# Patient Record
Sex: MALE | Race: OTHER | NOT HISPANIC OR LATINO | URBAN - METROPOLITAN AREA
[De-identification: names, ages, dates, MRNs, and addresses within clinical notes are randomized per-mention and may not be internally consistent; named-entity substitution may affect disease eponyms.]

---

## 2019-05-18 ENCOUNTER — EMERGENCY (EMERGENCY)
Facility: HOSPITAL | Age: 59
LOS: 1 days | Discharge: HOME | End: 2019-05-18
Admitting: EMERGENCY MEDICINE
Payer: MEDICARE

## 2019-05-18 VITALS
HEART RATE: 77 BPM | DIASTOLIC BLOOD PRESSURE: 85 MMHG | RESPIRATION RATE: 17 BRPM | OXYGEN SATURATION: 98 % | TEMPERATURE: 98 F | SYSTOLIC BLOOD PRESSURE: 125 MMHG

## 2019-05-18 VITALS
TEMPERATURE: 98 F | DIASTOLIC BLOOD PRESSURE: 78 MMHG | HEART RATE: 82 BPM | OXYGEN SATURATION: 99 % | RESPIRATION RATE: 16 BRPM | SYSTOLIC BLOOD PRESSURE: 118 MMHG
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
## 2019-05-18 DIAGNOSIS — S01.112A LACERATION WITHOUT FOREIGN BODY OF LEFT EYELID AND PERIOCULAR AREA, INITIAL ENCOUNTER: ICD-10-CM
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
## 2019-05-18 DIAGNOSIS — R55 SYNCOPE AND COLLAPSE: ICD-10-CM

## 2019-05-18 DIAGNOSIS — Z79.899 OTHER LONG TERM (CURRENT) DRUG THERAPY: ICD-10-CM
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
## 2019-05-18 LAB
ALBUMIN SERPL ELPH-MCNC: 3.4 G/DL — SIGNIFICANT CHANGE UP (ref 3.4–5)
ALP SERPL-CCNC: 79 U/L — SIGNIFICANT CHANGE UP (ref 40–120)
ALT FLD-CCNC: 34 U/L — SIGNIFICANT CHANGE UP (ref 12–42)
ANION GAP SERPL CALC-SCNC: 5 MMOL/L — LOW (ref 9–16)
AST SERPL-CCNC: 30 U/L — SIGNIFICANT CHANGE UP (ref 15–37)
BASOPHILS NFR BLD AUTO: 0.3 % — SIGNIFICANT CHANGE UP (ref 0–2)
BILIRUB SERPL-MCNC: 0.5 MG/DL — SIGNIFICANT CHANGE UP (ref 0.2–1.2)
BUN SERPL-MCNC: 28 MG/DL — HIGH (ref 7–23)
CALCIUM SERPL-MCNC: 9 MG/DL — SIGNIFICANT CHANGE UP (ref 8.5–10.5)
CHLORIDE SERPL-SCNC: 102 MMOL/L — SIGNIFICANT CHANGE UP (ref 96–108)
CO2 SERPL-SCNC: 31 MMOL/L — SIGNIFICANT CHANGE UP (ref 22–31)
CREAT SERPL-MCNC: 1.82 MG/DL — HIGH (ref 0.5–1.3)
EOSINOPHIL NFR BLD AUTO: 0.8 % — SIGNIFICANT CHANGE UP (ref 0–6)
GLUCOSE SERPL-MCNC: 104 MG/DL — HIGH (ref 70–99)
HCT VFR BLD CALC: 48.5 % — SIGNIFICANT CHANGE UP (ref 39–50)
HGB BLD-MCNC: 16.3 G/DL — SIGNIFICANT CHANGE UP (ref 13–17)
IMM GRANULOCYTES NFR BLD AUTO: 0.3 % — SIGNIFICANT CHANGE UP (ref 0–1.5)
LYMPHOCYTES # BLD AUTO: 19.1 % — SIGNIFICANT CHANGE UP (ref 13–44)
MCHC RBC-ENTMCNC: 29.6 PG — SIGNIFICANT CHANGE UP (ref 27–34)
MCHC RBC-ENTMCNC: 33.6 G/DL — SIGNIFICANT CHANGE UP (ref 32–36)
MCV RBC AUTO: 88 FL — SIGNIFICANT CHANGE UP (ref 80–100)
MONOCYTES NFR BLD AUTO: 10.7 % — SIGNIFICANT CHANGE UP (ref 2–14)
NEUTROPHILS NFR BLD AUTO: 68.8 % — SIGNIFICANT CHANGE UP (ref 43–77)
PLATELET # BLD AUTO: 149 K/UL — LOW (ref 150–400)
POTASSIUM SERPL-MCNC: 3.6 MMOL/L — SIGNIFICANT CHANGE UP (ref 3.5–5.3)
POTASSIUM SERPL-SCNC: 3.6 MMOL/L — SIGNIFICANT CHANGE UP (ref 3.5–5.3)
PROT SERPL-MCNC: 7.1 G/DL — SIGNIFICANT CHANGE UP (ref 6.4–8.2)
RBC # BLD: 5.51 M/UL — SIGNIFICANT CHANGE UP (ref 4.2–5.8)
RBC # FLD: 13.2 % — SIGNIFICANT CHANGE UP (ref 10.3–14.5)
SODIUM SERPL-SCNC: 138 MMOL/L — SIGNIFICANT CHANGE UP (ref 132–145)
TROPONIN I SERPL-MCNC: <0.017 NG/ML — LOW (ref 0.02–0.06)
WBC # BLD: 6.3 K/UL — SIGNIFICANT CHANGE UP (ref 3.8–10.5)
WBC # FLD AUTO: 6.3 K/UL — SIGNIFICANT CHANGE UP (ref 3.8–10.5)

## 2019-05-18 PROCEDURE — 72125 CT NECK SPINE W/O DYE: CPT | Mod: 26

## 2019-05-18 PROCEDURE — 99284 EMERGENCY DEPT VISIT MOD MDM: CPT | Mod: 25

## 2019-05-18 PROCEDURE — 12051 INTMD RPR FACE/MM 2.5 CM/<: CPT

## 2019-05-18 PROCEDURE — 70450 CT HEAD/BRAIN W/O DYE: CPT | Mod: 26

## 2019-05-18 PROCEDURE — 70486 CT MAXILLOFACIAL W/O DYE: CPT | Mod: 26

## 2019-05-18 PROCEDURE — 93010 ELECTROCARDIOGRAM REPORT: CPT | Mod: 59

## 2019-05-18 RX ORDER — SODIUM CHLORIDE 9 MG/ML
1000 INJECTION INTRAMUSCULAR; INTRAVENOUS; SUBCUTANEOUS ONCE
Refills: 0 | Status: COMPLETED | OUTPATIENT
Start: 2019-05-18 | End: 2019-05-18

## 2019-05-18 RX ADMIN — SODIUM CHLORIDE 1000 MILLILITER(S): 9 INJECTION INTRAMUSCULAR; INTRAVENOUS; SUBCUTANEOUS at 03:18

## 2019-05-18 NOTE — ED ADULT NURSE NOTE - OBJECTIVE STATEMENT
59 YO M c.o syncopal episode. pt states "I was sleeping on my couch and woke up to use the bathroom. I was walking then the next thing I know I woke up on the floor". Pt has noted bruising to left eye and laceration to left eyebrow noted. Pt denies CP< SOB,n/v/d/fever/chills, HA, blurred vision at this time. Pt states incident happened at 530 am yesterday and after it happened he went back to sleep but during the day the cut kept opening so he came in for stitches.

## 2019-05-18 NOTE — ED PROVIDER NOTE - ENMT, MLM
Airway patent, bruising below left eye with 1.5cm laceration to left eyebrow medially, no tenderness.

## 2019-05-18 NOTE — ED ADULT NURSE NOTE - CHIEF COMPLAINT QUOTE
pt arrived ambulatory complaining of syncopal episode with fall yesterday morning. Noted with bruising to left eye/laceration to left eyebrow. Pt states he initially did not want to come to the ED but a friend advised him to. Denies chest pain/SOB. Takes aspirin daily.

## 2019-05-18 NOTE — ED PROVIDER NOTE - OBJECTIVE STATEMENT
59yo M with h/o HIV on meds with undetectable VL presents c/o laceration to left eyebrow. pt states he woke up this morning (16 hours ago) and stood up quickly and then became lightheaded and had a syncopal episode. pt describes falling and striking his left eyebrow on the dining room table. pt states he came to and was on the floor. pt cleaned the wound with soap and water and applied a bandage but continues to have bleeding. 59yo M with h/o HIV on meds with undetectable VL presents c/o laceration to left eyebrow. pt states he woke up this morning (16 hours ago) and stood up quickly and then became lightheaded and had a syncopal episode. pt describes falling and striking his left eyebrow on the dining room table. pt states he came to and was on the floor. pt cleaned the wound with soap and water and applied a bandage but continues to have bleeding. denies headache, vision changes, nausea, vomiting, numbness, tingling, weakness, slurred speech, ambulation changes. tetanus utd.

## 2019-05-18 NOTE — ED PROVIDER NOTE - NSFOLLOWUPINSTRUCTIONS_ED_ALL_ED_FT
KEEP WOUND CLEAN AND DRY. CHANGE DRESSING DAILY AND APPLY ANTIBIOTIC OINTMENT DAILY. OKAY TO LEAVE OPEN TO AIR AFTER 2-3 DAYS. RETURN TO SUTURE REMOVAL IN 5-7 DAYS. RETURN SOONER FOR FEVER, CHILLS, REDNESS, PUS DRAINAGE, HEADACHES, VISION CHANGES, NAUSEA, VOMITING, ANY OTHER CONCERNS.     BECAUSE YOUR WOUND WAS OPEN SEVERAL HOURS BEFORE YOU CAME TO THE ER, TAKE ANTIBIOTICS AS PRESCRIBED TO PREVENT INFECTION. KEEP WOUND CLEAN AND DRY. CHANGE DRESSING DAILY AND APPLY ANTIBIOTIC OINTMENT DAILY. OKAY TO LEAVE OPEN TO AIR AFTER 2-3 DAYS. RETURN TO SUTURE REMOVAL IN 5-7 DAYS. RETURN SOONER FOR FEVER, CHILLS, REDNESS, PUS DRAINAGE, HEADACHES, VISION CHANGES, NAUSEA, VOMITING, ANY OTHER CONCERNS.     BECAUSE YOUR WOUND WAS OPEN SEVERAL HOURS BEFORE YOU CAME TO THE ER, TAKE ANTIBIOTICS AS PRESCRIBED TO PREVENT INFECTION.    DISCUSS YOUR EKG AND YOUR LABS WITH YOUR PMD AT YOUR APPOINTMENT NEXT WEEK.

## 2019-05-18 NOTE — ED ADULT NURSE REASSESSMENT NOTE - NS ED NURSE REASSESS COMMENT FT1
patient resting in stretcher, nad able to ambulate to bathroom with steady gait. AOx3, plan of care explained, will continue to monitor.

## 2019-05-18 NOTE — ED PROVIDER NOTE - CLINICAL SUMMARY MEDICAL DECISION MAKING FREE TEXT BOX
pt presents with LOC and fall with eyebrow laceration 16 hours ago. labs shows elevated cr - pt given IVF and instructed to f/u with his pmd. he has an appointment next week. pt also given copy of EKG to discuss AV block and RBBB with his PMD. CTH/max/face/cervical nonacute. laceration repaired as per procedure note. will d/c. pt presents with LOC and fall with eyebrow laceration 16 hours ago. labs shows elevated cr - pt given IVF and instructed to f/u with his pmd. he has an appointment next week. pt also given copy of EKG to discuss AV block and RBBB with his PMD. pt given copy of labs and instructed to f/u regarding elevated cr. CTH/max/face/cervical nonacute. laceration repaired as per procedure note. will d/c.

## 2019-05-18 NOTE — ED ADULT TRIAGE NOTE - CHIEF COMPLAINT QUOTE
pt arrived ambulatory complaining of syncopal episode with fall yesterday morning. Noted with laceration to left eyebrow. Pt states he initially did not want to come to the ED but a friend advised him to. Denies chest pain/SOB. Takes aspirin daily. pt arrived ambulatory complaining of syncopal episode with fall yesterday morning. Noted with bruising to left eye/laceration to left eyebrow. Pt states he initially did not want to come to the ED but a friend advised him to. Denies chest pain/SOB. Takes aspirin daily.

## 2019-07-21 ENCOUNTER — EMERGENCY (EMERGENCY)
Facility: HOSPITAL | Age: 59
LOS: 1 days | Discharge: HOME | End: 2019-07-21
Admitting: EMERGENCY MEDICINE
Payer: MEDICARE

## 2019-07-21 ENCOUNTER — EMERGENCY (EMERGENCY)
Facility: HOSPITAL | Age: 59
LOS: 1 days | Discharge: HOME | End: 2019-07-21
Attending: EMERGENCY MEDICINE | Admitting: EMERGENCY MEDICINE
Payer: MEDICARE

## 2019-07-21 VITALS
WEIGHT: 179.9 LBS | RESPIRATION RATE: 18 BRPM | HEART RATE: 75 BPM | TEMPERATURE: 98 F | HEIGHT: 69 IN | OXYGEN SATURATION: 98 % | SYSTOLIC BLOOD PRESSURE: 128 MMHG | DIASTOLIC BLOOD PRESSURE: 81 MMHG

## 2019-07-21 VITALS
DIASTOLIC BLOOD PRESSURE: 86 MMHG | WEIGHT: 169.98 LBS | TEMPERATURE: 98 F | SYSTOLIC BLOOD PRESSURE: 144 MMHG | OXYGEN SATURATION: 98 % | HEART RATE: 63 BPM | RESPIRATION RATE: 17 BRPM

## 2019-07-21 VITALS
DIASTOLIC BLOOD PRESSURE: 78 MMHG | RESPIRATION RATE: 20 BRPM | HEART RATE: 82 BPM | TEMPERATURE: 99 F | OXYGEN SATURATION: 98 % | SYSTOLIC BLOOD PRESSURE: 119 MMHG

## 2019-07-21 VITALS
OXYGEN SATURATION: 95 % | RESPIRATION RATE: 18 BRPM | DIASTOLIC BLOOD PRESSURE: 85 MMHG | TEMPERATURE: 98 F | HEART RATE: 53 BPM | SYSTOLIC BLOOD PRESSURE: 141 MMHG

## 2019-07-21 LAB
ALBUMIN SERPL ELPH-MCNC: 3.9 G/DL — SIGNIFICANT CHANGE UP (ref 3.4–5)
ALP SERPL-CCNC: 79 U/L — SIGNIFICANT CHANGE UP (ref 40–120)
ALT FLD-CCNC: 32 U/L — SIGNIFICANT CHANGE UP (ref 12–42)
ANION GAP SERPL CALC-SCNC: 6 MMOL/L — LOW (ref 9–16)
AST SERPL-CCNC: 26 U/L — SIGNIFICANT CHANGE UP (ref 15–37)
BASOPHILS NFR BLD AUTO: 0.3 % — SIGNIFICANT CHANGE UP (ref 0–2)
BILIRUB SERPL-MCNC: 0.5 MG/DL — SIGNIFICANT CHANGE UP (ref 0.2–1.2)
BUN SERPL-MCNC: 21 MG/DL — SIGNIFICANT CHANGE UP (ref 7–23)
CALCIUM SERPL-MCNC: 10.4 MG/DL — SIGNIFICANT CHANGE UP (ref 8.5–10.5)
CHLORIDE SERPL-SCNC: 107 MMOL/L — SIGNIFICANT CHANGE UP (ref 96–108)
CO2 SERPL-SCNC: 30 MMOL/L — SIGNIFICANT CHANGE UP (ref 22–31)
CREAT SERPL-MCNC: 1.28 MG/DL — SIGNIFICANT CHANGE UP (ref 0.5–1.3)
EOSINOPHIL NFR BLD AUTO: 2.1 % — SIGNIFICANT CHANGE UP (ref 0–6)
GLUCOSE SERPL-MCNC: 76 MG/DL — SIGNIFICANT CHANGE UP (ref 70–99)
HCT VFR BLD CALC: 46.3 % — SIGNIFICANT CHANGE UP (ref 39–50)
HGB BLD-MCNC: 15.7 G/DL — SIGNIFICANT CHANGE UP (ref 13–17)
IMM GRANULOCYTES NFR BLD AUTO: 0.3 % — SIGNIFICANT CHANGE UP (ref 0–1.5)
LYMPHOCYTES # BLD AUTO: 25.9 % — SIGNIFICANT CHANGE UP (ref 13–44)
MCHC RBC-ENTMCNC: 29.1 PG — SIGNIFICANT CHANGE UP (ref 27–34)
MCHC RBC-ENTMCNC: 33.9 G/DL — SIGNIFICANT CHANGE UP (ref 32–36)
MCV RBC AUTO: 85.7 FL — SIGNIFICANT CHANGE UP (ref 80–100)
MONOCYTES NFR BLD AUTO: 10.5 % — SIGNIFICANT CHANGE UP (ref 2–14)
NEUTROPHILS NFR BLD AUTO: 60.9 % — SIGNIFICANT CHANGE UP (ref 43–77)
PLATELET # BLD AUTO: 202 K/UL — SIGNIFICANT CHANGE UP (ref 150–400)
POTASSIUM SERPL-MCNC: 5 MMOL/L — SIGNIFICANT CHANGE UP (ref 3.5–5.3)
POTASSIUM SERPL-SCNC: 5 MMOL/L — SIGNIFICANT CHANGE UP (ref 3.5–5.3)
PROT SERPL-MCNC: 8.1 G/DL — SIGNIFICANT CHANGE UP (ref 6.4–8.2)
RBC # BLD: 5.4 M/UL — SIGNIFICANT CHANGE UP (ref 4.2–5.8)
RBC # FLD: 14.5 % — SIGNIFICANT CHANGE UP (ref 10.3–14.5)
SODIUM SERPL-SCNC: 143 MMOL/L — SIGNIFICANT CHANGE UP (ref 132–145)
WBC # BLD: 5.8 K/UL — SIGNIFICANT CHANGE UP (ref 3.8–10.5)
WBC # FLD AUTO: 5.8 K/UL — SIGNIFICANT CHANGE UP (ref 3.8–10.5)

## 2019-07-21 PROCEDURE — 99284 EMERGENCY DEPT VISIT MOD MDM: CPT

## 2019-07-21 RX ORDER — CEFTRIAXONE 500 MG/1
1000 INJECTION, POWDER, FOR SOLUTION INTRAMUSCULAR; INTRAVENOUS ONCE
Refills: 0 | Status: COMPLETED | OUTPATIENT
Start: 2019-07-21 | End: 2019-07-21

## 2019-07-21 RX ORDER — VANCOMYCIN HCL 1 G
1000 VIAL (EA) INTRAVENOUS EVERY 12 HOURS
Refills: 0 | Status: DISCONTINUED | OUTPATIENT
Start: 2019-07-21 | End: 2019-07-21

## 2019-07-21 RX ORDER — VANCOMYCIN HCL 1 G
1250 VIAL (EA) INTRAVENOUS EVERY 12 HOURS
Refills: 0 | Status: DISCONTINUED | OUTPATIENT
Start: 2019-07-21 | End: 2019-07-25

## 2019-07-21 RX ORDER — VANCOMYCIN HCL 1 G
1250 VIAL (EA) INTRAVENOUS ONCE
Refills: 0 | Status: COMPLETED | OUTPATIENT
Start: 2019-07-21 | End: 2019-07-21

## 2019-07-21 RX ADMIN — Medication 1250 MILLIGRAM(S): at 05:43

## 2019-07-21 RX ADMIN — Medication 166.67 MILLIGRAM(S): at 03:53

## 2019-07-21 RX ADMIN — CEFTRIAXONE 100 MILLIGRAM(S): 500 INJECTION, POWDER, FOR SOLUTION INTRAMUSCULAR; INTRAVENOUS at 03:08

## 2019-07-21 RX ADMIN — Medication 166.67 MILLIGRAM(S): at 15:03

## 2019-07-21 RX ADMIN — CEFTRIAXONE 1000 MILLIGRAM(S): 500 INJECTION, POWDER, FOR SOLUTION INTRAMUSCULAR; INTRAVENOUS at 03:54

## 2019-07-21 NOTE — ED PROVIDER NOTE - SKIN, MLM
1cm area of fluctuance with minimal purulent drainage with +1 pitting edema to the dorsum of foot up to the mid calf. 12cm of erythema. Lesion on the dorsum of ankle. Bruising over medial aspect of the ankle but nontender to palpation. 1cm area of fluctuance to dorsum of left ankle with minimal purulent drainage with +1 pitting edema to the dorsum of foot up to 1/3 the mid calf. 12cm of erythema, well demarcated. Bruising over medial aspect of the ankle but nontender to palpation.

## 2019-07-21 NOTE — ED PROVIDER NOTE - CLINICAL SUMMARY MEDICAL DECISION MAKING FREE TEXT BOX
Pt here for wound check, VSS. Pt reports improvement in swelling and pain to left foot. Pt has received IV abx, will return at 3am and follow up with ID tomorrow and still refusing admission.

## 2019-07-21 NOTE — ED PROVIDER NOTE - CLINICAL SUMMARY MEDICAL DECISION MAKING FREE TEXT BOX
pt presents with significant cellulitis to left lower extremity stemming from a central fluctuant lesion to posterior aspect of left ankle. Dr. Kennedy attempted to drain fluctuant area but the tissue was significantly macerated and drainage was already occurring. while exploring the wound, the base of the ulceration reveals the tendon. ortho consulted who report this does not require an immediate wash out but rather agree with plan for IV abx and recommend closure by plastics after wound has healed some. pt given vanco and ceftriaxone and advised admission which he declined, understanding the risks of worsening infection and possible permanent damage. pt states he will return for 2nd dose of vanco tomorrow night and then will proceed for admission at [facility] where his ID doctor is. pt lives locally and given strict return precautions. VSS. labs unremarkable.

## 2019-07-21 NOTE — ED PROVIDER NOTE - OBJECTIVE STATEMENT
58 y.o M with PMHx throat cancer and skin cancer (in remission) and HIV on meds with undetectable VL presents to the ED with abscess on left ankle x1 week. Pt states 1 week ago he noticed a bump on his left foot which he initially believed it be a mosquito bite. When he noticed it was an abscess, he went to PCP who rx him Bactrim. Pt on his 5th day of Bactrim with no relief of sx. Pt now with redness, swelling and pain to the left foot and ankle. He notes having a history of abscesses and states Bactrim does not relieve sx. Denies recent trauma or injury to the foot. Denies fever, chills, N/V, weakness, numbness or tingling to lower extremities. 58 y.o M with PMHx throat cancer and skin cancer (in remission) and HIV on meds with undetectable VL presents to the ED with abscess on left ankle x1 week. Pt states 1 week ago he noticed a bump on his left foot which he initially believed it be a mosquito bite. When he noticed it was an abscess, he went to PCP who rx him Bactrim. Pt on his 5th day of Bactrim with no relief of sx. Pt now with redness, swelling and pain to the left foot and ankle. 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 Denies recent trauma or injury to the foot. Denies fever, chills, N/V, weakness, numbness or tingling to lower extremities.

## 2019-07-21 NOTE — ED PROVIDER NOTE - SKIN, MLM
Removed dressing of left ankle. No purulent drainage, no erythema, no new areas of fluctuance, and not warm to touch. Good pulses. neurovascularly intact

## 2019-07-21 NOTE — ED PROVIDER NOTE - OBJECTIVE STATEMENT
57 y/o M with PMHx throat cancer and skin cancer (in remission) and HIV on meds with undetectable VL presents to the ED for IV abx. Pt was initially seen her for an abscess to left ankle. He returns today for IV antibiotics for left heel cellulitis. Pt reports feeling better, and foot has significantly improved. No fever, and chills. He is now able to ambulate. Pt is still denying for admission for IV antibiotics. He will follow up for ID doctor tomorrow for possible admission at [facility]. Pt will return at 3am for 2nd round of antibiotics.

## 2019-07-21 NOTE — ED ADULT NURSE NOTE - OBJECTIVE STATEMENT
pt has been on po abs finishing yesterday for what he tought was mosquito bite to heel, now infection tracking up leg, came to the er, nil fevers

## 2019-07-21 NOTE — ED ADULT NURSE REASSESSMENT NOTE - NS ED NURSE REASSESS COMMENT FT1
pt requesting to go home and return for his next dose of ivabs, both Dr antonio and Memo Mahoney aware

## 2019-07-21 NOTE — ED ADULT TRIAGE NOTE - CHIEF COMPLAINT QUOTE
BIBA ambulatory, complaining of abscess to left ankle. Pt states he thought it was a mosquito bite and went to his PCP; was prescribed with bactrim and on his 5th day. Pt complains of worsening pain, redness and swelling to affected ankle.

## 2019-07-21 NOTE — ED ADULT TRIAGE NOTE - WEIGHT IN LBS
Vascular Surgery Consult Note  Pager [phone]    HPI:  75-year-old with atrial fibrillation on coumadin s/p right tibial embolectomy in October 2018 for acute RLE ischemia in setting of AC withdrawal for spine surgery, c/b compartment syndrome with RTOR on POD#2 for 4-compartment fasciotomies, now presents with bilateral LE swelling. Patient went to see JOEL Motta, who grafted the fasciotomy sites and was sent to the ED given concerns of leg swelling. Patient currently ambulates with a cane. Has neuropathic pain for which he sees a neurologist but has refused to start on pain medications.  No known history of CHF, but has had some shortness of breath and orthopnea recently.      PAST MEDICAL & SURGICAL HISTORY:  Exposure to toxin: 9/11   Spondylolisthesis  Spinal stenosis: L3-L4  CVA (cerebral vascular accident): 1/2018 - attributed to no AC for 11 days preop/postop spine surgery - presented to ED with slurred speech, residual ST memory loss, per pt  Osteoarthritis  BPH (benign prostatic hyperplasia)  MOE (obstructive sleep apnea): positive sleep study many years ago, was recommended to use CPAP, patient non-compliant  GERD (gastroesophageal reflux disease)  HTN (hypertension)  Atrial fibrillation: over 20 years  Hodgkin lymphoma: 1975  History of lumbar spinal fusion: 1/5/2018  History of cardioversion: for AF - &quot;many years ago&quot; - unsuccessful  S/P hip replacement, right: 2014  S/P splenectomy: 1975      ALLERGIES:  NKA      HOME MEDICATIONS:  aspirin 81 mg oral delayed release tablet: 1 tab(s) orally once a day (15 Nov 2018 16:26)  atenolol 25 mg oral tablet: 1 tab(s) orally once a day (15 Nov 2018 16:26)  docusate sodium 100 mg oral capsule: 1 cap(s) orally once a day (15 Nov 2018 16:26)  fluticasone 50 mcg/inh nasal spray: 1 spray(s) nasal once a day (15 Nov 2018 16:26)  NIFEdipine 60 mg oral tablet, extended release: 1 tab(s) orally once a day (15 Nov 2018 16:26)  pantoprazole 40 mg oral delayed release tablet: 1 tab(s) orally once a day (before a meal) (15 Nov 2018 16:26)  polyethylene glycol 3350 oral powder for reconstitution: 17 gram(s) orally once a day (15 Nov 2018 16:26)  senna oral tablet: 2 tab(s) orally once a day (at bedtime) (15 Nov 2018 16:26)  simvastatin 20 mg oral tablet: 1 tab(s) orally once a day (at bedtime) (15 Nov 2018 16:26)  tamsulosin 0.4 mg oral capsule: 1 cap(s) orally once a day (at bedtime) (15 Nov 2018 16:26)  warfarin 2 mg oral tablet: 1 tab(s) orally once a day.  INR Goal 2.5-3.5. (15 Nov 2018 16:26)        SOCIAL HISTORY:  Denies smoking and ETOH use. Lives with family.      FAMILY HISTORY:  No pertinent history in first degree relatives.  ___________________________________________  REVIEW OF SYSTEMS:  Constitutional: No fevers, chills, no recent weight loss  ENMT: No changes in hearing, no changes in vision, no sore throat, no cough  Respiratory: No shortness of breath  Cardiovascular: No chest pain, palpitations  Gastrointestinal: No abdominal pain, no diarrhea/constipation  Genitourinary: No dysuria, frequency, or urgency    Extremities: No joint swelling, no limited range of movement  Neurological: No paresthesia  Skin: No rashes  ___________________________________________  PHYSICAL EXAM:  Vital Signs Last 24 Hrs  T(C): 36.6 (05 Feb 2019 12:09), Max: 36.6 (05 Feb 2019 12:09)  T(F): 97.9 (05 Feb 2019 12:09), Max: 97.9 (05 Feb 2019 12:09)  HR: 68 (05 Feb 2019 12:09) (68 - 68)  BP: 163/74 (05 Feb 2019 12:09) (163/74 - 163/74)  BP(mean): --  RR: 20 (05 Feb 2019 12:09) (20 - 20)  SpO2: 94% (05 Feb 2019 12:09) (94% - 94%)CAPILLARY BLOOD GLUCOSE      General: A&Ox3, NAD.  Neuro: Motor and sensory grossly intact with no focal deficits.  HEENT: Anicteric sclerae.  Respiratory: Unlabored breathing.   CVS: Regular rate and rhythm.  Abdomen: Soft, non-distended, non-tender.   Extremities: Warm bilaterally w/ palpable DPs. Bilateral LE swelling. RLE fasciotomy with skin grafts well-healed.  MSK: Intact ROM.  ____________________________________________  LABS:  CBC Full  -  ( 05 Feb 2019 15:20 )  WBC Count : 11.4 K/uL  Hemoglobin : 10.1 g/dL  Hematocrit : 31.6 %  Platelet Count - Automated : 524 K/uL  Mean Cell Volume : 95.7 fl  Mean Cell Hemoglobin : 30.5 pg  Mean Cell Hemoglobin Concentration : 31.8 gm/dL  Auto Neutrophil # : 6.9 K/uL  Auto Lymphocyte # : 3.0 K/uL  Auto Monocyte # : 1.0 K/uL  Auto Eosinophil # : 0.4 K/uL  Auto Basophil # : 0.1 K/uL  Auto Neutrophil % : 60.8 %  Auto Lymphocyte % : 26.8 %  Auto Monocyte % : 8.5 %  Auto Eosinophil % : 3.2 %  Auto Basophil % : 0.7 %    02-05    139  |  100  |  12  ----------------------------<  92  4.1   |  24  |  0.62    Ca    9.1      05 Feb 2019 15:20    TPro  7.4  /  Alb  3.7  /  TBili  0.5  /  DBili  x   /  AST  22  /  ALT  14  /  AlkPhos  116  02-05    LIVER FUNCTIONS - ( 05 Feb 2019 15:20 )  Alb: 3.7 g/dL / Pro: 7.4 g/dL / ALK PHOS: 116 U/L / ALT: 14 U/L / AST: 22 U/L / GGT: x           PT/INR - ( 05 Feb 2019 15:20 )   PT: 27.0 sec;   INR: 2.31 ratio         PTT - ( 05 Feb 2019 15:20 )  PTT:41.5 sec        ____________________________________________  RADIOLOGY:  VA Duplex Lower Ext Vein Scan, Bilat (02.05.19 @ 17:47)   There is normal compressibility of the bilateral common femoral, femoral   and popliteal veins. No calf vein thrombosis is detected.    Doppler examination shows normal spontaneous and phasic flow.    Bilateral benignappearing groin lymph nodes.    Subcutaneous edema in the calves.    IMPRESSION:     No evidence of bilateral lower extremity deep venous thrombosis. 169.9

## 2019-07-21 NOTE — ED PROVIDER NOTE - NSFOLLOWUPINSTRUCTIONS_ED_ALL_ED_FT
RETURN AT 2AM TONIGHT FOR REPEAT DOSE OF ANTIBIOTICS.     AVOID WALKING ON THE LEG AS THIS CAN INCREASE RISK OF TENDON DAMAGE.     KEEP LEG ELEVATED.     OKAY TO TAKE TYLENOL OR MOTRIN ACCORDING TO PACKAGE INSTRUCTIONS FOR PAIN. TAKE WITH FOOD.     RETURN TO ER FOR ANY NEW OR CONCERNING SYMPTOMS INCLUDING FEVER, CHILLS, NAUSEA, VOMITING, DIZZINESS, INCREASED REDNESS, ANY OTHER CONCERNS.

## 2019-07-21 NOTE — ED PROVIDER NOTE - ATTENDING CONTRIBUTION TO CARE
58 yom pw cellulitis.  pt states took 5 days of bactrim w/o significant improvement.  initially was a bug bite.  hx of HIV, complaint w/ meds, CD4 250+ last check up.  pt noted a bump above the bug bite, w/ oozing, and skin breakdown.  no fc.      agree w/ PA, swelling across dorsum of proximal L foot, erythema from base of the heel to distal 1/3 of calf, appears somewhat demarcated, flat, no lymphatic streaking.  noted skin abrasion above the area of achilles tendon insertion (pt states that's the initial bug bite), above which sits an area of fluctuance and cyst like structure, w/ ulcerated base and skin breakdown in the center, no active drainage or bleeding.    wound was gently debrided at bedside, w/ a small superficial incision made after local anesthetics, no significant purulent drainage, white band-like structure noted which is likely the achilles tendon.    d/w pt regarding admission for iv abx, wound care, pt declines, seeking to go home, states will return in the afternoon for 2nd dose of iv abx.  pt states will go follow up with his doctor at [facility], who's an infectious disease specialist.  pt appears reliable, understands rationale of admission for further care, understands the risk of leaving, including worsening symptoms, worsening infection, including deeper infection since there's skin breakdown and exposure of deeper soft tissue.  pt ao x 4, appears to have capacity to make decision, seeking dc, appears reliable, will return for additional iv abx this afternoon and follow up with PMD.

## 2019-07-21 NOTE — ED ADULT NURSE NOTE - NSIMPLEMENTINTERV_GEN_ALL_ED
Implemented All Universal Safety Interventions:  [place] to call system. Call bell, personal items and telephone within reach. Instruct patient to call for assistance. Room bathroom lighting operational. Non-slip footwear when patient is off stretcher. Physically safe environment: no spills, clutter or unnecessary equipment. Stretcher in lowest position, wheels locked, appropriate side rails in place.

## 2019-07-22 ENCOUNTER — EMERGENCY (EMERGENCY)
Facility: HOSPITAL | Age: 59
LOS: 1 days | Discharge: HOME | End: 2019-07-22
Attending: EMERGENCY MEDICINE | Admitting: EMERGENCY MEDICINE
Payer: MEDICARE

## 2019-07-22 VITALS
DIASTOLIC BLOOD PRESSURE: 80 MMHG | TEMPERATURE: 98 F | RESPIRATION RATE: 20 BRPM | SYSTOLIC BLOOD PRESSURE: 129 MMHG | HEART RATE: 49 BPM | OXYGEN SATURATION: 93 %

## 2019-07-22 VITALS
TEMPERATURE: 98 F | OXYGEN SATURATION: 95 % | HEART RATE: 76 BPM | DIASTOLIC BLOOD PRESSURE: 81 MMHG | RESPIRATION RATE: 19 BRPM | SYSTOLIC BLOOD PRESSURE: 123 MMHG

## 2019-07-22 DIAGNOSIS — Z48.00 ENCOUNTER FOR CHANGE OR REMOVAL OF NONSURGICAL WOUND DRESSING: ICD-10-CM

## 2019-07-22 DIAGNOSIS — L03.116 CELLULITIS OF LEFT LOWER LIMB: ICD-10-CM

## 2019-07-22 PROBLEM — L02.91 CUTANEOUS ABSCESS, UNSPECIFIED: Chronic | Status: ACTIVE | Noted: 2019-07-21

## 2019-07-22 PROBLEM — C14.0 MALIGNANT NEOPLASM OF PHARYNX, UNSPECIFIED: Chronic | Status: ACTIVE | Noted: 2019-07-21

## 2019-07-22 LAB
ANION GAP SERPL CALC-SCNC: 10 MMOL/L — SIGNIFICANT CHANGE UP (ref 9–16)
BASOPHILS NFR BLD AUTO: 0.8 % — SIGNIFICANT CHANGE UP (ref 0–2)
BUN SERPL-MCNC: 23 MG/DL — SIGNIFICANT CHANGE UP (ref 7–23)
CALCIUM SERPL-MCNC: 9.7 MG/DL — SIGNIFICANT CHANGE UP (ref 8.5–10.5)
CHLORIDE SERPL-SCNC: 104 MMOL/L — SIGNIFICANT CHANGE UP (ref 96–108)
CO2 SERPL-SCNC: 28 MMOL/L — SIGNIFICANT CHANGE UP (ref 22–31)
CREAT SERPL-MCNC: 1.31 MG/DL — HIGH (ref 0.5–1.3)
EOSINOPHIL NFR BLD AUTO: 3 % — SIGNIFICANT CHANGE UP (ref 0–6)
GLUCOSE SERPL-MCNC: 75 MG/DL — SIGNIFICANT CHANGE UP (ref 70–99)
HCT VFR BLD CALC: 45.3 % — SIGNIFICANT CHANGE UP (ref 39–50)
HGB BLD-MCNC: 15.2 G/DL — SIGNIFICANT CHANGE UP (ref 13–17)
IMM GRANULOCYTES NFR BLD AUTO: 0.8 % — SIGNIFICANT CHANGE UP (ref 0–1.5)
LYMPHOCYTES # BLD AUTO: 25 % — SIGNIFICANT CHANGE UP (ref 13–44)
MCHC RBC-ENTMCNC: 28.8 PG — SIGNIFICANT CHANGE UP (ref 27–34)
MCHC RBC-ENTMCNC: 33.6 G/DL — SIGNIFICANT CHANGE UP (ref 32–36)
MCV RBC AUTO: 86 FL — SIGNIFICANT CHANGE UP (ref 80–100)
MONOCYTES NFR BLD AUTO: 11.1 % — SIGNIFICANT CHANGE UP (ref 2–14)
NEUTROPHILS NFR BLD AUTO: 59.3 % — SIGNIFICANT CHANGE UP (ref 43–77)
PLATELET # BLD AUTO: 198 K/UL — SIGNIFICANT CHANGE UP (ref 150–400)
POTASSIUM SERPL-MCNC: 4.3 MMOL/L — SIGNIFICANT CHANGE UP (ref 3.5–5.3)
POTASSIUM SERPL-SCNC: 4.3 MMOL/L — SIGNIFICANT CHANGE UP (ref 3.5–5.3)
RBC # BLD: 5.27 M/UL — SIGNIFICANT CHANGE UP (ref 4.2–5.8)
RBC # FLD: 14.1 % — SIGNIFICANT CHANGE UP (ref 10.3–14.5)
SODIUM SERPL-SCNC: 142 MMOL/L — SIGNIFICANT CHANGE UP (ref 132–145)
WBC # BLD: 4 K/UL — SIGNIFICANT CHANGE UP (ref 3.8–10.5)
WBC # FLD AUTO: 4 K/UL — SIGNIFICANT CHANGE UP (ref 3.8–10.5)

## 2019-07-22 PROCEDURE — 99284 EMERGENCY DEPT VISIT MOD MDM: CPT

## 2019-07-22 RX ORDER — VANCOMYCIN HCL 1 G
1250 VIAL (EA) INTRAVENOUS ONCE
Refills: 0 | Status: COMPLETED | OUTPATIENT
Start: 2019-07-22 | End: 2019-07-22

## 2019-07-22 RX ORDER — CEFTRIAXONE 500 MG/1
1000 INJECTION, POWDER, FOR SOLUTION INTRAMUSCULAR; INTRAVENOUS ONCE
Refills: 0 | Status: COMPLETED | OUTPATIENT
Start: 2019-07-22 | End: 2019-07-22

## 2019-07-22 RX ORDER — CEFPODOXIME PROXETIL 100 MG
1 TABLET ORAL
Qty: 28 | Refills: 0
Start: 2019-07-22 | End: 2019-08-04

## 2019-07-22 RX ADMIN — Medication 166.67 MILLIGRAM(S): at 03:57

## 2019-07-22 RX ADMIN — CEFTRIAXONE 100 MILLIGRAM(S): 500 INJECTION, POWDER, FOR SOLUTION INTRAMUSCULAR; INTRAVENOUS at 03:58

## 2019-07-22 RX ADMIN — CEFTRIAXONE 1000 MILLIGRAM(S): 500 INJECTION, POWDER, FOR SOLUTION INTRAMUSCULAR; INTRAVENOUS at 04:01

## 2019-07-22 NOTE — ED PROVIDER NOTE - OBJECTIVE STATEMENT
58 yom pw rpt evaluation for cellulitis and wound ulcer.  rec'd 2nd dose of iv abx this afternoon.  pt will see his PMD who's an ID specialist today morning.  there is available walk ins.  pt reports feeling improved w/ swelling and pain.  no bleeding.  no fc

## 2019-07-22 NOTE — ED ADULT NURSE NOTE - OBJECTIVE STATEMENT
pt return to the er for further ivabs as per yesterday, for cellulitis to heel, marked cellulitis area is looking better than yesterday, no pain for patient, walked into dept denies fevers

## 2019-07-22 NOTE — ED PROVIDER NOTE - CLINICAL SUMMARY MEDICAL DECISION MAKING FREE TEXT BOX
marked improvement of cellulitis, will give iv abx, d/w to f/u PMD tomorrow, PO abx ordered but instructed pt to discuss w/ PMD first before starting them.  strict return precautions given

## 2019-07-22 NOTE — ED PROVIDER NOTE - NSFOLLOWUPINSTRUCTIONS_ED_ALL_ED_FT
Follow up with your primary care doctor TODAY  Please discuss with him about the antibiotics regimen  Return immediately for any new or worsening symptoms or any new concerns

## 2019-07-22 NOTE — ED PROVIDER NOTE - PHYSICAL EXAMINATION
CON: ao x 3, HENMT: clear oropharynx, soft neck, HEAD: atraumatic, SKIN: improved erythema compared to prior (I saw pt during his first ED visit), still noted the wound ulcer which appeared less fluctuant, pedal pulses palpable, cap refill < 2 sec, no lymphatic streaking, MSK: full dorsiplantarflexion, improved swelling to base and midfoot, no crepitus noted to LLE

## 2019-07-25 DIAGNOSIS — L02.416 CUTANEOUS ABSCESS OF LEFT LOWER LIMB: ICD-10-CM

## 2019-07-25 DIAGNOSIS — L03.116 CELLULITIS OF LEFT LOWER LIMB: ICD-10-CM

## 2019-12-10 ENCOUNTER — EMERGENCY (EMERGENCY)
Facility: HOSPITAL | Age: 59
LOS: 1 days | Discharge: HOME | End: 2019-12-10
Attending: EMERGENCY MEDICINE | Admitting: EMERGENCY MEDICINE
Payer: MEDICARE

## 2019-12-10 VITALS
OXYGEN SATURATION: 97 % | HEART RATE: 86 BPM | DIASTOLIC BLOOD PRESSURE: 80 MMHG | SYSTOLIC BLOOD PRESSURE: 130 MMHG | RESPIRATION RATE: 18 BRPM | TEMPERATURE: 98 F

## 2019-12-10 VITALS
HEIGHT: 70 IN | DIASTOLIC BLOOD PRESSURE: 82 MMHG | OXYGEN SATURATION: 96 % | HEART RATE: 70 BPM | SYSTOLIC BLOOD PRESSURE: 121 MMHG | RESPIRATION RATE: 16 BRPM | TEMPERATURE: 98 F | WEIGHT: 175.05 LBS

## 2019-12-10 PROCEDURE — 71046 X-RAY EXAM CHEST 2 VIEWS: CPT | Mod: 26

## 2019-12-10 PROCEDURE — 99283 EMERGENCY DEPT VISIT LOW MDM: CPT | Mod: 25

## 2019-12-10 RX ORDER — AZITHROMYCIN 500 MG/1
1 TABLET, FILM COATED ORAL
Qty: 5 | Refills: 0
Start: 2019-12-10 | End: 2020-03-19

## 2019-12-10 RX ORDER — CEFDINIR 250 MG/5ML
1 POWDER, FOR SUSPENSION ORAL
Qty: 20 | Refills: 0
Start: 2019-12-10 | End: 2019-12-19

## 2019-12-10 RX ORDER — AZITHROMYCIN 500 MG/1
1 TABLET, FILM COATED ORAL
Qty: 5 | Refills: 0
Start: 2019-12-10 | End: 2019-12-14

## 2019-12-10 RX ORDER — CEFDINIR 250 MG/5ML
1 POWDER, FOR SUSPENSION ORAL
Qty: 20 | Refills: 0
Start: 2019-12-10 | End: 2020-03-24

## 2019-12-10 NOTE — ED PROVIDER NOTE - CLINICAL SUMMARY MEDICAL DECISION MAKING FREE TEXT BOX
58 y/o M with hx of HIV, undetectable viral load and T cells chronically 200-300,  and throat cancer s/p resection and radiation (in remission) presents to the ED c/o productive cough with green sputum and chest pain associated with cough x2 days. Exam unremarkable. Will order CXR. 60 y/o M with hx of HIV, undetectable viral load and T cells chronically 200-300,  and throat cancer s/p resection and radiation (in remission) presents to the ED c/o productive cough with green sputum and chest pain associated with cough x2 days. Exam unremarkable. + infiltrate R middle lobe, low CURB65, afebrile, will d/c home on cephalosporin + azithromycin, f/u with PMD later this week. discussed return precautions.

## 2019-12-10 NOTE — ED PROVIDER NOTE - OBJECTIVE STATEMENT
60 y/o M with hx of HIV, undetectable viral load and T cells chronically 200-300,  and throat cancer s/p resection and radiation (in remission) presents to the ED c/o productive cough with green sputum and chest pain associated with cough x2 days. Pt endorses chills, but no fever. Received flu shot this year. PMD out of town. Denies myalgias, sore throat or headache.

## 2019-12-10 NOTE — ED PROVIDER NOTE - PATIENT PORTAL LINK FT
You can access the FollowMyHealth Patient Portal offered by [facility] by registering at the following website: [email or web address]. By joining [email or web address]’s FollowMyHealth portal, you will also be able to view your health information using other applications (apps) compatible with our system.

## 2019-12-10 NOTE — ED ADULT NURSE NOTE - NSIMPLEMENTINTERV_GEN_ALL_ED
Implemented All Universal Safety Interventions:  [place] to call system. Call bell, personal items and telephone within reach. Instruct patient to call for assistance. Room bathroom lighting operational. Non-slip footwear when patient is off stretcher. Physically safe environment: no spills, clutter or unnecessary equipment. Stretcher in lowest position, wheels locked, appropriate side rails in place.

## 2019-12-10 NOTE — ED PROVIDER NOTE - NSFOLLOWUPINSTRUCTIONS_ED_ALL_ED_FT
Log Out.    [employer] CareNotes®     :  [facility]             PNEUMONIA - AfterCare(R) Instructions(ER/ED)     Pneumonia    WHAT YOU NEED TO KNOW:    Pneumonia is an infection in your lungs caused by bacteria, viruses, fungi, or parasites. You can become infected if you come in contact with someone who is sick. You can get pneumonia if you recently had surgery or needed a ventilator to help you breathe. Pneumonia can also be caused by accidentally inhaling saliva or small pieces of food. Pneumonia may cause mild symptoms, or it can be severe and life-threatening. The Lungs         DISCHARGE INSTRUCTIONS:    Return to the emergency department if:     You cough up blood.       Your heart beats more than 100 beats in 1 minute.       You are very tired, confused, and cannot think clearly.      You have chest pain or trouble breathing.       Your lips or fingernails turn gray or blue.     Contact your healthcare provider if:     Your symptoms are the same or get worse 48 hours after you start antibiotics.      Your fever is not below 99°F (37.2°C) 48 hours after you start antibiotics.       You have a fever higher than 101°F (38.3°C).       You cannot eat, or you have loss of appetite, nausea, or are vomiting.      You have questions or concerns about your condition or care.    Medicines:     Antibiotics treat pneumonia caused by bacteria.      Acetaminophen decreases pain and fever. It is available without a doctor's order. Ask how much to take and how often to take it. Follow directions. Read the labels of all other medicines you are using to see if they also contain acetaminophen, or ask your doctor or pharmacist. Acetaminophen can cause liver damage if not taken correctly. Do not use more than 4 grams (4,000 milligrams) total of acetaminophen in one day.       NSAIDs, such as ibuprofen, help decrease swelling, pain, and fever. This medicine is available with or without a doctor's order. NSAIDs can cause stomach bleeding or kidney problems in certain people. If you take blood thinner medicine, always ask your healthcare provider if NSAIDs are safe for you. Always read the medicine label and follow directions.      Take your medicine as directed. Contact your healthcare provider if you think your medicine is not helping or if you have side effects. Tell him or her if you are allergic to any medicine. Keep a list of the medicines, vitamins, and herbs you take. Include the amounts, and when and why you take them. Bring the list or the pill bottles to follow-up visits. Carry your medicine list with you in case of an emergency.    Follow up with your healthcare provider as directed: You will need to return for more tests. Write down your questions so you remember to ask them during your visits.     Manage your symptoms:     Rest as needed. Rest often throughout the day. Alternate times of activity with times of rest.      Drink liquids as directed. Ask how much liquid to drink each day and which liquids are best for you. Liquids help thin your mucus, which may make it easier for you to cough it up.       Do not smoke. Avoid secondhand smoke. Smoking increases your risk for pneumonia. Smoking also makes it harder for you to get better after you have had pneumonia. Ask your healthcare provider for information if you need help to quit smoking.       Use a cool mist humidifier. A humidifier will help increase air moisture in your home. This may make it easier for you to breathe and help decrease your cough.       Keep your head elevated. You may be able to breathe better if you lie down with the head of your bed up.     Prevent pneumonia:     Prevent the spread of germs. Wash your hands often with soap and water. Use gel hand cleanser when there is no soap and water available. Do not touch your eyes, nose, or mouth unless you have washed your hands first. Cover your mouth when you cough. Cough into a tissue or your shirtsleeve so you do not spread germs from your hands. If you are sick, stay away from others as much as possible. Handwashing           Limit alcohol. Women should limit alcohol to 1 drink a day. Men should limit alcohol to 2 drinks a day. A drink of alcohol is 12 ounces of beer, 5 ounces of wine, or 1½ ounces of liquor.      Ask about vaccines. You may need a vaccine to help prevent pneumonia. Get an influenza (flu) vaccine every year as soon as it becomes available.

## 2019-12-16 DIAGNOSIS — B20 HUMAN IMMUNODEFICIENCY VIRUS [HIV] DISEASE: ICD-10-CM

## 2019-12-16 DIAGNOSIS — J18.1 LOBAR PNEUMONIA, UNSPECIFIED ORGANISM: ICD-10-CM

## 2019-12-16 DIAGNOSIS — Z79.899 OTHER LONG TERM (CURRENT) DRUG THERAPY: ICD-10-CM

## 2019-12-16 DIAGNOSIS — Z79.2 LONG TERM (CURRENT) USE OF ANTIBIOTICS: ICD-10-CM

## 2019-12-16 DIAGNOSIS — R05 COUGH: ICD-10-CM

## 2020-03-15 ENCOUNTER — EMERGENCY (EMERGENCY)
Facility: HOSPITAL | Age: 60
LOS: 1 days | Discharge: HOME | End: 2020-03-15
Attending: EMERGENCY MEDICINE | Admitting: EMERGENCY MEDICINE
Payer: MEDICARE

## 2020-03-15 VITALS
RESPIRATION RATE: 18 BRPM | SYSTOLIC BLOOD PRESSURE: 127 MMHG | DIASTOLIC BLOOD PRESSURE: 82 MMHG | OXYGEN SATURATION: 98 % | HEIGHT: 68 IN | HEART RATE: 84 BPM | WEIGHT: 149.91 LBS | TEMPERATURE: 98 F

## 2020-03-15 DIAGNOSIS — J02.9 ACUTE PHARYNGITIS, UNSPECIFIED: ICD-10-CM

## 2020-03-15 DIAGNOSIS — R05 COUGH: ICD-10-CM

## 2020-03-15 DIAGNOSIS — Z79.2 LONG TERM (CURRENT) USE OF ANTIBIOTICS: ICD-10-CM

## 2020-03-15 DIAGNOSIS — B20 HUMAN IMMUNODEFICIENCY VIRUS [HIV] DISEASE: ICD-10-CM

## 2020-03-15 DIAGNOSIS — Z79.899 OTHER LONG TERM (CURRENT) DRUG THERAPY: ICD-10-CM

## 2020-03-15 PROCEDURE — 71046 X-RAY EXAM CHEST 2 VIEWS: CPT | Mod: 26

## 2020-03-15 PROCEDURE — 99283 EMERGENCY DEPT VISIT LOW MDM: CPT | Mod: 25

## 2020-03-15 RX ORDER — AZITHROMYCIN 500 MG/1
1 TABLET, FILM COATED ORAL
Qty: 4 | Refills: 0
Start: 2020-03-15 | End: 2020-03-18

## 2020-03-15 RX ORDER — AZITHROMYCIN 500 MG/1
500 TABLET, FILM COATED ORAL ONCE
Refills: 0 | Status: COMPLETED | OUTPATIENT
Start: 2020-03-15 | End: 2020-03-15

## 2020-03-15 RX ADMIN — AZITHROMYCIN 500 MILLIGRAM(S): 500 TABLET, FILM COATED ORAL at 16:08

## 2020-03-15 NOTE — ED ADULT NURSE NOTE - OBJECTIVE STATEMENT
58 y/o M c/o cough and hoarseness for the past 3 wks that has recently increased in severity. Pt states he was originally coughing up clear phlegm but has not been able to expectorate anything in the past few days. Pt endorses increase work of breathing and MENDEZ. Pt denies fever, aches, CP, N/V/D. Pt has PMH of frequent strep throat, throat cancer, laryngitis, HIV, HTN, BPH, HLD.

## 2020-03-15 NOTE — ED PROVIDER NOTE - CHPI ED SYMPTOMS NEG
no fever/no chills/no chest pain/no palpitations, no dizziness, no neck pain, no jaw pain, no N/V, no syncope

## 2020-03-15 NOTE — ED PROVIDER NOTE - OBJECTIVE STATEMENT
Present in HPI: Jacqueline Navas (Formerly [facility]), patient  Triage note: "pt states he has had hoarseness 2-3 weeks, productive cough/ but then not productive. Denies body aches, chills. hx throat CA, HIV"    59 y o male with PMHX of HIV (VL undetectable, CD4 300), throat CA, states he takes Crestor, Losartan, Valtrex, Loperamide, and Flomax for reactions caused by HIV medication, presents to ED for productive cough. Pt states that sx initially started 2-3 weeks ago with post nasal drip, sore throat, and hoarseness. He states it was laryngitis that then turned into a productive cough which has worsened. Notes associated fatigue and chest congestion. Pt noted some SOB this morning while walking his dogs this morning as well. He denies exertional chest pain, palpitations, dizziness, neck pain, jaw pain, N/V, syncope, or other sx. No fevers or chills endorsed. Pt has an upcoming appointment with his oncologist this month. He denies recent travel or known COVID-19 positive contacts.     Triage VS: /82, HR 84, RR 18, Temp 98.4 F, O2 98%

## 2020-03-15 NOTE — ED ADULT NURSE NOTE - CHPI ED NUR SYMPTOMS NEG
no vomiting/no bleeding gums/no chills/no fever/no loss of consciousness/no weakness/no nausea/no numbness/no syncope

## 2020-03-15 NOTE — ED PROVIDER NOTE - NSFOLLOWUPINSTRUCTIONS_ED_ALL_ED_FT
take medications as directed     stay well hydrated    return to ER if symptoms worsen or for any other concern

## 2020-03-15 NOTE — ED ADULT TRIAGE NOTE - CHIEF COMPLAINT QUOTE
pt states he has had hoarseness 2-3 weeks, productive cough/ but then not productive.  Denies body aches, chills  hx throat CA, HIV

## 2020-03-15 NOTE — ED PROVIDER NOTE - CLINICAL SUMMARY MEDICAL DECISION MAKING FREE TEXT BOX
ED evaluation and management discussed with the patient and family (if available) in detail.  Close PMD follow up encouraged.  Strict ED return instructions discussed in detail and patient given the opportunity to ask any questions about their discharge diagnosis and instructions. Patient verbalized understanding.     dc home will follow up with his oncologist as scheduled in 2 weeks

## 2020-03-15 NOTE — ED PROVIDER NOTE - PATIENT PORTAL LINK FT
You can access the FollowMyHealth Patient Portal offered by [facility] by registering at the following website: [email or web address]. By joining [facility]’s FollowMyHealth portal, you will also be able to view your health information using other applications (apps) compatible with our system.

## 2020-03-30 ENCOUNTER — INPATIENT (INPATIENT)
Facility: HOSPITAL | Age: 60
LOS: 31 days | Discharge: HOME HEALTH | DRG: 208 | End: 2020-05-01
Attending: INTERNAL MEDICINE | Admitting: INTERNAL MEDICINE
Payer: MEDICARE

## 2020-03-30 VITALS
SYSTOLIC BLOOD PRESSURE: 143 MMHG | HEART RATE: 86 BPM | OXYGEN SATURATION: 92 % | HEIGHT: 70 IN | TEMPERATURE: 99 F | RESPIRATION RATE: 18 BRPM | WEIGHT: 164.91 LBS | DIASTOLIC BLOOD PRESSURE: 67 MMHG

## 2020-03-30 LAB
ALBUMIN SERPL ELPH-MCNC: 2.9 G/DL — LOW (ref 3.4–5)
ALP SERPL-CCNC: 49 U/L — SIGNIFICANT CHANGE UP (ref 40–120)
ALT FLD-CCNC: 48 U/L — HIGH (ref 12–42)
ANION GAP SERPL CALC-SCNC: 10 MMOL/L — SIGNIFICANT CHANGE UP (ref 9–16)
ANION GAP SERPL CALC-SCNC: 9 MMOL/L — SIGNIFICANT CHANGE UP (ref 9–16)
AST SERPL-CCNC: 67 U/L — HIGH (ref 15–37)
BASOPHILS # BLD AUTO: 0.02 K/UL — SIGNIFICANT CHANGE UP (ref 0–0.2)
BASOPHILS NFR BLD AUTO: 0.2 % — SIGNIFICANT CHANGE UP (ref 0–2)
BILIRUB SERPL-MCNC: 0.3 MG/DL — SIGNIFICANT CHANGE UP (ref 0.2–1.2)
BUN SERPL-MCNC: 43 MG/DL — HIGH (ref 7–23)
BUN SERPL-MCNC: 46 MG/DL — HIGH (ref 7–23)
CALCIUM SERPL-MCNC: 8.3 MG/DL — LOW (ref 8.5–10.5)
CALCIUM SERPL-MCNC: 9.1 MG/DL — SIGNIFICANT CHANGE UP (ref 8.5–10.5)
CHLORIDE SERPL-SCNC: 93 MMOL/L — LOW (ref 96–108)
CHLORIDE SERPL-SCNC: 96 MMOL/L — SIGNIFICANT CHANGE UP (ref 96–108)
CO2 SERPL-SCNC: 25 MMOL/L — SIGNIFICANT CHANGE UP (ref 22–31)
CO2 SERPL-SCNC: 28 MMOL/L — SIGNIFICANT CHANGE UP (ref 22–31)
CREAT SERPL-MCNC: 2.03 MG/DL — HIGH (ref 0.5–1.3)
CREAT SERPL-MCNC: 2.06 MG/DL — HIGH (ref 0.5–1.3)
EOSINOPHIL # BLD AUTO: 0 K/UL — SIGNIFICANT CHANGE UP (ref 0–0.5)
EOSINOPHIL NFR BLD AUTO: 0 % — SIGNIFICANT CHANGE UP (ref 0–6)
GLUCOSE SERPL-MCNC: 101 MG/DL — HIGH (ref 70–99)
GLUCOSE SERPL-MCNC: 109 MG/DL — HIGH (ref 70–99)
HCT VFR BLD CALC: 54.2 % — HIGH (ref 39–50)
HGB BLD-MCNC: 17.8 G/DL — HIGH (ref 13–17)
IMM GRANULOCYTES NFR BLD AUTO: 0.5 % — SIGNIFICANT CHANGE UP (ref 0–1.5)
LACTATE SERPL-SCNC: 0.9 MMOL/L — SIGNIFICANT CHANGE UP (ref 0.4–2)
LYMPHOCYTES # BLD AUTO: 1.32 K/UL — SIGNIFICANT CHANGE UP (ref 1–3.3)
LYMPHOCYTES # BLD AUTO: 15.6 % — SIGNIFICANT CHANGE UP (ref 13–44)
MAGNESIUM SERPL-MCNC: 2.2 MG/DL — SIGNIFICANT CHANGE UP (ref 1.6–2.6)
MCHC RBC-ENTMCNC: 28.9 PG — SIGNIFICANT CHANGE UP (ref 27–34)
MCHC RBC-ENTMCNC: 32.8 GM/DL — SIGNIFICANT CHANGE UP (ref 32–36)
MCV RBC AUTO: 88 FL — SIGNIFICANT CHANGE UP (ref 80–100)
MONOCYTES # BLD AUTO: 0.52 K/UL — SIGNIFICANT CHANGE UP (ref 0–0.9)
MONOCYTES NFR BLD AUTO: 6.2 % — SIGNIFICANT CHANGE UP (ref 2–14)
NEUTROPHILS # BLD AUTO: 6.54 K/UL — SIGNIFICANT CHANGE UP (ref 1.8–7.4)
NEUTROPHILS NFR BLD AUTO: 77.5 % — HIGH (ref 43–77)
NRBC # BLD: 0 /100 WBCS — SIGNIFICANT CHANGE UP (ref 0–0)
NT-PROBNP SERPL-SCNC: 200 PG/ML — SIGNIFICANT CHANGE UP
PCO2 BLDV: 49 MMHG — SIGNIFICANT CHANGE UP (ref 41–51)
PH BLDV: 7.38 — SIGNIFICANT CHANGE UP (ref 7.32–7.43)
PHOSPHATE SERPL-MCNC: 3.2 MG/DL — SIGNIFICANT CHANGE UP (ref 2.5–4.5)
PLATELET # BLD AUTO: 177 K/UL — SIGNIFICANT CHANGE UP (ref 150–400)
PO2 BLDV: 15 MMHG — LOW (ref 35–40)
POTASSIUM SERPL-MCNC: 3.2 MMOL/L — LOW (ref 3.5–5.3)
POTASSIUM SERPL-MCNC: 3.6 MMOL/L — SIGNIFICANT CHANGE UP (ref 3.5–5.3)
POTASSIUM SERPL-SCNC: 3.2 MMOL/L — LOW (ref 3.5–5.3)
POTASSIUM SERPL-SCNC: 3.6 MMOL/L — SIGNIFICANT CHANGE UP (ref 3.5–5.3)
PROT SERPL-MCNC: 8.2 G/DL — SIGNIFICANT CHANGE UP (ref 6.4–8.2)
RBC # BLD: 6.16 M/UL — HIGH (ref 4.2–5.8)
RBC # FLD: 14.3 % — SIGNIFICANT CHANGE UP (ref 10.3–14.5)
SAO2 % BLDV: 19 % — SIGNIFICANT CHANGE UP
SODIUM SERPL-SCNC: 130 MMOL/L — LOW (ref 132–145)
SODIUM SERPL-SCNC: 131 MMOL/L — LOW (ref 132–145)
TROPONIN I SERPL-MCNC: 0.14 NG/ML — HIGH (ref 0.02–0.06)
TROPONIN I SERPL-MCNC: 0.16 NG/ML — HIGH (ref 0.02–0.06)
WBC # BLD: 8.44 K/UL — SIGNIFICANT CHANGE UP (ref 3.8–10.5)
WBC # FLD AUTO: 8.44 K/UL — SIGNIFICANT CHANGE UP (ref 3.8–10.5)

## 2020-03-30 PROCEDURE — 71250 CT THORAX DX C-: CPT | Mod: 26

## 2020-03-30 PROCEDURE — 93010 ELECTROCARDIOGRAM REPORT: CPT

## 2020-03-30 PROCEDURE — 99285 EMERGENCY DEPT VISIT HI MDM: CPT | Mod: CS

## 2020-03-30 RX ORDER — RALTEGRAVIR 400 MG/1
400 TABLET, FILM COATED ORAL
Refills: 0 | Status: DISCONTINUED | OUTPATIENT
Start: 2020-03-30 | End: 2020-04-03

## 2020-03-30 RX ORDER — ALBUTEROL 90 UG/1
0 AEROSOL, METERED ORAL
Qty: 8.5 | Refills: 0 | DISCHARGE

## 2020-03-30 RX ORDER — TAMSULOSIN HYDROCHLORIDE 0.4 MG/1
0.4 CAPSULE ORAL AT BEDTIME
Refills: 0 | Status: DISCONTINUED | OUTPATIENT
Start: 2020-03-30 | End: 2020-05-01

## 2020-03-30 RX ORDER — FAMOTIDINE 10 MG/ML
20 INJECTION INTRAVENOUS DAILY
Refills: 0 | Status: DISCONTINUED | OUTPATIENT
Start: 2020-03-30 | End: 2020-04-04

## 2020-03-30 RX ORDER — TAMSULOSIN HYDROCHLORIDE 0.4 MG/1
0 CAPSULE ORAL
Qty: 0 | Refills: 0 | DISCHARGE

## 2020-03-30 RX ORDER — EMTRICITABINE AND TENOFOVIR DISOPROXIL FUMARATE 200; 300 MG/1; MG/1
0 TABLET, FILM COATED ORAL
Qty: 30 | Refills: 0 | DISCHARGE

## 2020-03-30 RX ORDER — DARUNAVIR 75 MG/1
600 TABLET, FILM COATED ORAL
Refills: 0 | Status: DISCONTINUED | OUTPATIENT
Start: 2020-03-30 | End: 2020-04-03

## 2020-03-30 RX ORDER — AZITHROMYCIN 500 MG/1
500 TABLET, FILM COATED ORAL ONCE
Refills: 0 | Status: COMPLETED | OUTPATIENT
Start: 2020-03-30 | End: 2020-03-30

## 2020-03-30 RX ORDER — ENOXAPARIN SODIUM 100 MG/ML
40 INJECTION SUBCUTANEOUS EVERY 24 HOURS
Refills: 0 | Status: DISCONTINUED | OUTPATIENT
Start: 2020-03-30 | End: 2020-04-11

## 2020-03-30 RX ORDER — ACETAMINOPHEN 500 MG
975 TABLET ORAL ONCE
Refills: 0 | Status: COMPLETED | OUTPATIENT
Start: 2020-03-30 | End: 2020-03-30

## 2020-03-30 RX ORDER — CEFTRIAXONE 500 MG/1
1000 INJECTION, POWDER, FOR SOLUTION INTRAMUSCULAR; INTRAVENOUS ONCE
Refills: 0 | Status: COMPLETED | OUTPATIENT
Start: 2020-03-30 | End: 2020-03-30

## 2020-03-30 RX ORDER — ACETAMINOPHEN 500 MG
650 TABLET ORAL EVERY 6 HOURS
Refills: 0 | Status: DISCONTINUED | OUTPATIENT
Start: 2020-03-30 | End: 2020-03-31

## 2020-03-30 RX ORDER — VALACYCLOVIR 500 MG/1
500 TABLET, FILM COATED ORAL
Refills: 0 | Status: DISCONTINUED | OUTPATIENT
Start: 2020-03-30 | End: 2020-04-03

## 2020-03-30 RX ORDER — LOSARTAN POTASSIUM 100 MG/1
0 TABLET, FILM COATED ORAL
Qty: 0 | Refills: 0 | DISCHARGE

## 2020-03-30 RX ORDER — ASPIRIN/CALCIUM CARB/MAGNESIUM 324 MG
325 TABLET ORAL ONCE
Refills: 0 | Status: COMPLETED | OUTPATIENT
Start: 2020-03-30 | End: 2020-03-30

## 2020-03-30 RX ORDER — LEVOTHYROXINE SODIUM 125 MCG
125 TABLET ORAL DAILY
Refills: 0 | Status: DISCONTINUED | OUTPATIENT
Start: 2020-03-30 | End: 2020-05-01

## 2020-03-30 RX ORDER — POTASSIUM CHLORIDE 20 MEQ
40 PACKET (EA) ORAL EVERY 4 HOURS
Refills: 0 | Status: COMPLETED | OUTPATIENT
Start: 2020-03-30 | End: 2020-03-31

## 2020-03-30 RX ORDER — ROSUVASTATIN CALCIUM 5 MG/1
0 TABLET ORAL
Qty: 0 | Refills: 0 | DISCHARGE

## 2020-03-30 RX ORDER — RITONAVIR 100 MG/1
100 TABLET, FILM COATED ORAL
Refills: 0 | Status: DISCONTINUED | OUTPATIENT
Start: 2020-03-30 | End: 2020-04-03

## 2020-03-30 RX ORDER — SODIUM CHLORIDE 9 MG/ML
1000 INJECTION INTRAMUSCULAR; INTRAVENOUS; SUBCUTANEOUS ONCE
Refills: 0 | Status: COMPLETED | OUTPATIENT
Start: 2020-03-30 | End: 2020-03-30

## 2020-03-30 RX ADMIN — AZITHROMYCIN 255 MILLIGRAM(S): 500 TABLET, FILM COATED ORAL at 20:17

## 2020-03-30 RX ADMIN — SODIUM CHLORIDE 2000 MILLILITER(S): 9 INJECTION INTRAMUSCULAR; INTRAVENOUS; SUBCUTANEOUS at 19:26

## 2020-03-30 RX ADMIN — CEFTRIAXONE 100 MILLIGRAM(S): 500 INJECTION, POWDER, FOR SOLUTION INTRAMUSCULAR; INTRAVENOUS at 20:17

## 2020-03-30 RX ADMIN — Medication 975 MILLIGRAM(S): at 22:51

## 2020-03-30 RX ADMIN — Medication 325 MILLIGRAM(S): at 20:17

## 2020-03-30 NOTE — ED PROVIDER NOTE - PHYSICAL EXAMINATION
PHYSICAL EXAM:    Constitutional: awake, alert, NAD  Eyes: EOMI, no conj injection  HENT: NC AT  Respiratory: mild increased WOB, tachypnic, mild scattered rhonchi b/l  Cardiovascular: RRR nml S1S2  Gastrointestinal: soft, no masses, nontender, nondistended. No guarding or rebound.   Extremities: no peripheral edema  Neurological: AAOx3, CN II-XII grossly intact, no focal numbness or weakness  Skin: no rash  Musculoskeletal: no gross deformity

## 2020-03-30 NOTE — ED PROVIDER NOTE - CARE PLAN
Principal Discharge DX:	Viral pneumonia  Secondary Diagnosis:	LEONEL (acute kidney injury)  Secondary Diagnosis:	Troponin I above reference range

## 2020-03-30 NOTE — ED PROVIDER NOTE - OBJECTIVE STATEMENT
59 y o male with PMHX of HIV (VL undetectable, CD4 300), throat CA s/p treatment, states he takes Crestor, Losartan, Valtrex, Loperamide, and Flomax for reactions caused by HIV medication here for worsening cough/sob. pt was in ED 3/15 for same sx and states since then he completed his azithromycin but sx had not improved. pt endorsing chest soreness w/ cough and SOB at rest and on exertion. no and pain, n,v,diarrhea, syncope. no trauma. + subjective fever. pt endorsing decreased PO intake.

## 2020-03-30 NOTE — ED ADULT NURSE REASSESSMENT NOTE - NS ED NURSE REASSESS COMMENT FT1
Pt remains sleeping in bed, in NAD. Pt found to be febrile during repeat VS, given Tylenol as per MD orders. Repeat labs drawn and sent to lab. Awaiting transport to [facility]. Pt remains on continuos cardiac monitoring.

## 2020-03-30 NOTE — ED ADULT TRIAGE NOTE - CHIEF COMPLAINT QUOTE
Patient with complaint of cough fever and SOB.  Patient in no acute respiratory distress.  Masked in triage.  No known exposure or recent travel.

## 2020-03-30 NOTE — ED ADULT NURSE NOTE - OBJECTIVE STATEMENT
Pt presents to ED c/o cough, SOB and generalized body aches for 4-5 weeks, was seen in ED x2 weeks ago and dc home. Pt reports developing fever x2days ago. Denies any recent travel or recent sick contacts. Pt has hx of HIV, vial load in the 200s and CD4 count in the 300s, compliant with medications.

## 2020-03-30 NOTE — ED PROVIDER NOTE - CLINICAL SUMMARY MEDICAL DECISION MAKING FREE TEXT BOX
pt presents w/ progressive cough, sob, some chest pain w/ cough, c/f dehydration. will get basic labs, ekg/trop r/o atypical acs/prognosticate covid, ct chest, ed obs period

## 2020-03-30 NOTE — ED PROVIDER NOTE - PROGRESS NOTE DETAILS
discussed results w/ pt including COVID findings, elevated trop. cardiology on call for covid called and message left Dr. Dixon. Transfer center contacted

## 2020-03-30 NOTE — ED PROVIDER NOTE - NS ED ROS FT
Constitutional: pos subjective fever, no rigors  Eyes: no eye redness, acute visual change  ENMT: no ear pain, no throat pain  Card: pos chest pain with cough, no palpitations  Pulm: pos cough, pos shortness of breath  GI: no abdominal pain, nausea or vomiting  : no dysuria or hematuria  MSK: no limitation in range of motion, no neck pain  Skin: no rash, no abrasion  Neuro: no numbness, no weakness  Heme/Onc: no easy bruising, no bleeding tendency   Allergic: no hives, no throat swelling

## 2020-03-31 DIAGNOSIS — J96.01 ACUTE RESPIRATORY FAILURE WITH HYPOXIA: ICD-10-CM

## 2020-03-31 DIAGNOSIS — J18.9 PNEUMONIA, UNSPECIFIED ORGANISM: ICD-10-CM

## 2020-03-31 DIAGNOSIS — B20 HUMAN IMMUNODEFICIENCY VIRUS [HIV] DISEASE: ICD-10-CM

## 2020-03-31 DIAGNOSIS — Z87.438 PERSONAL HISTORY OF OTHER DISEASES OF MALE GENITAL ORGANS: ICD-10-CM

## 2020-03-31 DIAGNOSIS — E78.5 HYPERLIPIDEMIA, UNSPECIFIED: ICD-10-CM

## 2020-03-31 DIAGNOSIS — Z29.9 ENCOUNTER FOR PROPHYLACTIC MEASURES, UNSPECIFIED: ICD-10-CM

## 2020-03-31 DIAGNOSIS — N17.9 ACUTE KIDNEY FAILURE, UNSPECIFIED: ICD-10-CM

## 2020-03-31 LAB
4/8 RATIO: 0.32 RATIO — LOW (ref 0.9–3.6)
ABS CD8: 318 /UL — SIGNIFICANT CHANGE UP (ref 142–740)
ALBUMIN SERPL ELPH-MCNC: 2.8 G/DL — LOW (ref 3.3–5)
ALP SERPL-CCNC: 41 U/L — SIGNIFICANT CHANGE UP (ref 40–120)
ALT FLD-CCNC: 46 U/L — HIGH (ref 10–45)
ANION GAP SERPL CALC-SCNC: 16 MMOL/L — SIGNIFICANT CHANGE UP (ref 5–17)
AST SERPL-CCNC: 69 U/L — HIGH (ref 10–40)
BASOPHILS # BLD AUTO: 0.02 K/UL — SIGNIFICANT CHANGE UP (ref 0–0.2)
BASOPHILS NFR BLD AUTO: 0.2 % — SIGNIFICANT CHANGE UP (ref 0–2)
BILIRUB SERPL-MCNC: 0.3 MG/DL — SIGNIFICANT CHANGE UP (ref 0.2–1.2)
BUN SERPL-MCNC: 49 MG/DL — HIGH (ref 7–23)
C DIFF GDH STL QL: NEGATIVE — SIGNIFICANT CHANGE UP
C DIFF GDH STL QL: SIGNIFICANT CHANGE UP
CALCIUM SERPL-MCNC: 8.8 MG/DL — SIGNIFICANT CHANGE UP (ref 8.4–10.5)
CD3 BLASTS SPEC-ACNC: 449 /UL — LOW (ref 672–1870)
CD3 BLASTS SPEC-ACNC: 69 % — SIGNIFICANT CHANGE UP (ref 59–83)
CD4 %: 16 % — LOW (ref 30–62)
CD8 %: 49 % — HIGH (ref 12–36)
CHLORIDE SERPL-SCNC: 100 MMOL/L — SIGNIFICANT CHANGE UP (ref 96–108)
CK SERPL-CCNC: 284 U/L — HIGH (ref 30–200)
CO2 SERPL-SCNC: 18 MMOL/L — LOW (ref 22–31)
CREAT SERPL-MCNC: 2.1 MG/DL — HIGH (ref 0.5–1.3)
CRP SERPL-MCNC: 14.9 MG/DL — HIGH (ref 0–0.4)
D DIMER BLD IA.RAPID-MCNC: 442 NG/ML DDU — HIGH
EOSINOPHIL # BLD AUTO: 0 K/UL — SIGNIFICANT CHANGE UP (ref 0–0.5)
EOSINOPHIL NFR BLD AUTO: 0 % — SIGNIFICANT CHANGE UP (ref 0–6)
FERRITIN SERPL-MCNC: 1892 NG/ML — HIGH (ref 30–400)
GLUCOSE SERPL-MCNC: 96 MG/DL — SIGNIFICANT CHANGE UP (ref 70–99)
HCT VFR BLD CALC: 51.4 % — HIGH (ref 39–50)
HCV AB S/CO SERPL IA: 0.07 S/CO — SIGNIFICANT CHANGE UP
HCV AB SERPL-IMP: SIGNIFICANT CHANGE UP
HGB BLD-MCNC: 16.8 G/DL — SIGNIFICANT CHANGE UP (ref 13–17)
IMM GRANULOCYTES NFR BLD AUTO: 0.4 % — SIGNIFICANT CHANGE UP (ref 0–1.5)
LYMPHOCYTES # BLD AUTO: 0.78 K/UL — LOW (ref 1–3.3)
LYMPHOCYTES # BLD AUTO: 8.8 % — LOW (ref 13–44)
MAGNESIUM SERPL-MCNC: 2.1 MG/DL — SIGNIFICANT CHANGE UP (ref 1.6–2.6)
MCHC RBC-ENTMCNC: 29.2 PG — SIGNIFICANT CHANGE UP (ref 27–34)
MCHC RBC-ENTMCNC: 32.7 GM/DL — SIGNIFICANT CHANGE UP (ref 32–36)
MCV RBC AUTO: 89.2 FL — SIGNIFICANT CHANGE UP (ref 80–100)
MONOCYTES # BLD AUTO: 0.31 K/UL — SIGNIFICANT CHANGE UP (ref 0–0.9)
MONOCYTES NFR BLD AUTO: 3.5 % — SIGNIFICANT CHANGE UP (ref 2–14)
NEUTROPHILS # BLD AUTO: 7.76 K/UL — HIGH (ref 1.8–7.4)
NEUTROPHILS NFR BLD AUTO: 87.1 % — HIGH (ref 43–77)
NRBC # BLD: 0 /100 WBCS — SIGNIFICANT CHANGE UP (ref 0–0)
NT-PROBNP SERPL-SCNC: 178 PG/ML — SIGNIFICANT CHANGE UP (ref 0–300)
PHOSPHATE SERPL-MCNC: 3.7 MG/DL — SIGNIFICANT CHANGE UP (ref 2.5–4.5)
PLATELET # BLD AUTO: 163 K/UL — SIGNIFICANT CHANGE UP (ref 150–400)
POTASSIUM SERPL-MCNC: 3.9 MMOL/L — SIGNIFICANT CHANGE UP (ref 3.5–5.3)
POTASSIUM SERPL-SCNC: 3.9 MMOL/L — SIGNIFICANT CHANGE UP (ref 3.5–5.3)
PROCALCITONIN SERPL-MCNC: 0.32 NG/ML — HIGH (ref 0.02–0.1)
PROT SERPL-MCNC: 7.4 G/DL — SIGNIFICANT CHANGE UP (ref 6–8.3)
RAPID RVP RESULT: SIGNIFICANT CHANGE UP
RBC # BLD: 5.76 M/UL — SIGNIFICANT CHANGE UP (ref 4.2–5.8)
RBC # FLD: 13.9 % — SIGNIFICANT CHANGE UP (ref 10.3–14.5)
SARS-COV-2 RNA SPEC QL NAA+PROBE: DETECTED
SARS-COV-2 RNA SPEC QL NAA+PROBE: DETECTED
SODIUM SERPL-SCNC: 134 MMOL/L — LOW (ref 135–145)
T-CELL CD4 SUBSET PNL BLD: 103 /UL — LOW (ref 489–1457)
WBC # BLD: 8.91 K/UL — SIGNIFICANT CHANGE UP (ref 3.8–10.5)
WBC # FLD AUTO: 8.91 K/UL — SIGNIFICANT CHANGE UP (ref 3.8–10.5)

## 2020-03-31 PROCEDURE — 99223 1ST HOSP IP/OBS HIGH 75: CPT

## 2020-03-31 PROCEDURE — 99223 1ST HOSP IP/OBS HIGH 75: CPT | Mod: GC

## 2020-03-31 RX ORDER — ACETAMINOPHEN 500 MG
975 TABLET ORAL EVERY 6 HOURS
Refills: 0 | Status: DISCONTINUED | OUTPATIENT
Start: 2020-03-31 | End: 2020-04-04

## 2020-03-31 RX ORDER — GUAIFENESIN/DEXTROMETHORPHAN 600MG-30MG
5 TABLET, EXTENDED RELEASE 12 HR ORAL ONCE
Refills: 0 | Status: COMPLETED | OUTPATIENT
Start: 2020-03-31 | End: 2020-03-31

## 2020-03-31 RX ORDER — SODIUM CHLORIDE 9 MG/ML
1000 INJECTION INTRAMUSCULAR; INTRAVENOUS; SUBCUTANEOUS
Refills: 0 | Status: DISCONTINUED | OUTPATIENT
Start: 2020-03-31 | End: 2020-04-02

## 2020-03-31 RX ORDER — SODIUM CHLORIDE 9 MG/ML
250 INJECTION INTRAMUSCULAR; INTRAVENOUS; SUBCUTANEOUS ONCE
Refills: 0 | Status: COMPLETED | OUTPATIENT
Start: 2020-03-31 | End: 2020-03-31

## 2020-03-31 RX ORDER — SODIUM CHLORIDE 9 MG/ML
500 INJECTION INTRAMUSCULAR; INTRAVENOUS; SUBCUTANEOUS ONCE
Refills: 0 | Status: COMPLETED | OUTPATIENT
Start: 2020-03-31 | End: 2020-03-31

## 2020-03-31 RX ORDER — INFLUENZA VIRUS VACCINE 15; 15; 15; 15 UG/.5ML; UG/.5ML; UG/.5ML; UG/.5ML
0.5 SUSPENSION INTRAMUSCULAR ONCE
Refills: 0 | Status: DISCONTINUED | OUTPATIENT
Start: 2020-03-31 | End: 2020-05-01

## 2020-03-31 RX ORDER — ACETAMINOPHEN 500 MG
650 TABLET ORAL ONCE
Refills: 0 | Status: COMPLETED | OUTPATIENT
Start: 2020-03-31 | End: 2020-03-31

## 2020-03-31 RX ADMIN — Medication 650 MILLIGRAM(S): at 12:21

## 2020-03-31 RX ADMIN — RALTEGRAVIR 400 MILLIGRAM(S): 400 TABLET, FILM COATED ORAL at 06:30

## 2020-03-31 RX ADMIN — RITONAVIR 100 MILLIGRAM(S): 100 TABLET, FILM COATED ORAL at 17:49

## 2020-03-31 RX ADMIN — Medication 125 MICROGRAM(S): at 06:30

## 2020-03-31 RX ADMIN — RITONAVIR 100 MILLIGRAM(S): 100 TABLET, FILM COATED ORAL at 06:30

## 2020-03-31 RX ADMIN — Medication 5 MILLILITER(S): at 11:56

## 2020-03-31 RX ADMIN — FAMOTIDINE 20 MILLIGRAM(S): 10 INJECTION INTRAVENOUS at 11:56

## 2020-03-31 RX ADMIN — TAMSULOSIN HYDROCHLORIDE 0.4 MILLIGRAM(S): 0.4 CAPSULE ORAL at 21:53

## 2020-03-31 RX ADMIN — VALACYCLOVIR 500 MILLIGRAM(S): 500 TABLET, FILM COATED ORAL at 06:30

## 2020-03-31 RX ADMIN — SODIUM CHLORIDE 50 MILLILITER(S): 9 INJECTION INTRAMUSCULAR; INTRAVENOUS; SUBCUTANEOUS at 11:57

## 2020-03-31 RX ADMIN — Medication 40 MILLIEQUIVALENT(S): at 01:01

## 2020-03-31 RX ADMIN — VALACYCLOVIR 500 MILLIGRAM(S): 500 TABLET, FILM COATED ORAL at 17:48

## 2020-03-31 RX ADMIN — SODIUM CHLORIDE 1000 MILLILITER(S): 9 INJECTION INTRAMUSCULAR; INTRAVENOUS; SUBCUTANEOUS at 10:17

## 2020-03-31 RX ADMIN — Medication 40 MILLIEQUIVALENT(S): at 00:11

## 2020-03-31 RX ADMIN — DARUNAVIR 600 MILLIGRAM(S): 75 TABLET, FILM COATED ORAL at 17:49

## 2020-03-31 RX ADMIN — SODIUM CHLORIDE 250 MILLILITER(S): 9 INJECTION INTRAMUSCULAR; INTRAVENOUS; SUBCUTANEOUS at 06:30

## 2020-03-31 RX ADMIN — ENOXAPARIN SODIUM 40 MILLIGRAM(S): 100 INJECTION SUBCUTANEOUS at 01:01

## 2020-03-31 RX ADMIN — Medication 975 MILLIGRAM(S): at 17:48

## 2020-03-31 RX ADMIN — RALTEGRAVIR 400 MILLIGRAM(S): 400 TABLET, FILM COATED ORAL at 17:48

## 2020-03-31 RX ADMIN — DARUNAVIR 600 MILLIGRAM(S): 75 TABLET, FILM COATED ORAL at 06:30

## 2020-03-31 NOTE — H&P ADULT - PROBLEM SELECTOR PLAN 8
F: s/p 1L NS and additional .5L NS  E: replete as needed for K <4, Mg <2  N: DASH/TLC diet   GI ppx: famotidine  VTE/DVT ppx: lovenox  Pain: tylenol  Code status: Full  Dispo: COVID tele Hx of BPH  - c/w tamsulosin

## 2020-03-31 NOTE — H&P ADULT - NSICDXFAMILYHX_GEN_ALL_CORE_FT
FAMILY HISTORY:  Family history of diabetes mellitus (DM)  FH: heart disease, father, father still alive age 90

## 2020-03-31 NOTE — H&P ADULT - ATTENDING COMMENTS
Patient seen and examined with house-staff during bedside rounds.  Resident note read, including vitals, physical findings, laboratory data, and radiological reports.   Revisions included below.  Direct personal management at bed side and extensive interpretation of the data.  Plan was outlined and discussed in details with the housestaff.  Decision making of high complexity  Action taken for acute disease activity to reflect the level of care provided:  - medication reconciliation  - review laboratory data  follow on covid swab  continue to monitor troponin  hemodynamically stable

## 2020-03-31 NOTE — H&P ADULT - PROBLEM SELECTOR PLAN 5
Hx of HLD  - hold crestor for transaminitis  - can restart therapeutic interchange with resolution of LFT elevation Hx of HIV, undetectable for 20 yrs per patient and with recent f/u. Tcells per pt 232.   - consider opportunistic infections   - c/w home HIV rx  - c/w valtrex

## 2020-03-31 NOTE — H&P ADULT - PROBLEM SELECTOR PLAN 4
Hx of HIV, undectectable for 20 yrs per patient and with recent f/u. Tcells per pt 232.   - consider opportunistic infections   - c/w home HIV rx Upon presentation reported pain with coughing and had Trop I .159. Received 325mg ASA. EKG without new ST/T wave changes. Old ST depression V3 noted. Repeat trops downtrended to .146. No complaint of chest pain upon presentation to [facility]. Troponemia can be seen with COVID -19.   - monitor for signs/symptoms of ACS

## 2020-03-31 NOTE — H&P ADULT - NSICDXPASTMEDICALHX_GEN_ALL_CORE_FT
PAST MEDICAL HISTORY:  Abscess     HIV disease     Throat cancer PAST MEDICAL HISTORY:  Abscess     History of BPH     HIV disease     Throat cancer

## 2020-03-31 NOTE — CONSULT NOTE ADULT - ASSESSMENT
IMPRESSION:  AIDS patient with COVID-19 infection.  I do not believe his CD4 count checked here is adequate.  I would not normally recommend checking a CD4 count during an acute infection because it's not accurate.  COVID-19 is known to cause lymphopenia.  I assume it will alter his CD4.  Further he has a CD4 % of 16 - this would not change acutely and correlates with a CD4 > 200, which the patient endorses himself.     As for how to treat him:  there is limited data to suggest plaquenil helps decrease viral shedding.  Given his hypoxia, he is someone I would typically treat with plaquenil however the QTc is at 500.  Plaquenil can interact with ritonavir to further increase QTc.  I'm not sure the benefits would outweight risks at this time    Recommend:  1.  Would not start Bactrim at this time.  Would disregard CD4 count we obtained here and call PCP for his last CD4 count  2.  Continue his HIV regimen:  he report it is as follows:  Descovy 1 tab PO daily + Intelence 200 mg PO q12 + Isentress 400 mg PO q12 + Prezista 600 mg PO q12 + Norvir 100 mg PO q12.  Can call his pharmacy and/or PCP to verify in the AM  3.  Hold plaquenil for now given drug interaction.  Check QTc in the AM.  If patient's breathing or oxygen level worsens may have to consider restarting.  There are no other FDA-approved treatments would therefore just give supportive care    4. From my standpoint, can stop Ceftriaxone and Azithromycin --> doubt superimposed bacterial pneumonia     ID team 2 will follow

## 2020-03-31 NOTE — H&P ADULT - PROBLEM SELECTOR PLAN 3
Presentation suspicious for COVID-19. WBC elevated, auto lymphocyte wnl. No known sick/COVID+ contacts.   - f/u COVID PCR  - isolation precautions  - holding empiric tx given lower suspicion  - f/u COVID labs Presentation suspicious for COVID-19. WBC elevated, auto lymphocyte wnl. No known sick/COVID+ contacts.   - f/u COVID PCR  - isolation precautions  - holding empiric tx given lower suspicion  - f/u COVID labs  - f/u AM QTc before rx'ing any QTc prolonging agents

## 2020-03-31 NOTE — H&P ADULT - ASSESSMENT
58YO M with PMH of HIV (VL undetectable, CD4 300), tonsillar Ca (sp tx), HTN, HLD, hypothyroidism, BPH and sleep apnea who presented with cough, subjective fevers, anorexia, and increasing SOB and MENDEZ; found to be in acute hypoxic respiratory failure suspicious for COVID-19 etiology with GGO on CT Chest but ddx including non-COVID etiologies. 58YO M with PMH of HIV (VL undetectable, CD4 300), tonsillar Ca (sp tx), HTN, HLD, hypothyroidism, BPH and sleep apnea who presented with cough, subjective fevers, anorexia, and increasing SOB and MENDEZ; found to be in acute hypoxic respiratory failure suspicious for COVID-19 etiology in present setting and GGO on CT Chest but ddx including non-COVID etiologies including CAP, opportunistic infection, other URI, etc.

## 2020-03-31 NOTE — H&P ADULT - NSHPLABSRESULTS_GEN_ALL_CORE
LABS:                       17.8   8.44  )-----------( 177      ( 30 Mar 2020 19:07 )             54.2     03-30    131<L>  |  96  |  46<H>  ----------------------------<  101<H>  3.2<L>   |  25  |  2.03<H>    Ca    8.3<L>      30 Mar 2020 22:51  Phos  3.2     03-30  Mg     2.2     03-30    TPro  8.2  /  Alb  2.9<L>  /  TBili  0.3  /  DBili  x   /  AST  67<H>  /  ALT  48<H>  /  AlkPhos  49  03-30      CARDIAC MARKERS ( 30 Mar 2020 22:51 )  0.143 ng/mL / x     / x     / x     / x      CARDIAC MARKERS ( 30 Mar 2020 19:07 )  0.159 ng/mL / x     / x     / x     / x          Serum Pro-Brain Natriuretic Peptide: 200 pg/mL (03-30 @ 19:07)    Lactate, Blood: 0.9 mmoL/L (03-30 @ 22:51)      < from: CT Chest No Cont (03.30.20 @ 19:31) >    IMPRESSION:   1.  Multiple perihilar and peripheral ground glass opacities are seen within the upper and lower lobes bilaterally, as well as within the right middle lobe with a bronchocentric and peripheral pattern. These findings are suspicious for atypical/viral pneumonia such as COVID-19   2.  Aneurysmal dilatation of the aortic root measuring up to 4.6 cm, ascending aorta measuring 4.4 cm, aortic arch 3.5 cm and descending thoracic aorta to the level of the aortic hiatus measuring 3 cm. Periodic interval surveillance may be of value.  3.  Mild dilatation of the main pulmonary artery, measuring up to 3.3 cm, suggestive of pulmonary hypertension.  4.  Nonobstructive left nephrolithiasis.    < end of copied text >          RADIOLOGY, EKG & ADDITIONAL TESTS: Reviewed. LABS:                       17.8   8.44  )-----------( 177      ( 30 Mar 2020 19:07 )             54.2     03-30  131<L>  |  96  |  46<H>  ----------------------------<  101<H>  3.2<L>   |  25  |  2.03<H>    Ca    8.3<L>      30 Mar 2020 22:51  Phos  3.2     03-30  Mg     2.2     03-30    TPro  8.2  /  Alb  2.9<L>  /  TBili  0.3  /  DBili  x   /  AST  67<H>  /  ALT  48<H>  /  AlkPhos  49  03-30      CARDIAC MARKERS ( 30 Mar 2020 22:51 )  0.143 ng/mL / x     / x     / x     / x      CARDIAC MARKERS ( 30 Mar 2020 19:07 )  0.159 ng/mL / x     / x     / x     / x          Serum Pro-Brain Natriuretic Peptide: 200 pg/mL (03-30 @ 19:07)    Lactate, Blood: 0.9 mmoL/L (03-30 @ 22:51)      < from: CT Chest No Cont (03.30.20 @ 19:31) >    IMPRESSION:   1.  Multiple perihilar and peripheral ground glass opacities are seen within the upper and lower lobes bilaterally, as well as within the right middle lobe with a bronchocentric and peripheral pattern. These findings are suspicious for atypical/viral pneumonia such as COVID-19   2.  Aneurysmal dilatation of the aortic root measuring up to 4.6 cm, ascending aorta measuring 4.4 cm, aortic arch 3.5 cm and descending thoracic aorta to the level of the aortic hiatus measuring 3 cm. Periodic interval surveillance may be of value.  3.  Mild dilatation of the main pulmonary artery, measuring up to 3.3 cm, suggestive of pulmonary hypertension.  4.  Nonobstructive left nephrolithiasis.    < end of copied text >          RADIOLOGY, EKG & ADDITIONAL TESTS: Reviewed.

## 2020-03-31 NOTE — PROVIDER CONTACT NOTE (CHANGE IN STATUS NOTIFICATION) - SITUATION
Pt experienced 2 episodes of liquid diarrhea with one episode of incontinence. Upon getting back in bed, pt noted to shiver and complaining of feeling cold. Pt O2 increased from 2L to 6L sating at 98%

## 2020-03-31 NOTE — H&P ADULT - NSHPPHYSICALEXAM_GEN_ALL_CORE
General: age-appearing male in NAD, on NC   Resp: diminished at apices b/l, scattered soft rhonchi but generally good air movement. intermittent wet cough. no crackles or wheezing appreciated, satting 90s on NC, no respiratory distress  CV: distant S1S2 RRR  Extremities: UE WWP, 2+ radial pulses b/l  Neuro: AOx4

## 2020-03-31 NOTE — PATIENT PROFILE ADULT - NSTRANSFERBELONGINGSDISPO_GEN_A_NUR
[Fatigue] : fatigue [Nausea] : nausea [Diarrhea] : diarrhea [Negative] : Heme/Lymph [Headache] : headache [FreeTextEntry4] : sinus pressure  [FreeTextEntry9] : body aches with patient

## 2020-03-31 NOTE — H&P ADULT - PROBLEM SELECTOR PLAN 7
Hx of BPH  - c/w tamsulosin LEONEL on CKD. Cr 2.06 on admission, baseline 1.3 in 2019 (although has had Cr 1.8 previous labs).   - trend BMP  - gentle hydration  - consider urine studies  - renally dose Rx    #hyponatremia  Noted with mild hyponatremia (130 -> 131) and hypochloremia (93->96) on presentation likely 2/2 decreased PO intake.   - improved s/p 1L NS  - receiving 500cc bolus AM 3/31  - f/u AM BMP

## 2020-03-31 NOTE — H&P ADULT - PROBLEM SELECTOR PLAN 6
LEONEL on CKD. Cr 2.06 on admission, baseline 1.3 in 2019 (although has had Cr 1.8 previous labs). Noted with mild hyponatremia and hypochloremia on presentation likely 2/2 decreased PO intake.   - trend BMP  - gentle hydration  - consider urine studies  - renally dose Rx Hx of HLD  - hold crestor for transaminitis  - can restart therapeutic interchange with resolution of LFT elevation    #HTN  pt denied hx of HTN however has losartan as home medication  - hold anti HTNs in setting of likely infection  - monitor BPs    #hypothyroidism  - c/w home levothyroxine 125mcg daily

## 2020-03-31 NOTE — H&P ADULT - HISTORY OF PRESENT ILLNESS
58YO M with PMH of HIV (VL undetectable, CD4 300), tonsillar Ca (sp chemo and radtx). HTN, HLD, hypothyroidism, BPH and sleep apnea who presented with cough, subjective fevers, pain with cough, decreased PO intake and increasing SOB and MENDEZ. Of note presented mid-March to [facility] with similar sx and was prescribed Azithromycin.     In [facility] ED:   VS: T99.2, HR 86, 143/67, RR 18 with SpO2 92% on RA  EKG: NSR HR 75, Old ST depression V3, old RBBB  Labs notable for: K+ 3.6, Na 130, BUN/Cr 43/2.06, Trop I .159, auto lympho 1.32, lactate 0.9  Imaging: CT Chest showed b/l upper lobe GGO and RML     Admitted for r/o COVID w/u and tx with r/o ACS. Placed on NC 3L with SpO2 in mid-high 90s.   Given 1L NS, 325mg ASA, 1g tylenol, 1g CTX, 500mg Azithromycin. Home medications restarted. Trop I downtrended to .143 on repeat.     HPI- COVID specific    Date of onset of fever:  Date of onset of dyspnea:  Recent Travel:  Sick Contacts:  COVID Exposure:  Close Contacts:    ROS:  Fevers: Y/N  Malaise: Y/N  Myalgias: Y/N  SOB: Y/N          At rest: Y/N         With exertion: Y/N         At baseline: Y/ N  Cough: Y/N         Productive: Y/N  Nausea: Y/N  Vomiting: Y/N  Diarrhea: Y/N    PMHx:   HTN: Y/N  DM: Y/N  Lung Disease: Y/N       Specify:  Cardiovascular disease: Y/N  Malignancy: Y/N  Immunosuppression: Y/N  HIV: Y/N  CKD/ESRD: Y/N  Chronic Liver Disease: Y/N    SoHx:  Tobacco use: Y/N  Vape use: Y/N  Health care worker: Y/N 58YO M with PMH of HIV (VL undetectable, CD4 232 per pt), tonsillar Ca (sp chemo and radtx), HLD, hypothyroidism, BPH and sleep apnea who presented with cough, subjective fevers, pain with cough, decreased PO intake for 2 weeks with 1d of increasing SOB and MENDEZ. Of note presented mid-March (3/15) to [facility] with similar sx and was prescribed Azithromycin. On ROS endorsed cough as intermittently productive - dark brown sputum. Endorsed mild diarrhea (possibly chronic per med review), denied n/v. Denied abd pain/chest pain. Denied chills. Denied any known sick contacts or contacts w COVID dx.    In [facility] ED:   VS: T99.2, HR 86, 143/67, RR 18 with SpO2 92% on RA  EKG: NSR HR 75, , , QTc 515, Old ST depression V3, old RBBB  Labs notable for: K+ 3.6, Na 130, BUN/Cr 43/2.06, Trop I .159, auto lympho 1.32, lactate 0.9  Imaging: CT Chest showed b/l upper lobe GGO and RML     Admitted for r/o COVID w/u and tx with r/o ACS. Placed on NC 3L with SpO2 in mid-high 90s.   Given 1L NS, 325mg ASA, 1g tylenol, 1g CTX, 500mg Azithromycin. Home medications restarted. Trop I downtrended to .143 on repeat.     HPI- COVID specific    Date of onset of fever: unclear date   Date of onset of dyspnea: 1d  Recent Travel: N  Sick Contacts: N  COVID Exposure: N  Close Contacts: N    ROS:  Fevers: Y  Malaise: Y  Myalgias: N  SOB: Y         At rest: Y        With exertion: Y         At baseline:  N  Cough: Y         Productive: Y  Nausea: N  Vomiting: N  Diarrhea: Y    PMHx:   HTN: N  DM: N  Lung Disease: N however->       Specify: prior CTs in [facility] concerning for pulmonary nodule  Cardiovascular disease: N  Malignancy: Y hx tonsilar Ca  Immunosuppression: N  HIV: Y  CKD/ESRD: N  Chronic Liver Disease: N    SoHx:  Tobacco use: N  Vape use: N  Health care worker: N

## 2020-03-31 NOTE — H&P ADULT - PROBLEM SELECTOR PLAN 1
Presented to [facility] satting low 90s on RA placed on NC with good saturations. Suspicious for COVID-19 etiology at present time and with GGO on CT Chest. Lungs  with good air movement b/l.   - f/u COVID-19 PCR  - f/u lung pathology/pathologies  - wean O2 as tolerated

## 2020-03-31 NOTE — H&P ADULT - PROBLEM SELECTOR PLAN 9
F: s/p 1L NS and additional .5L NS  E: replete as needed for K <4, Mg <2  N: DASH/TLC diet   GI ppx: famotidine  VTE/DVT ppx: lovenox  Pain: tylenol  Code status: Full  Dispo: COVID tele

## 2020-03-31 NOTE — H&P ADULT - PROBLEM SELECTOR PLAN 2
Upon presentation reported pain with coughing and had Trop I .159. EKG without new ST/T wave changes. Old ST depression V3 noted. Repeat trops downtrended to .146. No complaint of chest pain upon presentation to [facility]. Troponemia can be seen with COVID -19.   - monitor for signs/symptoms of ACS Presenting with cough, SOB, mild hypoxia and hx concerning for respiratory infection specifically COVID. CXR now showing distinct opacifications or b/l patchy opacifications. No lymphopenia and +neutrophilia. CT Chest showing b/l GGO concerning for COVID, viral, or atypical PNA. Did not meet sepsis criteria on admission; no tachypnea, no leukocytosis, not febrile (Tmax 100F), not tachycardic. SBP 90s - 140s. In ED given 1g CTX and 500mg azithromycin  - f/u Blood Cx  - f/u sputum Cx (if can collect)  - consider opportunistic infections  - no empiric abx at this time

## 2020-04-01 DIAGNOSIS — B34.2 CORONAVIRUS INFECTION, UNSPECIFIED: ICD-10-CM

## 2020-04-01 LAB
ALBUMIN SERPL ELPH-MCNC: 2.9 G/DL — LOW (ref 3.3–5)
ALP SERPL-CCNC: 35 U/L — LOW (ref 40–120)
ALT FLD-CCNC: 39 U/L — SIGNIFICANT CHANGE UP (ref 10–45)
ANION GAP SERPL CALC-SCNC: 13 MMOL/L — SIGNIFICANT CHANGE UP (ref 5–17)
AST SERPL-CCNC: 60 U/L — HIGH (ref 10–40)
BILIRUB SERPL-MCNC: 0.3 MG/DL — SIGNIFICANT CHANGE UP (ref 0.2–1.2)
BUN SERPL-MCNC: 42 MG/DL — HIGH (ref 7–23)
CALCIUM SERPL-MCNC: 8.8 MG/DL — SIGNIFICANT CHANGE UP (ref 8.4–10.5)
CHLORIDE SERPL-SCNC: 101 MMOL/L — SIGNIFICANT CHANGE UP (ref 96–108)
CO2 SERPL-SCNC: 22 MMOL/L — SIGNIFICANT CHANGE UP (ref 22–31)
CREAT SERPL-MCNC: 1.71 MG/DL — HIGH (ref 0.5–1.3)
CRP SERPL-MCNC: 18.47 MG/DL — HIGH (ref 0–0.4)
CULTURE RESULTS: SIGNIFICANT CHANGE UP
CULTURE RESULTS: SIGNIFICANT CHANGE UP
D DIMER BLD IA.RAPID-MCNC: 396 NG/ML DDU — HIGH
FERRITIN SERPL-MCNC: 1977 NG/ML — HIGH (ref 30–400)
GAMMA INTERFERON BACKGROUND BLD IA-ACNC: 0.02 IU/ML — SIGNIFICANT CHANGE UP
GLUCOSE SERPL-MCNC: 98 MG/DL — SIGNIFICANT CHANGE UP (ref 70–99)
HIV-1 VIRAL LOAD RESULT: SIGNIFICANT CHANGE UP
HIV1 RNA # SERPL NAA+PROBE: SIGNIFICANT CHANGE UP
HIV1 RNA SER-IMP: SIGNIFICANT CHANGE UP
HIV1 RNA SERPL NAA+PROBE-ACNC: SIGNIFICANT CHANGE UP
HIV1 RNA SERPL NAA+PROBE-LOG#: SIGNIFICANT CHANGE UP LG COP/ML
LDH SERPL L TO P-CCNC: 370 U/L — HIGH (ref 50–242)
M TB IFN-G BLD-IMP: NEGATIVE — SIGNIFICANT CHANGE UP
M TB IFN-G CD4+ BCKGRND COR BLD-ACNC: 0 IU/ML — SIGNIFICANT CHANGE UP
M TB IFN-G CD4+CD8+ BCKGRND COR BLD-ACNC: 0 IU/ML — SIGNIFICANT CHANGE UP
MAGNESIUM SERPL-MCNC: 2.4 MG/DL — SIGNIFICANT CHANGE UP (ref 1.6–2.6)
PHOSPHATE SERPL-MCNC: 2.9 MG/DL — SIGNIFICANT CHANGE UP (ref 2.5–4.5)
POTASSIUM SERPL-MCNC: 3.7 MMOL/L — SIGNIFICANT CHANGE UP (ref 3.5–5.3)
POTASSIUM SERPL-SCNC: 3.7 MMOL/L — SIGNIFICANT CHANGE UP (ref 3.5–5.3)
PROCALCITONIN SERPL-MCNC: 0.56 NG/ML — HIGH (ref 0.02–0.1)
PROT SERPL-MCNC: 6.9 G/DL — SIGNIFICANT CHANGE UP (ref 6–8.3)
QUANT TB PLUS MITOGEN MINUS NIL: 5.07 IU/ML — SIGNIFICANT CHANGE UP
SODIUM SERPL-SCNC: 136 MMOL/L — SIGNIFICANT CHANGE UP (ref 135–145)
SPECIMEN SOURCE: SIGNIFICANT CHANGE UP
SPECIMEN SOURCE: SIGNIFICANT CHANGE UP

## 2020-04-01 PROCEDURE — 99232 SBSQ HOSP IP/OBS MODERATE 35: CPT

## 2020-04-01 RX ORDER — ACETAMINOPHEN 500 MG
325 TABLET ORAL ONCE
Refills: 0 | Status: COMPLETED | OUTPATIENT
Start: 2020-04-01 | End: 2020-04-01

## 2020-04-01 RX ORDER — THIAMINE MONONITRATE (VIT B1) 100 MG
200 TABLET ORAL
Refills: 0 | Status: DISCONTINUED | OUTPATIENT
Start: 2020-04-01 | End: 2020-04-03

## 2020-04-01 RX ADMIN — TAMSULOSIN HYDROCHLORIDE 0.4 MILLIGRAM(S): 0.4 CAPSULE ORAL at 21:30

## 2020-04-01 RX ADMIN — Medication 200 MILLIGRAM(S): at 23:28

## 2020-04-01 RX ADMIN — VALACYCLOVIR 500 MILLIGRAM(S): 500 TABLET, FILM COATED ORAL at 05:14

## 2020-04-01 RX ADMIN — Medication 975 MILLIGRAM(S): at 12:35

## 2020-04-01 RX ADMIN — ENOXAPARIN SODIUM 40 MILLIGRAM(S): 100 INJECTION SUBCUTANEOUS at 01:20

## 2020-04-01 RX ADMIN — RITONAVIR 100 MILLIGRAM(S): 100 TABLET, FILM COATED ORAL at 05:14

## 2020-04-01 RX ADMIN — Medication 975 MILLIGRAM(S): at 18:02

## 2020-04-01 RX ADMIN — DARUNAVIR 600 MILLIGRAM(S): 75 TABLET, FILM COATED ORAL at 17:53

## 2020-04-01 RX ADMIN — VALACYCLOVIR 500 MILLIGRAM(S): 500 TABLET, FILM COATED ORAL at 17:53

## 2020-04-01 RX ADMIN — Medication 100 MILLIGRAM(S): at 17:53

## 2020-04-01 RX ADMIN — RALTEGRAVIR 400 MILLIGRAM(S): 400 TABLET, FILM COATED ORAL at 17:53

## 2020-04-01 RX ADMIN — RITONAVIR 100 MILLIGRAM(S): 100 TABLET, FILM COATED ORAL at 17:53

## 2020-04-01 RX ADMIN — Medication 125 MICROGRAM(S): at 05:14

## 2020-04-01 RX ADMIN — RALTEGRAVIR 400 MILLIGRAM(S): 400 TABLET, FILM COATED ORAL at 05:15

## 2020-04-01 RX ADMIN — DARUNAVIR 600 MILLIGRAM(S): 75 TABLET, FILM COATED ORAL at 05:15

## 2020-04-01 RX ADMIN — Medication 975 MILLIGRAM(S): at 09:13

## 2020-04-01 RX ADMIN — Medication 325 MILLIGRAM(S): at 22:10

## 2020-04-01 RX ADMIN — Medication 975 MILLIGRAM(S): at 01:44

## 2020-04-01 RX ADMIN — Medication 100 MILLIGRAM(S): at 09:14

## 2020-04-01 RX ADMIN — FAMOTIDINE 20 MILLIGRAM(S): 10 INJECTION INTRAVENOUS at 12:36

## 2020-04-01 NOTE — PROGRESS NOTE ADULT - SUBJECTIVE AND OBJECTIVE BOX
Hospital Course:   58YO M with PMH of HIV (VL undetectable, CD4 232 per pt), tonsillar Ca (sp chemo and radtx), HLD, hypothyroidism, BPH and sleep apnea who presented with cough (intermitent sputum), subjective fevers, pain with cough, mild diarrhea, decreased PO intake for 2 weeks with 1d of increasing SOB and MENDEZ. Of note presented mid-March (3/15) to [facility] with similar sx and was prescribed Azithromycin. Imaging: CT Chest showed b/l upper lobe GGO and RML. Placed on NC 3L with SpO2 in mid-high 90s, but then required NRB and admitted to [facility]. Received CTX and azithro. ID consulted and recommend continuing home regimen of HIV meds and hold on plaquenil at this time. Now O2 weaned and sat 95% on 4L NC stable for step down to [facility] for further monitoring and management.    VITAL SIGNS:  Vital Signs Last 24 Hrs  T(C): 38.5 (01 Apr 2020 20:55), Max: 38.5 (01 Apr 2020 20:55)  T(F): 101.3 (01 Apr 2020 20:55), Max: 101.3 (01 Apr 2020 20:55)  HR: 64 (01 Apr 2020 20:59) (57 - 73)  BP: 98/57 (01 Apr 2020 20:59) (82/49 - 106/64)  BP(mean): --  RR: 18 (01 Apr 2020 20:59) (18 - 19)  SpO2: 93% (01 Apr 2020 20:59) (92% - 97%)    PHYSICAL EXAM:    General: age-appearing male in NAD, on NC, answering questions in full sentences, uncomfortable appearing  HEENT: NC/AT, EOMI, PERRL, dry mucous membranes  Neck: No JVD  Resp: R>>L posterior middle and lower lobe crackles, occasional intermittent wet cough. no wheezing appreciated   Cardio: distant S1S2 RRR  Abd: S/NT/ND  Extremities: UE WWP, 2+ radial pulses b/l  Neuro: AAOx3    MEDICATIONS:  MEDICATIONS  (STANDING):  acetaminophen   Tablet .. 975 milliGRAM(s) Oral every 6 hours  darunavir 600 milliGRAM(s) Oral two times a day  enoxaparin Injectable 40 milliGRAM(s) SubCutaneous every 24 hours  famotidine    Tablet 20 milliGRAM(s) Oral daily  influenza   Vaccine 0.5 milliLiter(s) IntraMuscular once  levothyroxine 125 MICROGram(s) Oral daily  raltegravir Tablet 400 milliGRAM(s) Oral two times a day  ritonavir Tablet 100 milliGRAM(s) Oral two times a day  sodium chloride 0.9%. 1000 milliLiter(s) (50 mL/Hr) IV Continuous <Continuous>  tamsulosin 0.4 milliGRAM(s) Oral at bedtime  valACYclovir 500 milliGRAM(s) Oral two times a day    MEDICATIONS  (PRN):  benzonatate 100 milliGRAM(s) Oral three times a day PRN Cough      ALLERGIES:  Allergies    No Known Allergies    Intolerances        LABS:                        15.5   6.49  )-----------( 142      ( 01 Apr 2020 07:36 )             47.5     04-01    136  |  101  |  42<H>  ----------------------------<  98  3.7   |  22  |  1.71<H>    Ca    8.8      01 Apr 2020 07:36  Phos  2.9     04-01  Mg     2.4     04-01    TPro  6.9  /  Alb  2.9<L>  /  TBili  0.3  /  DBili  x   /  AST  60<H>  /  ALT  39  /  AlkPhos  35<L>  04-01        CAPILLARY BLOOD GLUCOSE          RADIOLOGY & ADDITIONAL TESTS: Reviewed. Hospital Course:   58YO M with PMH of HIV (VL undetectable, CD4 232 per pt), tonsillar Ca (sp chemo and radtx), HLD, hypothyroidism, BPH and sleep apnea who presented with cough (intermitent sputum), subjective fevers, pain with cough, mild diarrhea, decreased PO intake for 2 weeks with 1d of increasing SOB and MENDEZ. Of note presented mid-March (3/15) to [facility] with similar sx and was prescribed Azithromycin. Imaging: CT Chest showed b/l upper lobe GGO and RML. Placed on NC 3L with SpO2 in mid-high 90s, but then required NRB and admitted to [facility]. Received CTX and azithro x 1 in the ED. ID consulted and recommend continuing home regimen of HIV meds and hold on plaquenil at this time for prolonged QTc. Now O2 weaned and sat 95% on 4L NC stable for step down to [facility] for further monitoring and management.    VITAL SIGNS:  Vital Signs Last 24 Hrs  T(C): 38.5 (01 Apr 2020 20:55), Max: 38.5 (01 Apr 2020 20:55)  T(F): 101.3 (01 Apr 2020 20:55), Max: 101.3 (01 Apr 2020 20:55)  HR: 64 (01 Apr 2020 20:59) (57 - 73)  BP: 98/57 (01 Apr 2020 20:59) (82/49 - 106/64)  BP(mean): --  RR: 18 (01 Apr 2020 20:59) (18 - 19)  SpO2: 93% (01 Apr 2020 20:59) (92% - 97%)    PHYSICAL EXAM:    General: age-appearing male in NAD, on NC, answering questions in full sentences, uncomfortable appearing  HEENT: NC/AT, EOMI, PERRL, dry mucous membranes  Neck: No JVD  Resp: R>>L posterior middle and lower lobe crackles, occasional intermittent wet cough. no wheezing appreciated   Cardio: distant S1S2 RRR  Abd: S/NT/ND  Extremities: UE WWP, 2+ radial pulses b/l  Neuro: AAOx3    MEDICATIONS:  MEDICATIONS  (STANDING):  acetaminophen   Tablet .. 975 milliGRAM(s) Oral every 6 hours  darunavir 600 milliGRAM(s) Oral two times a day  enoxaparin Injectable 40 milliGRAM(s) SubCutaneous every 24 hours  famotidine    Tablet 20 milliGRAM(s) Oral daily  influenza   Vaccine 0.5 milliLiter(s) IntraMuscular once  levothyroxine 125 MICROGram(s) Oral daily  raltegravir Tablet 400 milliGRAM(s) Oral two times a day  ritonavir Tablet 100 milliGRAM(s) Oral two times a day  sodium chloride 0.9%. 1000 milliLiter(s) (50 mL/Hr) IV Continuous <Continuous>  tamsulosin 0.4 milliGRAM(s) Oral at bedtime  valACYclovir 500 milliGRAM(s) Oral two times a day    MEDICATIONS  (PRN):  benzonatate 100 milliGRAM(s) Oral three times a day PRN Cough      ALLERGIES:  Allergies    No Known Allergies    Intolerances        LABS:                        15.5   6.49  )-----------( 142      ( 01 Apr 2020 07:36 )             47.5     04-01    136  |  101  |  42<H>  ----------------------------<  98  3.7   |  22  |  1.71<H>    Ca    8.8      01 Apr 2020 07:36  Phos  2.9     04-01  Mg     2.4     04-01    TPro  6.9  /  Alb  2.9<L>  /  TBili  0.3  /  DBili  x   /  AST  60<H>  /  ALT  39  /  AlkPhos  35<L>  04-01        CAPILLARY BLOOD GLUCOSE          RADIOLOGY & ADDITIONAL TESTS: Reviewed.

## 2020-04-01 NOTE — PROGRESS NOTE ADULT - PROBLEM SELECTOR PLAN 2
Likely viral pneumonia 2/2 COVID-19. Presenting with cough, SOB, mild hypoxia and hx concerning for respiratory infection specifically COVID. CXR now showing distinct opacifications or b/l patchy opacifications. No lymphopenia and +neutrophilia. CT Chest showing b/l GGO concerning for COVID, viral, or atypical PNA. Did not meet sepsis criteria on admission; no tachypnea, no leukocytosis, not febrile (Tmax 100F), not tachycardic. SBP 90s - 140s. In ED given 1g CTX and 500mg azithromycin  - f/u Blood Cx  - f/u sputum Cx (if can collect)  - consider opportunistic infections  - no empiric abx at this time Likely viral pneumonia 2/2 COVID-19. Presenting with cough, SOB, mild hypoxia and hx concerning for respiratory infection specifically COVID. CXR now showing distinct opacifications or b/l patchy opacifications. No lymphopenia and +neutrophilia. CT Chest showing b/l GGO concerning for COVID, viral, or atypical PNA. Did not meet sepsis criteria on admission; no tachypnea, no leukocytosis, not febrile (Tmax 100F), not tachycardic. SBP 90s - 140s. In ED given 1g CTX and 500mg azithromycin  - f/u Blood Cx  - f/u sputum Cx (if can collect)  - held on further azithromycin and plaquenil for prolonged QTc  - holding on vitamin C due to LEONEL  - will start thiamine 200mg BID  - consider opportunistic infections  - no empiric abx at this time Presented to [facility] satting low 90s on RA placed on NC with good saturations. Suspicious for COVID-19 etiology at present time and with GGO on CT Chest. Lungs  with good air movement b/l.   - f/u lung pathology/pathologies  - wean O2 as tolerated Likely viral pneumonia 2/2 COVID-19. Presenting with cough, SOB, mild hypoxia and hx concerning for respiratory infection specifically COVID. CXR now showing distinct opacifications or b/l patchy opacifications. No lymphopenia and +neutrophilia. CT Chest showing b/l GGO concerning for COVID, viral, or atypical PNA. Did not meet sepsis criteria on admission; no tachypnea, no leukocytosis, not febrile (Tmax 100F), not tachycardic. SBP 90s - 140s. In ED given 1g CTX and 500mg azithromycin  - blood Cx NGTD  - f/u sputum Cx (if can collect)  - consider opportunistic infections  - no empiric abx at this time in setting of COVID-19+

## 2020-04-01 NOTE — PROGRESS NOTE ADULT - ASSESSMENT
60YO M with PMH of HIV (VL undetectable, CD4 300), tonsillar Ca (sp tx), HTN, HLD, hypothyroidism, BPH and sleep apnea who presented with cough, subjective fevers, anorexia, and increasing SOB and MENDEZ; found to be in acute hypoxic respiratory failure suspicious for COVID-19 etiology in present setting and GGO on CT Chest but ddx including non-COVID etiologies including CAP, opportunistic infection, other URI, etc.

## 2020-04-01 NOTE — PROGRESS NOTE ADULT - SUBJECTIVE AND OBJECTIVE BOX
INTERVAL HPI/OVERNIGHT EVENTS:    Patient seen and examined earlier today.  Feels better than prior     CONSTITUTIONAL:  + fever or chills, feels well, good appetite  EYES:  Negative  blurry vision or double vision  CARDIOVASCULAR:  Negative for chest pain or palpitations  RESPIRATORY:  Negative for cough, wheezing, + SOB   GASTROINTESTINAL:  Negative for nausea, vomiting, diarrhea, constipation, or abdominal pain  GENITOURINARY:  Negative frequency, urgency or dysuria  NEUROLOGIC:  No headache, confusion, dizziness, lightheadedness      ANTIBIOTICS/RELEVANT:    MEDICATIONS  (STANDING):  acetaminophen   Tablet .. 975 milliGRAM(s) Oral every 6 hours  darunavir 600 milliGRAM(s) Oral two times a day  enoxaparin Injectable 40 milliGRAM(s) SubCutaneous every 24 hours  famotidine    Tablet 20 milliGRAM(s) Oral daily  influenza   Vaccine 0.5 milliLiter(s) IntraMuscular once  levothyroxine 125 MICROGram(s) Oral daily  raltegravir Tablet 400 milliGRAM(s) Oral two times a day  ritonavir Tablet 100 milliGRAM(s) Oral two times a day  sodium chloride 0.9%. 1000 milliLiter(s) (50 mL/Hr) IV Continuous <Continuous>  tamsulosin 0.4 milliGRAM(s) Oral at bedtime  valACYclovir 500 milliGRAM(s) Oral two times a day    MEDICATIONS  (PRN):  benzonatate 100 milliGRAM(s) Oral three times a day PRN Cough        Vital Signs Last 24 Hrs  T(C): 37.6 (01 Apr 2020 09:18), Max: 39.7 (31 Mar 2020 20:48)  T(F): 99.6 (01 Apr 2020 09:18), Max: 103.5 (31 Mar 2020 20:48)  HR: 57 (01 Apr 2020 12:41) (57 - 78)  BP: 103/58 (01 Apr 2020 12:41) (96/61 - 126/74)  BP(mean): --  RR: 18 (01 Apr 2020 12:41) (18 - 20)  SpO2: 95% (01 Apr 2020 12:41) (90% - 97%)    PHYSICAL EXAM:  Constitutional: no distress  Eyes: no icterus   Ear/Nose/Throat: no sinus tenderness on percussion	  Neck:no JVD, no lymphadenopathy, supple  Respiratory: + crackles  Cardiovascular: S1S2 RRR, no murmurs  Gastrointestinal:soft, (+) BS, no HSM  Extremities:no edema   Vascular: DP Pulse:	right normal; left normal      LABS:                        15.5   6.49  )-----------( 142      ( 01 Apr 2020 07:36 )             47.5     04-01    136  |  101  |  42<H>  ----------------------------<  98  3.7   |  22  |  1.71<H>    Ca    8.8      01 Apr 2020 07:36  Phos  2.9     04-01  Mg     2.4     04-01    TPro  6.9  /  Alb  2.9<L>  /  TBili  0.3  /  DBili  x   /  AST  60<H>  /  ALT  39  /  AlkPhos  35<L>  04-01    GI PCR:  negative       MICROBIOLOGY:    Cryptosporidium Smear (04.01.20 @ 00:57)    Culture Results:   No Cryptosporidium, Cyclospora sp. or Isospora found by modified acid  fast stain    GI PCR Panel, Stool (04.01.20 @ 00:55)    Culture Results:   GI PCR Results: NOT detected  *******Please Note:*******  GI panel PCR evaluates for:  Campylobacter, Plesiomonas shigelloides, Salmonella,  Vibrio, Yersinia enterocolitica, Enteroaggregative  Escherichia coli (EAEC), Enteropathogenic E.coli (EPEC),  Enterotoxigenic E. coli (ETEC) lt/st, Shiga-like  toxin-producing E. coli (STEC) stx1/stx2,  Shigella/ Enteroinvasive E. coli (EIEC), Cryptosporidium,  Cyclospora cayetanensis, Entamoeba histolytica,  Giardia lamblia, Adenovirus F 40/41, Astrovirus,  Norovirus GI/GII, Rotavirus A, Sapovirus        RADIOLOGY & ADDITIONAL STUDIES:

## 2020-04-01 NOTE — PROGRESS NOTE ADULT - PROBLEM SELECTOR PLAN 3
Presentation suspicious for COVID-19. WBC elevated, auto lymphocyte wnl. No known sick/COVID+ contacts. COVID-19 PCR +  - isolation precautions  - holding empiric tx given lower oxygen requirements  - f/u daily COVID labs  - QTc: 515 on 3/30 before rx'ing any QTc prolonging agents Likely viral pneumonia 2/2 COVID-19. Presenting with cough, SOB, mild hypoxia and hx concerning for respiratory infection specifically COVID. CXR now showing distinct opacifications or b/l patchy opacifications. No lymphopenia and +neutrophilia. CT Chest showing b/l GGO concerning for COVID, viral, or atypical PNA. Did not meet sepsis criteria on admission; no tachypnea, no leukocytosis, not febrile (Tmax 100F), not tachycardic. SBP 90s - 140s. In ED given 1g CTX and 500mg azithromycin  - f/u Blood Cx  - f/u sputum Cx (if can collect)  - consider opportunistic infections  - no empiric abx at this time Upon presentation reported pain with coughing and had Trop I .159. Received 325mg ASA. EKG without new ST/T wave changes. Old ST depression V3 noted. Repeat trops downtrended to 0.146. No complaint of chest pain upon presentation to [facility]. Troponemia can be seen with COVID -19.   - monitor for signs/symptoms of ACS

## 2020-04-01 NOTE — PROGRESS NOTE ADULT - ASSESSMENT
58YO M with PMH of HIV (VL undetectable, CD4 300), tonsillar Ca (sp tx), HTN, HLD, hypothyroidism, BPH and sleep apnea who presented with cough, subjective fevers, anorexia, and increasing SOB and MENDEZ; found to be in acute hypoxic respiratory failure suspicious for COVID-19 etiology in present setting and GGO on CT Chest but ddx including non-COVID etiologies including CAP, opportunistic infection, other URI, etc. 60YO M with PMH of HIV (VL undetectable, CD4 300), tonsillar Ca (sp tx), HTN, HLD, hypothyroidism, BPH and sleep apnea who presented with cough, subjective fevers, anorexia, and increasing SOB and MENDEZ; found to be in acute hypoxic respiratory 2/2 COVID-19, (not treated for COVID-19 2/2 QTc prolongation), now stepped down to [facility] saturating 100% on 3L.

## 2020-04-01 NOTE — PROGRESS NOTE ADULT - PROBLEM SELECTOR PLAN 7
LEONEL on CKD. Cr 2.06 on admission, baseline 1.3 in 2019 (although has had Cr 1.8 previous labs).   - trend BMP  - gentle hydration  - consider urine studies  - renally dose Rx    #hyponatremia  Noted with mild hyponatremia (130 -> 131) and hypochloremia (93->96) on presentation likely 2/2 decreased PO intake. Improving after fluids  - f/u AM BMP

## 2020-04-01 NOTE — PROGRESS NOTE ADULT - PROBLEM SELECTOR PLAN 1
Presented to [facility] satting low 90s on RA placed on NC with good saturations. Suspicious for COVID-19 etiology at present time and with GGO on CT Chest. Lungs  with good air movement b/l.   - f/u lung pathology/pathologies  - wean O2 as tolerated positive for COVID-19. WBC elevated, auto lymphocyte wnl. No known sick/COVID+ contacts. COVID-19 PCR +  - isolation precautions  - holding empiric tx given lower oxygen requirements  - held on further azithromycin and plaquenil for prolonged QTc  - holding on vitamin C due to LEONEL  - will start thiamine 200mg BID  - f/u daily COVID labs  - QTc: 515 on 3/30 before rx'ing any QTc prolonging agents

## 2020-04-01 NOTE — PROGRESS NOTE ADULT - SUBJECTIVE AND OBJECTIVE BOX
INTERVAL HPI/OVERNIGHT EVENTS: No acute events overnight.    SUBJECTIVE: Patient seen and examined at bedside. Still complaining of some diarrhea and endorses cough. Breathing feels a little bit better today.    OBJECTIVE:    VITAL SIGNS:  ICU Vital Signs Last 24 Hrs  T(C): 37.6 (01 Apr 2020 09:18), Max: 39.7 (31 Mar 2020 20:48)  T(F): 99.6 (01 Apr 2020 09:18), Max: 103.5 (31 Mar 2020 20:48)  HR: 57 (01 Apr 2020 12:41) (57 - 78)  BP: 103/58 (01 Apr 2020 12:41) (96/61 - 126/74)  BP(mean): --  ABP: --  ABP(mean): --  RR: 18 (01 Apr 2020 12:41) (18 - 20)  SpO2: 95% (01 Apr 2020 12:41) (90% - 97%)        CAPILLARY BLOOD GLUCOSE          PHYSICAL EXAM:  General: age-appearing male in NAD, on NC, answering questions in full sentences, uncomfortable appearing  HEENT: NC/AT, EOMI, PERRL, dry mucous membranes  Neck: No JVD  Resp: R>>L posterior middle and lower lobe crackles, occasional intermittent wet cough. no wheezing appreciated   Cardio: distant S1S2 RRR  Abd: S/NT/ND  Extremities: UE WWP, 2+ radial pulses b/l  Neuro: AAOx3    MEDICATIONS:  MEDICATIONS  (STANDING):  acetaminophen   Tablet .. 975 milliGRAM(s) Oral every 6 hours  darunavir 600 milliGRAM(s) Oral two times a day  enoxaparin Injectable 40 milliGRAM(s) SubCutaneous every 24 hours  famotidine    Tablet 20 milliGRAM(s) Oral daily  influenza   Vaccine 0.5 milliLiter(s) IntraMuscular once  levothyroxine 125 MICROGram(s) Oral daily  raltegravir Tablet 400 milliGRAM(s) Oral two times a day  ritonavir Tablet 100 milliGRAM(s) Oral two times a day  sodium chloride 0.9%. 1000 milliLiter(s) (50 mL/Hr) IV Continuous <Continuous>  tamsulosin 0.4 milliGRAM(s) Oral at bedtime  valACYclovir 500 milliGRAM(s) Oral two times a day    MEDICATIONS  (PRN):  benzonatate 100 milliGRAM(s) Oral three times a day PRN Cough      ALLERGIES:  Allergies    No Known Allergies    Intolerances        LABS:                        15.5   6.49  )-----------( 142      ( 01 Apr 2020 07:36 )             47.5     04-01    136  |  101  |  42<H>  ----------------------------<  98  3.7   |  22  |  1.71<H>    Ca    8.8      01 Apr 2020 07:36  Phos  2.9     04-01  Mg     2.4     04-01    TPro  6.9  /  Alb  2.9<L>  /  TBili  0.3  /  DBili  x   /  AST  60<H>  /  ALT  39  /  AlkPhos  35<L>  04-01          Procalcitonin: Procalcitonin, Serum: 0.56 ng/mL (04-01-20 @ 07:36)  Procalcitonin, Serum: 0.32 ng/mL (03-31-20 @ 08:08)    D-dimer: D-Dimer Assay, Quantitative: 396 ng/mL DDU (04-01-20 @ 07:36)  D-Dimer Assay, Quantitative: 442 ng/mL DDU (03-31-20 @ 08:08)    ESR:   CRP: C-Reactive Protein, Serum: 18.47 mg/dL (04-01-20 @ 07:36)  C-Reactive Protein, Serum: 14.90 mg/dL (03-31-20 @ 08:08)    LDH: Lactate Dehydrogenase, Serum: 370 U/L (04-01-20 @ 07:36)    Ferritin: Ferritin, Serum: 1977 ng/mL (04-01-20 @ 07:36)  Ferritin, Serum: 1892 ng/mL (03-31-20 @ 08:08)    Lactate: lc  Trop I:   Ck: Creatine Kinase, Serum: 284 U/L (03-31-20 @ 08:08)        COVID Labs  Full T cell subset: ABS CD3: 449 /uL (03-31-20 @ 11:16)  ABS CD4: 103 /uL (03-31-20 @ 11:16)  ABS CD8: 318 /uL (03-31-20 @ 11:16)    G6PD:   Immunoglobulins panel:   Quantiferon Gold Tb:   Triglyceride level:         Culture - Blood (collected 03-31-20 @ 01:12)  Source: .Blood Blood-Venous  Preliminary Report (04-01-20 @ 02:01):    No growth to date.    Culture - Blood (collected 03-31-20 @ 01:12)  Source: .Blood Blood-Venous  Preliminary Report (04-01-20 @ 02:01):    No growth to date.            RADIOLOGY & ADDITIONAL TESTS: Reviewed. Hospital Course:   60YO M with PMH of HIV (VL undetectable, CD4 232 per pt), tonsillar Ca (sp chemo and radtx), HLD, hypothyroidism, BPH and sleep apnea who presented with cough (intermitent sputum), subjective fevers, pain with cough, mild diarrhea, decreased PO intake for 2 weeks with 1d of increasing SOB and MENDEZ. Of note presented mid-March (3/15) to [facility] with similar sx and was prescribed Azithromycin. Imaging: CT Chest showed b/l upper lobe GGO and RML. Placed on NC 3L with SpO2 in mid-high 90s, but then required NRB and admitted to [facility]. Received CTX and azithro. ID consulted and recommend continuing home regimen of HIV meds and hold on plaquenil at this time. Now O2 weaned and sat 95% on 4L NC stable for step down to [facility] for futher monitoring and management.    INTERVAL HPI/OVERNIGHT EVENTS: No acute events overnight.    SUBJECTIVE: Patient seen and examined at bedside. Still complaining of some diarrhea and endorses cough. Breathing feels a little bit better today.    OBJECTIVE:    VITAL SIGNS:  ICU Vital Signs Last 24 Hrs  T(C): 37.6 (01 Apr 2020 09:18), Max: 39.7 (31 Mar 2020 20:48)  T(F): 99.6 (01 Apr 2020 09:18), Max: 103.5 (31 Mar 2020 20:48)  HR: 57 (01 Apr 2020 12:41) (57 - 78)  BP: 103/58 (01 Apr 2020 12:41) (96/61 - 126/74)  BP(mean): --  ABP: --  ABP(mean): --  RR: 18 (01 Apr 2020 12:41) (18 - 20)  SpO2: 95% (01 Apr 2020 12:41) (90% - 97%)        CAPILLARY BLOOD GLUCOSE          PHYSICAL EXAM:  General: age-appearing male in NAD, on NC, answering questions in full sentences, uncomfortable appearing  HEENT: NC/AT, EOMI, PERRL, dry mucous membranes  Neck: No JVD  Resp: R>>L posterior middle and lower lobe crackles, occasional intermittent wet cough. no wheezing appreciated   Cardio: distant S1S2 RRR  Abd: S/NT/ND  Extremities: UE WWP, 2+ radial pulses b/l  Neuro: AAOx3    MEDICATIONS:  MEDICATIONS  (STANDING):  acetaminophen   Tablet .. 975 milliGRAM(s) Oral every 6 hours  darunavir 600 milliGRAM(s) Oral two times a day  enoxaparin Injectable 40 milliGRAM(s) SubCutaneous every 24 hours  famotidine    Tablet 20 milliGRAM(s) Oral daily  influenza   Vaccine 0.5 milliLiter(s) IntraMuscular once  levothyroxine 125 MICROGram(s) Oral daily  raltegravir Tablet 400 milliGRAM(s) Oral two times a day  ritonavir Tablet 100 milliGRAM(s) Oral two times a day  sodium chloride 0.9%. 1000 milliLiter(s) (50 mL/Hr) IV Continuous <Continuous>  tamsulosin 0.4 milliGRAM(s) Oral at bedtime  valACYclovir 500 milliGRAM(s) Oral two times a day    MEDICATIONS  (PRN):  benzonatate 100 milliGRAM(s) Oral three times a day PRN Cough      ALLERGIES:  Allergies    No Known Allergies    Intolerances        LABS:                        15.5   6.49  )-----------( 142      ( 01 Apr 2020 07:36 )             47.5     04-01    136  |  101  |  42<H>  ----------------------------<  98  3.7   |  22  |  1.71<H>    Ca    8.8      01 Apr 2020 07:36  Phos  2.9     04-01  Mg     2.4     04-01    TPro  6.9  /  Alb  2.9<L>  /  TBili  0.3  /  DBili  x   /  AST  60<H>  /  ALT  39  /  AlkPhos  35<L>  04-01          Procalcitonin: Procalcitonin, Serum: 0.56 ng/mL (04-01-20 @ 07:36)  Procalcitonin, Serum: 0.32 ng/mL (03-31-20 @ 08:08)    D-dimer: D-Dimer Assay, Quantitative: 396 ng/mL DDU (04-01-20 @ 07:36)  D-Dimer Assay, Quantitative: 442 ng/mL DDU (03-31-20 @ 08:08)    ESR:   CRP: C-Reactive Protein, Serum: 18.47 mg/dL (04-01-20 @ 07:36)  C-Reactive Protein, Serum: 14.90 mg/dL (03-31-20 @ 08:08)    LDH: Lactate Dehydrogenase, Serum: 370 U/L (04-01-20 @ 07:36)    Ferritin: Ferritin, Serum: 1977 ng/mL (04-01-20 @ 07:36)  Ferritin, Serum: 1892 ng/mL (03-31-20 @ 08:08)    Lactate: lc  Trop I:   Ck: Creatine Kinase, Serum: 284 U/L (03-31-20 @ 08:08)        COVID Labs  Full T cell subset: ABS CD3: 449 /uL (03-31-20 @ 11:16)  ABS CD4: 103 /uL (03-31-20 @ 11:16)  ABS CD8: 318 /uL (03-31-20 @ 11:16)    G6PD:   Immunoglobulins panel:   Quantiferon Gold Tb:   Triglyceride level:         Culture - Blood (collected 03-31-20 @ 01:12)  Source: .Blood Blood-Venous  Preliminary Report (04-01-20 @ 02:01):    No growth to date.    Culture - Blood (collected 03-31-20 @ 01:12)  Source: .Blood Blood-Venous  Preliminary Report (04-01-20 @ 02:01):    No growth to date.            RADIOLOGY & ADDITIONAL TESTS: Reviewed.

## 2020-04-01 NOTE — PROGRESS NOTE ADULT - PROBLEM SELECTOR PLAN 4
Upon presentation reported pain with coughing and had Trop I .159. Received 325mg ASA. EKG without new ST/T wave changes. Old ST depression V3 noted. Repeat trops downtrended to .146. No complaint of chest pain upon presentation to [facility]. Troponemia can be seen with COVID -19.   - monitor for signs/symptoms of ACS Upon presentation reported pain with coughing and had Trop I .159. Received 325mg ASA. EKG without new ST/T wave changes. Old ST depression V3 noted. Repeat trops downtrended to 0.146. No complaint of chest pain upon presentation to [facility]. Troponemia can be seen with COVID -19.   - monitor for signs/symptoms of ACS Presented to [facility] satting low 90s on RA placed on NC with good saturations. Suspicious for COVID-19 etiology at present time and with GGO on CT Chest. Lungs  with good air movement b/l.   - f/u lung pathology/pathologies  - wean O2 as tolerated

## 2020-04-01 NOTE — PROGRESS NOTE ADULT - ASSESSMENT
IMPRESSION:  HIV patient with COVID-19.  Seems clinically better today    Recommend:  1.  Continue ART regimen:  Descovy 1 tab PO daily + Isentress 400 mg PO q12 + Intelence 200 mg PO q12 + Prezista 600 mg PO q12 + Norvir 100 mg PO q12  2.  In terms of starting PCP ppx, there is no guidance on ppx in patients with HIV who have COVID.  COVID can worsen lymphopenia.  It's unclear how long this lasts for.  Therefore I want to restart bactrim for PCP ppx whoever will wait until renal function returns to normal --> will likely normalize tomorrow  3.  In terms of treating COVID, there is some data to support plaquenil and azithromycin however he has a QTc > 500. Further his norvir would increase plaquenil and potentiate that effect.  He seems to be improving off treatment therefore there is no need to rush given potential side effects.  Would recommend holding those meds.  Replete Potassium > 4 and keep magnesium > 2    ID team 1 will follow

## 2020-04-01 NOTE — PROGRESS NOTE ADULT - PROBLEM SELECTOR PLAN 6
Hx of HLD  - hold crestor for transaminitis  - can restart therapeutic interchange with resolution of LFT elevation    #HTN  pt denied hx of HTN however has losartan as home medication  - hold anti HTNs in setting of likely infection  - monitor BPs    #hypothyroidism  - c/w home levothyroxine 125mcg daily

## 2020-04-01 NOTE — PROGRESS NOTE ADULT - PROBLEM SELECTOR PLAN 4
Upon presentation reported pain with coughing and had Trop I .159. Received 325mg ASA. EKG without new ST/T wave changes. Old ST depression V3 noted. Repeat trops downtrended to .146. No complaint of chest pain upon presentation to [facility]. Troponemia can be seen with COVID -19.   - monitor for signs/symptoms of ACS

## 2020-04-01 NOTE — PROGRESS NOTE ADULT - PROBLEM SELECTOR PLAN 3
Presentation suspicious for COVID-19. WBC elevated, auto lymphocyte wnl. No known sick/COVID+ contacts. COVID-19 PCR +  - isolation precautions  - holding empiric tx given lower oxygen requirements  - f/u daily COVID labs  - QTc: 515 on 3/30 before rx'ing any QTc prolonging agents

## 2020-04-01 NOTE — PROGRESS NOTE ADULT - PROBLEM SELECTOR PLAN 1
Presented to [facility] satting low 90s on RA placed on NC with good saturations. Suspicious for COVID-19 etiology at present time and with GGO on CT Chest. Lungs  with good air movement b/l.   - f/u lung pathology/pathologies  - wean O2 as tolerated

## 2020-04-01 NOTE — PROGRESS NOTE ADULT - PROBLEM SELECTOR PLAN 2
Presenting with cough, SOB, mild hypoxia and hx concerning for respiratory infection specifically COVID. CXR now showing distinct opacifications or b/l patchy opacifications. No lymphopenia and +neutrophilia. CT Chest showing b/l GGO concerning for COVID, viral, or atypical PNA. Did not meet sepsis criteria on admission; no tachypnea, no leukocytosis, not febrile (Tmax 100F), not tachycardic. SBP 90s - 140s. In ED given 1g CTX and 500mg azithromycin  - f/u Blood Cx  - f/u sputum Cx (if can collect)  - consider opportunistic infections  - no empiric abx at this time Likely viral pneumonia 2/2 COVID-19. Presenting with cough, SOB, mild hypoxia and hx concerning for respiratory infection specifically COVID. CXR now showing distinct opacifications or b/l patchy opacifications. No lymphopenia and +neutrophilia. CT Chest showing b/l GGO concerning for COVID, viral, or atypical PNA. Did not meet sepsis criteria on admission; no tachypnea, no leukocytosis, not febrile (Tmax 100F), not tachycardic. SBP 90s - 140s. In ED given 1g CTX and 500mg azithromycin  - f/u Blood Cx  - f/u sputum Cx (if can collect)  - consider opportunistic infections  - no empiric abx at this time

## 2020-04-01 NOTE — PROGRESS NOTE ADULT - PROBLEM SELECTOR PLAN 5
Hx of HIV, undetectable for 20 yrs per patient and with recent f/u. Tcells per pt 232.   - consider opportunistic infections   - c/w home HIV rx  - c/w valtrex

## 2020-04-02 LAB
ALBUMIN SERPL ELPH-MCNC: 2.4 G/DL — LOW (ref 3.3–5)
ALP SERPL-CCNC: 32 U/L — LOW (ref 40–120)
ALT FLD-CCNC: 31 U/L — SIGNIFICANT CHANGE UP (ref 10–45)
ANION GAP SERPL CALC-SCNC: 12 MMOL/L — SIGNIFICANT CHANGE UP (ref 5–17)
AST SERPL-CCNC: 52 U/L — HIGH (ref 10–40)
BASOPHILS # BLD AUTO: 0 K/UL — SIGNIFICANT CHANGE UP (ref 0–0.2)
BASOPHILS NFR BLD AUTO: 0 % — SIGNIFICANT CHANGE UP (ref 0–2)
BILIRUB SERPL-MCNC: 0.2 MG/DL — SIGNIFICANT CHANGE UP (ref 0.2–1.2)
BUN SERPL-MCNC: 41 MG/DL — HIGH (ref 7–23)
CALCIUM SERPL-MCNC: 8.4 MG/DL — SIGNIFICANT CHANGE UP (ref 8.4–10.5)
CHLORIDE SERPL-SCNC: 105 MMOL/L — SIGNIFICANT CHANGE UP (ref 96–108)
CO2 SERPL-SCNC: 18 MMOL/L — LOW (ref 22–31)
CREAT SERPL-MCNC: 1.45 MG/DL — HIGH (ref 0.5–1.3)
CRP SERPL-MCNC: 18.11 MG/DL — HIGH (ref 0–0.4)
CULTURE RESULTS: SIGNIFICANT CHANGE UP
D DIMER BLD IA.RAPID-MCNC: 334 NG/ML DDU — HIGH
EOSINOPHIL # BLD AUTO: 0 K/UL — SIGNIFICANT CHANGE UP (ref 0–0.5)
EOSINOPHIL NFR BLD AUTO: 0 % — SIGNIFICANT CHANGE UP (ref 0–6)
FERRITIN SERPL-MCNC: 2152 NG/ML — HIGH (ref 30–400)
G6PD RBC-CCNC: 13.7 U/G HGB — SIGNIFICANT CHANGE UP (ref 7–20.5)
GLUCOSE SERPL-MCNC: 130 MG/DL — HIGH (ref 70–99)
HCT VFR BLD CALC: 42.7 % — SIGNIFICANT CHANGE UP (ref 39–50)
HGB BLD-MCNC: 14.2 G/DL — SIGNIFICANT CHANGE UP (ref 13–17)
LYMPHOCYTES # BLD AUTO: 0.38 K/UL — LOW (ref 1–3.3)
LYMPHOCYTES # BLD AUTO: 7.1 % — LOW (ref 13–44)
MAGNESIUM SERPL-MCNC: 2.4 MG/DL — SIGNIFICANT CHANGE UP (ref 1.6–2.6)
MCHC RBC-ENTMCNC: 29.1 PG — SIGNIFICANT CHANGE UP (ref 27–34)
MCHC RBC-ENTMCNC: 33.3 GM/DL — SIGNIFICANT CHANGE UP (ref 32–36)
MCV RBC AUTO: 87.5 FL — SIGNIFICANT CHANGE UP (ref 80–100)
MONOCYTES # BLD AUTO: 0.05 K/UL — SIGNIFICANT CHANGE UP (ref 0–0.9)
MONOCYTES NFR BLD AUTO: 0.9 % — LOW (ref 2–14)
MRSA PCR RESULT.: POSITIVE
NEUTROPHILS # BLD AUTO: 4.9 K/UL — SIGNIFICANT CHANGE UP (ref 1.8–7.4)
NEUTROPHILS NFR BLD AUTO: 75.2 % — SIGNIFICANT CHANGE UP (ref 43–77)
PHOSPHATE SERPL-MCNC: 1.9 MG/DL — LOW (ref 2.5–4.5)
PLATELET # BLD AUTO: 143 K/UL — LOW (ref 150–400)
POTASSIUM SERPL-MCNC: 3.1 MMOL/L — LOW (ref 3.5–5.3)
POTASSIUM SERPL-SCNC: 3.1 MMOL/L — LOW (ref 3.5–5.3)
PROT SERPL-MCNC: 6.4 G/DL — SIGNIFICANT CHANGE UP (ref 6–8.3)
RBC # BLD: 4.88 M/UL — SIGNIFICANT CHANGE UP (ref 4.2–5.8)
RBC # FLD: 13.9 % — SIGNIFICANT CHANGE UP (ref 10.3–14.5)
S AUREUS DNA NOSE QL NAA+PROBE: POSITIVE
SODIUM SERPL-SCNC: 135 MMOL/L — SIGNIFICANT CHANGE UP (ref 135–145)
SPECIMEN SOURCE: SIGNIFICANT CHANGE UP
TSH SERPL-MCNC: 2.59 UIU/ML — SIGNIFICANT CHANGE UP (ref 0.35–4.94)
WBC # BLD: 5.33 K/UL — SIGNIFICANT CHANGE UP (ref 3.8–10.5)
WBC # FLD AUTO: 5.33 K/UL — SIGNIFICANT CHANGE UP (ref 3.8–10.5)

## 2020-04-02 PROCEDURE — 71045 X-RAY EXAM CHEST 1 VIEW: CPT | Mod: 26

## 2020-04-02 PROCEDURE — 99233 SBSQ HOSP IP/OBS HIGH 50: CPT | Mod: GC

## 2020-04-02 PROCEDURE — 99232 SBSQ HOSP IP/OBS MODERATE 35: CPT

## 2020-04-02 RX ORDER — ATORVASTATIN CALCIUM 80 MG/1
80 TABLET, FILM COATED ORAL AT BEDTIME
Refills: 0 | Status: DISCONTINUED | OUTPATIENT
Start: 2020-04-02 | End: 2020-04-11

## 2020-04-02 RX ORDER — ETRAVIRINE 200 MG/1
200 TABLET ORAL
Refills: 0 | Status: DISCONTINUED | OUTPATIENT
Start: 2020-04-02 | End: 2020-04-03

## 2020-04-02 RX ORDER — PIPERACILLIN AND TAZOBACTAM 4; .5 G/20ML; G/20ML
4.5 INJECTION, POWDER, LYOPHILIZED, FOR SOLUTION INTRAVENOUS EVERY 6 HOURS
Refills: 0 | Status: COMPLETED | OUTPATIENT
Start: 2020-04-02 | End: 2020-04-09

## 2020-04-02 RX ORDER — AZITHROMYCIN 500 MG/1
250 TABLET, FILM COATED ORAL DAILY
Refills: 0 | Status: DISCONTINUED | OUTPATIENT
Start: 2020-04-02 | End: 2020-04-02

## 2020-04-02 RX ORDER — POTASSIUM PHOSPHATE, MONOBASIC POTASSIUM PHOSPHATE, DIBASIC 236; 224 MG/ML; MG/ML
15 INJECTION, SOLUTION INTRAVENOUS ONCE
Refills: 0 | Status: COMPLETED | OUTPATIENT
Start: 2020-04-02 | End: 2020-04-02

## 2020-04-02 RX ORDER — HYDROXYCHLOROQUINE SULFATE 200 MG
TABLET ORAL
Refills: 0 | Status: COMPLETED | OUTPATIENT
Start: 2020-04-02 | End: 2020-04-07

## 2020-04-02 RX ORDER — LACTOBACILLUS ACIDOPH-L.BULGARICUS 1 MILLION CELL CHEWABLE TABLET 1MM CELL
1 TABLET,CHEWABLE ORAL DAILY
Refills: 0 | Status: DISCONTINUED | OUTPATIENT
Start: 2020-04-02 | End: 2020-05-01

## 2020-04-02 RX ORDER — VANCOMYCIN HCL 1 G
1000 VIAL (EA) INTRAVENOUS EVERY 12 HOURS
Refills: 0 | Status: COMPLETED | OUTPATIENT
Start: 2020-04-03 | End: 2020-04-03

## 2020-04-02 RX ORDER — VANCOMYCIN HCL 1 G
1000 VIAL (EA) INTRAVENOUS EVERY 12 HOURS
Refills: 0 | Status: DISCONTINUED | OUTPATIENT
Start: 2020-04-02 | End: 2020-04-02

## 2020-04-02 RX ORDER — EMTRICITABINE AND TENOFOVIR DISOPROXIL FUMARATE 200; 300 MG/1; MG/1
1 TABLET, FILM COATED ORAL DAILY
Refills: 0 | Status: DISCONTINUED | OUTPATIENT
Start: 2020-04-02 | End: 2020-04-03

## 2020-04-02 RX ORDER — CEFTRIAXONE 500 MG/1
1000 INJECTION, POWDER, FOR SOLUTION INTRAMUSCULAR; INTRAVENOUS EVERY 24 HOURS
Refills: 0 | Status: DISCONTINUED | OUTPATIENT
Start: 2020-04-02 | End: 2020-04-02

## 2020-04-02 RX ORDER — HYDROXYCHLOROQUINE SULFATE 200 MG
200 TABLET ORAL EVERY 12 HOURS
Refills: 0 | Status: COMPLETED | OUTPATIENT
Start: 2020-04-03 | End: 2020-04-06

## 2020-04-02 RX ORDER — HYDROXYCHLOROQUINE SULFATE 200 MG
400 TABLET ORAL EVERY 12 HOURS
Refills: 0 | Status: COMPLETED | OUTPATIENT
Start: 2020-04-02 | End: 2020-04-02

## 2020-04-02 RX ORDER — POTASSIUM CHLORIDE 20 MEQ
40 PACKET (EA) ORAL EVERY 4 HOURS
Refills: 0 | Status: COMPLETED | OUTPATIENT
Start: 2020-04-02 | End: 2020-04-02

## 2020-04-02 RX ORDER — AZITHROMYCIN 500 MG/1
250 TABLET, FILM COATED ORAL DAILY
Refills: 0 | Status: COMPLETED | OUTPATIENT
Start: 2020-04-02 | End: 2020-04-06

## 2020-04-02 RX ADMIN — Medication 975 MILLIGRAM(S): at 13:10

## 2020-04-02 RX ADMIN — FAMOTIDINE 20 MILLIGRAM(S): 10 INJECTION INTRAVENOUS at 13:10

## 2020-04-02 RX ADMIN — RALTEGRAVIR 400 MILLIGRAM(S): 400 TABLET, FILM COATED ORAL at 16:55

## 2020-04-02 RX ADMIN — EMTRICITABINE AND TENOFOVIR DISOPROXIL FUMARATE 1 TABLET(S): 200; 300 TABLET, FILM COATED ORAL at 23:09

## 2020-04-02 RX ADMIN — Medication 1 TABLET(S): at 23:09

## 2020-04-02 RX ADMIN — ENOXAPARIN SODIUM 40 MILLIGRAM(S): 100 INJECTION SUBCUTANEOUS at 00:20

## 2020-04-02 RX ADMIN — Medication 400 MILLIGRAM(S): at 13:10

## 2020-04-02 RX ADMIN — Medication 975 MILLIGRAM(S): at 06:01

## 2020-04-02 RX ADMIN — EMTRICITABINE AND TENOFOVIR DISOPROXIL FUMARATE 1 TABLET(S): 200; 300 TABLET, FILM COATED ORAL at 17:29

## 2020-04-02 RX ADMIN — Medication 125 MICROGRAM(S): at 05:59

## 2020-04-02 RX ADMIN — TAMSULOSIN HYDROCHLORIDE 0.4 MILLIGRAM(S): 0.4 CAPSULE ORAL at 23:11

## 2020-04-02 RX ADMIN — Medication 40 MILLIEQUIVALENT(S): at 13:11

## 2020-04-02 RX ADMIN — RITONAVIR 100 MILLIGRAM(S): 100 TABLET, FILM COATED ORAL at 16:55

## 2020-04-02 RX ADMIN — PIPERACILLIN AND TAZOBACTAM 200 GRAM(S): 4; .5 INJECTION, POWDER, LYOPHILIZED, FOR SOLUTION INTRAVENOUS at 15:18

## 2020-04-02 RX ADMIN — Medication 200 MILLIGRAM(S): at 13:11

## 2020-04-02 RX ADMIN — AZITHROMYCIN 250 MILLIGRAM(S): 500 TABLET, FILM COATED ORAL at 13:10

## 2020-04-02 RX ADMIN — VALACYCLOVIR 500 MILLIGRAM(S): 500 TABLET, FILM COATED ORAL at 17:00

## 2020-04-02 RX ADMIN — Medication 250 MILLIGRAM(S): at 15:32

## 2020-04-02 RX ADMIN — RALTEGRAVIR 400 MILLIGRAM(S): 400 TABLET, FILM COATED ORAL at 06:00

## 2020-04-02 RX ADMIN — Medication 975 MILLIGRAM(S): at 18:25

## 2020-04-02 RX ADMIN — DARUNAVIR 600 MILLIGRAM(S): 75 TABLET, FILM COATED ORAL at 16:55

## 2020-04-02 RX ADMIN — LACTOBACILLUS ACIDOPH-L.BULGARICUS 1 MILLION CELL CHEWABLE TABLET 1 TABLET(S): at 13:11

## 2020-04-02 RX ADMIN — ATORVASTATIN CALCIUM 80 MILLIGRAM(S): 80 TABLET, FILM COATED ORAL at 23:15

## 2020-04-02 RX ADMIN — Medication 400 MILLIGRAM(S): at 23:11

## 2020-04-02 RX ADMIN — VALACYCLOVIR 500 MILLIGRAM(S): 500 TABLET, FILM COATED ORAL at 05:59

## 2020-04-02 RX ADMIN — Medication 975 MILLIGRAM(S): at 00:20

## 2020-04-02 RX ADMIN — CEFTRIAXONE 100 MILLIGRAM(S): 500 INJECTION, POWDER, FOR SOLUTION INTRAMUSCULAR; INTRAVENOUS at 13:17

## 2020-04-02 RX ADMIN — Medication 200 MILLIGRAM(S): at 23:11

## 2020-04-02 RX ADMIN — Medication 40 MILLIEQUIVALENT(S): at 17:15

## 2020-04-02 RX ADMIN — DARUNAVIR 600 MILLIGRAM(S): 75 TABLET, FILM COATED ORAL at 05:59

## 2020-04-02 RX ADMIN — ETRAVIRINE 200 MILLIGRAM(S): 200 TABLET ORAL at 16:57

## 2020-04-02 RX ADMIN — RITONAVIR 100 MILLIGRAM(S): 100 TABLET, FILM COATED ORAL at 06:00

## 2020-04-02 RX ADMIN — PIPERACILLIN AND TAZOBACTAM 200 GRAM(S): 4; .5 INJECTION, POWDER, LYOPHILIZED, FOR SOLUTION INTRAVENOUS at 23:08

## 2020-04-02 RX ADMIN — POTASSIUM PHOSPHATE, MONOBASIC POTASSIUM PHOSPHATE, DIBASIC 63.75 MILLIMOLE(S): 236; 224 INJECTION, SOLUTION INTRAVENOUS at 13:11

## 2020-04-02 NOTE — PROGRESS NOTE ADULT - ASSESSMENT
60YO M with PMH of HIV (VL undetectable, CD4 300), tonsillar Ca (sp tx), HTN, HLD, hypothyroidism, BPH and sleep apnea who presented with cough, subjective fevers, anorexia, and increasing SOB and MENDEZ; found to be in acute hypoxic respiratory 2/2 COVID-19 with possible superimposed bacterial pneumonia.

## 2020-04-02 NOTE — DIETITIAN INITIAL EVALUATION ADULT. - ENERGY NEEDS
Ht (3/30): 177.8cm, Wt (3/30): 74.8kg, IBW: 75.5kg+/-10%, %IBW: 98%, BMI: 23.7   ABW used to calculate energy needs due to pt's current body weight within % IBW. Needs adjusted for hypermetabolic state, fever. Fluid needs per team.

## 2020-04-02 NOTE — DIETITIAN INITIAL EVALUATION ADULT. - PROBLEM SELECTOR PLAN 1
Presented to [facility] satting low 90s on RA placed on NC with good saturations. Suspicious for COVID-19 etiology at present time and with GGO on CT Chest. Lungs  with good air movement b/l.   - f/u COVID-19 PCR  - f/u lung pathology/pathologies  - wean O2 as tolerated

## 2020-04-02 NOTE — DIETITIAN INITIAL EVALUATION ADULT. - PROBLEM SELECTOR PLAN 3
Presentation suspicious for COVID-19. WBC elevated, auto lymphocyte wnl. No known sick/COVID+ contacts.   - f/u COVID PCR  - isolation precautions  - holding empiric tx given lower suspicion  - f/u COVID labs  - f/u AM QTc before rx'ing any QTc prolonging agents

## 2020-04-02 NOTE — PROGRESS NOTE ADULT - PROBLEM SELECTOR PLAN 5
Upon presentation reported pain with coughing and had Trop I .159. Received 325mg ASA and initally admitted to Telemetry for r/o ACS. EKG without new ST/T wave changes. Old ST depression V3 noted. Repeat trops downtrended to 0.146. No complaint of chest pain upon presentation to [facility]. Troponemia can be seen with COVID -19.   - monitor for signs/symptoms of ACS

## 2020-04-02 NOTE — PROGRESS NOTE ADULT - ASSESSMENT
IMPRESSION:  HIV patient with severe COVID-19 infection.  His CD4 count is lower than his reported baseline of > 200.  This is likely due to COVID-19 which causes lymphopenia.  There is no guidance on PCP ppx in HIV patients with COVID.  It is unclear how long this lymphopenia will last.  A PCP infection on top of COVID-19 would potentially be fatal therefore will opt to prophylax him    Given lower Qtc and worsening symptoms it is reasonable to treat COVID-19    Recommend:  1.  Continue ART:  Descovy 1 tab PO daily + Isentress 400 mg PO q12 + Intelence 200 mg PO q12 + Prezista 600 mg PO q12 + Norvir 100 mg PO q12  2.  Can start Plaquenil 400 mg PO q12 x 2 doses, then 200 mg PO q12 x 8 doses  3.  Start Azithromycin 250 mg PO daily x 5 days  4.  Start thiamine 200 mg IV q12 x 5 days   5.  Hold ascorbic acid given LEONEL  6.  Start Bactrim 1 DS PO daily for PCP ppx  7.  Monitor EKG daily if possible while on Azithromycin and plaquenil    ID team 1 will follow

## 2020-04-02 NOTE — PROGRESS NOTE ADULT - PROBLEM SELECTOR PLAN 3
Positive for COVID-19. WBC elevated, auto lymphocyte wnl. No known sick/COVID+ contacts. COVID-19 PCR +.  - isolation precautions  - Started azithromycin and plaquenil as repeat QTc 452  - holding on vitamin C due to LEONEL  - Started thiamine 200mg BID  - f/u daily COVID labs  - Daily EKG for QTc monitoring

## 2020-04-02 NOTE — PROGRESS NOTE ADULT - ATTENDING COMMENTS
Patient seen and examined at bedside. Agree with the history, physical exam, assessment, and plan as outlined above with the following addendum. Briefly patient is a 58 yo M with HIV, MONICA originally admitted to [facility] tele for resp distress requiring NRB. He is now on NC and stepped down to F    COVID 19  -with hypoxia requiring NC. Today pt is febrile and o2 requirements increasing. Initially covid treatment was held given prolonged qtc.   Discussion had with ID. QTC is now 450, so will start azithromycin and Plaquenil and will follow QTC closely.    Given bandemia, will also cover for super imposed bacterial infection. Will start IV vanc and zosyn to cover for hospital acquired organisms given. F/w blood cultures   CT shows joseph opacities and AA at 4.6 cm, which should be followed up as outpatient   Case also discussed with Dr. Pagan at pulmonary conference. If no improvement, can consider IV steroids vs toci

## 2020-04-02 NOTE — PROGRESS NOTE ADULT - PROBLEM SELECTOR PLAN 1
Presented to [facility] satting low 90s on RA placed on NC with good saturations. Suspicious for COVID-19 etiology at present time and with GGO on CT Chest. Lungs  with good air movement b/l. Likely 2/2 viral +/- bacterial pneumonia.  - f/u lung pathology/pathologies  - wean O2 as tolerated

## 2020-04-02 NOTE — PROGRESS NOTE ADULT - PROBLEM SELECTOR PLAN 4
LEONEL on CKD. Cr 2.06 on admission, baseline 1.3 in 2019 (although has had Cr 1.8 previous labs). Improving.  - trend BMP  - gentle hydration  - consider urine studies  - renally dose Rx    #hyponatremia  Noted with mild hyponatremia (130 -> 131) and hypochloremia (93->96) on presentation likely 2/2 decreased PO intake. Improving after fluids  - f/u AM BMP

## 2020-04-02 NOTE — DIETITIAN INITIAL EVALUATION ADULT. - PROBLEM SELECTOR PLAN 7
LEONEL on CKD. Cr 2.06 on admission, baseline 1.3 in 2019 (although has had Cr 1.8 previous labs).   - trend BMP  - gentle hydration  - consider urine studies  - renally dose Rx    #hyponatremia  Noted with mild hyponatremia (130 -> 131) and hypochloremia (93->96) on presentation likely 2/2 decreased PO intake.   - improved s/p 1L NS  - receiving 500cc bolus AM 3/31  - f/u AM BMP

## 2020-04-02 NOTE — PROGRESS NOTE ADULT - SUBJECTIVE AND OBJECTIVE BOX
INTERVAL HPI/OVERNIGHT EVENTS:    Events noted.  Still febrile with decreased O2 sats         ANTIBIOTICS/RELEVANT:    MEDICATIONS  (STANDING):  acetaminophen   Tablet .. 975 milliGRAM(s) Oral every 6 hours  atorvastatin 80 milliGRAM(s) Oral at bedtime  azithromycin   Tablet 250 milliGRAM(s) Oral daily  darunavir 600 milliGRAM(s) Oral two times a day  emtricitabine 200 mG/tenofovir alafenamide 25 mG (DESCOVY) Tablet 1 Tablet(s) Oral daily  enoxaparin Injectable 40 milliGRAM(s) SubCutaneous every 24 hours  etravirine 200 milliGRAM(s) Oral two times a day after meals  famotidine    Tablet 20 milliGRAM(s) Oral daily  hydroxychloroquine 400 milliGRAM(s) Oral every 12 hours  hydroxychloroquine   Oral   influenza   Vaccine 0.5 milliLiter(s) IntraMuscular once  lactobacillus acidophilus and bulgaricus Chewable 1 Tablet(s) Chew daily  levothyroxine 125 MICROGram(s) Oral daily  piperacillin/tazobactam IVPB.. 4.5 Gram(s) IV Intermittent every 6 hours  potassium chloride   Powder 40 milliEquivalent(s) Oral every 4 hours  raltegravir Tablet 400 milliGRAM(s) Oral two times a day  ritonavir Tablet 100 milliGRAM(s) Oral two times a day  tamsulosin 0.4 milliGRAM(s) Oral at bedtime  thiamine 200 milliGRAM(s) Oral <User Schedule>  trimethoprim  160 mG/sulfamethoxazole 800 mG 1 Tablet(s) Oral daily  valACYclovir 500 milliGRAM(s) Oral two times a day  vancomycin  IVPB 1000 milliGRAM(s) IV Intermittent every 12 hours    MEDICATIONS  (PRN):  benzonatate 100 milliGRAM(s) Oral three times a day PRN Cough        Vital Signs Last 24 Hrs  T(C): 36.7 (2020 09:30), Max: 38.9 (2020 21:51)  T(F): 98 (2020 09:30), Max: 102 (2020 21:51)  HR: 60 (2020 09:30) (60 - 73)  BP: 90/53 (2020 09:30) (82/49 - 100/60)  BP(mean): --  RR: 18 (2020 09:30) (17 - 18)  SpO2: 93% (2020 09:30) (92% - 100%)    PHYSICAL EXAM:  see primary team     LABS:                        14.2   5.33  )-----------( 143      ( 2020 09:26 )             42.7     04-02    135  |  105  |  41<H>  ----------------------------<  130<H>  3.1<L>   |  18<L>  |  1.45<H>    Ca    8.4      2020 09:26  Phos  1.9     04-  Mg     2.4     04-    TPro  6.4  /  Alb  2.4<L>  /  TBili  0.2  /  DBili  x   /  AST  52<H>  /  ALT  31  /  AlkPhos  32<L>      Repeat EK      MICROBIOLOGY:    Culture - Blood (20 @ 01:12)    Specimen Source: .Blood Blood-Venous    Culture Results:   No growth to date.        RADIOLOGY & ADDITIONAL STUDIES:    < from: Xray Chest 1 View- PORTABLE-Urgent (20 @ 11:04) >  IMPRESSION: Bilateral airspace opacities, compatible with a multifocal infectious process.

## 2020-04-02 NOTE — DIETITIAN INITIAL EVALUATION ADULT. - PERTINENT MEDS FT
Lovenox, plaquenil, zithromax, descovy, intelence, zosyn, bactrim, vanco, Prezista, isentress, novir, valtrex, lipitor, lactinex, KCl, thiamine, tylenol, pepcid, synthroid

## 2020-04-02 NOTE — DIETITIAN INITIAL EVALUATION ADULT. - OTHER INFO
60YO M with PMH of HIV (VL undetectable, CD4 300), tonsillar Ca (sp tx), HTN, HLD, hypothyroidism, BPH and sleep apnea who presented with cough, subjective fevers, anorexia, and increasing SOB and MENDEZ; found to be in acute hypoxic respiratory 2/2 COVID-19 with possible superimposed bacterial pneumonia. Unable to conduct a face to face interview due to limited contact restrictions related to their medical condition and isolation precautions, spoke with pt on phone. Pt states he does not feel well, denies N/V, last BM 4/1 (endorses loose BM today), endorses abdominal bloating/distention. Fernando score 20. Pt states appetite fair, consuming ~50% of meals at this time. Pt states his appetite is better at home, follows regular diet, denies food allergies. Pt states UBW is 170lb, denies recent weight changes PTA, of note weight documented on 3/30 is 164lb, question if 6lb weight loss true weight loss or difference in scales. Unable to conduct a nutrition-focused physical exam due to limited contact restrictions related to their medical condition and isolation precautions. Encouraged continued increased PO intake with emphasis on lean protein- pt appeared receptive. Offered pt ONS but pt states he brought ~15 protein shakes with him to the hospital. RD to monitor and f/u per protocol.

## 2020-04-02 NOTE — PROGRESS NOTE ADULT - PROBLEM SELECTOR PLAN 7
Hx of HLD  - restarted therapeutic interchange of home crestor    #HTN  pt denied hx of HTN however has losartan as home medication  - hold anti HTNs in setting of likely infection  - monitor BPs    #hypothyroidism  - c/w home levothyroxine 125mcg daily  - F/u TSH

## 2020-04-02 NOTE — PROGRESS NOTE ADULT - SUBJECTIVE AND OBJECTIVE BOX
OVERNIGHT EVENTS: Febrile to 102F. Required increased O2 from 4L to 6L.    SUBJECTIVE / INTERVAL HPI: Patient seen and examined at bedside. Pt c/o persistent dry cough and diarrhea, unable to offer a specific number but agrees it's about 2-5 times daily. Denies f/c, n/v, HA, chest pain, SOB, abdominal pain, dysuria.    MEDICATIONS  (STANDING):  acetaminophen   Tablet .. 975 milliGRAM(s) Oral every 6 hours  azithromycin   Tablet 250 milliGRAM(s) Oral daily  cefTRIAXone   IVPB 1000 milliGRAM(s) IV Intermittent every 24 hours  darunavir 600 milliGRAM(s) Oral two times a day  enoxaparin Injectable 40 milliGRAM(s) SubCutaneous every 24 hours  famotidine    Tablet 20 milliGRAM(s) Oral daily  hydroxychloroquine 400 milliGRAM(s) Oral every 12 hours  hydroxychloroquine   Oral   influenza   Vaccine 0.5 milliLiter(s) IntraMuscular once  lactobacillus acidophilus and bulgaricus Chewable 1 Tablet(s) Chew daily  levothyroxine 125 MICROGram(s) Oral daily  potassium chloride   Powder 40 milliEquivalent(s) Oral every 4 hours  potassium phosphate IVPB 15 milliMole(s) IV Intermittent once  raltegravir Tablet 400 milliGRAM(s) Oral two times a day  ritonavir Tablet 100 milliGRAM(s) Oral two times a day  tamsulosin 0.4 milliGRAM(s) Oral at bedtime  thiamine 200 milliGRAM(s) Oral <User Schedule>  valACYclovir 500 milliGRAM(s) Oral two times a day    MEDICATIONS  (PRN):  benzonatate 100 milliGRAM(s) Oral three times a day PRN Cough    Allergies    No Known Allergies    Intolerances        VITAL SIGNS:  Vital Signs Last 24 Hrs  T(C): 36.7 (02 Apr 2020 09:30), Max: 38.9 (01 Apr 2020 21:51)  T(F): 98 (02 Apr 2020 09:30), Max: 102 (01 Apr 2020 21:51)  HR: 60 (02 Apr 2020 09:30) (57 - 73)  BP: 90/53 (02 Apr 2020 09:30) (82/49 - 103/58)  BP(mean): --  RR: 18 (02 Apr 2020 09:30) (17 - 18)  SpO2: 93% (02 Apr 2020 09:30) (92% - 100%)      04-01-20 @ 07:01  -  04-02-20 @ 07:00  --------------------------------------------------------  IN: 120 mL / OUT: 200 mL / NET: -80 mL        PHYSICAL EXAM:  General: NAD, Laying in bed, not tachypneic on 6LNC, appears malaised  HEENT: NC/AT, anicteric sclera, MMM  Neck: supple  Cardiovascular: +S1/S2, RRR, No murmurs, rubs, gallops  Respiratory: Decreased breath sounds in all lung fields, no wheezing or crackles  Gastrointestinal: soft, NT/ND, +BSx4  Extremities: WWP, no edema, clubbing or cyanosis  Vascular: 2+ radial, DP/PT pulses B/L  Neurological: AAOx3, no focal deficits      LABS:                        14.2   5.33  )-----------( 143      ( 02 Apr 2020 09:26 )             42.7     04-02    135  |  105  |  41<H>  ----------------------------<  130<H>  3.1<L>   |  18<L>  |  1.45<H>    Ca    8.4      02 Apr 2020 09:26  Phos  1.9     04-02  Mg     2.4     04-02    TPro  6.4  /  Alb  2.4<L>  /  TBili  0.2  /  DBili  x   /  AST  52<H>  /  ALT  31  /  AlkPhos  32<L>  04-02        RADIOLOGY & ADDITIONAL TESTS: Reviewed.

## 2020-04-02 NOTE — DIETITIAN INITIAL EVALUATION ADULT. - PROBLEM SELECTOR PLAN 4
Upon presentation reported pain with coughing and had Trop I .159. Received 325mg ASA. EKG without new ST/T wave changes. Old ST depression V3 noted. Repeat trops downtrended to .146. No complaint of chest pain upon presentation to [facility]. Troponemia can be seen with COVID -19.   - monitor for signs/symptoms of ACS

## 2020-04-02 NOTE — DIETITIAN INITIAL EVALUATION ADULT. - PROBLEM SELECTOR PLAN 2
Presenting with cough, SOB, mild hypoxia and hx concerning for respiratory infection specifically COVID. CXR now showing distinct opacifications or b/l patchy opacifications. No lymphopenia and +neutrophilia. CT Chest showing b/l GGO concerning for COVID, viral, or atypical PNA. Did not meet sepsis criteria on admission; no tachypnea, no leukocytosis, not febrile (Tmax 100F), not tachycardic. SBP 90s - 140s. In ED given 1g CTX and 500mg azithromycin  - f/u Blood Cx  - f/u sputum Cx (if can collect)  - consider opportunistic infections  - no empiric abx at this time

## 2020-04-02 NOTE — PROGRESS NOTE ADULT - PROBLEM SELECTOR PLAN 2
Likely viral pneumonia 2/2 COVID-19, now with bandemia concerning for superimposed bacterial pneumonia. Presenting with cough, SOB, mild hypoxia and hx concerning for respiratory infection. CXR showing b/l patchy opacifications. No lymphopenia and +neutrophilia. CT Chest showing b/l GGO concerning for COVID, viral, or atypical PNA. Did not meet sepsis criteria on admission; no tachypnea, no leukocytosis, not febrile (Tmax 100F), not tachycardic. SBP 90s - 140s. Now spiked fever to 102. In ED given 1g CTX and 500mg azithromycin.  - blood Cx NGTD  - Started CFTX 1 gram x 5 days for bandemia and fever to 102  - considering opportunistic infections in setting of HIV with CD4 in 200s

## 2020-04-03 LAB
ALBUMIN SERPL ELPH-MCNC: 2.7 G/DL — LOW (ref 3.3–5)
ALP SERPL-CCNC: 34 U/L — LOW (ref 40–120)
ALT FLD-CCNC: 34 U/L — SIGNIFICANT CHANGE UP (ref 10–45)
ANION GAP SERPL CALC-SCNC: 15 MMOL/L — SIGNIFICANT CHANGE UP (ref 5–17)
AST SERPL-CCNC: 64 U/L — HIGH (ref 10–40)
BASOPHILS # BLD AUTO: 0.01 K/UL — SIGNIFICANT CHANGE UP (ref 0–0.2)
BASOPHILS NFR BLD AUTO: 0.2 % — SIGNIFICANT CHANGE UP (ref 0–2)
BILIRUB SERPL-MCNC: 0.3 MG/DL — SIGNIFICANT CHANGE UP (ref 0.2–1.2)
BUN SERPL-MCNC: 32 MG/DL — HIGH (ref 7–23)
CALCIUM SERPL-MCNC: 9 MG/DL — SIGNIFICANT CHANGE UP (ref 8.4–10.5)
CHLORIDE SERPL-SCNC: 105 MMOL/L — SIGNIFICANT CHANGE UP (ref 96–108)
CO2 SERPL-SCNC: 20 MMOL/L — LOW (ref 22–31)
CREAT SERPL-MCNC: 1.42 MG/DL — HIGH (ref 0.5–1.3)
CRP SERPL-MCNC: 26.62 MG/DL — HIGH (ref 0–0.4)
D DIMER BLD IA.RAPID-MCNC: 383 NG/ML DDU — HIGH
EOSINOPHIL # BLD AUTO: 0 K/UL — SIGNIFICANT CHANGE UP (ref 0–0.5)
EOSINOPHIL NFR BLD AUTO: 0 % — SIGNIFICANT CHANGE UP (ref 0–6)
FERRITIN SERPL-MCNC: 3021 NG/ML — HIGH (ref 30–400)
GLUCOSE BLDC GLUCOMTR-MCNC: 109 MG/DL — HIGH (ref 70–99)
GLUCOSE BLDC GLUCOMTR-MCNC: 143 MG/DL — HIGH (ref 70–99)
GLUCOSE SERPL-MCNC: 96 MG/DL — SIGNIFICANT CHANGE UP (ref 70–99)
HCT VFR BLD CALC: 44.8 % — SIGNIFICANT CHANGE UP (ref 39–50)
HGB BLD-MCNC: 14.7 G/DL — SIGNIFICANT CHANGE UP (ref 13–17)
IMM GRANULOCYTES NFR BLD AUTO: 1 % — SIGNIFICANT CHANGE UP (ref 0–1.5)
LYMPHOCYTES # BLD AUTO: 0.44 K/UL — LOW (ref 1–3.3)
LYMPHOCYTES # BLD AUTO: 11 % — LOW (ref 13–44)
LYMPHOCYTES # BLD AUTO: 9 % — LOW (ref 13–44)
MAGNESIUM SERPL-MCNC: 2.2 MG/DL — SIGNIFICANT CHANGE UP (ref 1.6–2.6)
MANUAL DIF COMMENT BLD-IMP: SIGNIFICANT CHANGE UP
MANUAL SMEAR VERIFICATION: SIGNIFICANT CHANGE UP
MCHC RBC-ENTMCNC: 29.1 PG — SIGNIFICANT CHANGE UP (ref 27–34)
MCHC RBC-ENTMCNC: 32.8 GM/DL — SIGNIFICANT CHANGE UP (ref 32–36)
MCV RBC AUTO: 88.5 FL — SIGNIFICANT CHANGE UP (ref 80–100)
MONOCYTES # BLD AUTO: 0.15 K/UL — SIGNIFICANT CHANGE UP (ref 0–0.9)
MONOCYTES NFR BLD AUTO: 2 % — SIGNIFICANT CHANGE UP (ref 2–14)
MONOCYTES NFR BLD AUTO: 3.1 % — SIGNIFICANT CHANGE UP (ref 2–14)
NEUTROPHILS # BLD AUTO: 4.23 K/UL — SIGNIFICANT CHANGE UP (ref 1.8–7.4)
NEUTROPHILS NFR BLD AUTO: 82 % — HIGH (ref 43–77)
NEUTROPHILS NFR BLD AUTO: 86.7 % — HIGH (ref 43–77)
NEUTS BAND # BLD: 5 % — SIGNIFICANT CHANGE UP
NRBC # BLD: 0 /100 WBCS — SIGNIFICANT CHANGE UP (ref 0–0)
OVALOCYTES BLD QL SMEAR: SLIGHT — SIGNIFICANT CHANGE UP
PHOSPHATE SERPL-MCNC: 2.3 MG/DL — LOW (ref 2.5–4.5)
PLAT MORPH BLD: ABNORMAL
PLATELET # BLD AUTO: 164 K/UL — SIGNIFICANT CHANGE UP (ref 150–400)
PLATELET COUNT - ESTIMATE: ABNORMAL
POTASSIUM SERPL-MCNC: 4 MMOL/L — SIGNIFICANT CHANGE UP (ref 3.5–5.3)
POTASSIUM SERPL-SCNC: 4 MMOL/L — SIGNIFICANT CHANGE UP (ref 3.5–5.3)
PROT SERPL-MCNC: 6.9 G/DL — SIGNIFICANT CHANGE UP (ref 6–8.3)
RBC # BLD: 5.06 M/UL — SIGNIFICANT CHANGE UP (ref 4.2–5.8)
RBC # FLD: 14.3 % — SIGNIFICANT CHANGE UP (ref 10.3–14.5)
RBC BLD AUTO: ABNORMAL
SODIUM SERPL-SCNC: 140 MMOL/L — SIGNIFICANT CHANGE UP (ref 135–145)
WBC # BLD: 4.88 K/UL — SIGNIFICANT CHANGE UP (ref 3.8–10.5)
WBC # FLD AUTO: 4.88 K/UL — SIGNIFICANT CHANGE UP (ref 3.8–10.5)

## 2020-04-03 PROCEDURE — 93010 ELECTROCARDIOGRAM REPORT: CPT

## 2020-04-03 PROCEDURE — 99232 SBSQ HOSP IP/OBS MODERATE 35: CPT

## 2020-04-03 PROCEDURE — 99233 SBSQ HOSP IP/OBS HIGH 50: CPT | Mod: GC

## 2020-04-03 RX ORDER — SODIUM,POTASSIUM PHOSPHATES 278-250MG
2 POWDER IN PACKET (EA) ORAL ONCE
Refills: 0 | Status: COMPLETED | OUTPATIENT
Start: 2020-04-03 | End: 2020-04-03

## 2020-04-03 RX ORDER — INSULIN LISPRO 100/ML
VIAL (ML) SUBCUTANEOUS
Refills: 0 | Status: DISCONTINUED | OUTPATIENT
Start: 2020-04-03 | End: 2020-04-13

## 2020-04-03 RX ORDER — ETRAVIRINE 200 MG/1
200 TABLET ORAL
Refills: 0 | Status: DISCONTINUED | OUTPATIENT
Start: 2020-04-03 | End: 2020-04-04

## 2020-04-03 RX ORDER — DARUNAVIR 75 MG/1
600 TABLET, FILM COATED ORAL
Refills: 0 | Status: DISCONTINUED | OUTPATIENT
Start: 2020-04-03 | End: 2020-04-04

## 2020-04-03 RX ORDER — LOPERAMIDE HCL 2 MG
2 TABLET ORAL THREE TIMES A DAY
Refills: 0 | Status: DISCONTINUED | OUTPATIENT
Start: 2020-04-03 | End: 2020-04-04

## 2020-04-03 RX ORDER — DEXTROSE 50 % IN WATER 50 %
25 SYRINGE (ML) INTRAVENOUS ONCE
Refills: 0 | Status: DISCONTINUED | OUTPATIENT
Start: 2020-04-03 | End: 2020-05-01

## 2020-04-03 RX ORDER — TOCILIZUMAB 20 MG/ML
400 INJECTION, SOLUTION, CONCENTRATE INTRAVENOUS ONCE
Refills: 0 | Status: COMPLETED | OUTPATIENT
Start: 2020-04-03 | End: 2020-04-03

## 2020-04-03 RX ORDER — DEXTROSE 50 % IN WATER 50 %
12.5 SYRINGE (ML) INTRAVENOUS ONCE
Refills: 0 | Status: DISCONTINUED | OUTPATIENT
Start: 2020-04-03 | End: 2020-05-01

## 2020-04-03 RX ORDER — GLUCAGON INJECTION, SOLUTION 0.5 MG/.1ML
1 INJECTION, SOLUTION SUBCUTANEOUS ONCE
Refills: 0 | Status: DISCONTINUED | OUTPATIENT
Start: 2020-04-03 | End: 2020-05-01

## 2020-04-03 RX ORDER — VALACYCLOVIR 500 MG/1
500 TABLET, FILM COATED ORAL
Refills: 0 | Status: DISCONTINUED | OUTPATIENT
Start: 2020-04-03 | End: 2020-05-01

## 2020-04-03 RX ORDER — DEXTROSE 50 % IN WATER 50 %
15 SYRINGE (ML) INTRAVENOUS ONCE
Refills: 0 | Status: DISCONTINUED | OUTPATIENT
Start: 2020-04-03 | End: 2020-05-01

## 2020-04-03 RX ORDER — SODIUM CHLORIDE 9 MG/ML
1000 INJECTION, SOLUTION INTRAVENOUS
Refills: 0 | Status: DISCONTINUED | OUTPATIENT
Start: 2020-04-03 | End: 2020-05-01

## 2020-04-03 RX ORDER — RALTEGRAVIR 400 MG/1
400 TABLET, FILM COATED ORAL
Refills: 0 | Status: DISCONTINUED | OUTPATIENT
Start: 2020-04-03 | End: 2020-04-04

## 2020-04-03 RX ORDER — EMTRICITABINE AND TENOFOVIR DISOPROXIL FUMARATE 200; 300 MG/1; MG/1
1 TABLET, FILM COATED ORAL DAILY
Refills: 0 | Status: DISCONTINUED | OUTPATIENT
Start: 2020-04-03 | End: 2020-05-01

## 2020-04-03 RX ORDER — RITONAVIR 100 MG/1
100 TABLET, FILM COATED ORAL
Refills: 0 | Status: DISCONTINUED | OUTPATIENT
Start: 2020-04-03 | End: 2020-04-14

## 2020-04-03 RX ADMIN — RALTEGRAVIR 400 MILLIGRAM(S): 400 TABLET, FILM COATED ORAL at 05:47

## 2020-04-03 RX ADMIN — Medication 2 PACKET(S): at 12:23

## 2020-04-03 RX ADMIN — Medication 40 MILLIGRAM(S): at 17:18

## 2020-04-03 RX ADMIN — ENOXAPARIN SODIUM 40 MILLIGRAM(S): 100 INJECTION SUBCUTANEOUS at 23:59

## 2020-04-03 RX ADMIN — Medication 975 MILLIGRAM(S): at 12:17

## 2020-04-03 RX ADMIN — FAMOTIDINE 20 MILLIGRAM(S): 10 INJECTION INTRAVENOUS at 12:16

## 2020-04-03 RX ADMIN — Medication 200 MILLIGRAM(S): at 12:17

## 2020-04-03 RX ADMIN — Medication 250 MILLIGRAM(S): at 16:39

## 2020-04-03 RX ADMIN — Medication 975 MILLIGRAM(S): at 00:25

## 2020-04-03 RX ADMIN — Medication 250 MILLIGRAM(S): at 05:45

## 2020-04-03 RX ADMIN — DARUNAVIR 600 MILLIGRAM(S): 75 TABLET, FILM COATED ORAL at 05:47

## 2020-04-03 RX ADMIN — Medication 975 MILLIGRAM(S): at 18:03

## 2020-04-03 RX ADMIN — Medication 200 MILLIGRAM(S): at 22:59

## 2020-04-03 RX ADMIN — ETRAVIRINE 200 MILLIGRAM(S): 200 TABLET ORAL at 12:16

## 2020-04-03 RX ADMIN — RITONAVIR 100 MILLIGRAM(S): 100 TABLET, FILM COATED ORAL at 18:02

## 2020-04-03 RX ADMIN — RITONAVIR 100 MILLIGRAM(S): 100 TABLET, FILM COATED ORAL at 05:47

## 2020-04-03 RX ADMIN — EMTRICITABINE AND TENOFOVIR DISOPROXIL FUMARATE 1 TABLET(S): 200; 300 TABLET, FILM COATED ORAL at 12:16

## 2020-04-03 RX ADMIN — Medication 1 TABLET(S): at 12:17

## 2020-04-03 RX ADMIN — Medication 975 MILLIGRAM(S): at 06:08

## 2020-04-03 RX ADMIN — ATORVASTATIN CALCIUM 80 MILLIGRAM(S): 80 TABLET, FILM COATED ORAL at 22:59

## 2020-04-03 RX ADMIN — PIPERACILLIN AND TAZOBACTAM 200 GRAM(S): 4; .5 INJECTION, POWDER, LYOPHILIZED, FOR SOLUTION INTRAVENOUS at 23:57

## 2020-04-03 RX ADMIN — VALACYCLOVIR 500 MILLIGRAM(S): 500 TABLET, FILM COATED ORAL at 18:03

## 2020-04-03 RX ADMIN — PIPERACILLIN AND TAZOBACTAM 200 GRAM(S): 4; .5 INJECTION, POWDER, LYOPHILIZED, FOR SOLUTION INTRAVENOUS at 05:46

## 2020-04-03 RX ADMIN — PIPERACILLIN AND TAZOBACTAM 200 GRAM(S): 4; .5 INJECTION, POWDER, LYOPHILIZED, FOR SOLUTION INTRAVENOUS at 17:18

## 2020-04-03 RX ADMIN — Medication 125 MICROGRAM(S): at 05:47

## 2020-04-03 RX ADMIN — TOCILIZUMAB 100 MILLIGRAM(S): 20 INJECTION, SOLUTION, CONCENTRATE INTRAVENOUS at 15:32

## 2020-04-03 RX ADMIN — PIPERACILLIN AND TAZOBACTAM 200 GRAM(S): 4; .5 INJECTION, POWDER, LYOPHILIZED, FOR SOLUTION INTRAVENOUS at 12:16

## 2020-04-03 RX ADMIN — AZITHROMYCIN 250 MILLIGRAM(S): 500 TABLET, FILM COATED ORAL at 12:17

## 2020-04-03 RX ADMIN — ETRAVIRINE 200 MILLIGRAM(S): 200 TABLET ORAL at 18:02

## 2020-04-03 RX ADMIN — Medication 975 MILLIGRAM(S): at 23:58

## 2020-04-03 RX ADMIN — TAMSULOSIN HYDROCHLORIDE 0.4 MILLIGRAM(S): 0.4 CAPSULE ORAL at 22:59

## 2020-04-03 RX ADMIN — VALACYCLOVIR 500 MILLIGRAM(S): 500 TABLET, FILM COATED ORAL at 05:47

## 2020-04-03 RX ADMIN — DARUNAVIR 600 MILLIGRAM(S): 75 TABLET, FILM COATED ORAL at 18:04

## 2020-04-03 RX ADMIN — RALTEGRAVIR 400 MILLIGRAM(S): 400 TABLET, FILM COATED ORAL at 18:02

## 2020-04-03 RX ADMIN — ENOXAPARIN SODIUM 40 MILLIGRAM(S): 100 INJECTION SUBCUTANEOUS at 00:24

## 2020-04-03 RX ADMIN — LACTOBACILLUS ACIDOPH-L.BULGARICUS 1 MILLION CELL CHEWABLE TABLET 1 TABLET(S): at 12:16

## 2020-04-03 NOTE — PHYSICAL THERAPY INITIAL EVALUATION ADULT - PERTINENT HX OF CURRENT PROBLEM, REHAB EVAL
59 M HIV, HTN, HSV, HLD, admitted for subjective fevers and mild hypoxia, admitted for r/o COVID. Admitted to tele due to trop I elevation. EKG without ischemic changes, but does have RBBB.. Found to be Covid-19 positive.

## 2020-04-03 NOTE — PROGRESS NOTE ADULT - ASSESSMENT
60YO M with PMH of HIV (VL undetectable, CD4 300), tonsillar Ca (sp tx), HTN, HLD, hypothyroidism, BPH and MONICA who presented with cough, subjective fevers, anorexia, and increasing SOB and MENDEZ; found to be in acute hypoxic respiratory 2/2 COVID-19 with possible superimposed bacterial pneumonia.

## 2020-04-03 NOTE — PROGRESS NOTE ADULT - PROBLEM SELECTOR PLAN 3
Positive for COVID-19. WBC elevated, auto lymphocyte wnl. No known sick/COVID+ contacts. COVID-19 PCR +.  - isolation precautions  - Started azithromycin and plaquenil as repeat QTc 452  - holding on vitamin C due to LEONEL  - Started thiamine 200mg BID  - f/u daily COVID labs  - Daily EKG for QTc monitoring, 4/3 was 449

## 2020-04-03 NOTE — PROGRESS NOTE ADULT - PROBLEM SELECTOR PLAN 1
Presented to [facility] satting low 90s on RA placed on NC. Suspicious for COVID-19 etiology at present time and with GGO on CT Chest. Lungs with good air movement b/l. Likely 2/2 viral +/- bacterial pneumonia.  - f/u lung pathology/pathologies  - wean O2 as tolerated

## 2020-04-03 NOTE — PHYSICAL THERAPY INITIAL EVALUATION ADULT - ADDITIONAL COMMENTS
Patient lives alone with 2 dogs in apartment building with 5+12+15 steps to enter. Does not use any Assistive Devices at baseline. Denies recent Hx of falls.

## 2020-04-03 NOTE — PHYSICAL THERAPY INITIAL EVALUATION ADULT - ASR WT BEARING STATUS EVAL
Notification of Inpatient Admission/Inpatient Authorization Request   This is a Notification of Inpatient Admission for [address]  Be advised that this patient was admitted to our facility under Inpatient Status  Please contact the Arsalan Rosales at [phone] for additional admission information  Patient Name:   Anola Flatness   YOB: 1975       [address]   [address]:   [address]    Tax ID: [id number]  NPI: [id number] Attending Provider/NPI: Dorinda Mcnair Md [[id number]]   Place of Service Code: 24     Place of Service Name:  [address] [place] [address]   Start Date: 11/4/19 1233     Discharge Date & Time: No discharge date for patient encounter  Type of Admission: Inpatient Status Discharge Disposition   (if discharged): Home/Self Care   Patient Diagnoses: Diarrhea [R19 7]  Colitis [K52 9]  Uncooperative behavior [F91 9]     Orders: Admission Orders (From admission, onward)     Ordered        11/04/19 1232  Inpatient Admission  Once                    Assigned Utilization Review Contact: Arsalan Rosales  Utilization   Network Utilization Review Department  Phone: [phone]; Fax [phone]  Email: [email or web address]   ATTENTION PAYERS: Please call the assigned Utilization  directly with any questions or concerns ALL voicemails in the department are confidential  Send all requests for admission clinical reviews, approved or denied determinations and any other requests to dedicated fax number belonging to the campus where the patient is receiving treatment  no weight-bearing restrictions

## 2020-04-03 NOTE — PROGRESS NOTE ADULT - SUBJECTIVE AND OBJECTIVE BOX
OVERNIGHT EVENTS: Persistently febrile to 101.4F. Remained stable on 6LNC.    SUBJECTIVE / INTERVAL HPI: Patient seen and examined at bedside. Pt feeling better. C/o persistent diarrhea up to 5 episodes yesterday. Otherwise denies f/c, n/v, HA, chest pain, SOB, abdominal pain, dysuria.    MEDICATIONS  (STANDING):  acetaminophen   Tablet .. 975 milliGRAM(s) Oral every 6 hours  atorvastatin 80 milliGRAM(s) Oral at bedtime  azithromycin   Tablet 250 milliGRAM(s) Oral daily  darunavir 600 milliGRAM(s) Oral two times a day  emtricitabine 200 mG/tenofovir alafenamide 25 mG (DESCOVY) Tablet 1 Tablet(s) Oral daily  enoxaparin Injectable 40 milliGRAM(s) SubCutaneous every 24 hours  etravirine 200 milliGRAM(s) Oral two times a day after meals  famotidine    Tablet 20 milliGRAM(s) Oral daily  hydroxychloroquine 200 milliGRAM(s) Oral every 12 hours  hydroxychloroquine   Oral   influenza   Vaccine 0.5 milliLiter(s) IntraMuscular once  lactobacillus acidophilus and bulgaricus Chewable 1 Tablet(s) Chew daily  levothyroxine 125 MICROGram(s) Oral daily  piperacillin/tazobactam IVPB.. 4.5 Gram(s) IV Intermittent every 6 hours  raltegravir Tablet 400 milliGRAM(s) Oral two times a day  ritonavir Tablet 100 milliGRAM(s) Oral two times a day  tamsulosin 0.4 milliGRAM(s) Oral at bedtime  thiamine 200 milliGRAM(s) Oral <User Schedule>  tocilizumab IVPB 400 milliGRAM(s) IV Intermittent once  trimethoprim  160 mG/sulfamethoxazole 800 mG 1 Tablet(s) Oral daily  valACYclovir 500 milliGRAM(s) Oral two times a day  vancomycin  IVPB 1000 milliGRAM(s) IV Intermittent every 12 hours    MEDICATIONS  (PRN):  benzonatate 100 milliGRAM(s) Oral three times a day PRN Cough  loperamide 2 milliGRAM(s) Oral three times a day PRN Diarrhea    Allergies    No Known Allergies    Intolerances        VITAL SIGNS:  Vital Signs Last 24 Hrs  T(C): 37 (03 Apr 2020 06:02), Max: 38.6 (03 Apr 2020 00:46)  T(F): 98.6 (03 Apr 2020 06:02), Max: 101.4 (03 Apr 2020 00:46)  HR: 68 (03 Apr 2020 06:02) (68 - 74)  BP: 92/52 (03 Apr 2020 06:02) (92/52 - 111/54)  BP(mean): --  RR: 20 (03 Apr 2020 06:02) (20 - 20)  SpO2: 79% (03 Apr 2020 06:30) (79% - 96%)      04-02-20 @ 07:01  -  04-03-20 @ 07:00  --------------------------------------------------------  IN: 450 mL / OUT: 350 mL / NET: 100 mL        PHYSICAL EXAM:  General: NAD, Laying comfortably in bed, on 6LNC  HEENT: NC/AT, anicteric sclera, MMM  Neck: supple  Cardiovascular: +S1/S2, RRR, No murmurs, rubs, gallops  Respiratory: Coarse crackles in b/l lung bases  Gastrointestinal: soft, NT/ND, +BSx4  Extremities: WWP, no edema, clubbing or cyanosis  Vascular: 2+ radial, DP/PT pulses B/L  Neurological: AAOx3, no focal deficits      LABS:                        14.7   4.88  )-----------( 164      ( 03 Apr 2020 08:01 )             44.8     04-03    140  |  105  |  32<H>  ----------------------------<  96  4.0   |  20<L>  |  1.42<H>    Ca    9.0      03 Apr 2020 08:01  Phos  2.3     04-03  Mg     2.2     04-03    TPro  6.9  /  Alb  2.7<L>  /  TBili  0.3  /  DBili  x   /  AST  64<H>  /  ALT  34  /  AlkPhos  34<L>  04-03        Culture - Blood (collected 04-02-20 @ 16:09)  Source: .Blood Blood  Preliminary Report (04-03-20 @ 05:00):    No growth at 12 hours    Culture - Blood (collected 04-02-20 @ 16:09)  Source: .Blood Blood  Preliminary Report (04-03-20 @ 05:00):    No growth at 12 hours        RADIOLOGY & ADDITIONAL TESTS: Reviewed.

## 2020-04-03 NOTE — PROGRESS NOTE ADULT - PROBLEM SELECTOR PLAN 2
Likely viral pneumonia 2/2 COVID-19, now with bandemia concerning for superimposed bacterial pneumonia. Presenting with cough, SOB, mild hypoxia and hx concerning for respiratory infection. CXR showing b/l patchy opacifications. No lymphopenia and +neutrophilia. CT Chest showing b/l GGO concerning for COVID, viral, or atypical PNA. Did not meet sepsis criteria on admission; no tachypnea, no leukocytosis, not febrile (Tmax 100F), not tachycardic. SBP 90s - 140s. Now spiked fever to 102. In ED given 1g CTX and 500mg azithromycin.  - blood Cx NGTD  - C/w CFTX 1 gram x 5 days for bandemia and fever to 102 (4/2 - 4/6)  - considering opportunistic infections in setting of HIV with CD4 in 200s

## 2020-04-03 NOTE — PROGRESS NOTE ADULT - ASSESSMENT
IMPRESSION:  Severe COVID-19 infection in patient with HIV.  He feels better and clinically looks better although his inflammatory markers continue to rise    Recommend:  1.  Continue his home HIV regimen  2.  Continue Bactrim 1 DS PO daily - this is low dose --> should  have very little effect on QTc and is 100 % effective at preventing PCP  3.  Continue COVID-19 treatment x 5 days     ID team 1 will follow

## 2020-04-03 NOTE — PROGRESS NOTE ADULT - PROBLEM SELECTOR PLAN 6
Hx of HIV, undetectable for 20 yrs per patient and with recent f/u. Tcells per pt 232.   - consider opportunistic infections   - c/w home HIV rx  - c/w valtrex  - Started PCP ppx with Bactrim DS one tablet daily

## 2020-04-03 NOTE — PROGRESS NOTE ADULT - SUBJECTIVE AND OBJECTIVE BOX
INTERVAL HPI/OVERNIGHT EVENTS:    Patient was seen and examined.  Fever curve has improved.  He denies any respiratory complaints other than occasional cough     CONSTITUTIONAL:  Negative fever or chills, feels well, good appetite  EYES:  Negative  blurry vision or double vision  CARDIOVASCULAR:  Negative for chest pain or palpitations  RESPIRATORY:  Negative for cough, wheezing, or SOB   GASTROINTESTINAL:  Negative for nausea, vomiting, diarrhea, constipation, or abdominal pain  GENITOURINARY:  Negative frequency, urgency or dysuria  NEUROLOGIC:  No headache, confusion, dizziness, lightheadedness      ANTIBIOTICS/RELEVANT:    MEDICATIONS  (STANDING):  acetaminophen   Tablet .. 975 milliGRAM(s) Oral every 6 hours  atorvastatin 80 milliGRAM(s) Oral at bedtime  azithromycin   Tablet 250 milliGRAM(s) Oral daily  darunavir 600 milliGRAM(s) Oral two times a day  emtricitabine 200 mG/tenofovir alafenamide 25 mG (DESCOVY) Tablet 1 Tablet(s) Oral daily  enoxaparin Injectable 40 milliGRAM(s) SubCutaneous every 24 hours  etravirine 200 milliGRAM(s) Oral two times a day after meals  famotidine    Tablet 20 milliGRAM(s) Oral daily  hydroxychloroquine 200 milliGRAM(s) Oral every 12 hours  hydroxychloroquine   Oral   influenza   Vaccine 0.5 milliLiter(s) IntraMuscular once  lactobacillus acidophilus and bulgaricus Chewable 1 Tablet(s) Chew daily  levothyroxine 125 MICROGram(s) Oral daily  piperacillin/tazobactam IVPB.. 4.5 Gram(s) IV Intermittent every 6 hours  raltegravir Tablet 400 milliGRAM(s) Oral two times a day  ritonavir Tablet 100 milliGRAM(s) Oral two times a day  tamsulosin 0.4 milliGRAM(s) Oral at bedtime  thiamine 200 milliGRAM(s) Oral <User Schedule>  tocilizumab IVPB 400 milliGRAM(s) IV Intermittent once  trimethoprim  160 mG/sulfamethoxazole 800 mG 1 Tablet(s) Oral daily  valACYclovir 500 milliGRAM(s) Oral two times a day  vancomycin  IVPB 1000 milliGRAM(s) IV Intermittent every 12 hours    MEDICATIONS  (PRN):  benzonatate 100 milliGRAM(s) Oral three times a day PRN Cough  loperamide 2 milliGRAM(s) Oral three times a day PRN Diarrhea        Vital Signs Last 24 Hrs  T(C): 37 (03 Apr 2020 06:02), Max: 38.6 (03 Apr 2020 00:46)  T(F): 98.6 (03 Apr 2020 06:02), Max: 101.4 (03 Apr 2020 00:46)  HR: 68 (03 Apr 2020 06:02) (68 - 74)  BP: 92/52 (03 Apr 2020 06:02) (92/52 - 111/54)  BP(mean): --  RR: 20 (03 Apr 2020 06:02) (20 - 20)  SpO2: 79% (03 Apr 2020 06:30) (79% - 96%)    PHYSICAL EXAM:  Constitutional: non-toxic, no distress  Eyes:  no icterus   Ear/Nose/Throat: no oral lesion  Neck:  supple  Respiratory: scattered crackles   Cardiovascular: S1S2 RRR, no murmurs  Gastrointestinal:soft, (+) BS, no HSM  Extremities:no edema  Vascular: DP Pulse:	right normal; left normal      LABS:                        14.7   4.88  )-----------( 164      ( 03 Apr 2020 08:01 )             44.8     04-03    140  |  105  |  32<H>  ----------------------------<  96  4.0   |  20<L>  |  1.42<H>    Ca    9.0      03 Apr 2020 08:01  Phos  2.3     04-03  Mg     2.2     04-03    TPro  6.9  /  Alb  2.7<L>  /  TBili  0.3  /  DBili  x   /  AST  64<H>  /  ALT  34  /  AlkPhos  34<L>  04-03          MICROBIOLOGY:    Culture - Blood (04.02.20 @ 16:09)    Specimen Source: .Blood Blood    Culture Results:   No growth at 12 hours        RADIOLOGY & ADDITIONAL STUDIES:    < from: Xray Chest 1 View- PORTABLE-Urgent (04.02.20 @ 11:04) >  IMPRESSION: Bilateral airspace opacities, compatible with a multifocal infectious process.

## 2020-04-03 NOTE — PHYSICAL THERAPY INITIAL EVALUATION ADULT - CRITERIA FOR SKILLED THERAPEUTIC INTERVENTIONS
impairments found/therapy frequency/anticipated discharge recommendation/functional limitations in following categories/predicted duration of therapy intervention

## 2020-04-03 NOTE — PHYSICAL THERAPY INITIAL EVALUATION ADULT - GENERAL OBSERVATIONS, REHAB EVAL
Patient received semi-bermudez in bed  in NAD on RA (off NC), +IV (dislodged). Cleared by ZACARIAS Glasgow. Agreeable to PT.

## 2020-04-03 NOTE — PROGRESS NOTE ADULT - PROBLEM SELECTOR PLAN 5
RESOLVED. Upon presentation reported pain with coughing and had Trop I .159. Received 325mg ASA and initally admitted to Telemetry for r/o ACS. EKG without new ST/T wave changes. Old ST depression V3 noted. Repeat trops downtrended to 0.146. No complaint of chest pain upon presentation to [facility]. Troponemia can be seen with COVID -19.   - monitor for signs/symptoms of ACS

## 2020-04-03 NOTE — PROGRESS NOTE ADULT - ATTENDING COMMENTS
Patient seen and examined at bedside. Agree with the history, physical exam, assessment, and plan as outlined above with the following addendum. Briefly patient is a 58 yo M with HIV, MONICA originally admitted to [facility] tele for resp distress requiring NRB. He is now on NC and stepped down to F    COVID 19  Patient was started on azithromycin and plaquenil on 4/2, monitor QTC. Zosyn also started given bandemia and concern for possible superimposed bacterial infecction. Vanc dc since MRSA PCR negative. Bandemia has resolved but today he is still requiring 6L NC and CRP has increased from 18 to 26. Case discussed with Dr. Pagan of pulm, who has approved IV Toci and steroids for COVID treatment  CT shows joseph opacities and AA at 4.6 cm, which should be followed up as outpatient     HIV  -appreciate ID recs  -on home HIV regimen and pcp ppx

## 2020-04-03 NOTE — PROGRESS NOTE ADULT - PROBLEM SELECTOR PLAN 7
Hx of HLD  - restarted therapeutic interchange of home crestor    #HTN  pt denied hx of HTN however has losartan as home medication  - hold anti HTNs in setting of likely infection  - monitor BPs    #hypothyroidism, TSH 2.5  - c/w home levothyroxine 125mcg daily

## 2020-04-03 NOTE — PHYSICAL THERAPY INITIAL EVALUATION ADULT - LEVEL OF INDEPENDENCE: SIT/STAND, REHAB EVAL
Pt presents to the ER with mom and dad after spiking a temp at home of 105.7. Pt last had tylenol at 0800. He did vomit x1 no diarrhea per parents. Per parents pt woke up and spiked a temp of 102.5 and since has been lethargic. Pt denies ear or belly ache but did say that his throat hurts.    supervision

## 2020-04-04 LAB
ALBUMIN SERPL ELPH-MCNC: 2.7 G/DL — LOW (ref 3.3–5)
ALP SERPL-CCNC: 35 U/L — LOW (ref 40–120)
ALT FLD-CCNC: 51 U/L — HIGH (ref 10–45)
ANION GAP SERPL CALC-SCNC: 15 MMOL/L — SIGNIFICANT CHANGE UP (ref 5–17)
AST SERPL-CCNC: 70 U/L — HIGH (ref 10–40)
BASOPHILS # BLD AUTO: 0 K/UL — SIGNIFICANT CHANGE UP (ref 0–0.2)
BASOPHILS NFR BLD AUTO: 0 % — SIGNIFICANT CHANGE UP (ref 0–2)
BILIRUB SERPL-MCNC: 0.3 MG/DL — SIGNIFICANT CHANGE UP (ref 0.2–1.2)
BUN SERPL-MCNC: 32 MG/DL — HIGH (ref 7–23)
CALCIUM SERPL-MCNC: 8.8 MG/DL — SIGNIFICANT CHANGE UP (ref 8.4–10.5)
CHLORIDE SERPL-SCNC: 104 MMOL/L — SIGNIFICANT CHANGE UP (ref 96–108)
CO2 SERPL-SCNC: 17 MMOL/L — LOW (ref 22–31)
CREAT SERPL-MCNC: 1.44 MG/DL — HIGH (ref 0.5–1.3)
CRP SERPL-MCNC: 25.05 MG/DL — HIGH (ref 0–0.4)
CULTURE RESULTS: SIGNIFICANT CHANGE UP
CULTURE RESULTS: SIGNIFICANT CHANGE UP
D DIMER BLD IA.RAPID-MCNC: 394 NG/ML DDU — HIGH
EOSINOPHIL # BLD AUTO: 0 K/UL — SIGNIFICANT CHANGE UP (ref 0–0.5)
EOSINOPHIL NFR BLD AUTO: 0 % — SIGNIFICANT CHANGE UP (ref 0–6)
FERRITIN SERPL-MCNC: 4378 NG/ML — HIGH (ref 30–400)
GLUCOSE BLDC GLUCOMTR-MCNC: 122 MG/DL — HIGH (ref 70–99)
GLUCOSE BLDC GLUCOMTR-MCNC: 132 MG/DL — HIGH (ref 70–99)
GLUCOSE BLDC GLUCOMTR-MCNC: 145 MG/DL — HIGH (ref 70–99)
GLUCOSE SERPL-MCNC: 149 MG/DL — HIGH (ref 70–99)
HCT VFR BLD CALC: 43.7 % — SIGNIFICANT CHANGE UP (ref 39–50)
HGB BLD-MCNC: 14.5 G/DL — SIGNIFICANT CHANGE UP (ref 13–17)
IMM GRANULOCYTES NFR BLD AUTO: 0.6 % — SIGNIFICANT CHANGE UP (ref 0–1.5)
LYMPHOCYTES # BLD AUTO: 0.28 K/UL — LOW (ref 1–3.3)
LYMPHOCYTES # BLD AUTO: 8.1 % — LOW (ref 13–44)
MAGNESIUM SERPL-MCNC: 2.2 MG/DL — SIGNIFICANT CHANGE UP (ref 1.6–2.6)
MCHC RBC-ENTMCNC: 29.2 PG — SIGNIFICANT CHANGE UP (ref 27–34)
MCHC RBC-ENTMCNC: 33.2 GM/DL — SIGNIFICANT CHANGE UP (ref 32–36)
MCV RBC AUTO: 87.9 FL — SIGNIFICANT CHANGE UP (ref 80–100)
MONOCYTES # BLD AUTO: 0.1 K/UL — SIGNIFICANT CHANGE UP (ref 0–0.9)
MONOCYTES NFR BLD AUTO: 2.9 % — SIGNIFICANT CHANGE UP (ref 2–14)
NEUTROPHILS # BLD AUTO: 3.06 K/UL — SIGNIFICANT CHANGE UP (ref 1.8–7.4)
NEUTROPHILS NFR BLD AUTO: 88.4 % — HIGH (ref 43–77)
NRBC # BLD: 0 /100 WBCS — SIGNIFICANT CHANGE UP (ref 0–0)
PHOSPHATE SERPL-MCNC: 3.4 MG/DL — SIGNIFICANT CHANGE UP (ref 2.5–4.5)
PLATELET # BLD AUTO: 227 K/UL — SIGNIFICANT CHANGE UP (ref 150–400)
POTASSIUM SERPL-MCNC: 3.2 MMOL/L — LOW (ref 3.5–5.3)
POTASSIUM SERPL-SCNC: 3.2 MMOL/L — LOW (ref 3.5–5.3)
PROT SERPL-MCNC: 6.7 G/DL — SIGNIFICANT CHANGE UP (ref 6–8.3)
RBC # BLD: 4.97 M/UL — SIGNIFICANT CHANGE UP (ref 4.2–5.8)
RBC # FLD: 14.1 % — SIGNIFICANT CHANGE UP (ref 10.3–14.5)
SODIUM SERPL-SCNC: 136 MMOL/L — SIGNIFICANT CHANGE UP (ref 135–145)
SPECIMEN SOURCE: SIGNIFICANT CHANGE UP
SPECIMEN SOURCE: SIGNIFICANT CHANGE UP
VANCOMYCIN TROUGH SERPL-MCNC: 8.6 UG/ML — LOW (ref 10–20)
WBC # BLD: 3.46 K/UL — LOW (ref 3.8–10.5)
WBC # FLD AUTO: 3.46 K/UL — LOW (ref 3.8–10.5)

## 2020-04-04 PROCEDURE — 93010 ELECTROCARDIOGRAM REPORT: CPT | Mod: 76

## 2020-04-04 PROCEDURE — 99233 SBSQ HOSP IP/OBS HIGH 50: CPT | Mod: GC

## 2020-04-04 PROCEDURE — 99232 SBSQ HOSP IP/OBS MODERATE 35: CPT

## 2020-04-04 RX ORDER — FAMOTIDINE 10 MG/ML
20 INJECTION INTRAVENOUS DAILY
Refills: 0 | Status: DISCONTINUED | OUTPATIENT
Start: 2020-04-05 | End: 2020-05-01

## 2020-04-04 RX ORDER — FAMOTIDINE 10 MG/ML
20 INJECTION INTRAVENOUS ONCE
Refills: 0 | Status: COMPLETED | OUTPATIENT
Start: 2020-04-04 | End: 2020-04-04

## 2020-04-04 RX ORDER — RALTEGRAVIR 400 MG/1
400 TABLET, FILM COATED ORAL
Refills: 0 | Status: DISCONTINUED | OUTPATIENT
Start: 2020-04-04 | End: 2020-05-01

## 2020-04-04 RX ORDER — DARUNAVIR 75 MG/1
600 TABLET, FILM COATED ORAL
Refills: 0 | Status: DISCONTINUED | OUTPATIENT
Start: 2020-04-04 | End: 2020-05-01

## 2020-04-04 RX ORDER — ACETAMINOPHEN 500 MG
975 TABLET ORAL EVERY 8 HOURS
Refills: 0 | Status: DISCONTINUED | OUTPATIENT
Start: 2020-04-04 | End: 2020-04-04

## 2020-04-04 RX ORDER — VANCOMYCIN HCL 1 G
1500 VIAL (EA) INTRAVENOUS EVERY 12 HOURS
Refills: 0 | Status: COMPLETED | OUTPATIENT
Start: 2020-04-04 | End: 2020-04-05

## 2020-04-04 RX ORDER — POTASSIUM CHLORIDE 20 MEQ
40 PACKET (EA) ORAL EVERY 4 HOURS
Refills: 0 | Status: COMPLETED | OUTPATIENT
Start: 2020-04-04 | End: 2020-04-04

## 2020-04-04 RX ORDER — ETRAVIRINE 200 MG/1
200 TABLET ORAL
Refills: 0 | Status: DISCONTINUED | OUTPATIENT
Start: 2020-04-04 | End: 2020-05-01

## 2020-04-04 RX ORDER — LOPERAMIDE HCL 2 MG
2 TABLET ORAL
Refills: 0 | Status: DISCONTINUED | OUTPATIENT
Start: 2020-04-04 | End: 2020-04-11

## 2020-04-04 RX ORDER — ACETAMINOPHEN 500 MG
975 TABLET ORAL EVERY 8 HOURS
Refills: 0 | Status: DISCONTINUED | OUTPATIENT
Start: 2020-04-04 | End: 2020-05-01

## 2020-04-04 RX ADMIN — RITONAVIR 100 MILLIGRAM(S): 100 TABLET, FILM COATED ORAL at 18:05

## 2020-04-04 RX ADMIN — FAMOTIDINE 20 MILLIGRAM(S): 10 INJECTION INTRAVENOUS at 12:18

## 2020-04-04 RX ADMIN — Medication 125 MICROGRAM(S): at 05:13

## 2020-04-04 RX ADMIN — RITONAVIR 100 MILLIGRAM(S): 100 TABLET, FILM COATED ORAL at 05:12

## 2020-04-04 RX ADMIN — VALACYCLOVIR 500 MILLIGRAM(S): 500 TABLET, FILM COATED ORAL at 12:18

## 2020-04-04 RX ADMIN — Medication 40 MILLIGRAM(S): at 05:11

## 2020-04-04 RX ADMIN — RALTEGRAVIR 400 MILLIGRAM(S): 400 TABLET, FILM COATED ORAL at 05:12

## 2020-04-04 RX ADMIN — DARUNAVIR 600 MILLIGRAM(S): 75 TABLET, FILM COATED ORAL at 18:04

## 2020-04-04 RX ADMIN — Medication 40 MILLIGRAM(S): at 18:05

## 2020-04-04 RX ADMIN — TAMSULOSIN HYDROCHLORIDE 0.4 MILLIGRAM(S): 0.4 CAPSULE ORAL at 21:36

## 2020-04-04 RX ADMIN — Medication 40 MILLIEQUIVALENT(S): at 12:18

## 2020-04-04 RX ADMIN — Medication 40 MILLIEQUIVALENT(S): at 18:17

## 2020-04-04 RX ADMIN — Medication 2 MILLIGRAM(S): at 07:35

## 2020-04-04 RX ADMIN — Medication 975 MILLIGRAM(S): at 18:13

## 2020-04-04 RX ADMIN — Medication 1 TABLET(S): at 12:18

## 2020-04-04 RX ADMIN — Medication 300 MILLIGRAM(S): at 13:30

## 2020-04-04 RX ADMIN — Medication 2 MILLIGRAM(S): at 18:04

## 2020-04-04 RX ADMIN — ETRAVIRINE 200 MILLIGRAM(S): 200 TABLET ORAL at 18:04

## 2020-04-04 RX ADMIN — EMTRICITABINE AND TENOFOVIR DISOPROXIL FUMARATE 1 TABLET(S): 200; 300 TABLET, FILM COATED ORAL at 12:18

## 2020-04-04 RX ADMIN — ATORVASTATIN CALCIUM 80 MILLIGRAM(S): 80 TABLET, FILM COATED ORAL at 21:36

## 2020-04-04 RX ADMIN — Medication 200 MILLIGRAM(S): at 18:17

## 2020-04-04 RX ADMIN — PIPERACILLIN AND TAZOBACTAM 200 GRAM(S): 4; .5 INJECTION, POWDER, LYOPHILIZED, FOR SOLUTION INTRAVENOUS at 18:16

## 2020-04-04 RX ADMIN — PIPERACILLIN AND TAZOBACTAM 200 GRAM(S): 4; .5 INJECTION, POWDER, LYOPHILIZED, FOR SOLUTION INTRAVENOUS at 21:37

## 2020-04-04 RX ADMIN — Medication 200 MILLIGRAM(S): at 12:18

## 2020-04-04 RX ADMIN — VALACYCLOVIR 500 MILLIGRAM(S): 500 TABLET, FILM COATED ORAL at 18:07

## 2020-04-04 RX ADMIN — DARUNAVIR 600 MILLIGRAM(S): 75 TABLET, FILM COATED ORAL at 05:11

## 2020-04-04 RX ADMIN — PIPERACILLIN AND TAZOBACTAM 200 GRAM(S): 4; .5 INJECTION, POWDER, LYOPHILIZED, FOR SOLUTION INTRAVENOUS at 06:10

## 2020-04-04 RX ADMIN — Medication 200 MILLIGRAM(S): at 22:00

## 2020-04-04 RX ADMIN — Medication 2 MILLIGRAM(S): at 12:17

## 2020-04-04 RX ADMIN — Medication 975 MILLIGRAM(S): at 12:23

## 2020-04-04 RX ADMIN — Medication 975 MILLIGRAM(S): at 06:11

## 2020-04-04 RX ADMIN — LACTOBACILLUS ACIDOPH-L.BULGARICUS 1 MILLION CELL CHEWABLE TABLET 1 TABLET(S): at 12:18

## 2020-04-04 RX ADMIN — PIPERACILLIN AND TAZOBACTAM 200 GRAM(S): 4; .5 INJECTION, POWDER, LYOPHILIZED, FOR SOLUTION INTRAVENOUS at 12:17

## 2020-04-04 RX ADMIN — AZITHROMYCIN 250 MILLIGRAM(S): 500 TABLET, FILM COATED ORAL at 12:18

## 2020-04-04 RX ADMIN — RALTEGRAVIR 400 MILLIGRAM(S): 400 TABLET, FILM COATED ORAL at 18:04

## 2020-04-04 RX ADMIN — ETRAVIRINE 200 MILLIGRAM(S): 200 TABLET ORAL at 08:08

## 2020-04-04 NOTE — PROGRESS NOTE ADULT - PROBLEM SELECTOR PLAN 2
Likely viral pneumonia 2/2 COVID-19, now with bandemia concerning for superimposed bacterial pneumonia. Presenting with cough, SOB, mild hypoxia and hx concerning for respiratory infection. CXR showing b/l patchy opacifications. No lymphopenia and +neutrophilia. CT Chest showing b/l GGO concerning for COVID, viral, or atypical PNA. Did not meet sepsis criteria on admission; no tachypnea, no leukocytosis, not febrile (Tmax 100F), not tachycardic. SBP 90s - 140s. Now spiked fever to 102. In ED given 1g CTX and 500mg azithromycin. MRSA swab positive.  - blood Cx NGTD  - Started vanc and zosyn x 7 days for bandemia and fever to 102 (4/2 - 4/6)  - considering opportunistic infections in setting of HIV with CD4 in 200s

## 2020-04-04 NOTE — PROGRESS NOTE ADULT - ATTENDING COMMENTS
Patient seen and examined at bedside. Agree with the history, physical exam, assessment, and plan as outlined above with the following addendum. Briefly patient is a 60 yo M with HIV, MONICA originally admitted to [facility] for resp distress requiring NRB. He is now on NC and stepped down to F    COVID 19  Oxygenation requirements downtrending 6 to 4L, CRP stable.    S/p Toci on 4/3. On plaquenil, azithromycin (4/2), IV steroids ( 4/3) for 5 days total  IV abx for possible superimposed bacterial infect- bandemia now resolved. C/w for 7 days total.   CT shows joseph opacities and AA at 4.6 cm, which should be followed up as outpatient     HIV  -appreciate ID recs  -on home HIV regimen and pcp ppx

## 2020-04-04 NOTE — PROGRESS NOTE ADULT - SUBJECTIVE AND OBJECTIVE BOX
OVERNIGHT EVENTS: NAEO. Remained stable on 6LNC.    SUBJECTIVE / INTERVAL HPI: Patient seen and examined at bedside. Pt feeling better however still endorses loose stools and cough. Denies f/c, n/v, HA, chest pain, SOB, abdominal pain, dysuria.    MEDICATIONS  (STANDING):  acetaminophen   Tablet .. 975 milliGRAM(s) Oral every 8 hours  atorvastatin 80 milliGRAM(s) Oral at bedtime  azithromycin   Tablet 250 milliGRAM(s) Oral daily  darunavir 600 milliGRAM(s) Oral <User Schedule>  dextrose 5%. 1000 milliLiter(s) (50 mL/Hr) IV Continuous <Continuous>  dextrose 50% Injectable 12.5 Gram(s) IV Push once  dextrose 50% Injectable 25 Gram(s) IV Push once  dextrose 50% Injectable 25 Gram(s) IV Push once  emtricitabine 200 mG/tenofovir alafenamide 25 mG (DESCOVY) Tablet 1 Tablet(s) Oral daily  enoxaparin Injectable 40 milliGRAM(s) SubCutaneous every 24 hours  etravirine 200 milliGRAM(s) Oral <User Schedule>  guaiFENesin   Syrup  (Sugar-Free) 200 milliGRAM(s) Oral two times a day  hydroxychloroquine 200 milliGRAM(s) Oral every 12 hours  hydroxychloroquine   Oral   influenza   Vaccine 0.5 milliLiter(s) IntraMuscular once  insulin lispro (HumaLOG) corrective regimen sliding scale   SubCutaneous Before meals and at bedtime  lactobacillus acidophilus and bulgaricus Chewable 1 Tablet(s) Chew daily  levothyroxine 125 MICROGram(s) Oral daily  loperamide 2 milliGRAM(s) Oral <User Schedule>  methylPREDNISolone sodium succinate Injectable 40 milliGRAM(s) IV Push every 12 hours  piperacillin/tazobactam IVPB.. 4.5 Gram(s) IV Intermittent every 6 hours  potassium chloride   Powder 40 milliEquivalent(s) Oral every 4 hours  raltegravir Tablet 400 milliGRAM(s) Oral <User Schedule>  ritonavir Tablet 100 milliGRAM(s) Oral two times a day  tamsulosin 0.4 milliGRAM(s) Oral at bedtime  trimethoprim  160 mG/sulfamethoxazole 800 mG 1 Tablet(s) Oral daily  valACYclovir 500 milliGRAM(s) Oral two times a day  vancomycin  IVPB 1500 milliGRAM(s) IV Intermittent every 12 hours    MEDICATIONS  (PRN):  benzonatate 100 milliGRAM(s) Oral three times a day PRN Cough  dextrose 40% Gel 15 Gram(s) Oral once PRN Blood Glucose LESS THAN 70 milliGRAM(s)/deciliter  glucagon  Injectable 1 milliGRAM(s) IntraMuscular once PRN Glucose LESS THAN 70 milligrams/deciliter    Allergies    No Known Allergies    Intolerances        VITAL SIGNS:  Vital Signs Last 24 Hrs  T(C): 36.3 (04 Apr 2020 08:30), Max: 36.9 (04 Apr 2020 05:00)  T(F): 97.4 (04 Apr 2020 08:30), Max: 98.5 (04 Apr 2020 05:00)  HR: 53 (04 Apr 2020 08:30) (53 - 64)  BP: 100/63 (04 Apr 2020 08:30) (82/59 - 110/68)  BP(mean): --  RR: 20 (04 Apr 2020 08:30) (20 - 20)  SpO2: 93% (04 Apr 2020 08:30) (70% - 93%)      04-03-20 @ 07:01  -  04-04-20 @ 07:00  --------------------------------------------------------  IN: 480 mL / OUT: 700 mL / NET: -220 mL        PHYSICAL EXAM:  General: NAD, Laying comfortably in bed, on 6LNC  HEENT: NC/AT, anicteric sclera, MMM  Neck: supple  Cardiovascular: +S1/S2, RRR, No murmurs, rubs, gallops  Respiratory: Coarse crackles in b/l lung bases  Gastrointestinal: soft, NT/ND, +BSx4  Extremities: WWP, no edema, clubbing or cyanosis  Vascular: 2+ radial, DP/PT pulses B/L  Neurological: AAOx3, no focal deficits      LABS:                        14.5   3.46  )-----------( 227      ( 04 Apr 2020 08:26 )             43.7     04-04    136  |  104  |  32<H>  ----------------------------<  149<H>  3.2<L>   |  17<L>  |  1.44<H>    Ca    8.8      04 Apr 2020 08:26  Phos  3.4     04-04  Mg     2.2     04-04    TPro  6.7  /  Alb  2.7<L>  /  TBili  0.3  /  DBili  x   /  AST  70<H>  /  ALT  51<H>  /  AlkPhos  35<L>  04-04        CAPILLARY BLOOD GLUCOSE  POCT Blood Glucose.: 122 mg/dL (04 Apr 2020 11:23)  POCT Blood Glucose.: 132 mg/dL (04 Apr 2020 07:02)  POCT Blood Glucose.: 143 mg/dL (03 Apr 2020 21:18)  POCT Blood Glucose.: 109 mg/dL (03 Apr 2020 16:40)        RADIOLOGY & ADDITIONAL TESTS: Reviewed.

## 2020-04-04 NOTE — PROGRESS NOTE ADULT - PROBLEM SELECTOR PLAN 5
RESOLVED. Upon presentation reported pain with coughing and had Trop I .159. Received 325mg ASA and initally admitted to Telemetry for r/o ACS. EKG without new ST/T wave changes. Old ST depression V3 noted. Repeat trops downtrended to 0.146. No complaint of chest pain upon presentation to [facility]. Troponemia can be seen with COVID -19.   - monitor for signs/symptoms of ACS

## 2020-04-04 NOTE — PROGRESS NOTE ADULT - PROBLEM SELECTOR PLAN 1
Presented to [facility] satting low 90s on RA placed on NC. Suspicious for COVID-19 etiology at present time and with GGO on CT Chest. Lungs with good air movement b/l. Likely 2/2 viral +/- bacterial pneumonia.  - f/u lung pathology/pathologies  - wean O2 as tolerated

## 2020-04-04 NOTE — PROGRESS NOTE ADULT - PROBLEM SELECTOR PLAN 6
Hx of HIV, undetectable for 20 yrs per patient and with recent f/u. Tcells per pt 232.   - consider opportunistic infections   - c/w home HIV rx  - c/w valtrex  - Started PCP ppx with Bactrim DS one tablet daily, to be continued after discharge

## 2020-04-04 NOTE — PROGRESS NOTE ADULT - SUBJECTIVE AND OBJECTIVE BOX
INTERVAL HPI/OVERNIGHT EVENTS:    Events noted. Received Tocilizumab on 4/3.  Afebrile overnight with improving oxygenation        ANTIBIOTICS/RELEVANT:    MEDICATIONS  (STANDING):  acetaminophen   Tablet .. 975 milliGRAM(s) Oral every 8 hours  atorvastatin 80 milliGRAM(s) Oral at bedtime  azithromycin   Tablet 250 milliGRAM(s) Oral daily  darunavir 600 milliGRAM(s) Oral <User Schedule>  dextrose 5%. 1000 milliLiter(s) (50 mL/Hr) IV Continuous <Continuous>  dextrose 50% Injectable 12.5 Gram(s) IV Push once  dextrose 50% Injectable 25 Gram(s) IV Push once  dextrose 50% Injectable 25 Gram(s) IV Push once  emtricitabine 200 mG/tenofovir alafenamide 25 mG (DESCOVY) Tablet 1 Tablet(s) Oral daily  enoxaparin Injectable 40 milliGRAM(s) SubCutaneous every 24 hours  etravirine 200 milliGRAM(s) Oral <User Schedule>  famotidine    Tablet 20 milliGRAM(s) Oral once  hydroxychloroquine 200 milliGRAM(s) Oral every 12 hours  hydroxychloroquine   Oral   influenza   Vaccine 0.5 milliLiter(s) IntraMuscular once  insulin lispro (HumaLOG) corrective regimen sliding scale   SubCutaneous Before meals and at bedtime  lactobacillus acidophilus and bulgaricus Chewable 1 Tablet(s) Chew daily  levothyroxine 125 MICROGram(s) Oral daily  loperamide 2 milliGRAM(s) Oral <User Schedule>  methylPREDNISolone sodium succinate Injectable 40 milliGRAM(s) IV Push every 12 hours  piperacillin/tazobactam IVPB.. 4.5 Gram(s) IV Intermittent every 6 hours  potassium chloride   Powder 40 milliEquivalent(s) Oral every 4 hours  raltegravir Tablet 400 milliGRAM(s) Oral <User Schedule>  ritonavir Tablet 100 milliGRAM(s) Oral two times a day  tamsulosin 0.4 milliGRAM(s) Oral at bedtime  trimethoprim  160 mG/sulfamethoxazole 800 mG 1 Tablet(s) Oral daily  valACYclovir 500 milliGRAM(s) Oral two times a day  vancomycin  IVPB 1500 milliGRAM(s) IV Intermittent every 12 hours    MEDICATIONS  (PRN):  benzonatate 100 milliGRAM(s) Oral three times a day PRN Cough  dextrose 40% Gel 15 Gram(s) Oral once PRN Blood Glucose LESS THAN 70 milliGRAM(s)/deciliter  glucagon  Injectable 1 milliGRAM(s) IntraMuscular once PRN Glucose LESS THAN 70 milligrams/deciliter        Vital Signs Last 24 Hrs  T(C): 36.3 (04 Apr 2020 08:30), Max: 36.9 (04 Apr 2020 05:00)  T(F): 97.4 (04 Apr 2020 08:30), Max: 98.5 (04 Apr 2020 05:00)  HR: 53 (04 Apr 2020 08:30) (53 - 64)  BP: 100/63 (04 Apr 2020 08:30) (82/59 - 110/68)  BP(mean): --  RR: 20 (04 Apr 2020 08:30) (20 - 20)  SpO2: 93% (04 Apr 2020 08:30) (70% - 93%)    PHYSICAL EXAM:  see medicine exam     LABS:                        14.5   3.46  )-----------( 227      ( 04 Apr 2020 08:26 )             43.7     04-04    136  |  104  |  32<H>  ----------------------------<  149<H>  3.2<L>   |  17<L>  |  1.44<H>    Ca    8.8      04 Apr 2020 08:26  Phos  3.4     04-04  Mg     2.2     04-04    TPro  6.7  /  Alb  2.7<L>  /  TBili  0.3  /  DBili  x   /  AST  70<H>  /  ALT  51<H>  /  AlkPhos  35<L>  04-04          MICROBIOLOGY:    Culture - Blood (04.02.20 @ 16:09)    Specimen Source: .Blood Blood    Culture Results:   No growth at 1 day.    Culture - Blood (04.02.20 @ 16:09)    Specimen Source: .Blood Blood    Culture Results:   No growth at 1 day.        RADIOLOGY & ADDITIONAL STUDIES:    < from: Xray Chest 1 View- PORTABLE-Urgent (04.02.20 @ 11:04) >   Bilateral airspace opacities, compatible with a multifocal infectious process.

## 2020-04-05 LAB
ALBUMIN SERPL ELPH-MCNC: 2.9 G/DL — LOW (ref 3.3–5)
ALP SERPL-CCNC: 38 U/L — LOW (ref 40–120)
ALT FLD-CCNC: 43 U/L — SIGNIFICANT CHANGE UP (ref 10–45)
ANION GAP SERPL CALC-SCNC: 14 MMOL/L — SIGNIFICANT CHANGE UP (ref 5–17)
AST SERPL-CCNC: 59 U/L — HIGH (ref 10–40)
BASOPHILS # BLD AUTO: 0.01 K/UL — SIGNIFICANT CHANGE UP (ref 0–0.2)
BASOPHILS NFR BLD AUTO: 0.1 % — SIGNIFICANT CHANGE UP (ref 0–2)
BILIRUB SERPL-MCNC: 0.3 MG/DL — SIGNIFICANT CHANGE UP (ref 0.2–1.2)
BUN SERPL-MCNC: 32 MG/DL — HIGH (ref 7–23)
CALCIUM SERPL-MCNC: 9.4 MG/DL — SIGNIFICANT CHANGE UP (ref 8.4–10.5)
CHLORIDE SERPL-SCNC: 106 MMOL/L — SIGNIFICANT CHANGE UP (ref 96–108)
CO2 SERPL-SCNC: 19 MMOL/L — LOW (ref 22–31)
CREAT SERPL-MCNC: 1.39 MG/DL — HIGH (ref 0.5–1.3)
CRP SERPL-MCNC: 9.56 MG/DL — HIGH (ref 0–0.4)
D DIMER BLD IA.RAPID-MCNC: 571 NG/ML DDU — HIGH
EOSINOPHIL # BLD AUTO: 0 K/UL — SIGNIFICANT CHANGE UP (ref 0–0.5)
EOSINOPHIL NFR BLD AUTO: 0 % — SIGNIFICANT CHANGE UP (ref 0–6)
FERRITIN SERPL-MCNC: 4256 NG/ML — HIGH (ref 30–400)
GLUCOSE BLDC GLUCOMTR-MCNC: 116 MG/DL — HIGH (ref 70–99)
GLUCOSE BLDC GLUCOMTR-MCNC: 123 MG/DL — HIGH (ref 70–99)
GLUCOSE BLDC GLUCOMTR-MCNC: 127 MG/DL — HIGH (ref 70–99)
GLUCOSE BLDC GLUCOMTR-MCNC: 139 MG/DL — HIGH (ref 70–99)
GLUCOSE BLDC GLUCOMTR-MCNC: 86 MG/DL — SIGNIFICANT CHANGE UP (ref 70–99)
GLUCOSE SERPL-MCNC: 141 MG/DL — HIGH (ref 70–99)
HCT VFR BLD CALC: 46.8 % — SIGNIFICANT CHANGE UP (ref 39–50)
HGB BLD-MCNC: 15.4 G/DL — SIGNIFICANT CHANGE UP (ref 13–17)
IMM GRANULOCYTES NFR BLD AUTO: 0.7 % — SIGNIFICANT CHANGE UP (ref 0–1.5)
LYMPHOCYTES # BLD AUTO: 0.28 K/UL — LOW (ref 1–3.3)
LYMPHOCYTES # BLD AUTO: 3.7 % — LOW (ref 13–44)
MAGNESIUM SERPL-MCNC: 2 MG/DL — SIGNIFICANT CHANGE UP (ref 1.6–2.6)
MCHC RBC-ENTMCNC: 29.3 PG — SIGNIFICANT CHANGE UP (ref 27–34)
MCHC RBC-ENTMCNC: 32.9 GM/DL — SIGNIFICANT CHANGE UP (ref 32–36)
MCV RBC AUTO: 89 FL — SIGNIFICANT CHANGE UP (ref 80–100)
MONOCYTES # BLD AUTO: 0.19 K/UL — SIGNIFICANT CHANGE UP (ref 0–0.9)
MONOCYTES NFR BLD AUTO: 2.5 % — SIGNIFICANT CHANGE UP (ref 2–14)
NEUTROPHILS # BLD AUTO: 6.98 K/UL — SIGNIFICANT CHANGE UP (ref 1.8–7.4)
NEUTROPHILS NFR BLD AUTO: 93 % — HIGH (ref 43–77)
NRBC # BLD: 0 /100 WBCS — SIGNIFICANT CHANGE UP (ref 0–0)
PHOSPHATE SERPL-MCNC: 2 MG/DL — LOW (ref 2.5–4.5)
PLATELET # BLD AUTO: 286 K/UL — SIGNIFICANT CHANGE UP (ref 150–400)
POTASSIUM SERPL-MCNC: 4.2 MMOL/L — SIGNIFICANT CHANGE UP (ref 3.5–5.3)
POTASSIUM SERPL-SCNC: 4.2 MMOL/L — SIGNIFICANT CHANGE UP (ref 3.5–5.3)
PROT SERPL-MCNC: 7.3 G/DL — SIGNIFICANT CHANGE UP (ref 6–8.3)
RBC # BLD: 5.26 M/UL — SIGNIFICANT CHANGE UP (ref 4.2–5.8)
RBC # FLD: 14.5 % — SIGNIFICANT CHANGE UP (ref 10.3–14.5)
SODIUM SERPL-SCNC: 139 MMOL/L — SIGNIFICANT CHANGE UP (ref 135–145)
VANCOMYCIN TROUGH SERPL-MCNC: 33.4 UG/ML — CRITICAL HIGH (ref 10–20)
WBC # BLD: 7.51 K/UL — SIGNIFICANT CHANGE UP (ref 3.8–10.5)
WBC # FLD AUTO: 7.51 K/UL — SIGNIFICANT CHANGE UP (ref 3.8–10.5)

## 2020-04-05 PROCEDURE — 71045 X-RAY EXAM CHEST 1 VIEW: CPT | Mod: 26

## 2020-04-05 PROCEDURE — 99233 SBSQ HOSP IP/OBS HIGH 50: CPT | Mod: GC

## 2020-04-05 RX ORDER — FUROSEMIDE 40 MG
40 TABLET ORAL ONCE
Refills: 0 | Status: COMPLETED | OUTPATIENT
Start: 2020-04-05 | End: 2020-04-05

## 2020-04-05 RX ADMIN — Medication 300 MILLIGRAM(S): at 00:01

## 2020-04-05 RX ADMIN — Medication 200 MILLIGRAM(S): at 17:13

## 2020-04-05 RX ADMIN — PIPERACILLIN AND TAZOBACTAM 200 GRAM(S): 4; .5 INJECTION, POWDER, LYOPHILIZED, FOR SOLUTION INTRAVENOUS at 22:32

## 2020-04-05 RX ADMIN — VALACYCLOVIR 500 MILLIGRAM(S): 500 TABLET, FILM COATED ORAL at 11:05

## 2020-04-05 RX ADMIN — RALTEGRAVIR 400 MILLIGRAM(S): 400 TABLET, FILM COATED ORAL at 17:13

## 2020-04-05 RX ADMIN — RALTEGRAVIR 400 MILLIGRAM(S): 400 TABLET, FILM COATED ORAL at 11:05

## 2020-04-05 RX ADMIN — Medication 200 MILLIGRAM(S): at 11:05

## 2020-04-05 RX ADMIN — PIPERACILLIN AND TAZOBACTAM 200 GRAM(S): 4; .5 INJECTION, POWDER, LYOPHILIZED, FOR SOLUTION INTRAVENOUS at 11:05

## 2020-04-05 RX ADMIN — PIPERACILLIN AND TAZOBACTAM 200 GRAM(S): 4; .5 INJECTION, POWDER, LYOPHILIZED, FOR SOLUTION INTRAVENOUS at 05:02

## 2020-04-05 RX ADMIN — FAMOTIDINE 20 MILLIGRAM(S): 10 INJECTION INTRAVENOUS at 11:06

## 2020-04-05 RX ADMIN — AZITHROMYCIN 250 MILLIGRAM(S): 500 TABLET, FILM COATED ORAL at 11:05

## 2020-04-05 RX ADMIN — Medication 40 MILLIGRAM(S): at 17:13

## 2020-04-05 RX ADMIN — ATORVASTATIN CALCIUM 80 MILLIGRAM(S): 80 TABLET, FILM COATED ORAL at 21:06

## 2020-04-05 RX ADMIN — EMTRICITABINE AND TENOFOVIR DISOPROXIL FUMARATE 1 TABLET(S): 200; 300 TABLET, FILM COATED ORAL at 11:05

## 2020-04-05 RX ADMIN — PIPERACILLIN AND TAZOBACTAM 200 GRAM(S): 4; .5 INJECTION, POWDER, LYOPHILIZED, FOR SOLUTION INTRAVENOUS at 17:13

## 2020-04-05 RX ADMIN — ETRAVIRINE 200 MILLIGRAM(S): 200 TABLET ORAL at 17:13

## 2020-04-05 RX ADMIN — DARUNAVIR 600 MILLIGRAM(S): 75 TABLET, FILM COATED ORAL at 17:13

## 2020-04-05 RX ADMIN — Medication 125 MICROGRAM(S): at 05:03

## 2020-04-05 RX ADMIN — ETRAVIRINE 200 MILLIGRAM(S): 200 TABLET ORAL at 11:05

## 2020-04-05 RX ADMIN — LACTOBACILLUS ACIDOPH-L.BULGARICUS 1 MILLION CELL CHEWABLE TABLET 1 TABLET(S): at 11:05

## 2020-04-05 RX ADMIN — DARUNAVIR 600 MILLIGRAM(S): 75 TABLET, FILM COATED ORAL at 11:05

## 2020-04-05 RX ADMIN — TAMSULOSIN HYDROCHLORIDE 0.4 MILLIGRAM(S): 0.4 CAPSULE ORAL at 21:06

## 2020-04-05 RX ADMIN — Medication 200 MILLIGRAM(S): at 05:03

## 2020-04-05 RX ADMIN — Medication 2 MILLIGRAM(S): at 11:05

## 2020-04-05 RX ADMIN — Medication 1 TABLET(S): at 11:05

## 2020-04-05 RX ADMIN — ENOXAPARIN SODIUM 40 MILLIGRAM(S): 100 INJECTION SUBCUTANEOUS at 00:01

## 2020-04-05 RX ADMIN — Medication 300 MILLIGRAM(S): at 14:12

## 2020-04-05 RX ADMIN — Medication 40 MILLIGRAM(S): at 14:12

## 2020-04-05 RX ADMIN — RITONAVIR 100 MILLIGRAM(S): 100 TABLET, FILM COATED ORAL at 17:13

## 2020-04-05 RX ADMIN — VALACYCLOVIR 500 MILLIGRAM(S): 500 TABLET, FILM COATED ORAL at 17:13

## 2020-04-05 RX ADMIN — Medication 40 MILLIGRAM(S): at 05:03

## 2020-04-05 RX ADMIN — Medication 2 MILLIGRAM(S): at 17:13

## 2020-04-05 RX ADMIN — RITONAVIR 100 MILLIGRAM(S): 100 TABLET, FILM COATED ORAL at 05:03

## 2020-04-05 NOTE — PROGRESS NOTE ADULT - SUBJECTIVE AND OBJECTIVE BOX
Overnight: Required NRB, transitioned to 6L NC    Allergies    No Known Allergies    Intolerances      MEDICATIONS:  MEDICATIONS  (STANDING):  atorvastatin 80 milliGRAM(s) Oral at bedtime  azithromycin   Tablet 250 milliGRAM(s) Oral daily  darunavir 600 milliGRAM(s) Oral <User Schedule>  dextrose 5%. 1000 milliLiter(s) (50 mL/Hr) IV Continuous <Continuous>  dextrose 50% Injectable 12.5 Gram(s) IV Push once  dextrose 50% Injectable 25 Gram(s) IV Push once  dextrose 50% Injectable 25 Gram(s) IV Push once  emtricitabine 200 mG/tenofovir alafenamide 25 mG (DESCOVY) Tablet 1 Tablet(s) Oral daily  enoxaparin Injectable 40 milliGRAM(s) SubCutaneous every 24 hours  etravirine 200 milliGRAM(s) Oral <User Schedule>  famotidine    Tablet 20 milliGRAM(s) Oral daily  guaiFENesin   Syrup  (Sugar-Free) 200 milliGRAM(s) Oral two times a day  hydroxychloroquine 200 milliGRAM(s) Oral every 12 hours  hydroxychloroquine   Oral   influenza   Vaccine 0.5 milliLiter(s) IntraMuscular once  insulin lispro (HumaLOG) corrective regimen sliding scale   SubCutaneous Before meals and at bedtime  lactobacillus acidophilus and bulgaricus Chewable 1 Tablet(s) Chew daily  levothyroxine 125 MICROGram(s) Oral daily  loperamide 2 milliGRAM(s) Oral <User Schedule>  methylPREDNISolone sodium succinate Injectable 40 milliGRAM(s) IV Push every 12 hours  piperacillin/tazobactam IVPB.. 4.5 Gram(s) IV Intermittent every 6 hours  raltegravir Tablet 400 milliGRAM(s) Oral <User Schedule>  ritonavir Tablet 100 milliGRAM(s) Oral two times a day  tamsulosin 0.4 milliGRAM(s) Oral at bedtime  trimethoprim  160 mG/sulfamethoxazole 800 mG 1 Tablet(s) Oral daily  valACYclovir 500 milliGRAM(s) Oral two times a day    MEDICATIONS  (PRN):  acetaminophen   Tablet .. 975 milliGRAM(s) Oral every 8 hours PRN Temp greater or equal to 38C (100.4F)  benzonatate 100 milliGRAM(s) Oral three times a day PRN Cough  dextrose 40% Gel 15 Gram(s) Oral once PRN Blood Glucose LESS THAN 70 milliGRAM(s)/deciliter  glucagon  Injectable 1 milliGRAM(s) IntraMuscular once PRN Glucose LESS THAN 70 milligrams/deciliter    Vital Signs Last 24 Hrs  T(C): 36.3 (05 Apr 2020 12:32), Max: 36.6 (04 Apr 2020 21:30)  T(F): 97.3 (05 Apr 2020 12:32), Max: 97.9 (04 Apr 2020 21:30)  HR: 74 (05 Apr 2020 12:32) (50 - 74)  BP: 100/68 (05 Apr 2020 12:32) (100/68 - 143/71)  BP(mean): --  RR: 19 (05 Apr 2020 12:32) (18 - 20)  SpO2: 94% (05 Apr 2020 12:32) (88% - 95%)    PHYSICAL EXAM:    General:  in no acute distress  HEENT: MMM, conjunctiva and sclera clear  Neck: Supple  CV: RRR. Normal S1/S2  Respiratory:No respiratory distress. No intercostal retractions, resp status comfortable on 6L NC  Gastrointestinal: Soft non-tender non-distended. Normal bowel sounds. No hepatosplenomegaly. No rebound or guarding  Extremities: No clubbing, cyanosis, or edema  Skin: Warm and dry.   Neuro: A&O x 3.  Psych: Normal affect and mood    LABS:                        15.4   7.51  )-----------( 286      ( 05 Apr 2020 09:56 )             46.8     04-05    139  |  106  |  32<H>  ----------------------------<  141<H>  4.2   |  19<L>  |  1.39<H>    Ca    9.4      05 Apr 2020 09:56  Phos  2.0     04-05  Mg     2.0     04-05    TPro  7.3  /  Alb  2.9<L>  /  TBili  0.3  /  DBili  x   /  AST  59<H>  /  ALT  43  /  AlkPhos  38<L>  04-05                      Culture - Blood (collected 02 Apr 2020 16:09)  Source: .Blood Blood  Preliminary Report (04 Apr 2020 17:00):    No growth at 2 days.    Culture - Blood (collected 02 Apr 2020 16:09)  Source: .Blood Blood  Preliminary Report (04 Apr 2020 17:00):    No growth at 2 days.      RADIOLOGY & ADDITIONAL STUDIES: Overnight: Required NRB, transitioned to 6L NC    Allergies    No Known Allergies    Intolerances      MEDICATIONS:  MEDICATIONS  (STANDING):  atorvastatin 80 milliGRAM(s) Oral at bedtime  azithromycin   Tablet 250 milliGRAM(s) Oral daily  darunavir 600 milliGRAM(s) Oral <User Schedule>  dextrose 5%. 1000 milliLiter(s) (50 mL/Hr) IV Continuous <Continuous>  dextrose 50% Injectable 12.5 Gram(s) IV Push once  dextrose 50% Injectable 25 Gram(s) IV Push once  dextrose 50% Injectable 25 Gram(s) IV Push once  emtricitabine 200 mG/tenofovir alafenamide 25 mG (DESCOVY) Tablet 1 Tablet(s) Oral daily  enoxaparin Injectable 40 milliGRAM(s) SubCutaneous every 24 hours  etravirine 200 milliGRAM(s) Oral <User Schedule>  famotidine    Tablet 20 milliGRAM(s) Oral daily  guaiFENesin   Syrup  (Sugar-Free) 200 milliGRAM(s) Oral two times a day  hydroxychloroquine 200 milliGRAM(s) Oral every 12 hours  hydroxychloroquine   Oral   influenza   Vaccine 0.5 milliLiter(s) IntraMuscular once  insulin lispro (HumaLOG) corrective regimen sliding scale   SubCutaneous Before meals and at bedtime  lactobacillus acidophilus and bulgaricus Chewable 1 Tablet(s) Chew daily  levothyroxine 125 MICROGram(s) Oral daily  loperamide 2 milliGRAM(s) Oral <User Schedule>  methylPREDNISolone sodium succinate Injectable 40 milliGRAM(s) IV Push every 12 hours  piperacillin/tazobactam IVPB.. 4.5 Gram(s) IV Intermittent every 6 hours  raltegravir Tablet 400 milliGRAM(s) Oral <User Schedule>  ritonavir Tablet 100 milliGRAM(s) Oral two times a day  tamsulosin 0.4 milliGRAM(s) Oral at bedtime  trimethoprim  160 mG/sulfamethoxazole 800 mG 1 Tablet(s) Oral daily  valACYclovir 500 milliGRAM(s) Oral two times a day    MEDICATIONS  (PRN):  acetaminophen   Tablet .. 975 milliGRAM(s) Oral every 8 hours PRN Temp greater or equal to 38C (100.4F)  benzonatate 100 milliGRAM(s) Oral three times a day PRN Cough  dextrose 40% Gel 15 Gram(s) Oral once PRN Blood Glucose LESS THAN 70 milliGRAM(s)/deciliter  glucagon  Injectable 1 milliGRAM(s) IntraMuscular once PRN Glucose LESS THAN 70 milligrams/deciliter    Vital Signs Last 24 Hrs  T(C): 36.3 (05 Apr 2020 12:32), Max: 36.6 (04 Apr 2020 21:30)  T(F): 97.3 (05 Apr 2020 12:32), Max: 97.9 (04 Apr 2020 21:30)  HR: 74 (05 Apr 2020 12:32) (50 - 74)  BP: 100/68 (05 Apr 2020 12:32) (100/68 - 143/71)  BP(mean): --  RR: 19 (05 Apr 2020 12:32) (18 - 20)  SpO2: 94% (05 Apr 2020 12:32) (88% - 95%)    PHYSICAL EXAM:    General: NAD, Laying comfortably in bed, on 6LNC  HEENT: NC/AT, anicteric sclera, MMM  Neck: supple  Cardiovascular: +S1/S2, RRR, No murmurs, rubs, gallops  Respiratory: Coarse crackles in b/l lung bases  Gastrointestinal: soft, NT/ND, +BSx4  Extremities: WWP, no edema, clubbing or cyanosis  Vascular: 2+ radial, DP/PT pulses B/L  Neurological: AAOx3, no focal deficits    LABS:                        15.4   7.51  )-----------( 286      ( 05 Apr 2020 09:56 )             46.8     04-05    139  |  106  |  32<H>  ----------------------------<  141<H>  4.2   |  19<L>  |  1.39<H>    Ca    9.4      05 Apr 2020 09:56  Phos  2.0     04-05  Mg     2.0     04-05    TPro  7.3  /  Alb  2.9<L>  /  TBili  0.3  /  DBili  x   /  AST  59<H>  /  ALT  43  /  AlkPhos  38<L>  04-05                      Culture - Blood (collected 02 Apr 2020 16:09)  Source: .Blood Blood  Preliminary Report (04 Apr 2020 17:00):    No growth at 2 days.    Culture - Blood (collected 02 Apr 2020 16:09)  Source: .Blood Blood  Preliminary Report (04 Apr 2020 17:00):    No growth at 2 days.      RADIOLOGY & ADDITIONAL STUDIES:

## 2020-04-05 NOTE — PROGRESS NOTE ADULT - ASSESSMENT
Agree with the history, physical exam, assessment, and plan as outlined above with the following addendum. Briefly patient is a 58 yo M with HIV, MONICA originally admitted to [facility] tele for resp distress requiring NRB. He is now on NC and stepped down to [facility]    COVID 19  Required NRB overnight, CXR shows worsening pulm edema. Will give IV Lasix today   S/p Toci on 4/3. On plaquenil, azithromycin (4/2), IV steroids ( 4/3) for 5 days total  IV abx for possible superimposed bacterial infect- bandemia now resolved. C/w for 7 days total.       HIV  -appreciate ID recs  -on home HIV regimen and pcp ppx . patient is a 58 yo M with HIV, MONICA originally admitted to [facility] tele for resp distress requiring NRB. He is now on NC and stepped down to [facility]    COVID 19  Required NRB overnight, transitioned to 6L NC.  CXR shows worsening pulm edema. Will give IV Lasix today. Repeat cxr after vs CT  to further assess   S/p Toci on 4/3. On plaquenil, azithromycin (4/2), IV steroids ( 4/3) for 5 days total  IV abx for possible superimposed bacterial infect- bandemia now resolved. C/w for 7 days total.   CRP significantly downtrending --> 25 to 9      HIV  -appreciate ID recs  -on home HIV regimen and pcp ppx . patient is a 60 yo M with HIV, MONICA originally admitted to [facility] tele for resp distress requiring NRB. He is now on NC and stepped down to [facility]    COVID 19  Required NRB overnight, transitioned to 6L NC.  CXR shows worsening pulm edema. Will give IV Lasix today. Repeat cxr after vs CT  to further assess   S/p Toci on 4/3. On plaquenil, azithromycin (4/2), IV steroids ( 4/3) for 5 days total. Monitor qtc- repeat ekg today.   IV abx for possible superimposed bacterial infect- bandemia now resolved. C/w for 7 days total.   CRP significantly downtrending --> 25 to 9      HIV  -appreciate ID recs  -on home HIV regimen and pcp ppx .

## 2020-04-06 LAB
ALBUMIN SERPL ELPH-MCNC: 3.2 G/DL — LOW (ref 3.3–5)
ALP SERPL-CCNC: 43 U/L — SIGNIFICANT CHANGE UP (ref 40–120)
ALT FLD-CCNC: 45 U/L — SIGNIFICANT CHANGE UP (ref 10–45)
ANION GAP SERPL CALC-SCNC: 15 MMOL/L — SIGNIFICANT CHANGE UP (ref 5–17)
AST SERPL-CCNC: 52 U/L — HIGH (ref 10–40)
BASOPHILS # BLD AUTO: 0.01 K/UL — SIGNIFICANT CHANGE UP (ref 0–0.2)
BASOPHILS NFR BLD AUTO: 0.1 % — SIGNIFICANT CHANGE UP (ref 0–2)
BILIRUB SERPL-MCNC: 0.4 MG/DL — SIGNIFICANT CHANGE UP (ref 0.2–1.2)
BUN SERPL-MCNC: 37 MG/DL — HIGH (ref 7–23)
CALCIUM SERPL-MCNC: 9.9 MG/DL — SIGNIFICANT CHANGE UP (ref 8.4–10.5)
CHLORIDE SERPL-SCNC: 103 MMOL/L — SIGNIFICANT CHANGE UP (ref 96–108)
CO2 SERPL-SCNC: 23 MMOL/L — SIGNIFICANT CHANGE UP (ref 22–31)
CREAT SERPL-MCNC: 1.56 MG/DL — HIGH (ref 0.5–1.3)
CRP SERPL-MCNC: 5.25 MG/DL — HIGH (ref 0–0.4)
CULTURE RESULTS: NO GROWTH — SIGNIFICANT CHANGE UP
CULTURE RESULTS: NO GROWTH — SIGNIFICANT CHANGE UP
D DIMER BLD IA.RAPID-MCNC: 732 NG/ML DDU — HIGH
EOSINOPHIL # BLD AUTO: 0 K/UL — SIGNIFICANT CHANGE UP (ref 0–0.5)
EOSINOPHIL NFR BLD AUTO: 0 % — SIGNIFICANT CHANGE UP (ref 0–6)
FERRITIN SERPL-MCNC: 2698 NG/ML — HIGH (ref 30–400)
GLUCOSE BLDC GLUCOMTR-MCNC: 115 MG/DL — HIGH (ref 70–99)
GLUCOSE BLDC GLUCOMTR-MCNC: 120 MG/DL — HIGH (ref 70–99)
GLUCOSE BLDC GLUCOMTR-MCNC: 132 MG/DL — HIGH (ref 70–99)
GLUCOSE BLDC GLUCOMTR-MCNC: 153 MG/DL — HIGH (ref 70–99)
GLUCOSE SERPL-MCNC: 133 MG/DL — HIGH (ref 70–99)
HCT VFR BLD CALC: 47.3 % — SIGNIFICANT CHANGE UP (ref 39–50)
HGB BLD-MCNC: 15.4 G/DL — SIGNIFICANT CHANGE UP (ref 13–17)
IMM GRANULOCYTES NFR BLD AUTO: 0.8 % — SIGNIFICANT CHANGE UP (ref 0–1.5)
LYMPHOCYTES # BLD AUTO: 0.34 K/UL — LOW (ref 1–3.3)
LYMPHOCYTES # BLD AUTO: 4 % — LOW (ref 13–44)
MAGNESIUM SERPL-MCNC: 2 MG/DL — SIGNIFICANT CHANGE UP (ref 1.6–2.6)
MCHC RBC-ENTMCNC: 28.8 PG — SIGNIFICANT CHANGE UP (ref 27–34)
MCHC RBC-ENTMCNC: 32.6 GM/DL — SIGNIFICANT CHANGE UP (ref 32–36)
MCV RBC AUTO: 88.4 FL — SIGNIFICANT CHANGE UP (ref 80–100)
MONOCYTES # BLD AUTO: 0.22 K/UL — SIGNIFICANT CHANGE UP (ref 0–0.9)
MONOCYTES NFR BLD AUTO: 2.6 % — SIGNIFICANT CHANGE UP (ref 2–14)
NEUTROPHILS # BLD AUTO: 7.85 K/UL — HIGH (ref 1.8–7.4)
NEUTROPHILS NFR BLD AUTO: 92.5 % — HIGH (ref 43–77)
NRBC # BLD: 0 /100 WBCS — SIGNIFICANT CHANGE UP (ref 0–0)
PHOSPHATE SERPL-MCNC: 2.9 MG/DL — SIGNIFICANT CHANGE UP (ref 2.5–4.5)
PLATELET # BLD AUTO: 313 K/UL — SIGNIFICANT CHANGE UP (ref 150–400)
POTASSIUM SERPL-MCNC: 4.2 MMOL/L — SIGNIFICANT CHANGE UP (ref 3.5–5.3)
POTASSIUM SERPL-SCNC: 4.2 MMOL/L — SIGNIFICANT CHANGE UP (ref 3.5–5.3)
PROT SERPL-MCNC: 7.6 G/DL — SIGNIFICANT CHANGE UP (ref 6–8.3)
RBC # BLD: 5.35 M/UL — SIGNIFICANT CHANGE UP (ref 4.2–5.8)
RBC # FLD: 14.3 % — SIGNIFICANT CHANGE UP (ref 10.3–14.5)
SODIUM SERPL-SCNC: 141 MMOL/L — SIGNIFICANT CHANGE UP (ref 135–145)
SPECIMEN SOURCE: SIGNIFICANT CHANGE UP
SPECIMEN SOURCE: SIGNIFICANT CHANGE UP
VANCOMYCIN FLD-MCNC: 17.1 UG/ML — SIGNIFICANT CHANGE UP
WBC # BLD: 8.49 K/UL — SIGNIFICANT CHANGE UP (ref 3.8–10.5)
WBC # FLD AUTO: 8.49 K/UL — SIGNIFICANT CHANGE UP (ref 3.8–10.5)

## 2020-04-06 PROCEDURE — 71045 X-RAY EXAM CHEST 1 VIEW: CPT | Mod: 26

## 2020-04-06 PROCEDURE — 93010 ELECTROCARDIOGRAM REPORT: CPT

## 2020-04-06 RX ORDER — VANCOMYCIN HCL 1 G
1000 VIAL (EA) INTRAVENOUS EVERY 12 HOURS
Refills: 0 | Status: COMPLETED | OUTPATIENT
Start: 2020-04-06 | End: 2020-04-07

## 2020-04-06 RX ORDER — VANCOMYCIN HCL 1 G
1000 VIAL (EA) INTRAVENOUS EVERY 12 HOURS
Refills: 0 | Status: DISCONTINUED | OUTPATIENT
Start: 2020-04-06 | End: 2020-04-06

## 2020-04-06 RX ADMIN — Medication 2 MILLIGRAM(S): at 12:25

## 2020-04-06 RX ADMIN — Medication 200 MILLIGRAM(S): at 22:13

## 2020-04-06 RX ADMIN — ETRAVIRINE 200 MILLIGRAM(S): 200 TABLET ORAL at 18:44

## 2020-04-06 RX ADMIN — Medication 200 MILLIGRAM(S): at 00:06

## 2020-04-06 RX ADMIN — Medication 200 MILLIGRAM(S): at 05:02

## 2020-04-06 RX ADMIN — LACTOBACILLUS ACIDOPH-L.BULGARICUS 1 MILLION CELL CHEWABLE TABLET 1 TABLET(S): at 12:28

## 2020-04-06 RX ADMIN — Medication 250 MILLIGRAM(S): at 15:06

## 2020-04-06 RX ADMIN — PIPERACILLIN AND TAZOBACTAM 200 GRAM(S): 4; .5 INJECTION, POWDER, LYOPHILIZED, FOR SOLUTION INTRAVENOUS at 12:25

## 2020-04-06 RX ADMIN — Medication 200 MILLIGRAM(S): at 18:44

## 2020-04-06 RX ADMIN — DARUNAVIR 600 MILLIGRAM(S): 75 TABLET, FILM COATED ORAL at 18:44

## 2020-04-06 RX ADMIN — VALACYCLOVIR 500 MILLIGRAM(S): 500 TABLET, FILM COATED ORAL at 18:44

## 2020-04-06 RX ADMIN — RITONAVIR 100 MILLIGRAM(S): 100 TABLET, FILM COATED ORAL at 18:44

## 2020-04-06 RX ADMIN — ETRAVIRINE 200 MILLIGRAM(S): 200 TABLET ORAL at 12:25

## 2020-04-06 RX ADMIN — PIPERACILLIN AND TAZOBACTAM 200 GRAM(S): 4; .5 INJECTION, POWDER, LYOPHILIZED, FOR SOLUTION INTRAVENOUS at 05:02

## 2020-04-06 RX ADMIN — Medication 40 MILLIGRAM(S): at 18:44

## 2020-04-06 RX ADMIN — Medication 125 MICROGRAM(S): at 05:01

## 2020-04-06 RX ADMIN — ENOXAPARIN SODIUM 40 MILLIGRAM(S): 100 INJECTION SUBCUTANEOUS at 21:39

## 2020-04-06 RX ADMIN — Medication 200 MILLIGRAM(S): at 12:25

## 2020-04-06 RX ADMIN — ATORVASTATIN CALCIUM 80 MILLIGRAM(S): 80 TABLET, FILM COATED ORAL at 21:38

## 2020-04-06 RX ADMIN — Medication 1: at 21:42

## 2020-04-06 RX ADMIN — RALTEGRAVIR 400 MILLIGRAM(S): 400 TABLET, FILM COATED ORAL at 18:44

## 2020-04-06 RX ADMIN — Medication 40 MILLIGRAM(S): at 05:02

## 2020-04-06 RX ADMIN — EMTRICITABINE AND TENOFOVIR DISOPROXIL FUMARATE 1 TABLET(S): 200; 300 TABLET, FILM COATED ORAL at 12:25

## 2020-04-06 RX ADMIN — TAMSULOSIN HYDROCHLORIDE 0.4 MILLIGRAM(S): 0.4 CAPSULE ORAL at 21:38

## 2020-04-06 RX ADMIN — FAMOTIDINE 20 MILLIGRAM(S): 10 INJECTION INTRAVENOUS at 12:25

## 2020-04-06 RX ADMIN — ENOXAPARIN SODIUM 40 MILLIGRAM(S): 100 INJECTION SUBCUTANEOUS at 00:05

## 2020-04-06 RX ADMIN — PIPERACILLIN AND TAZOBACTAM 200 GRAM(S): 4; .5 INJECTION, POWDER, LYOPHILIZED, FOR SOLUTION INTRAVENOUS at 18:44

## 2020-04-06 RX ADMIN — AZITHROMYCIN 250 MILLIGRAM(S): 500 TABLET, FILM COATED ORAL at 12:25

## 2020-04-06 RX ADMIN — RALTEGRAVIR 400 MILLIGRAM(S): 400 TABLET, FILM COATED ORAL at 12:25

## 2020-04-06 RX ADMIN — DARUNAVIR 600 MILLIGRAM(S): 75 TABLET, FILM COATED ORAL at 12:44

## 2020-04-06 RX ADMIN — VALACYCLOVIR 500 MILLIGRAM(S): 500 TABLET, FILM COATED ORAL at 12:25

## 2020-04-06 RX ADMIN — Medication 1 TABLET(S): at 12:25

## 2020-04-06 RX ADMIN — PIPERACILLIN AND TAZOBACTAM 200 GRAM(S): 4; .5 INJECTION, POWDER, LYOPHILIZED, FOR SOLUTION INTRAVENOUS at 21:39

## 2020-04-06 RX ADMIN — Medication 2 MILLIGRAM(S): at 18:44

## 2020-04-06 NOTE — PROGRESS NOTE ADULT - PROBLEM SELECTOR PLAN 1
Presented to [facility] satting low 90s on RA placed on NC. Suspicious for COVID-19 etiology at present time and with GGO on CT Chest. Lungs with good air movement b/l. Likely 2/2 viral +/- bacterial pneumonia.  - f/u lung pathology/pathologies  - wean O2 as tolerated

## 2020-04-06 NOTE — PROGRESS NOTE ADULT - ATTENDING COMMENTS
C/w treatment for possible MRSA pneumonia/HAP and c/w treatment for COVID pneumonia  C/w HIV meds and bactrim for PJP prophylaxis  CKD stage 3-monitor renal function  Rest as above

## 2020-04-06 NOTE — PROGRESS NOTE ADULT - PROBLEM SELECTOR PLAN 6
Hx of HIV, undetectable for 20 yrs per patient and with recent f/u. Tcells per pt 232.   - consider opportunistic infections   - c/w home HIV rx  - c/w valtrex  - Started PCP ppx with Bactrim DS one tablet daily, to be continued after discharge Hx of HIV, undetectable for 20 yrs per patient and with recent f/u. Tcells per pt 232.   - consider opportunistic infections   - c/w home HIV rx  - c/w valtrex  - Started PCP ppx with Bactrim DS one tablet daily due to acute decreased in CD4 to 103, to be continued after discharge

## 2020-04-06 NOTE — PROGRESS NOTE ADULT - PROBLEM SELECTOR PLAN 3
Positive for COVID-19. WBC elevated, auto lymphocyte wnl. No known sick/COVID+ contacts. COVID-19 PCR +.  - isolation precautions  - Started azithromycin and plaquenil as repeat QTc 452  - holding on vitamin C due to LEONEL  - Started thiamine 200mg BID  - f/u daily COVID labs  - Daily EKG for QTc monitoring, 4/3 was 449 Positive for COVID-19. WBC elevated, auto lymphocyte wnl. No known sick/COVID+ contacts. COVID-19 PCR +.  - isolation precautions  - Started azithromycin and plaquenil as repeat QTc 452  - f/u daily COVID labs  - Daily EKG for QTc monitoring, 4/4 was 474

## 2020-04-06 NOTE — PROGRESS NOTE ADULT - PROBLEM SELECTOR PLAN 5
RESOLVED. Upon presentation reported pain with coughing and had Trop I .159. Received 325mg ASA and initally admitted to Telemetry for r/o ACS. EKG without new ST/T wave changes. Old ST depression V3 noted. Repeat trops downtrended to 0.146. No complaint of chest pain upon presentation to [facility]. Troponemia can be seen with COVID -19.   - monitor for signs/symptoms of ACS

## 2020-04-06 NOTE — PROGRESS NOTE ADULT - PROBLEM SELECTOR PLAN 2
Likely viral pneumonia 2/2 COVID-19, now with bandemia concerning for superimposed bacterial pneumonia. Presenting with cough, SOB, mild hypoxia and hx concerning for respiratory infection. CXR showing b/l patchy opacifications. No lymphopenia and +neutrophilia. CT Chest showing b/l GGO concerning for COVID, viral, or atypical PNA. Did not meet sepsis criteria on admission; no tachypnea, no leukocytosis, not febrile (Tmax 100F), not tachycardic. SBP 90s - 140s. Now spiked fever to 102. In ED given 1g CTX and 500mg azithromycin. MRSA swab positive.  - blood Cx NGTD  - Started vanc and zosyn x 7 days for bandemia and fever to 102 (4/2 - 4/6)  - considering opportunistic infections in setting of HIV with CD4 in 200s Likely viral pneumonia 2/2 COVID-19, now with bandemia concerning for superimposed bacterial pneumonia. Presenting with cough, SOB, mild hypoxia and hx concerning for respiratory infection. CXR showing b/l patchy opacifications. No lymphopenia and +neutrophilia. CT Chest showing b/l GGO concerning for COVID, viral, or atypical PNA. Did not meet sepsis criteria on admission; no tachypnea, no leukocytosis, not febrile (Tmax 100F), not tachycardic. SBP 90s - 140s. Then spiked fever to 102. In ED given 1g CTX and 500mg azithromycin. MRSA swab positive.  - blood Cx NGTD  - Started vanc and zosyn x 7 days for bandemia and fever to 102 (4/2 - 4/8)  - considering opportunistic infections in setting of HIV with CD4 in 200s

## 2020-04-06 NOTE — PROGRESS NOTE ADULT - SUBJECTIVE AND OBJECTIVE BOX
OVERNIGHT EVENTS: Vancomycin level 33, held vancomycin and will repeat level. Remained stable on 6LNC.    SUBJECTIVE / INTERVAL HPI: Patient seen and examined at bedside by resident. ROS per resident.    MEDICATIONS  (STANDING):  atorvastatin 80 milliGRAM(s) Oral at bedtime  darunavir 600 milliGRAM(s) Oral <User Schedule>  dextrose 5%. 1000 milliLiter(s) (50 mL/Hr) IV Continuous <Continuous>  dextrose 50% Injectable 12.5 Gram(s) IV Push once  dextrose 50% Injectable 25 Gram(s) IV Push once  dextrose 50% Injectable 25 Gram(s) IV Push once  emtricitabine 200 mG/tenofovir alafenamide 25 mG (DESCOVY) Tablet 1 Tablet(s) Oral daily  enoxaparin Injectable 40 milliGRAM(s) SubCutaneous every 24 hours  etravirine 200 milliGRAM(s) Oral <User Schedule>  famotidine    Tablet 20 milliGRAM(s) Oral daily  guaiFENesin   Syrup  (Sugar-Free) 200 milliGRAM(s) Oral two times a day  hydroxychloroquine 200 milliGRAM(s) Oral every 12 hours  hydroxychloroquine   Oral   influenza   Vaccine 0.5 milliLiter(s) IntraMuscular once  insulin lispro (HumaLOG) corrective regimen sliding scale   SubCutaneous Before meals and at bedtime  lactobacillus acidophilus and bulgaricus Chewable 1 Tablet(s) Chew daily  levothyroxine 125 MICROGram(s) Oral daily  loperamide 2 milliGRAM(s) Oral <User Schedule>  methylPREDNISolone sodium succinate Injectable 40 milliGRAM(s) IV Push every 12 hours  piperacillin/tazobactam IVPB.. 4.5 Gram(s) IV Intermittent every 6 hours  raltegravir Tablet 400 milliGRAM(s) Oral <User Schedule>  ritonavir Tablet 100 milliGRAM(s) Oral two times a day  tamsulosin 0.4 milliGRAM(s) Oral at bedtime  trimethoprim  160 mG/sulfamethoxazole 800 mG 1 Tablet(s) Oral daily  valACYclovir 500 milliGRAM(s) Oral two times a day    MEDICATIONS  (PRN):  acetaminophen   Tablet .. 975 milliGRAM(s) Oral every 8 hours PRN Temp greater or equal to 38C (100.4F)  benzonatate 100 milliGRAM(s) Oral three times a day PRN Cough  dextrose 40% Gel 15 Gram(s) Oral once PRN Blood Glucose LESS THAN 70 milliGRAM(s)/deciliter  glucagon  Injectable 1 milliGRAM(s) IntraMuscular once PRN Glucose LESS THAN 70 milligrams/deciliter    Allergies    No Known Allergies    Intolerances        VITAL SIGNS:  Vital Signs Last 24 Hrs  T(C): 36.8 (06 Apr 2020 10:40), Max: 36.8 (06 Apr 2020 10:40)  T(F): 98.2 (06 Apr 2020 10:40), Max: 98.2 (06 Apr 2020 10:40)  HR: 63 (06 Apr 2020 10:40) (54 - 63)  BP: 128/88 (06 Apr 2020 10:40) (128/88 - 153/93)  BP(mean): --  RR: 19 (06 Apr 2020 10:40) (19 - 20)  SpO2: 96% (06 Apr 2020 10:40) (93% - 96%)        PHYSICAL EXAM:  General: NAD, Laying comfortably in bed  HEENT: NC/AT, anicteric sclera, MMM  Neck: supple  Cardiovascular: +S1/S2, RRR, No murmurs, rubs, gallops  Respiratory: CTA B/L, no W/R/R  Gastrointestinal: soft, NT/ND, +BSx4  Extremities: WWP, no edema, clubbing or cyanosis  Vascular: 2+ radial, DP/PT pulses B/L  Neurological: AAOx3, no focal deficits      LABS:                        15.4   8.49  )-----------( 313      ( 06 Apr 2020 09:06 )             47.3     04-06    141  |  103  |  37<H>  ----------------------------<  133<H>  4.2   |  23  |  1.56<H>    Ca    9.9      06 Apr 2020 09:06  Phos  2.9     04-06  Mg     2.0     04-06    TPro  7.6  /  Alb  3.2<L>  /  TBili  0.4  /  DBili  x   /  AST  52<H>  /  ALT  45  /  AlkPhos  43  04-06        CAPILLARY BLOOD GLUCOSE      POCT Blood Glucose.: 120 mg/dL (06 Apr 2020 11:46)  POCT Blood Glucose.: 115 mg/dL (06 Apr 2020 06:52)  POCT Blood Glucose.: 123 mg/dL (05 Apr 2020 21:37)  POCT Blood Glucose.: 127 mg/dL (05 Apr 2020 17:24)          RADIOLOGY & ADDITIONAL TESTS: Reviewed. OVERNIGHT EVENTS: Vancomycin level 33, held vancomycin and will repeat level. Remained stable on 6LNC.    SUBJECTIVE / INTERVAL HPI: Patient seen and examined at bedside by resident. ROS per resident.    MEDICATIONS  (STANDING):  atorvastatin 80 milliGRAM(s) Oral at bedtime  darunavir 600 milliGRAM(s) Oral <User Schedule>  dextrose 5%. 1000 milliLiter(s) (50 mL/Hr) IV Continuous <Continuous>  dextrose 50% Injectable 12.5 Gram(s) IV Push once  dextrose 50% Injectable 25 Gram(s) IV Push once  dextrose 50% Injectable 25 Gram(s) IV Push once  emtricitabine 200 mG/tenofovir alafenamide 25 mG (DESCOVY) Tablet 1 Tablet(s) Oral daily  enoxaparin Injectable 40 milliGRAM(s) SubCutaneous every 24 hours  etravirine 200 milliGRAM(s) Oral <User Schedule>  famotidine    Tablet 20 milliGRAM(s) Oral daily  guaiFENesin   Syrup  (Sugar-Free) 200 milliGRAM(s) Oral two times a day  hydroxychloroquine 200 milliGRAM(s) Oral every 12 hours  hydroxychloroquine   Oral   influenza   Vaccine 0.5 milliLiter(s) IntraMuscular once  insulin lispro (HumaLOG) corrective regimen sliding scale   SubCutaneous Before meals and at bedtime  lactobacillus acidophilus and bulgaricus Chewable 1 Tablet(s) Chew daily  levothyroxine 125 MICROGram(s) Oral daily  loperamide 2 milliGRAM(s) Oral <User Schedule>  methylPREDNISolone sodium succinate Injectable 40 milliGRAM(s) IV Push every 12 hours  piperacillin/tazobactam IVPB.. 4.5 Gram(s) IV Intermittent every 6 hours  raltegravir Tablet 400 milliGRAM(s) Oral <User Schedule>  ritonavir Tablet 100 milliGRAM(s) Oral two times a day  tamsulosin 0.4 milliGRAM(s) Oral at bedtime  trimethoprim  160 mG/sulfamethoxazole 800 mG 1 Tablet(s) Oral daily  valACYclovir 500 milliGRAM(s) Oral two times a day    MEDICATIONS  (PRN):  acetaminophen   Tablet .. 975 milliGRAM(s) Oral every 8 hours PRN Temp greater or equal to 38C (100.4F)  benzonatate 100 milliGRAM(s) Oral three times a day PRN Cough  dextrose 40% Gel 15 Gram(s) Oral once PRN Blood Glucose LESS THAN 70 milliGRAM(s)/deciliter  glucagon  Injectable 1 milliGRAM(s) IntraMuscular once PRN Glucose LESS THAN 70 milligrams/deciliter    Allergies    No Known Allergies    Intolerances        VITAL SIGNS:  Vital Signs Last 24 Hrs  T(C): 36.8 (06 Apr 2020 10:40), Max: 36.8 (06 Apr 2020 10:40)  T(F): 98.2 (06 Apr 2020 10:40), Max: 98.2 (06 Apr 2020 10:40)  HR: 63 (06 Apr 2020 10:40) (54 - 63)  BP: 128/88 (06 Apr 2020 10:40) (128/88 - 153/93)  BP(mean): --  RR: 19 (06 Apr 2020 10:40) (19 - 20)  SpO2: 96% (06 Apr 2020 10:40) (93% - 96%)        PHYSICAL EXAM:  General: NAD, Laying comfortably in bed  HEENT: NC/AT, anicteric sclera, MMM  Neck: supple  Cardiovascular: +S1/S2, RRR, No murmurs, rubs, gallops  Respiratory: Coarse crackles in b/l lung bases  Gastrointestinal: soft, NT/ND, +BSx4  Extremities: WWP, no edema, clubbing or cyanosis  Vascular: 2+ radial, DP/PT pulses B/L  Neurological: AAOx3, no focal deficits      LABS:                        15.4   8.49  )-----------( 313      ( 06 Apr 2020 09:06 )             47.3     04-06    141  |  103  |  37<H>  ----------------------------<  133<H>  4.2   |  23  |  1.56<H>    Ca    9.9      06 Apr 2020 09:06  Phos  2.9     04-06  Mg     2.0     04-06    TPro  7.6  /  Alb  3.2<L>  /  TBili  0.4  /  DBili  x   /  AST  52<H>  /  ALT  45  /  AlkPhos  43  04-06      CAPILLARY BLOOD GLUCOSE  POCT Blood Glucose.: 120 mg/dL (06 Apr 2020 11:46)  POCT Blood Glucose.: 115 mg/dL (06 Apr 2020 06:52)  POCT Blood Glucose.: 123 mg/dL (05 Apr 2020 21:37)  POCT Blood Glucose.: 127 mg/dL (05 Apr 2020 17:24)      RADIOLOGY & ADDITIONAL TESTS: Reviewed.

## 2020-04-06 NOTE — PROGRESS NOTE ADULT - PROBLEM SELECTOR PLAN 4
LEONEL on CKD. Cr 2.06 on admission, baseline 1.3 in 2019 (although has had Cr 1.8 previous labs). Improving.  - trend BMP  - gentle hydration  - consider urine studies  - renally dose Rx    #hyponatremia  Noted with mild hyponatremia (130 -> 131) and hypochloremia (93->96) on presentation likely 2/2 decreased PO intake. Improving after fluids  - f/u AM BMP LEONEL on CKD. Cr 2.06 on admission, baseline 1.3 in 2019 (although has had Cr 1.8 previous labs). Improving.  - trend BMP  - caution with IVF in setting of lung pathology  - renally dose Rx    #hyponatremia - RESOLVED  Noted with mild hyponatremia (130 -> 131) and hypochloremia (93->96) on presentation likely 2/2 decreased PO intake. Improving after fluids.

## 2020-04-07 LAB
ALBUMIN SERPL ELPH-MCNC: 2.9 G/DL — LOW (ref 3.3–5)
ALP SERPL-CCNC: 39 U/L — LOW (ref 40–120)
ALT FLD-CCNC: 40 U/L — SIGNIFICANT CHANGE UP (ref 10–45)
ANION GAP SERPL CALC-SCNC: 14 MMOL/L — SIGNIFICANT CHANGE UP (ref 5–17)
AST SERPL-CCNC: 42 U/L — HIGH (ref 10–40)
BASOPHILS # BLD AUTO: 0.01 K/UL — SIGNIFICANT CHANGE UP (ref 0–0.2)
BASOPHILS NFR BLD AUTO: 0.1 % — SIGNIFICANT CHANGE UP (ref 0–2)
BILIRUB SERPL-MCNC: 0.4 MG/DL — SIGNIFICANT CHANGE UP (ref 0.2–1.2)
BUN SERPL-MCNC: 34 MG/DL — HIGH (ref 7–23)
CALCIUM SERPL-MCNC: 9.4 MG/DL — SIGNIFICANT CHANGE UP (ref 8.4–10.5)
CHLORIDE SERPL-SCNC: 104 MMOL/L — SIGNIFICANT CHANGE UP (ref 96–108)
CO2 SERPL-SCNC: 22 MMOL/L — SIGNIFICANT CHANGE UP (ref 22–31)
CREAT SERPL-MCNC: 1.36 MG/DL — HIGH (ref 0.5–1.3)
CRP SERPL-MCNC: 2.76 MG/DL — HIGH (ref 0–0.4)
D DIMER BLD IA.RAPID-MCNC: 865 NG/ML DDU — HIGH
EOSINOPHIL # BLD AUTO: 0 K/UL — SIGNIFICANT CHANGE UP (ref 0–0.5)
EOSINOPHIL NFR BLD AUTO: 0 % — SIGNIFICANT CHANGE UP (ref 0–6)
FERRITIN SERPL-MCNC: 1973 NG/ML — HIGH (ref 30–400)
GLUCOSE BLDC GLUCOMTR-MCNC: 127 MG/DL — HIGH (ref 70–99)
GLUCOSE BLDC GLUCOMTR-MCNC: 142 MG/DL — HIGH (ref 70–99)
GLUCOSE BLDC GLUCOMTR-MCNC: 161 MG/DL — HIGH (ref 70–99)
GLUCOSE SERPL-MCNC: 151 MG/DL — HIGH (ref 70–99)
HCT VFR BLD CALC: 45.1 % — SIGNIFICANT CHANGE UP (ref 39–50)
HGB BLD-MCNC: 14.6 G/DL — SIGNIFICANT CHANGE UP (ref 13–17)
IMM GRANULOCYTES NFR BLD AUTO: 1 % — SIGNIFICANT CHANGE UP (ref 0–1.5)
LYMPHOCYTES # BLD AUTO: 0.23 K/UL — LOW (ref 1–3.3)
LYMPHOCYTES # BLD AUTO: 2.9 % — LOW (ref 13–44)
MAGNESIUM SERPL-MCNC: 2.1 MG/DL — SIGNIFICANT CHANGE UP (ref 1.6–2.6)
MCHC RBC-ENTMCNC: 28.6 PG — SIGNIFICANT CHANGE UP (ref 27–34)
MCHC RBC-ENTMCNC: 32.4 GM/DL — SIGNIFICANT CHANGE UP (ref 32–36)
MCV RBC AUTO: 88.4 FL — SIGNIFICANT CHANGE UP (ref 80–100)
MONOCYTES # BLD AUTO: 0.15 K/UL — SIGNIFICANT CHANGE UP (ref 0–0.9)
MONOCYTES NFR BLD AUTO: 1.9 % — LOW (ref 2–14)
NEUTROPHILS # BLD AUTO: 7.53 K/UL — HIGH (ref 1.8–7.4)
NEUTROPHILS NFR BLD AUTO: 94.1 % — HIGH (ref 43–77)
NRBC # BLD: 0 /100 WBCS — SIGNIFICANT CHANGE UP (ref 0–0)
PHOSPHATE SERPL-MCNC: 2.4 MG/DL — LOW (ref 2.5–4.5)
PLATELET # BLD AUTO: 274 K/UL — SIGNIFICANT CHANGE UP (ref 150–400)
POTASSIUM SERPL-MCNC: 4.2 MMOL/L — SIGNIFICANT CHANGE UP (ref 3.5–5.3)
POTASSIUM SERPL-SCNC: 4.2 MMOL/L — SIGNIFICANT CHANGE UP (ref 3.5–5.3)
PROT SERPL-MCNC: 6.5 G/DL — SIGNIFICANT CHANGE UP (ref 6–8.3)
RBC # BLD: 5.1 M/UL — SIGNIFICANT CHANGE UP (ref 4.2–5.8)
RBC # FLD: 14 % — SIGNIFICANT CHANGE UP (ref 10.3–14.5)
SODIUM SERPL-SCNC: 140 MMOL/L — SIGNIFICANT CHANGE UP (ref 135–145)
WBC # BLD: 8 K/UL — SIGNIFICANT CHANGE UP (ref 3.8–10.5)
WBC # FLD AUTO: 8 K/UL — SIGNIFICANT CHANGE UP (ref 3.8–10.5)

## 2020-04-07 PROCEDURE — 93010 ELECTROCARDIOGRAM REPORT: CPT

## 2020-04-07 RX ORDER — SODIUM,POTASSIUM PHOSPHATES 278-250MG
1 POWDER IN PACKET (EA) ORAL ONCE
Refills: 0 | Status: COMPLETED | OUTPATIENT
Start: 2020-04-07 | End: 2020-04-07

## 2020-04-07 RX ADMIN — Medication 40 MILLIGRAM(S): at 05:23

## 2020-04-07 RX ADMIN — LACTOBACILLUS ACIDOPH-L.BULGARICUS 1 MILLION CELL CHEWABLE TABLET 1 TABLET(S): at 12:00

## 2020-04-07 RX ADMIN — Medication 200 MILLIGRAM(S): at 05:23

## 2020-04-07 RX ADMIN — DARUNAVIR 600 MILLIGRAM(S): 75 TABLET, FILM COATED ORAL at 18:14

## 2020-04-07 RX ADMIN — PIPERACILLIN AND TAZOBACTAM 200 GRAM(S): 4; .5 INJECTION, POWDER, LYOPHILIZED, FOR SOLUTION INTRAVENOUS at 11:56

## 2020-04-07 RX ADMIN — TAMSULOSIN HYDROCHLORIDE 0.4 MILLIGRAM(S): 0.4 CAPSULE ORAL at 22:07

## 2020-04-07 RX ADMIN — Medication 250 MILLIGRAM(S): at 18:15

## 2020-04-07 RX ADMIN — Medication 2 MILLIGRAM(S): at 18:15

## 2020-04-07 RX ADMIN — VALACYCLOVIR 500 MILLIGRAM(S): 500 TABLET, FILM COATED ORAL at 18:15

## 2020-04-07 RX ADMIN — EMTRICITABINE AND TENOFOVIR DISOPROXIL FUMARATE 1 TABLET(S): 200; 300 TABLET, FILM COATED ORAL at 13:12

## 2020-04-07 RX ADMIN — FAMOTIDINE 20 MILLIGRAM(S): 10 INJECTION INTRAVENOUS at 12:02

## 2020-04-07 RX ADMIN — RITONAVIR 100 MILLIGRAM(S): 100 TABLET, FILM COATED ORAL at 18:42

## 2020-04-07 RX ADMIN — RALTEGRAVIR 400 MILLIGRAM(S): 400 TABLET, FILM COATED ORAL at 13:12

## 2020-04-07 RX ADMIN — Medication 1: at 07:18

## 2020-04-07 RX ADMIN — PIPERACILLIN AND TAZOBACTAM 200 GRAM(S): 4; .5 INJECTION, POWDER, LYOPHILIZED, FOR SOLUTION INTRAVENOUS at 16:52

## 2020-04-07 RX ADMIN — PIPERACILLIN AND TAZOBACTAM 200 GRAM(S): 4; .5 INJECTION, POWDER, LYOPHILIZED, FOR SOLUTION INTRAVENOUS at 05:23

## 2020-04-07 RX ADMIN — Medication 125 MICROGRAM(S): at 05:22

## 2020-04-07 RX ADMIN — Medication 1 TABLET(S): at 12:00

## 2020-04-07 RX ADMIN — Medication 200 MILLIGRAM(S): at 18:14

## 2020-04-07 RX ADMIN — RALTEGRAVIR 400 MILLIGRAM(S): 400 TABLET, FILM COATED ORAL at 18:15

## 2020-04-07 RX ADMIN — ATORVASTATIN CALCIUM 80 MILLIGRAM(S): 80 TABLET, FILM COATED ORAL at 22:07

## 2020-04-07 RX ADMIN — Medication 40 MILLIGRAM(S): at 18:15

## 2020-04-07 RX ADMIN — Medication 1 PACKET(S): at 12:00

## 2020-04-07 RX ADMIN — VALACYCLOVIR 500 MILLIGRAM(S): 500 TABLET, FILM COATED ORAL at 12:00

## 2020-04-07 RX ADMIN — Medication 250 MILLIGRAM(S): at 05:22

## 2020-04-07 RX ADMIN — ETRAVIRINE 200 MILLIGRAM(S): 200 TABLET ORAL at 13:12

## 2020-04-07 RX ADMIN — RITONAVIR 100 MILLIGRAM(S): 100 TABLET, FILM COATED ORAL at 05:21

## 2020-04-07 RX ADMIN — PIPERACILLIN AND TAZOBACTAM 200 GRAM(S): 4; .5 INJECTION, POWDER, LYOPHILIZED, FOR SOLUTION INTRAVENOUS at 23:07

## 2020-04-07 RX ADMIN — Medication 2 MILLIGRAM(S): at 13:12

## 2020-04-07 RX ADMIN — ETRAVIRINE 200 MILLIGRAM(S): 200 TABLET ORAL at 18:15

## 2020-04-07 RX ADMIN — ENOXAPARIN SODIUM 40 MILLIGRAM(S): 100 INJECTION SUBCUTANEOUS at 23:07

## 2020-04-07 NOTE — PROGRESS NOTE ADULT - PROBLEM SELECTOR PLAN 3
Positive for COVID-19.  No known sick/COVID+ contacts. COVID-19 PCR +.  - isolation precautions  - s/p azithromycin and plaquenil  - trend daily COVID labs  - serial EKGs for QTc monitoring, 4/4 was 474 Positive for COVID-19.  No known sick/COVID+ contacts. COVID-19 PCR +.  - isolation precautions  - s/p azithromycin and plaquenil  - trend daily COVID labs  - serial EKGs for QTc monitoring, 4/4 was 474  --on 4/7 QTc 496  - repeat EKG in AM 4/8

## 2020-04-07 NOTE — PROGRESS NOTE ADULT - PROBLEM SELECTOR PLAN 8
F: none  E: replete as needed for K <4, Mg <2  N: DASH/TLC diet   GI ppx: famotidine  VTE/DVT ppx: lovenox    Code status: Full  Dispo: F

## 2020-04-07 NOTE — PROGRESS NOTE ADULT - SUBJECTIVE AND OBJECTIVE BOX
o/n:     SUBJECTIVE / INTERVAL HPI: Chart reviewed for events.     VITAL SIGNS:  T(F): 97.6 (04-07-20 @ 00:40), Max: 98.2 (04-06-20 @ 10:40)  HR: 56 (04-07-20 @ 00:40) (56 - 63)  BP: 131/80 (04-07-20 @ 00:40) (128/88 - 156/90)  RR: 17 (04-07-20 @ 06:14) (17 - 19)  SpO2: 95% (04-07-20 @ 06:14) (93% - 97%)  Oxygen Support/Ventilation:     PHYSICAL EXAM (covid)      MEDICATIONS:  MEDICATIONS  (STANDING):  atorvastatin 80 milliGRAM(s) Oral at bedtime  darunavir 600 milliGRAM(s) Oral <User Schedule>  dextrose 5%. 1000 milliLiter(s) (50 mL/Hr) IV Continuous <Continuous>  dextrose 50% Injectable 12.5 Gram(s) IV Push once  dextrose 50% Injectable 25 Gram(s) IV Push once  dextrose 50% Injectable 25 Gram(s) IV Push once  emtricitabine 200 mG/tenofovir alafenamide 25 mG (DESCOVY) Tablet 1 Tablet(s) Oral daily  enoxaparin Injectable 40 milliGRAM(s) SubCutaneous every 24 hours  etravirine 200 milliGRAM(s) Oral <User Schedule>  famotidine    Tablet 20 milliGRAM(s) Oral daily  guaiFENesin   Syrup  (Sugar-Free) 200 milliGRAM(s) Oral two times a day  influenza   Vaccine 0.5 milliLiter(s) IntraMuscular once  insulin lispro (HumaLOG) corrective regimen sliding scale   SubCutaneous Before meals and at bedtime  lactobacillus acidophilus and bulgaricus Chewable 1 Tablet(s) Chew daily  levothyroxine 125 MICROGram(s) Oral daily  loperamide 2 milliGRAM(s) Oral <User Schedule>  methylPREDNISolone sodium succinate Injectable 40 milliGRAM(s) IV Push every 12 hours  piperacillin/tazobactam IVPB.. 4.5 Gram(s) IV Intermittent every 6 hours  raltegravir Tablet 400 milliGRAM(s) Oral <User Schedule>  ritonavir Tablet 100 milliGRAM(s) Oral two times a day  tamsulosin 0.4 milliGRAM(s) Oral at bedtime  trimethoprim  160 mG/sulfamethoxazole 800 mG 1 Tablet(s) Oral daily  valACYclovir 500 milliGRAM(s) Oral two times a day  vancomycin  IVPB 1000 milliGRAM(s) IV Intermittent every 12 hours    MEDICATIONS  (PRN):  acetaminophen   Tablet .. 975 milliGRAM(s) Oral every 8 hours PRN Temp greater or equal to 38C (100.4F)  benzonatate 100 milliGRAM(s) Oral three times a day PRN Cough  dextrose 40% Gel 15 Gram(s) Oral once PRN Blood Glucose LESS THAN 70 milliGRAM(s)/deciliter  glucagon  Injectable 1 milliGRAM(s) IntraMuscular once PRN Glucose LESS THAN 70 milligrams/deciliter    ALLERGIES:  Allergies    No Known Allergies    Intolerances      LABS:                        15.4   8.49  )-----------( 313      ( 06 Apr 2020 09:06 )             47.3     04-06    141  |  103  |  37<H>  ----------------------------<  133<H>  4.2   |  23  |  1.56<H>    Ca    9.9      06 Apr 2020 09:06  Phos  2.9     04-06  Mg     2.0     04-06    TPro  7.6  /  Alb  3.2<L>  /  TBili  0.4  /  DBili  x   /  AST  52<H>  /  ALT  45  /  AlkPhos  43  04-06      CAPILLARY BLOOD GLUCOSE      POCT Blood Glucose.: 161 mg/dL (07 Apr 2020 07:05)      RADIOLOGY & ADDITIONAL TESTS: Reviewed. o/n: ESTEFANY, VSS    SUBJECTIVE / INTERVAL HPI: Chart reviewed for events.     VITAL SIGNS:  T(F): 97.6 (04-07-20 @ 00:40), Max: 98.2 (04-06-20 @ 10:40)  HR: 56 (04-07-20 @ 00:40) (56 - 63)  BP: 131/80 (04-07-20 @ 00:40) (128/88 - 156/90)  RR: 17 (04-07-20 @ 06:14) (17 - 19)  SpO2: 95% (04-07-20 @ 06:14) (93% - 97%)  Oxygen Support/Ventilation: 5L NC    PHYSICAL EXAM (covid)      MEDICATIONS:  MEDICATIONS  (STANDING):  atorvastatin 80 milliGRAM(s) Oral at bedtime  darunavir 600 milliGRAM(s) Oral <User Schedule>  dextrose 5%. 1000 milliLiter(s) (50 mL/Hr) IV Continuous <Continuous>  dextrose 50% Injectable 12.5 Gram(s) IV Push once  dextrose 50% Injectable 25 Gram(s) IV Push once  dextrose 50% Injectable 25 Gram(s) IV Push once  emtricitabine 200 mG/tenofovir alafenamide 25 mG (DESCOVY) Tablet 1 Tablet(s) Oral daily  enoxaparin Injectable 40 milliGRAM(s) SubCutaneous every 24 hours  etravirine 200 milliGRAM(s) Oral <User Schedule>  famotidine    Tablet 20 milliGRAM(s) Oral daily  guaiFENesin   Syrup  (Sugar-Free) 200 milliGRAM(s) Oral two times a day  influenza   Vaccine 0.5 milliLiter(s) IntraMuscular once  insulin lispro (HumaLOG) corrective regimen sliding scale   SubCutaneous Before meals and at bedtime  lactobacillus acidophilus and bulgaricus Chewable 1 Tablet(s) Chew daily  levothyroxine 125 MICROGram(s) Oral daily  loperamide 2 milliGRAM(s) Oral <User Schedule>  methylPREDNISolone sodium succinate Injectable 40 milliGRAM(s) IV Push every 12 hours  piperacillin/tazobactam IVPB.. 4.5 Gram(s) IV Intermittent every 6 hours  raltegravir Tablet 400 milliGRAM(s) Oral <User Schedule>  ritonavir Tablet 100 milliGRAM(s) Oral two times a day  tamsulosin 0.4 milliGRAM(s) Oral at bedtime  trimethoprim  160 mG/sulfamethoxazole 800 mG 1 Tablet(s) Oral daily  valACYclovir 500 milliGRAM(s) Oral two times a day  vancomycin  IVPB 1000 milliGRAM(s) IV Intermittent every 12 hours    MEDICATIONS  (PRN):  acetaminophen   Tablet .. 975 milliGRAM(s) Oral every 8 hours PRN Temp greater or equal to 38C (100.4F)  benzonatate 100 milliGRAM(s) Oral three times a day PRN Cough  dextrose 40% Gel 15 Gram(s) Oral once PRN Blood Glucose LESS THAN 70 milliGRAM(s)/deciliter  glucagon  Injectable 1 milliGRAM(s) IntraMuscular once PRN Glucose LESS THAN 70 milligrams/deciliter    ALLERGIES:  Allergies    No Known Allergies    Intolerances      LABS:                        15.4   8.49  )-----------( 313      ( 06 Apr 2020 09:06 )             47.3     04-06    141  |  103  |  37<H>  ----------------------------<  133<H>  4.2   |  23  |  1.56<H>    Ca    9.9      06 Apr 2020 09:06  Phos  2.9     04-06  Mg     2.0     04-06    TPro  7.6  /  Alb  3.2<L>  /  TBili  0.4  /  DBili  x   /  AST  52<H>  /  ALT  45  /  AlkPhos  43  04-06      CAPILLARY BLOOD GLUCOSE      POCT Blood Glucose.: 161 mg/dL (07 Apr 2020 07:05)      RADIOLOGY & ADDITIONAL TESTS: Reviewed. o/n: ESTEFANY, VSS    SUBJECTIVE / INTERVAL HPI: Chart reviewed for events. No complaints; continues to ask when he can return home.    VITAL SIGNS:  T(F): 97.6 (04-07-20 @ 00:40), Max: 98.2 (04-06-20 @ 10:40)  HR: 56 (04-07-20 @ 00:40) (56 - 63)  BP: 131/80 (04-07-20 @ 00:40) (128/88 - 156/90)  RR: 17 (04-07-20 @ 06:14) (17 - 19)  SpO2: 95% (04-07-20 @ 06:14) (93% - 97%)  Oxygen Support/Ventilation: 5L NC    PHYSICAL EXAM (covid)  General: resting in bed in NAD  Mental Status: awake, A&O x 4  HEENT/Neck: NC/AT, no JVD b/l  Cardiovascular: +S1/S2 with RRR. no M/R/G appreciated  Respiratory: comfortable on 5L, + scattered crackles. no respiratory distress  Abdominal: +BS x4, NT/ND, no rigidity, no guarding  Extremities: UE: WWP, no clubbing, no cyanosis.   LE: no edema, symmetric, NT  Vascular: 2+ radial pulses b/l; 1+ DP pulses b/l  Neurological: CN II-XII grossly intact  Psychiatric: verbalizations and behaviors are nonbizarre and appropriate    MEDICATIONS:  MEDICATIONS  (STANDING):  atorvastatin 80 milliGRAM(s) Oral at bedtime  darunavir 600 milliGRAM(s) Oral <User Schedule>  dextrose 5%. 1000 milliLiter(s) (50 mL/Hr) IV Continuous <Continuous>  dextrose 50% Injectable 12.5 Gram(s) IV Push once  dextrose 50% Injectable 25 Gram(s) IV Push once  dextrose 50% Injectable 25 Gram(s) IV Push once  emtricitabine 200 mG/tenofovir alafenamide 25 mG (DESCOVY) Tablet 1 Tablet(s) Oral daily  enoxaparin Injectable 40 milliGRAM(s) SubCutaneous every 24 hours  etravirine 200 milliGRAM(s) Oral <User Schedule>  famotidine    Tablet 20 milliGRAM(s) Oral daily  guaiFENesin   Syrup  (Sugar-Free) 200 milliGRAM(s) Oral two times a day  influenza   Vaccine 0.5 milliLiter(s) IntraMuscular once  insulin lispro (HumaLOG) corrective regimen sliding scale   SubCutaneous Before meals and at bedtime  lactobacillus acidophilus and bulgaricus Chewable 1 Tablet(s) Chew daily  levothyroxine 125 MICROGram(s) Oral daily  loperamide 2 milliGRAM(s) Oral <User Schedule>  methylPREDNISolone sodium succinate Injectable 40 milliGRAM(s) IV Push every 12 hours  piperacillin/tazobactam IVPB.. 4.5 Gram(s) IV Intermittent every 6 hours  raltegravir Tablet 400 milliGRAM(s) Oral <User Schedule>  ritonavir Tablet 100 milliGRAM(s) Oral two times a day  tamsulosin 0.4 milliGRAM(s) Oral at bedtime  trimethoprim  160 mG/sulfamethoxazole 800 mG 1 Tablet(s) Oral daily  valACYclovir 500 milliGRAM(s) Oral two times a day  vancomycin  IVPB 1000 milliGRAM(s) IV Intermittent every 12 hours    MEDICATIONS  (PRN):  acetaminophen   Tablet .. 975 milliGRAM(s) Oral every 8 hours PRN Temp greater or equal to 38C (100.4F)  benzonatate 100 milliGRAM(s) Oral three times a day PRN Cough  dextrose 40% Gel 15 Gram(s) Oral once PRN Blood Glucose LESS THAN 70 milliGRAM(s)/deciliter  glucagon  Injectable 1 milliGRAM(s) IntraMuscular once PRN Glucose LESS THAN 70 milligrams/deciliter    ALLERGIES:  Allergies    No Known Allergies    Intolerances      LABS:                        15.4   8.49  )-----------( 313      ( 06 Apr 2020 09:06 )             47.3     04-06    141  |  103  |  37<H>  ----------------------------<  133<H>  4.2   |  23  |  1.56<H>    Ca    9.9      06 Apr 2020 09:06  Phos  2.9     04-06  Mg     2.0     04-06    TPro  7.6  /  Alb  3.2<L>  /  TBili  0.4  /  DBili  x   /  AST  52<H>  /  ALT  45  /  AlkPhos  43  04-06      CAPILLARY BLOOD GLUCOSE      POCT Blood Glucose.: 161 mg/dL (07 Apr 2020 07:05)      RADIOLOGY & ADDITIONAL TESTS: Reviewed.

## 2020-04-07 NOTE — PROGRESS NOTE ADULT - PROBLEM SELECTOR PLAN 6
Hx of HLD  - restarted therapeutic interchange of home crestor    #HTN  pt denied hx of HTN however has losartan as home medication  - hold anti HTNs in setting of likely infection  - restart if elevated BPs  - monitor BPs    #hypothyroidism, TSH 2.5  - c/w home levothyroxine 125mcg daily

## 2020-04-07 NOTE — PROGRESS NOTE ADULT - ASSESSMENT
60YO M with PMH of HIV (VL undetectable, CD4 300), tonsillar Ca (sp tx), HTN, HLD, hypothyroidism, BPH and MONICA who presented with cough, subjective fevers, anorexia, and increasing SOB and MENDEZ; found to be in acute hypoxic respiratory 2/2 COVID-19 with possible superimposed bacterial pneumonia. 60YO M with PMH of HIV (VL undetectable, CD4 300), tonsillar Ca (sp tx), HTN, HLD, hypothyroidism, BPH and MONICA who presented with cough, subjective fevers, anorexia, and increasing SOB and MENDEZ; found to be in acute hypoxic respiratory 2/2 COVID-19 with likely superimposed bacterial pneumonia. Improving markedly after plaquenil, azithromycin, toci x1, solumedrol, vancomycin, zosyn, and bactrim started.

## 2020-04-07 NOTE — PROGRESS NOTE ADULT - PROBLEM SELECTOR PLAN 5
Hx of HIV, undetectable for 20 yrs per patient and with recent f/u. Tcells per pt 232.   - consider opportunistic infections   - c/w home HIV rx  - c/w valtrex  - Started PCP ppx with Bactrim DS one tablet daily due to acute decreased in CD4 to 103, to be continued after discharge  - outpatient f/u

## 2020-04-07 NOTE — PROGRESS NOTE ADULT - PROBLEM SELECTOR PLAN 2
Presenting with cough, SOB, mild hypoxia and hx concerning for respiratory infection. CXR showing b/l patchy opacifications.  CT Chest showing b/l GGO concerning for COVID, viral, or atypical PNA. Likely viral pneumonia 2/2 COVID-19 with for superimposed bacterial pneumonia. No lymphopenia and +neutrophilia. After admission SBP 90s - 140s, febrile. Originally started on CAP tx; MRSA swab positive. Now on vanc & zosyn & bactrim.  - blood Cx NGTD  - Started vanc and zosyn x 7 days for bandemia and fever to 102 (4/2 - 4/8)  - given acute drop in CD4 to <200 (>200 prior to admission per pt), discharge with Bactrim for PCP ppx

## 2020-04-07 NOTE — PROGRESS NOTE ADULT - PROBLEM SELECTOR PLAN 1
Presented to [facility] satting low 90s on RA placed on NC. Suspicious for COVID-19 with GGO on CT Chest with superimposed bacterial infection.   - azithromycin and plaquenil held on admission given prolonged QTc  - started on azithromycin, plaquenil, 1x toci, solumedrol, vancomycin, and zosyn on 4/3 with marked improvement after  - monitor respiratory status  - monitor VS and labs  - wean O2 as tolerated

## 2020-04-08 ENCOUNTER — TRANSCRIPTION ENCOUNTER (OUTPATIENT)
Age: 60
End: 2020-04-08

## 2020-04-08 LAB
ALBUMIN SERPL ELPH-MCNC: 3.1 G/DL — LOW (ref 3.3–5)
ALP SERPL-CCNC: 42 U/L — SIGNIFICANT CHANGE UP (ref 40–120)
ALT FLD-CCNC: 43 U/L — SIGNIFICANT CHANGE UP (ref 10–45)
ANION GAP SERPL CALC-SCNC: 15 MMOL/L — SIGNIFICANT CHANGE UP (ref 5–17)
AST SERPL-CCNC: 40 U/L — SIGNIFICANT CHANGE UP (ref 10–40)
BASOPHILS # BLD AUTO: 0.02 K/UL — SIGNIFICANT CHANGE UP (ref 0–0.2)
BASOPHILS NFR BLD AUTO: 0.2 % — SIGNIFICANT CHANGE UP (ref 0–2)
BILIRUB SERPL-MCNC: 0.5 MG/DL — SIGNIFICANT CHANGE UP (ref 0.2–1.2)
BUN SERPL-MCNC: 32 MG/DL — HIGH (ref 7–23)
CALCIUM SERPL-MCNC: 9.6 MG/DL — SIGNIFICANT CHANGE UP (ref 8.4–10.5)
CHLORIDE SERPL-SCNC: 103 MMOL/L — SIGNIFICANT CHANGE UP (ref 96–108)
CO2 SERPL-SCNC: 20 MMOL/L — LOW (ref 22–31)
CREAT SERPL-MCNC: 1.2 MG/DL — SIGNIFICANT CHANGE UP (ref 0.5–1.3)
CRP SERPL-MCNC: 1.73 MG/DL — HIGH (ref 0–0.4)
D DIMER BLD IA.RAPID-MCNC: 960 NG/ML DDU — HIGH
EOSINOPHIL # BLD AUTO: 0 K/UL — SIGNIFICANT CHANGE UP (ref 0–0.5)
EOSINOPHIL NFR BLD AUTO: 0 % — SIGNIFICANT CHANGE UP (ref 0–6)
FERRITIN SERPL-MCNC: 1923 NG/ML — HIGH (ref 30–400)
GLUCOSE BLDC GLUCOMTR-MCNC: 118 MG/DL — HIGH (ref 70–99)
GLUCOSE BLDC GLUCOMTR-MCNC: 120 MG/DL — HIGH (ref 70–99)
GLUCOSE BLDC GLUCOMTR-MCNC: 122 MG/DL — HIGH (ref 70–99)
GLUCOSE BLDC GLUCOMTR-MCNC: 155 MG/DL — HIGH (ref 70–99)
GLUCOSE SERPL-MCNC: 126 MG/DL — HIGH (ref 70–99)
HCT VFR BLD CALC: 48.8 % — SIGNIFICANT CHANGE UP (ref 39–50)
HGB BLD-MCNC: 16 G/DL — SIGNIFICANT CHANGE UP (ref 13–17)
IMM GRANULOCYTES NFR BLD AUTO: 1.2 % — SIGNIFICANT CHANGE UP (ref 0–1.5)
LYMPHOCYTES # BLD AUTO: 0.32 K/UL — LOW (ref 1–3.3)
LYMPHOCYTES # BLD AUTO: 3.1 % — LOW (ref 13–44)
MAGNESIUM SERPL-MCNC: 2.2 MG/DL — SIGNIFICANT CHANGE UP (ref 1.6–2.6)
MCHC RBC-ENTMCNC: 28.9 PG — SIGNIFICANT CHANGE UP (ref 27–34)
MCHC RBC-ENTMCNC: 32.8 GM/DL — SIGNIFICANT CHANGE UP (ref 32–36)
MCV RBC AUTO: 88.1 FL — SIGNIFICANT CHANGE UP (ref 80–100)
MONOCYTES # BLD AUTO: 0.25 K/UL — SIGNIFICANT CHANGE UP (ref 0–0.9)
MONOCYTES NFR BLD AUTO: 2.4 % — SIGNIFICANT CHANGE UP (ref 2–14)
NEUTROPHILS # BLD AUTO: 9.72 K/UL — HIGH (ref 1.8–7.4)
NEUTROPHILS NFR BLD AUTO: 93.1 % — HIGH (ref 43–77)
NRBC # BLD: 0 /100 WBCS — SIGNIFICANT CHANGE UP (ref 0–0)
PHOSPHATE SERPL-MCNC: 3 MG/DL — SIGNIFICANT CHANGE UP (ref 2.5–4.5)
PLATELET # BLD AUTO: 283 K/UL — SIGNIFICANT CHANGE UP (ref 150–400)
POTASSIUM SERPL-MCNC: 4.6 MMOL/L — SIGNIFICANT CHANGE UP (ref 3.5–5.3)
POTASSIUM SERPL-SCNC: 4.6 MMOL/L — SIGNIFICANT CHANGE UP (ref 3.5–5.3)
PROT SERPL-MCNC: 6.7 G/DL — SIGNIFICANT CHANGE UP (ref 6–8.3)
RBC # BLD: 5.54 M/UL — SIGNIFICANT CHANGE UP (ref 4.2–5.8)
RBC # FLD: 14 % — SIGNIFICANT CHANGE UP (ref 10.3–14.5)
SODIUM SERPL-SCNC: 138 MMOL/L — SIGNIFICANT CHANGE UP (ref 135–145)
VANCOMYCIN TROUGH SERPL-MCNC: 14.1 UG/ML — SIGNIFICANT CHANGE UP (ref 10–20)
WBC # BLD: 10.44 K/UL — SIGNIFICANT CHANGE UP (ref 3.8–10.5)
WBC # FLD AUTO: 10.44 K/UL — SIGNIFICANT CHANGE UP (ref 3.8–10.5)

## 2020-04-08 PROCEDURE — 99233 SBSQ HOSP IP/OBS HIGH 50: CPT | Mod: GC

## 2020-04-08 RX ORDER — VANCOMYCIN HCL 1 G
1000 VIAL (EA) INTRAVENOUS EVERY 12 HOURS
Refills: 0 | Status: COMPLETED | OUTPATIENT
Start: 2020-04-08 | End: 2020-04-09

## 2020-04-08 RX ADMIN — RALTEGRAVIR 400 MILLIGRAM(S): 400 TABLET, FILM COATED ORAL at 17:25

## 2020-04-08 RX ADMIN — EMTRICITABINE AND TENOFOVIR DISOPROXIL FUMARATE 1 TABLET(S): 200; 300 TABLET, FILM COATED ORAL at 12:53

## 2020-04-08 RX ADMIN — Medication 200 MILLIGRAM(S): at 17:26

## 2020-04-08 RX ADMIN — Medication 40 MILLIGRAM(S): at 06:01

## 2020-04-08 RX ADMIN — Medication 2 MILLIGRAM(S): at 17:25

## 2020-04-08 RX ADMIN — VALACYCLOVIR 500 MILLIGRAM(S): 500 TABLET, FILM COATED ORAL at 12:54

## 2020-04-08 RX ADMIN — RITONAVIR 100 MILLIGRAM(S): 100 TABLET, FILM COATED ORAL at 17:25

## 2020-04-08 RX ADMIN — ENOXAPARIN SODIUM 40 MILLIGRAM(S): 100 INJECTION SUBCUTANEOUS at 21:25

## 2020-04-08 RX ADMIN — PIPERACILLIN AND TAZOBACTAM 200 GRAM(S): 4; .5 INJECTION, POWDER, LYOPHILIZED, FOR SOLUTION INTRAVENOUS at 21:25

## 2020-04-08 RX ADMIN — ATORVASTATIN CALCIUM 80 MILLIGRAM(S): 80 TABLET, FILM COATED ORAL at 21:25

## 2020-04-08 RX ADMIN — ETRAVIRINE 200 MILLIGRAM(S): 200 TABLET ORAL at 17:25

## 2020-04-08 RX ADMIN — Medication 2 MILLIGRAM(S): at 12:54

## 2020-04-08 RX ADMIN — Medication 125 MICROGRAM(S): at 05:02

## 2020-04-08 RX ADMIN — RALTEGRAVIR 400 MILLIGRAM(S): 400 TABLET, FILM COATED ORAL at 12:53

## 2020-04-08 RX ADMIN — ETRAVIRINE 200 MILLIGRAM(S): 200 TABLET ORAL at 12:53

## 2020-04-08 RX ADMIN — Medication 1: at 21:30

## 2020-04-08 RX ADMIN — VALACYCLOVIR 500 MILLIGRAM(S): 500 TABLET, FILM COATED ORAL at 17:25

## 2020-04-08 RX ADMIN — Medication 200 MILLIGRAM(S): at 05:01

## 2020-04-08 RX ADMIN — Medication 250 MILLIGRAM(S): at 12:53

## 2020-04-08 RX ADMIN — FAMOTIDINE 20 MILLIGRAM(S): 10 INJECTION INTRAVENOUS at 12:54

## 2020-04-08 RX ADMIN — TAMSULOSIN HYDROCHLORIDE 0.4 MILLIGRAM(S): 0.4 CAPSULE ORAL at 21:30

## 2020-04-08 RX ADMIN — PIPERACILLIN AND TAZOBACTAM 200 GRAM(S): 4; .5 INJECTION, POWDER, LYOPHILIZED, FOR SOLUTION INTRAVENOUS at 12:05

## 2020-04-08 RX ADMIN — RITONAVIR 100 MILLIGRAM(S): 100 TABLET, FILM COATED ORAL at 05:02

## 2020-04-08 RX ADMIN — PIPERACILLIN AND TAZOBACTAM 200 GRAM(S): 4; .5 INJECTION, POWDER, LYOPHILIZED, FOR SOLUTION INTRAVENOUS at 17:26

## 2020-04-08 RX ADMIN — LACTOBACILLUS ACIDOPH-L.BULGARICUS 1 MILLION CELL CHEWABLE TABLET 1 TABLET(S): at 12:55

## 2020-04-08 RX ADMIN — Medication 1 TABLET(S): at 12:53

## 2020-04-08 RX ADMIN — DARUNAVIR 600 MILLIGRAM(S): 75 TABLET, FILM COATED ORAL at 12:54

## 2020-04-08 RX ADMIN — PIPERACILLIN AND TAZOBACTAM 200 GRAM(S): 4; .5 INJECTION, POWDER, LYOPHILIZED, FOR SOLUTION INTRAVENOUS at 06:02

## 2020-04-08 RX ADMIN — DARUNAVIR 600 MILLIGRAM(S): 75 TABLET, FILM COATED ORAL at 17:25

## 2020-04-08 NOTE — PROGRESS NOTE ADULT - ATTENDING COMMENTS
Seen and examined by me this morning, doing well, wanting to go home  Still on NC 2 lts/min, PT notes from yesterday reviewed-low oxygen on 6 lts  Plan for transfer patient to a CAP unit, needs to complete one more day of Vancomycin for likely superimposed MRSA pneumonia  LEONEL on CKD stage 3 resolved  Rest as above

## 2020-04-08 NOTE — PROGRESS NOTE ADULT - PROBLEM SELECTOR PLAN 5
Hx of HIV, undetectable for 20 yrs per patient and with recent f/u. Tcells per pt 232.   - c/w home HIV rx  - c/w valtrex  - Started PCP ppx with Bactrim DS one tablet daily due to acute decreased in CD4 to 103, to be continued after discharge  - outpatient f/u

## 2020-04-08 NOTE — PROGRESS NOTE ADULT - SUBJECTIVE AND OBJECTIVE BOX
o/n:     SUBJECTIVE / INTERVAL HPI: Chart reviewed for events.     VITAL SIGNS:  T(F): 97.6 (04-08-20 @ 05:49), Max: 98 (04-07-20 @ 14:53)  HR: 65 (04-08-20 @ 05:49) (62 - 74)  BP: 135/82 (04-08-20 @ 05:49) (135/82 - 155/85)  RR: 18 (04-08-20 @ 05:49) (17 - 19)  SpO2: 94% (04-08-20 @ 05:49) (94% - 95%)  Oxygen Support/Ventilation: 5L NC    PHYSICAL EXAM (covid)      MEDICATIONS:  MEDICATIONS  (STANDING):  atorvastatin 80 milliGRAM(s) Oral at bedtime  darunavir 600 milliGRAM(s) Oral <User Schedule>  dextrose 5%. 1000 milliLiter(s) (50 mL/Hr) IV Continuous <Continuous>  dextrose 50% Injectable 12.5 Gram(s) IV Push once  dextrose 50% Injectable 25 Gram(s) IV Push once  dextrose 50% Injectable 25 Gram(s) IV Push once  emtricitabine 200 mG/tenofovir alafenamide 25 mG (DESCOVY) Tablet 1 Tablet(s) Oral daily  enoxaparin Injectable 40 milliGRAM(s) SubCutaneous every 24 hours  etravirine 200 milliGRAM(s) Oral <User Schedule>  famotidine    Tablet 20 milliGRAM(s) Oral daily  guaiFENesin   Syrup  (Sugar-Free) 200 milliGRAM(s) Oral two times a day  influenza   Vaccine 0.5 milliLiter(s) IntraMuscular once  insulin lispro (HumaLOG) corrective regimen sliding scale   SubCutaneous Before meals and at bedtime  lactobacillus acidophilus and bulgaricus Chewable 1 Tablet(s) Chew daily  levothyroxine 125 MICROGram(s) Oral daily  loperamide 2 milliGRAM(s) Oral <User Schedule>  piperacillin/tazobactam IVPB.. 4.5 Gram(s) IV Intermittent every 6 hours  raltegravir Tablet 400 milliGRAM(s) Oral <User Schedule>  ritonavir Tablet 100 milliGRAM(s) Oral two times a day  tamsulosin 0.4 milliGRAM(s) Oral at bedtime  trimethoprim  160 mG/sulfamethoxazole 800 mG 1 Tablet(s) Oral daily  valACYclovir 500 milliGRAM(s) Oral two times a day    MEDICATIONS  (PRN):  acetaminophen   Tablet .. 975 milliGRAM(s) Oral every 8 hours PRN Temp greater or equal to 38C (100.4F)  benzonatate 100 milliGRAM(s) Oral three times a day PRN Cough  dextrose 40% Gel 15 Gram(s) Oral once PRN Blood Glucose LESS THAN 70 milliGRAM(s)/deciliter  glucagon  Injectable 1 milliGRAM(s) IntraMuscular once PRN Glucose LESS THAN 70 milligrams/deciliter    ALLERGIES:  Allergies    No Known Allergies    Intolerances      LABS:                        14.6   8.00  )-----------( 274      ( 07 Apr 2020 07:26 )             45.1     04-07    140  |  104  |  34<H>  ----------------------------<  151<H>  4.2   |  22  |  1.36<H>    Ca    9.4      07 Apr 2020 07:26  Phos  2.4     04-07  Mg     2.1     04-07    TPro  6.5  /  Alb  2.9<L>  /  TBili  0.4  /  DBili  x   /  AST  42<H>  /  ALT  40  /  AlkPhos  39<L>  04-07      CAPILLARY BLOOD GLUCOSE      POCT Blood Glucose.: 127 mg/dL (07 Apr 2020 21:06)      RADIOLOGY & ADDITIONAL TESTS: Reviewed. TRANSFER NOTE  [facility]  HOSPITAL COURSE  60YO M with PMH of HIV (VL undetectable, CD4 232 per pt), tonsillar Ca (sp chemo and radtx), HLD, hypothyroidism, BPH and sleep apnea who presented with cough intermittent sputum), subjective fevers, pain with cough, mild diarrhea, decreased PO intake for 2 weeks with 1d of increasing SOB and MENDEZ. Of note presented mid-March (3/15) to [facility] with similar sx and was prescribed Azithromycin. Imaging: CT Chest showed b/l upper lobe GGO and RML. Placed on NC 3L with SpO2 in mid-high 90s, but then required NRB and admitted to [facility]. Received CTX and azithro x 1 in the ED. ID consulted and recommend continuing home regimen of HIV meds and hold on plaquenil at this time for prolonged QTc. Stepped down to [facility] for further monitoring and management.    On RMF required increasing O2 and with QTc on repeat EKG <500 was started on azithromycin, plaquenil along with tocilizumab x1, steroids, vancomycin (MRSA swab +), zosyn, and bactrim for PCP ppx as CD4 drop to 103. Has continued to improve since initiation of the aforementioned medications and on 4/8 determined to be stable for transfer to [facility] to finalize inpatient abx and monitoring of weaning off O2.     o/n: ESTEFANY    SUBJECTIVE / INTERVAL HPI: Chart reviewed for events. Patient examined at bedside. Feeling well, requesting to go home. no f/c/n/v/d.    VITAL SIGNS:  T(F): 97.6 (04-08-20 @ 05:49), Max: 98 (04-07-20 @ 14:53)  HR: 65 (04-08-20 @ 05:49) (62 - 74)  BP: 135/82 (04-08-20 @ 05:49) (135/82 - 155/85)  RR: 18 (04-08-20 @ 05:49) (17 - 19)  SpO2: 94% (04-08-20 @ 05:49) (94% - 95%)  Oxygen Support/Ventilation: 5L NC    PHYSICAL EXAM (covid)  General: sitting in chair at bedside, NAD, thin male  Mental Status: awake, A&O x 4  HEENT/Neck: NC/AT, no JVD b/l  Cardiovascular: +S1/S2 with RRR. no M/R/G appreciated  Respiratory: comfortable sitting in chair at bedside, lungs CTA b/l  Extremities: UE: WWP, no clubbing, no cyanosis.   LE: no edema, symmetric  Neurological: pupils equal & round, EOMI, CN II-XII grossly intact  Psychiatric: verbalizations and behaviors are nonbizarre and appropriate    MEDICATIONS:  MEDICATIONS  (STANDING):  atorvastatin 80 milliGRAM(s) Oral at bedtime  darunavir 600 milliGRAM(s) Oral <User Schedule>  dextrose 5%. 1000 milliLiter(s) (50 mL/Hr) IV Continuous <Continuous>  dextrose 50% Injectable 12.5 Gram(s) IV Push once  dextrose 50% Injectable 25 Gram(s) IV Push once  dextrose 50% Injectable 25 Gram(s) IV Push once  emtricitabine 200 mG/tenofovir alafenamide 25 mG (DESCOVY) Tablet 1 Tablet(s) Oral daily  enoxaparin Injectable 40 milliGRAM(s) SubCutaneous every 24 hours  etravirine 200 milliGRAM(s) Oral <User Schedule>  famotidine    Tablet 20 milliGRAM(s) Oral daily  guaiFENesin   Syrup  (Sugar-Free) 200 milliGRAM(s) Oral two times a day  influenza   Vaccine 0.5 milliLiter(s) IntraMuscular once  insulin lispro (HumaLOG) corrective regimen sliding scale   SubCutaneous Before meals and at bedtime  lactobacillus acidophilus and bulgaricus Chewable 1 Tablet(s) Chew daily  levothyroxine 125 MICROGram(s) Oral daily  loperamide 2 milliGRAM(s) Oral <User Schedule>  piperacillin/tazobactam IVPB.. 4.5 Gram(s) IV Intermittent every 6 hours  raltegravir Tablet 400 milliGRAM(s) Oral <User Schedule>  ritonavir Tablet 100 milliGRAM(s) Oral two times a day  tamsulosin 0.4 milliGRAM(s) Oral at bedtime  trimethoprim  160 mG/sulfamethoxazole 800 mG 1 Tablet(s) Oral daily  valACYclovir 500 milliGRAM(s) Oral two times a day    MEDICATIONS  (PRN):  acetaminophen   Tablet .. 975 milliGRAM(s) Oral every 8 hours PRN Temp greater or equal to 38C (100.4F)  benzonatate 100 milliGRAM(s) Oral three times a day PRN Cough  dextrose 40% Gel 15 Gram(s) Oral once PRN Blood Glucose LESS THAN 70 milliGRAM(s)/deciliter  glucagon  Injectable 1 milliGRAM(s) IntraMuscular once PRN Glucose LESS THAN 70 milligrams/deciliter    ALLERGIES:  Allergies    No Known Allergies    Intolerances      LABS:                        14.6   8.00  )-----------( 274      ( 07 Apr 2020 07:26 )             45.1     04-07    140  |  104  |  34<H>  ----------------------------<  151<H>  4.2   |  22  |  1.36<H>    Ca    9.4      07 Apr 2020 07:26  Phos  2.4     04-07  Mg     2.1     04-07    TPro  6.5  /  Alb  2.9<L>  /  TBili  0.4  /  DBili  x   /  AST  42<H>  /  ALT  40  /  AlkPhos  39<L>  04-07      CAPILLARY BLOOD GLUCOSE      POCT Blood Glucose.: 127 mg/dL (07 Apr 2020 21:06)      RADIOLOGY & ADDITIONAL TESTS: Reviewed.

## 2020-04-08 NOTE — PROGRESS NOTE ADULT - PROBLEM SELECTOR PLAN 2
Presenting with cough, SOB, mild hypoxia and hx concerning for respiratory infection. CXR showing b/l patchy opacifications.  CT Chest showing b/l GGO concerning for COVID, viral, or atypical PNA. Likely viral pneumonia 2/2 COVID-19 with for superimposed bacterial pneumonia. No lymphopenia and +neutrophilia. After admission SBP 90s - 140s, febrile. Originally started on CAP tx; MRSA swab positive. Now on vanc & zosyn & bactrim.  - blood Cx NGTD  - Started vanc and zosyn x 7 days for bandemia and fever to 102 (4/2 - 4/8)  - vanc trough AM 4/8:   - given acute drop in CD4 to <200 (>200 prior to admission per pt), discharge with Bactrim for PCP ppx Presenting with cough, SOB, mild hypoxia and hx concerning for respiratory infection. CXR showing b/l patchy opacifications.  CT Chest showing b/l GGO concerning for COVID, viral, or atypical PNA. Likely viral pneumonia 2/2 COVID-19 with for superimposed bacterial pneumonia. No lymphopenia and +neutrophilia. After admission SBP 90s - 140s, febrile. Originally started on CAP tx; MRSA swab positive. Now on vanc & zosyn & bactrim.  - blood Cx NGTD  - Started vanc and zosyn x 7 days for bandemia and fever to 102 (4/2 - 4/8)  - vanc trough AM 4/8: 14  - due for 2 final vancomycin doses 4/8  - given acute drop in CD4 to <200 (>200 prior to admission per pt), discharge with Bactrim for PCP ppx

## 2020-04-08 NOTE — PROGRESS NOTE ADULT - PROBLEM SELECTOR PLAN 1
Presented to [facility] satting low 90s on RA placed on NC. Suspicious for COVID-19 with GGO on CT Chest with superimposed bacterial infection.   - azithromycin and plaquenil held on admission given prolonged QTc  - started on azithromycin, plaquenil, 1x toci, solumedrol, vancomycin, and zosyn on 4/3 with marked improvement after  - monitor respiratory status  - monitor VS and labs  - wean O2 as tolerated 5L NC on 4/7 --> Presented to [facility] satting low 90s on RA placed on NC. Suspicious for COVID-19 with GGO on CT Chest with superimposed bacterial infection.   - azithromycin and plaquenil held on admission given prolonged QTc  - started on azithromycin, plaquenil, 1x toci, solumedrol, vancomycin, and zosyn on 4/3 with marked improvement after  - monitor respiratory status  - monitor VS and labs  - wean O2 as tolerated 5L NC on 4/7  - AM 4/8 pt off O2 and saying feeling well  - f/u oxygen saturation on lower & no O2

## 2020-04-08 NOTE — PROGRESS NOTE ADULT - PROBLEM SELECTOR PLAN 3
Positive for COVID-19.  No known sick/COVID+ contacts. COVID-19 PCR +.  - isolation precautions  - s/p azithromycin and plaquenil  - trend daily COVID labs  - serial EKGs for QTc monitoring, 4/4 was 474  --on 4/7 QTc 496  - repeat EKG in AM 4/8, QTc: Positive for COVID-19.  No known sick/COVID+ contacts. COVID-19 PCR +.  - isolation precautions  - s/p azithromycin and plaquenil  - trend daily COVID labs  - serial EKGs for QTc monitoring, 4/4 was 474  --on 4/7 QTc 496  - f/u repeat EKG 4/8 for QTc

## 2020-04-08 NOTE — PROGRESS NOTE ADULT - ASSESSMENT
60YO M with PMH of HIV (VL undetectable, CD4 300), tonsillar Ca (sp tx), HTN, HLD, hypothyroidism, BPH and MONICA who presented with cough, subjective fevers, anorexia, and increasing SOB and MENDEZ; found to be in acute hypoxic respiratory 2/2 COVID-19 with likely superimposed bacterial pneumonia. Improving markedly after plaquenil, azithromycin, toci x1, solumedrol, vancomycin, zosyn, and bactrim started. 60YO M with PMH of HIV (VL undetectable, CD4 300), tonsillar Ca (sp tx), HTN, HLD, hypothyroidism, BPH and MONICA who presented with cough, subjective fevers, anorexia, and increasing SOB and MENDEZ; found to be in acute hypoxic respiratory 2/2 COVID-19 with likely superimposed bacterial pneumonia. Improving markedly after plaquenil, azithromycin, toci x1, solumedrol, vancomycin, zosyn, and bactrim started. Respiratory status continues to improve and now needs to finish inpatient treatment of MRSA PNA and continue to be weaned off oxygen support.

## 2020-04-09 LAB
ALBUMIN SERPL ELPH-MCNC: 2.5 G/DL — LOW (ref 3.4–5)
ALP SERPL-CCNC: 52 U/L — SIGNIFICANT CHANGE UP (ref 40–120)
ALT FLD-CCNC: 56 U/L — HIGH (ref 12–42)
ANION GAP SERPL CALC-SCNC: 10 MMOL/L — SIGNIFICANT CHANGE UP (ref 9–16)
AST SERPL-CCNC: 40 U/L — HIGH (ref 15–37)
BASOPHILS # BLD AUTO: 0.03 K/UL — SIGNIFICANT CHANGE UP (ref 0–0.2)
BASOPHILS NFR BLD AUTO: 0.2 % — SIGNIFICANT CHANGE UP (ref 0–2)
BILIRUB SERPL-MCNC: 0.5 MG/DL — SIGNIFICANT CHANGE UP (ref 0.2–1.2)
BUN SERPL-MCNC: 33 MG/DL — HIGH (ref 7–23)
CALCIUM SERPL-MCNC: 9.4 MG/DL — SIGNIFICANT CHANGE UP (ref 8.5–10.5)
CHLORIDE SERPL-SCNC: 101 MMOL/L — SIGNIFICANT CHANGE UP (ref 96–108)
CO2 SERPL-SCNC: 24 MMOL/L — SIGNIFICANT CHANGE UP (ref 22–31)
CREAT SERPL-MCNC: 1.33 MG/DL — HIGH (ref 0.5–1.3)
CRP SERPL-MCNC: 1.5 MG/DL — HIGH (ref 0–0.9)
EOSINOPHIL # BLD AUTO: 0 K/UL — SIGNIFICANT CHANGE UP (ref 0–0.5)
EOSINOPHIL NFR BLD AUTO: 0 % — SIGNIFICANT CHANGE UP (ref 0–6)
FERRITIN SERPL-MCNC: 1829 NG/ML — HIGH (ref 30–400)
GLUCOSE BLDC GLUCOMTR-MCNC: 109 MG/DL — HIGH (ref 70–99)
GLUCOSE BLDC GLUCOMTR-MCNC: 79 MG/DL — SIGNIFICANT CHANGE UP (ref 70–99)
GLUCOSE BLDC GLUCOMTR-MCNC: 85 MG/DL — SIGNIFICANT CHANGE UP (ref 70–99)
GLUCOSE BLDC GLUCOMTR-MCNC: 94 MG/DL — SIGNIFICANT CHANGE UP (ref 70–99)
GLUCOSE SERPL-MCNC: 96 MG/DL — SIGNIFICANT CHANGE UP (ref 70–99)
HCT VFR BLD CALC: 48.4 % — SIGNIFICANT CHANGE UP (ref 39–50)
HGB BLD-MCNC: 16 G/DL — SIGNIFICANT CHANGE UP (ref 13–17)
IMM GRANULOCYTES NFR BLD AUTO: 1.6 % — HIGH (ref 0–1.5)
LYMPHOCYTES # BLD AUTO: 0.26 K/UL — LOW (ref 1–3.3)
LYMPHOCYTES # BLD AUTO: 2.1 % — LOW (ref 13–44)
MAGNESIUM SERPL-MCNC: 2.2 MG/DL — SIGNIFICANT CHANGE UP (ref 1.6–2.6)
MCHC RBC-ENTMCNC: 29 PG — SIGNIFICANT CHANGE UP (ref 27–34)
MCHC RBC-ENTMCNC: 33.1 GM/DL — SIGNIFICANT CHANGE UP (ref 32–36)
MCV RBC AUTO: 87.8 FL — SIGNIFICANT CHANGE UP (ref 80–100)
MONOCYTES # BLD AUTO: 0.31 K/UL — SIGNIFICANT CHANGE UP (ref 0–0.9)
MONOCYTES NFR BLD AUTO: 2.5 % — SIGNIFICANT CHANGE UP (ref 2–14)
NEUTROPHILS # BLD AUTO: 11.84 K/UL — HIGH (ref 1.8–7.4)
NEUTROPHILS NFR BLD AUTO: 93.6 % — HIGH (ref 43–77)
NRBC # BLD: 0 /100 WBCS — SIGNIFICANT CHANGE UP (ref 0–0)
PHOSPHATE SERPL-MCNC: 2.8 MG/DL — SIGNIFICANT CHANGE UP (ref 2.5–4.5)
PLATELET # BLD AUTO: 291 K/UL — SIGNIFICANT CHANGE UP (ref 150–400)
POTASSIUM SERPL-MCNC: 4.4 MMOL/L — SIGNIFICANT CHANGE UP (ref 3.5–5.3)
POTASSIUM SERPL-SCNC: 4.4 MMOL/L — SIGNIFICANT CHANGE UP (ref 3.5–5.3)
PROT SERPL-MCNC: 7 G/DL — SIGNIFICANT CHANGE UP (ref 6.4–8.2)
RBC # BLD: 5.51 M/UL — SIGNIFICANT CHANGE UP (ref 4.2–5.8)
RBC # FLD: 13.7 % — SIGNIFICANT CHANGE UP (ref 10.3–14.5)
SODIUM SERPL-SCNC: 135 MMOL/L — SIGNIFICANT CHANGE UP (ref 132–145)
WBC # BLD: 12.64 K/UL — HIGH (ref 3.8–10.5)
WBC # FLD AUTO: 12.64 K/UL — HIGH (ref 3.8–10.5)

## 2020-04-09 PROCEDURE — 99231 SBSQ HOSP IP/OBS SF/LOW 25: CPT | Mod: CS

## 2020-04-09 RX ORDER — LOSARTAN POTASSIUM 100 MG/1
50 TABLET, FILM COATED ORAL DAILY
Refills: 0 | Status: DISCONTINUED | OUTPATIENT
Start: 2020-04-09 | End: 2020-04-12

## 2020-04-09 RX ORDER — LOSARTAN POTASSIUM 100 MG/1
25 TABLET, FILM COATED ORAL DAILY
Refills: 0 | Status: DISCONTINUED | OUTPATIENT
Start: 2020-04-09 | End: 2020-04-09

## 2020-04-09 RX ADMIN — DARUNAVIR 600 MILLIGRAM(S): 75 TABLET, FILM COATED ORAL at 14:37

## 2020-04-09 RX ADMIN — Medication 2 MILLIGRAM(S): at 14:37

## 2020-04-09 RX ADMIN — Medication 125 MICROGRAM(S): at 06:22

## 2020-04-09 RX ADMIN — RITONAVIR 100 MILLIGRAM(S): 100 TABLET, FILM COATED ORAL at 14:38

## 2020-04-09 RX ADMIN — Medication 200 MILLIGRAM(S): at 06:20

## 2020-04-09 RX ADMIN — Medication 1 TABLET(S): at 14:38

## 2020-04-09 RX ADMIN — PIPERACILLIN AND TAZOBACTAM 200 GRAM(S): 4; .5 INJECTION, POWDER, LYOPHILIZED, FOR SOLUTION INTRAVENOUS at 06:19

## 2020-04-09 RX ADMIN — ATORVASTATIN CALCIUM 80 MILLIGRAM(S): 80 TABLET, FILM COATED ORAL at 22:05

## 2020-04-09 RX ADMIN — PIPERACILLIN AND TAZOBACTAM 200 GRAM(S): 4; .5 INJECTION, POWDER, LYOPHILIZED, FOR SOLUTION INTRAVENOUS at 13:15

## 2020-04-09 RX ADMIN — LOSARTAN POTASSIUM 25 MILLIGRAM(S): 100 TABLET, FILM COATED ORAL at 06:27

## 2020-04-09 RX ADMIN — Medication 200 MILLIGRAM(S): at 20:48

## 2020-04-09 RX ADMIN — ETRAVIRINE 200 MILLIGRAM(S): 200 TABLET ORAL at 20:42

## 2020-04-09 RX ADMIN — RITONAVIR 100 MILLIGRAM(S): 100 TABLET, FILM COATED ORAL at 20:40

## 2020-04-09 RX ADMIN — RALTEGRAVIR 400 MILLIGRAM(S): 400 TABLET, FILM COATED ORAL at 14:39

## 2020-04-09 RX ADMIN — ENOXAPARIN SODIUM 40 MILLIGRAM(S): 100 INJECTION SUBCUTANEOUS at 22:06

## 2020-04-09 RX ADMIN — VALACYCLOVIR 500 MILLIGRAM(S): 500 TABLET, FILM COATED ORAL at 14:39

## 2020-04-09 RX ADMIN — EMTRICITABINE AND TENOFOVIR DISOPROXIL FUMARATE 1 TABLET(S): 200; 300 TABLET, FILM COATED ORAL at 14:39

## 2020-04-09 RX ADMIN — ETRAVIRINE 200 MILLIGRAM(S): 200 TABLET ORAL at 14:39

## 2020-04-09 RX ADMIN — FAMOTIDINE 20 MILLIGRAM(S): 10 INJECTION INTRAVENOUS at 14:37

## 2020-04-09 RX ADMIN — RALTEGRAVIR 400 MILLIGRAM(S): 400 TABLET, FILM COATED ORAL at 20:42

## 2020-04-09 RX ADMIN — Medication 2 MILLIGRAM(S): at 20:40

## 2020-04-09 RX ADMIN — VALACYCLOVIR 500 MILLIGRAM(S): 500 TABLET, FILM COATED ORAL at 20:48

## 2020-04-09 RX ADMIN — Medication 250 MILLIGRAM(S): at 00:27

## 2020-04-09 NOTE — DISCHARGE NOTE PROVIDER - CARE PROVIDER_API CALL
Elliott Ashraf)  Internal Medicine; Pulmonary Disease  [address] [place] [address]  Phone: [phone]  Fax: [phone]  Follow Up Time:     Freddy Moss)  Cardiology; Internal Medicine  [address] [place] [address]  Phone: [phone]  Fax: [phone]  Follow Up Time:

## 2020-04-09 NOTE — DISCHARGE NOTE PROVIDER - NSDCCPCAREPLAN_GEN_ALL_CORE_FT
PRINCIPAL DISCHARGE DIAGNOSIS  Diagnosis: Coronavirus infection  Assessment and Plan of Treatment: COVID+ PRINCIPAL DISCHARGE DIAGNOSIS  Diagnosis: Coronavirus infection  Assessment and Plan of Treatment: You were diagnosed with the new COVID-19 coronavirus infection via a nasal swab. The treatment for this infection is supportive care, which includes: rest, maintaining adequate oral intake of food and water, and taking acetaminophen/tylenol for fever. Please maintain a strict home quarantine for 14 days at home, and wear a surgical mask at all times when you need to be in close proximity with another human being. Take tylenol as needed, every 6 hours, with a maximum daily dose of 4,000 mg a day. Please visit your nearest urgent care or emergency department should you start to experience: severe shortness of breath, severe cough/wheezing/difficulty breathing, or fever >103 for 3 days. Please maintain a good healthy diet. If you have any questions, please call your primary care provider.        SECONDARY DISCHARGE DIAGNOSES  Diagnosis: History of throat cancer  Assessment and Plan of Treatment: History of throat cancer PRINCIPAL DISCHARGE DIAGNOSIS  Diagnosis: Coronavirus infection  Assessment and Plan of Treatment: You were diagnosed with the new COVID-19 coronavirus infection via a nasal swab. The treatment for this infection is supportive care, which includes: rest, maintaining adequate oral intake of food and water, and taking acetaminophen/tylenol for fever. Please maintain a strict home quarantine for 14 days at home, and wear a surgical mask at all times when you need to be in close proximity with another human being. Take tylenol as needed, every 6 hours, with a maximum daily dose of 4,000 mg a day. Please visit your nearest urgent care or emergency department should you start to experience: severe shortness of breath, severe cough/wheezing/difficulty breathing, or fever >103 for 3 days. Please maintain a good healthy diet. If you have any questions, please call your primary care provider.        SECONDARY DISCHARGE DIAGNOSES  Diagnosis: History of throat cancer  Assessment and Plan of Treatment: You were evaluated by speech and swallow during your hospitalization at [facility] for evaluation of difficulty eating. A modified barium swallow was performed on 5/1/2020 and showed Mild pharyngeal dysphagia in setting of chemoradiation therapy for tonsilar cancer 5 years ago. Swallow function is most likely consistent with baseline swallow due to late effects of chemoradiation therapy although with potential exacerbation of symptoms due to COVID and related shortness of breath with oral intake. You are safe to continue with an oral diet (Mechanical soft & moist solids with thin liquids) and would benefit from a short course of dysphagia therapy as an outpatient to prevent progression of dysphagia.

## 2020-04-09 NOTE — PROGRESS NOTE ADULT - ATTENDING COMMENTS
I was physically present for the key portions of the evaluation and management (E/M) service provided.  I agree with the above history, physical, and plan which I have reviewed and edited where appropriate.    Patient COVID +.  Improving.  Awaiting ability to maintain ambulatory oxygenation >90% on RA.

## 2020-04-09 NOTE — DISCHARGE NOTE PROVIDER - HOSPITAL COURSE
58YO M with PMH of HIV (VL undetectable, CD4 232 per pt), tonsillar Ca (sp chemo and radtx), HLD, hypothyroidism, BPH and sleep apnea who presented with cough intermittent sputum), subjective fevers, pain with cough, mild diarrhea, decreased PO intake for 2 weeks with 1d of increasing SOB and MENDEZ. Of note presented mid-March (3/15) to [facility] with similar sx and was prescribed Azithromycin. Imaging: CT Chest showed b/l upper lobe GGO and RML. Placed on NC 3L with SpO2 in mid-high 90s, but then required NRB and admitted to [facility]. Received CTX and azithro x 1 in the ED. ID consulted and recommend continuing home regimen of HIV meds and hold on plaquenil at this time for prolonged QTc. Stepped down to [facility] for further monitoring and management.    On RMF required increasing O2 and with QTc on repeat EKG <500 was started on azithromycin, plaquenil along with tocilizumab x1, steroids, vancomycin (MRSA swab +), zosyn, and bactrim for PCP ppx as CD4 drop to 103. Has continued to improve since initiation of the aforementioned medications and on 4/8 determined to be stable for transfer to [facility] to finalize inpatient abx and monitoring of weaning off O2. Transferred to [facility] 4/9/20. 58YO M with PMH of HIV (VL undetectable, CD4 232 per pt), tonsillar Ca (sp chemo and radtx), HLD, hypothyroidism, BPH and sleep apnea who presented with cough intermittent sputum), subjective fevers, pain with cough, mild diarrhea, decreased PO intake for 2 weeks with 1d of increasing SOB and MENDEZ. Of note presented mid-March (3/15) to [facility] with similar sx and was prescribed Azithromycin. Imaging: CT Chest showed b/l upper lobe GGO and RML. Placed on NC 3L with SpO2 in mid-high 90s, but then required NRB and admitted to [facility]. Received CTX and azithro x 1 in the ED. ID consulted and recommend continuing home regimen of HIV meds and hold on plaquenil at this time for prolonged QTc. Stepped down to [facility] for further monitoring and management.    On RMF required increasing O2 and with QTc on repeat EKG <500 was started on azithromycin, plaquenil along with tocilizumab x1, steroids, vancomycin (MRSA swab +), zosyn, and bactrim for PCP ppx as CD4 drop to 103. Has continued to improve since initiation of the aforementioned medications and on 4/8 determined to be stable for transfer to [facility] to finalize inpatient abx and monitoring of weaning off O2. Transferred to [facility] 4/9/20. Pt presented to [facility] on 4/11/2020 for hypoxic respiratory failure requiring nonrebreather and admitted for suspected HAP vs PCP and questionable early stage pulmonary fibrosis. Pt was started on full anticoagulation, B/L LE duplex showed no evidence of DVT. Pt's oxygen status continued to be monitored on nonrebreather, chest PT was performed. COVID-19 PCR was found to be negative on 4/16/2020. Pt was started on treatment for PCP with bactrim and steroids, given one dose of Vanc and started Zosyn for empiric coverage, resent MRSA swab and was found to be positive. CMV labs sent (IgG, IgM, PCR), negative. MRSA swab positive for MRSA, continued vanc. CT chest was obtained which showed worsening B/L findings, unable to r/o bacterial PNA. Pt was transferred to MICU due to worsening SOB and abnormal CT. 4/17: Transferred to  for intubation and bronch. Cuff leak noted, ETT requiring re-intubation. 4/18: Extubated to [facility]. MRSA swab +. Vanc trough 10.5- Vanc increased to 1.25g BID (pt already received 1g Vanc, given addtional 250mg). Covid PCR reswab still negative. Chandana Velarde. Passed bedside dysphagia. Regular Diet ordered. 4/19: transferred to [facility] for need of isolation for MRSA. Vanc trough subtherapeutic to 9.5, increased IV Vanc to 1500mg BID. 4/20: Dc'd bactrim as low suspicion for PCP PNA. O2 sats low to high 90s on NRB. 4/22: LE dopplers NEGATIVE. weaned from NRB to venti mask. Throughout his hospital stay pt continued to be weaned off O2. Pt was eventually stable for transfer to regional medical floor for further management. 4/25: CXR showed progression of infiltrates. CTA obtained to r/o PE was negative. 4/27: Fever overnight, CXR no changes, fever workup obtained. Physical therapy was consulted and worked with the pt.    4/28: CXR showed some patchiness in the RLE suspicious for inflammation vs aspiration PNA. Given history of throat ca/radiation, switched to soft diet and started on 7 days of unasyn. 4/29: Patient doing well, no recent fevers, will continue unasyn and monitor fever, encouraged incentive spirometry, failed speech and swallow. 4/30: S&S reevaluated, rec'd reswab for COVID-19 reswab and if (-) recommends modified barium swallow. COVID-19 PCR sent, awaiting results. afebrile, VSS. Soft diet w/ ensures restarted.            *INCOMPLETE** 60YO M with PMH of HIV (VL undetectable, CD4 232 per pt), tonsillar Ca (sp chemo and radtx), HLD, hypothyroidism, BPH and sleep apnea who presented with cough intermittent sputum), subjective fevers, pain with cough, mild diarrhea, decreased PO intake for 2 weeks with 1d of increasing SOB and MENDEZ. Of note presented mid-March (3/15) to [facility] with similar sx and was prescribed Azithromycin. Imaging: CT Chest showed b/l upper lobe GGO and RML. Placed on NC 3L with SpO2 in mid-high 90s, but then required NRB and admitted to [facility]. Received CTX and azithro x 1 in the ED. ID consulted and recommend continuing home regimen of HIV meds and hold on plaquenil at this time for prolonged QTc. Stepped down to [facility] for further monitoring and management.    On RMF required increasing O2 and with QTc on repeat EKG <500 was started on azithromycin, plaquenil along with tocilizumab x1, steroids, vancomycin (MRSA swab +), zosyn, and bactrim for PCP ppx as CD4 drop to 103. Has continued to improve since initiation of the aforementioned medications and on 4/8 determined to be stable for transfer to [facility] to finalize inpatient abx and monitoring of weaning off O2. Transferred to [facility] 4/9/20. Pt presented to [facility] on 4/11/2020 for hypoxic respiratory failure requiring nonrebreather and admitted for suspected HAP vs PCP and questionable early stage pulmonary fibrosis. Pt was started on full anticoagulation, B/L LE duplex showed no evidence of DVT. Pt's oxygen status continued to be monitored on nonrebreather, chest PT was performed. COVID-19 PCR was found to be negative on 4/16/2020. Pt was started on treatment for PCP with bactrim and steroids, given one dose of Vanc and started Zosyn for empiric coverage, resent MRSA swab and was found to be positive. CMV labs sent (IgG, IgM, PCR), negative. MRSA swab positive for MRSA, continued vanc. CT chest was obtained which showed worsening B/L findings, unable to r/o bacterial PNA. Pt was transferred to MICU due to worsening SOB and abnormal CT. 4/17: Transferred to  for intubation and bronch. Cuff leak noted, ETT requiring re-intubation. 4/18: Extubated to [facility]. MRSA swab +. Vanc trough 10.5- Vanc increased to 1.25g BID (pt already received 1g Vanc, given addtional 250mg). Covid PCR reswab was found to be positive on 4/18. Chandana Velarde. Passed bedside dysphagia. Regular Diet ordered. 4/19: transferred to [facility] for need of isolation for MRSA. Vanc trough subtherapeutic to 9.5, increased IV Vanc to 1500mg BID. 4/20: Dc'd bactrim as low suspicion for PCP PNA. O2 sats low to high 90s on NRB. 4/22: LE dopplers NEGATIVE. weaned from NRB to venti mask. Throughout his hospital stay pt continued to be weaned off O2. Pt was eventually stable for transfer to regional medical floor for further management. 4/25: CXR showed progression of infiltrates. CTA obtained to r/o PE was negative. 4/27: Fever overnight, CXR no changes, fever workup obtained. Physical therapy was consulted and worked with the pt. 4/28: CXR showed some patchiness in the RLE suspicious for inflammation vs aspiration PNA. Given history of throat ca/radiation, switched to soft diet and started on 7 days of unasyn. 4/29: Patient doing well, no recent fevers, will continue unasyn and monitor fever, encouraged incentive spirometry, failed speech and swallow. 4/30: S&S reevaluated, rec'd reswab for COVID-19 reswab and if (-) recommends modified barium swallow. COVID-19 PCR sent, awaiting results. afebrile, VSS. Soft diet w/ ensures restarted.            *INCOMPLETE** 60YO M with PMH of HIV (VL undetectable, CD4 232 per pt), tonsillar Ca (sp chemo and radtx), HLD, hypothyroidism, BPH and sleep apnea who presented with cough intermittent sputum), subjective fevers, pain with cough, mild diarrhea, decreased PO intake for 2 weeks with 1d of increasing SOB and MENDEZ. Of note presented mid-March (3/15) to [facility] with similar sx and was prescribed Azithromycin. Imaging: CT Chest showed b/l upper lobe GGO and RML. Placed on NC 3L with SpO2 in mid-high 90s, but then required NRB and admitted to [facility]. Received CTX and azithro x 1 in the ED. ID consulted and recommend continuing home regimen of HIV meds and hold on plaquenil at this time for prolonged QTc. Stepped down to [facility] for further monitoring and management.    On RMF required increasing O2 and with QTc on repeat EKG <500 was started on azithromycin, plaquenil along with tocilizumab x1, steroids, vancomycin (MRSA swab +), zosyn, and bactrim for PCP ppx as CD4 drop to 103. Has continued to improve since initiation of the aforementioned medications and on 4/8 determined to be stable for transfer to [facility] to finalize inpatient abx and monitoring of weaning off O2. Transferred to [facility] 4/9/20. Pt presented to [facility] on 4/11/2020 for hypoxic respiratory failure requiring nonrebreather and admitted for suspected HAP vs PCP and questionable early stage pulmonary fibrosis. Pt was started on full anticoagulation, B/L LE duplex showed no evidence of DVT. Pt's oxygen status continued to be monitored on nonrebreather, chest PT was performed. COVID-19 PCR was found to be negative on 4/16/2020. Pt was started on treatment for PCP with bactrim and steroids, given one dose of Vanc and started Zosyn for empiric coverage, resent MRSA swab and was found to be positive. CMV labs sent (IgG, IgM, PCR), negative. MRSA swab positive for MRSA, continued vanc. CT chest was obtained which showed worsening B/L findings, unable to r/o bacterial PNA. Pt was transferred to MICU due to worsening SOB and abnormal CT. 4/17: Transferred to  for intubation and bronch. Cuff leak noted, ETT requiring re-intubation. 4/18: Extubated to [facility]. MRSA swab +. Vanc trough 10.5- Vanc increased to 1.25g BID (pt already received 1g Vanc, given addtional 250mg). Covid PCR reswab was found to be positive on 4/18. Chandana Velarde. Passed bedside dysphagia. Regular Diet ordered. 4/19: transferred to [facility] for need of isolation for MRSA. Vanc trough subtherapeutic to 9.5, increased IV Vanc to 1500mg BID. 4/20: Dc'd bactrim as low suspicion for PCP PNA. O2 sats low to high 90s on NRB. 4/22: LE dopplers NEGATIVE. weaned from NRB to venti mask. Throughout his hospital stay pt continued to be weaned off O2. Pt was eventually stable for transfer to regional medical floor for further management. 4/25: CXR showed progression of infiltrates. CTA obtained to r/o PE was negative. 4/27: Fever overnight, CXR no changes, fever workup obtained. Physical therapy was consulted and worked with the pt. 4/28: CXR showed some patchiness in the RLE suspicious for inflammation vs aspiration PNA. Given history of throat ca/radiation, switched to soft diet and started on 7 days of unasyn. 4/29: Patient doing well, no recent fevers, will continue unasyn and monitor fever, encouraged incentive spirometry, failed speech and swallow. 4/30: S&S reevaluated, rec'd reswab for COVID-19 reswab and if (-) recommends modified barium swallow. Soft diet w/ ensures restarted. 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 Swallow function is most likely c/w baseline swallow due to late effects of CRT, although with potential exacerbation of symptoms due to COVID and related shortness of breath with PO intake. Pt appears safe to continue with an oral diet and would benefit from a short course of dysphagia therapy as an outpatient to prevent progression of dysphagia. Speech and swallow recommends to continue Mechanical soft & moist solids with thin liquids. At time of discharge pt is satting well on ___L NC at ___. Pt is hemodynamically stable and medically ready for discharge to home on supplemental oxygen (nasal cannula).                            *INCOMPLETE** 60YO M with PMH of HIV (VL undetectable, CD4 232 per pt), tonsillar Ca (sp chemo and radtx), HLD, hypothyroidism, BPH and sleep apnea who presented with cough intermittent sputum), subjective fevers, pain with cough, mild diarrhea, decreased PO intake for 2 weeks with 1d of increasing SOB and MENDEZ. Of note presented mid-March (3/15) to [facility] with similar sx and was prescribed Azithromycin. Imaging: CT Chest showed b/l upper lobe GGO and RML. Placed on NC 3L with SpO2 in mid-high 90s, but then required NRB and admitted to [facility]. Received CTX and azithro x 1 in the ED. ID consulted and recommend continuing home regimen of HIV meds and hold on plaquenil at this time for prolonged QTc. Stepped down to [facility] for further monitoring and management.    On RMF required increasing O2 and with QTc on repeat EKG <500 was started on azithromycin, plaquenil along with tocilizumab x1, steroids, vancomycin (MRSA swab +), zosyn, and bactrim for PCP ppx as CD4 drop to 103. Has continued to improve since initiation of the aforementioned medications and on 4/8 determined to be stable for transfer to [facility] to finalize inpatient abx and monitoring of weaning off O2. Transferred to [facility] 4/9/20. Pt presented to [facility] on 4/11/2020 for hypoxic respiratory failure requiring nonrebreather and admitted for suspected HAP vs PCP and questionable early stage pulmonary fibrosis. Pt was started on full anticoagulation, B/L LE duplex showed no evidence of DVT. Pt's oxygen status continued to be monitored on nonrebreather, chest PT was performed. COVID-19 PCR was found to be negative on 4/16/2020. Pt was started on treatment for PCP with bactrim and steroids, given one dose of Vanc and started Zosyn for empiric coverage, resent MRSA swab and was found to be positive. CMV labs sent (IgG, IgM, PCR), negative. MRSA swab positive for MRSA, continued vanc. CT chest was obtained which showed worsening B/L findings, unable to r/o bacterial PNA. Pt was transferred to MICU due to worsening SOB and abnormal CT. 4/17: Transferred to  for intubation and bronch. Cuff leak noted, ETT requiring re-intubation. 4/18: Extubated to [facility]. MRSA swab +. Vanc trough 10.5- Vanc increased to 1.25g BID (pt already received 1g Vanc, given addtional 250mg). Covid PCR reswab was found to be positive on 4/18. Chandana Velarde. Passed bedside dysphagia. Regular Diet ordered. 4/19: transferred to [facility] for need of isolation for MRSA. Vanc trough subtherapeutic to 9.5, increased IV Vanc to 1500mg BID. 4/20: Dc'd bactrim as low suspicion for PCP PNA. O2 sats low to high 90s on NRB. 4/22: LE dopplers NEGATIVE. weaned from NRB to venti mask. Throughout his hospital stay pt continued to be weaned off O2. Pt was eventually stable for transfer to regional medical floor for further management. 4/25: CXR showed progression of infiltrates. CTA obtained to r/o PE was negative. 4/27: Fever overnight, CXR no changes, fever workup obtained. Physical therapy was consulted and worked with the pt. 4/28: CXR showed some patchiness in the RLE suspicious for inflammation vs aspiration PNA. Given history of throat ca/radiation, switched to soft diet and started on 7 days of unasyn. 4/29: Patient doing well, no recent fevers, will continue unasyn and monitor fever, encouraged incentive spirometry, failed speech and swallow. 4/30: S&S reevaluated, rec'd reswab for COVID-19 reswab and if (-) recommends modified barium swallow. Soft diet w/ ensures restarted. 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 Swallow function is most likely c/w baseline swallow due to late effects of CRT, although with potential exacerbation of symptoms due to COVID and related shortness of breath with PO intake. Pt appears safe to continue with an oral diet and would benefit from a short course of dysphagia therapy as an outpatient to prevent progression of dysphagia. Speech and swallow recommends to continue Mechanical soft & moist solids with thin liquids. At time of discharge pt is satting well on 4L NC at 94%. Pt is hemodynamically stable and medically ready for discharge to home on supplemental oxygen (nasal cannula). 58YO M with PMH of HIV (VL undetectable, CD4 232 per pt), tonsillar Ca (sp chemo and radtx), HLD, hypothyroidism, BPH and sleep apnea who presented with cough intermittent sputum), subjective fevers, pain with cough, mild diarrhea, decreased PO intake for 2 weeks with 1d of increasing SOB and MENDEZ. Of note presented mid-March (3/15) to [facility] with similar sx and was prescribed Azithromycin. Imaging: CT Chest showed b/l upper lobe GGO and RML. Placed on NC 3L with SpO2 in mid-high 90s, but then required NRB and admitted to [facility]. Received CTX and azithro x 1 in the ED. ID consulted and recommend continuing home regimen of HIV meds and hold on plaquenil at this time for prolonged QTc. Stepped down to [facility] for further monitoring and management.    On RMF required increasing O2 and with QTc on repeat EKG <500 was started on azithromycin, plaquenil along with tocilizumab x1, steroids, vancomycin (MRSA swab +), zosyn, and bactrim for PCP ppx as CD4 drop to 103. Has continued to improve since initiation of the aforementioned medications and on 4/8 determined to be stable for transfer to [facility] to finalize inpatient abx and monitoring of weaning off O2. Transferred to [facility] 4/9/20. Pt presented to [facility] on 4/11/2020 for hypoxic respiratory failure requiring nonrebreather and admitted for suspected HAP vs PCP and questionable early stage pulmonary fibrosis. Pt was started on full anticoagulation, B/L LE duplex showed no evidence of DVT. Pt's oxygen status continued to be monitored on nonrebreather, chest PT was performed. COVID-19 PCR was found to be negative on 4/16/2020. Pt was started on treatment for PCP with bactrim and steroids, given one dose of Vanc and started Zosyn for empiric coverage, resent MRSA swab and was found to be positive. CMV labs sent (IgG, IgM, PCR), negative. MRSA swab positive for MRSA, continued vanc. CT chest was obtained which showed worsening B/L findings, unable to r/o bacterial PNA. Pt was transferred to MICU due to worsening SOB and abnormal CT. 4/17: Transferred to  for intubation and bronch. Cuff leak noted, ETT requiring re-intubation. 4/18: Extubated to [facility]. MRSA swab +. Vanc trough 10.5- Vanc increased to 1.25g BID (pt already received 1g Vanc, given addtional 250mg). Covid PCR reswab was found to be positive on 4/18. Chandana Velarde. Passed bedside dysphagia. Regular Diet ordered. 4/19: transferred to [facility] for need of isolation for MRSA. Vanc trough subtherapeutic to 9.5, increased IV Vanc to 1500mg BID. 4/20: Dc'd bactrim as low suspicion for PCP PNA. O2 sats low to high 90s on NRB. 4/22: LE dopplers NEGATIVE. weaned from NRB to venti mask. Throughout his hospital stay pt continued to be weaned off O2. Pt was eventually stable for transfer to regional medical floor for further management. 4/25: CXR showed progression of infiltrates. CTA obtained to r/o PE was negative. 4/27: Fever overnight, CXR no changes, fever workup obtained. Physical therapy was consulted and worked with the pt. 4/28: CXR showed some patchiness in the RLE suspicious for inflammation vs aspiration PNA. Given history of throat ca/radiation, switched to soft diet and started on 7 days of unasyn. 4/29: Patient doing well, no recent fevers, will continue unasyn and monitor fever, encouraged incentive spirometry, failed speech and swallow. 4/30: S&S reevaluated, rec'd reswab for COVID-19 reswab and if (-) recommends modified barium swallow. Soft diet w/ ensures restarted. 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 Swallow function is most likely c/w baseline swallow due to late effects of CRT, although with potential exacerbation of symptoms due to COVID and related shortness of breath with PO intake. Pt appears safe to continue with an oral diet and would benefit from a short course of dysphagia therapy as an outpatient to prevent progression of dysphagia. Speech and swallow recommends to continue Mechanical soft & moist solids with thin liquids. At time of discharge pt is satting well on 4L NC at 94%. Pt is hemodynamically stable and medically ready for discharge to home on 4L supplemental oxygen (nasal cannula). 60YO M with PMH of HIV (VL undetectable, CD4 232 per pt), tonsillar Ca (sp chemo and radtx), HLD, hypothyroidism, BPH and sleep apnea who presented with cough intermittent sputum), subjective fevers, pain with cough, mild diarrhea, decreased PO intake for 2 weeks with 1d of increasing SOB and MENDEZ. Of note presented mid-March (3/15) to [facility] with similar sx and was prescribed Azithromycin. Imaging: CT Chest showed b/l upper lobe GGO and RML. Placed on NC 3L with SpO2 in mid-high 90s, but then required NRB and admitted to [facility]. Received CTX and azithro x 1 in the ED. ID consulted and recommend continuing home regimen of HIV meds and hold on plaquenil at this time for prolonged QTc. Stepped down to [facility] for further monitoring and management.    On RMF required increasing O2 and with QTc on repeat EKG <500 was started on azithromycin, plaquenil along with tocilizumab x1, steroids, vancomycin (MRSA swab +), zosyn, and bactrim for PCP ppx as CD4 drop to 103. Has continued to improve since initiation of the aforementioned medications and on 4/8 determined to be stable for transfer to [facility] to finalize inpatient abx and monitoring of weaning off O2. Transferred to [facility] 4/9/20. Pt presented to [facility] on 4/11/2020 for hypoxic respiratory failure requiring nonrebreather and admitted for suspected HAP vs PCP and questionable early stage pulmonary fibrosis. Pt was started on full anticoagulation, B/L LE duplex showed no evidence of DVT. Pt's oxygen status continued to be monitored on nonrebreather, chest PT was performed. COVID-19 PCR was found to be negative on 4/16/2020. Pt was started on treatment for PCP with bactrim and steroids, given one dose of Vanc and started Zosyn for empiric coverage, resent MRSA swab and was found to be positive. CMV labs sent (IgG, IgM, PCR), negative. MRSA swab positive for MRSA, continued vanc. CT chest was obtained which showed worsening B/L findings, unable to r/o bacterial PNA. Pt was transferred to MICU due to worsening SOB and abnormal CT. 4/17: Transferred to  for intubation and bronch. Cuff leak noted, ETT requiring re-intubation. 4/18: Extubated to [facility]. MRSA swab +. Vanc trough 10.5- Vanc increased to 1.25g BID (pt already received 1g Vanc, given addtional 250mg). Covid PCR reswab was found to be positive on 4/18. Chandana Velarde. Passed bedside dysphagia. Regular Diet ordered. 4/19: transferred to [facility] for need of isolation for MRSA. Vanc trough subtherapeutic to 9.5, increased IV Vanc to 1500mg BID. 4/20: Dc'd bactrim as low suspicion for PCP PNA. O2 sats low to high 90s on NRB. 4/22: LE dopplers NEGATIVE. weaned from NRB to venti mask. Throughout his hospital stay pt continued to be weaned off O2. Pt was eventually stable for transfer to regional medical floor for further management. 4/25: CXR showed progression of infiltrates. CTA obtained to r/o PE was negative. 4/27: Fever overnight, CXR no changes, fever workup obtained. Physical therapy was consulted and worked with the pt. 4/28: CXR showed some patchiness in the RLE suspicious for inflammation vs aspiration PNA. Given history of throat ca/radiation, switched to soft diet and started on 7 days of unasyn. 4/29: Patient doing well, no recent fevers, will continue unasyn and monitor fever, encouraged incentive spirometry, failed speech and swallow. 4/30: S&S reevaluated, rec'd reswab for COVID-19 reswab and if (-) recommends modified barium swallow. Soft diet w/ ensures restarted. 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 Swallow function is most likely c/w baseline swallow due to late effects of CRT, although with potential exacerbation of symptoms due to COVID and related shortness of breath with PO intake. Pt appears safe to continue with an oral diet and would benefit from a short course of dysphagia therapy as an outpatient to prevent progression of dysphagia. Speech and swallow recommends to continue Mechanical soft & moist solids with thin liquids. At time of discharge pt is satting well on 4L NC at 94%. Pt is hemodynamically stable and medically ready for discharge to home on 4L supplemental oxygen (nasal cannula). Pt will be discharged w/ 30 days of lovenox for dvt ppx, pt well educated on administering lovenox.

## 2020-04-09 NOTE — DISCHARGE NOTE PROVIDER - NSDCACTIVITY_GEN_ALL_CORE
Bathing allowed/Walking - Indoors allowed/Do not drive or operate machinery/No heavy lifting/straining/Stairs allowed/Showering allowed

## 2020-04-09 NOTE — DISCHARGE NOTE PROVIDER - NSDCMRMEDTOKEN_GEN_ALL_CORE_FT
ALBUTEROL SULFATE HFA  108 MCG/ACT AERS:   ISENTRESS  400 MG TABS:   LEVOTHYROXINE SODIUM  125 MCG TABS:   Lexapro:   LOSARTAN POTASSIUM  50 MG TABS:   PREZISTA  600 MG TABS:   RITONAVIR  100 MG TABS:   ROSUVASTATIN CALCIUM  20 MG TABS:   TAMSULOSIN HYDROCHLORIDE  .4 MG CAPS:   TIZANIDINE HCL  2 MG TABS:   Valtrex: ALBUTEROL SULFATE HFA  108 MCG/ACT AERS:   ISENTRESS  400 MG TABS:   LEVOTHYROXINE SODIUM  125 MCG TABS:   loperamide: 10 milligram(s) orally 2 times a day  LOSARTAN POTASSIUM  50 MG TABS:   PREZISTA  600 MG TABS:   RITONAVIR  100 MG TABS:   ROSUVASTATIN CALCIUM  20 MG TABS:   TAMSULOSIN HYDROCHLORIDE  .4 MG CAPS:   TIZANIDINE HCL  2 MG TABS:   Valtrex: ALBUTEROL SULFATE HFA  108 MCG/ACT AERS:   Augmentin 875 mg-125 mg oral tablet: 1 tab(s) orally every 12 hours   enoxaparin 40 mg/0.4 mL injectable solution: 40 milligram(s) subcutaneously once a day   ISENTRESS  400 MG TABS:   LEVOTHYROXINE SODIUM  125 MCG TABS:   loperamide: 10 milligram(s) orally 2 times a day  LOSARTAN POTASSIUM  50 MG TABS:   nystatin 100,000 units/mL oral suspension: 4 milliliter(s) orally every 6 hours   PREZISTA  600 MG TABS:   RITONAVIR  100 MG TABS:   ROSUVASTATIN CALCIUM  20 MG TABS:   TAMSULOSIN HYDROCHLORIDE  .4 MG CAPS:   TIZANIDINE HCL  2 MG TABS:   Valtrex:

## 2020-04-09 NOTE — DISCHARGE NOTE PROVIDER - NSDCFUADDAPPT_GEN_ALL_CORE_FT
Please follow up with your primary care physician Please follow up with your primary care physician 1 week from discharge. Please follow up with your primary care physician 1 week from discharge.  Please follow up with Dr. Ashraf (pulmonary) on.......  Please follow up with Dr. Moss (internal medicine) on....... Please follow up with your primary care physician 1 week from discharge.  Please follow up with Dr. Ashraf/Dr. Blood (pulmonary) after discharge. The nurse manager will contact you regarding scheduling your appt.   Please follow up with Dr. Moss (internal medicine) on....... Please follow up with your PCP Dr. Justin Cervantes on 5/7/2020 at 4:30pm via phone. Your physician will call you for a follow up appt via phone.  Please follow up with Dr. Ashraf/Dr. Blood (pulmonary) after discharge. The nurse manager will contact you regarding scheduling your appt.   Please follow up with Dr. Moss (internal medicine) on....... Please follow up with your PCP Dr. Justin Cervantes on 5/7/2020 at 4:30pm via phone. Your physician will call you for a follow up appt via video phone call.   Please follow up with Dr. Ashraf/Dr. Blood (pulmonary) after discharge. The nurse manager will contact you regarding scheduling your appt.   Please follow up with Dr. Moss (internal medicine) on May 13th, 2020 at 1:30pm. Dr. Moss will contact you for follow up appt via video phone call. -Please follow up with your PCP Dr. Justin Cervantes on 5/7/2020 at 4:30pm via phone. Your physician will call you for a follow up appt via video phone call.   -Please follow up with Dr. Ashraf/Dr. Blood (pulmonary) after discharge. The nurse manager will contact you regarding scheduling your appt.   -Please follow up with Dr. Moss (internal medicine) on 5/13/2020 at 1:30pm. Dr. Moss will contact you for follow up appt via video phone call.  -Please follow up with ENT for speech pathology follow up appt. Call the office at [phone] to make an appt 1 week from discharge.

## 2020-04-09 NOTE — DISCHARGE NOTE PROVIDER - NSDCFUADDINST_GEN_ALL_CORE_FT
There was concern that you may have the novel new coronavirus, also known as COVID-19. This test resulted positive from a nasal and oral (mouth) swab that was done earlier in your admission to [facility]. Your symptoms improved dramatically during your hospital stay. Should your test result be positive, the treatment is supportive care, which means: rest, hydration, and maintaining a good healthy diet. You may take tylenol if you experience any fevers and Robitussin for cough. If you start experiencing extreme shortness of breath, difficulty breathing resulting in the inability to even walk, please visit an urgent care. Please maintain a 2 week (14 day) quarantine in your apartment until a health care agent calls you with the test results. The department of health will followup with you via phone, please do not go to your PCP while in self quarantine. Wear a mask at all times should you have to be outdoors briefly - and avoid crowds, public spaces, and mass transit at all times during this quarantine. Continue to wash your hands frequently. Please see the supplemented written instructions for further use. There was concern that you may have the novel new coronavirus, also known as COVID-19. This test resulted positive from a nasal and oral (mouth) swab that was done earlier in your admission to [facility]. Your symptoms improved dramatically during your hospital stay. Should your test result be positive, the treatment is supportive care, which means: rest, hydration, and maintaining a good healthy diet. You may take tylenol if you experience any fevers and Robitussin for cough. If you start experiencing extreme shortness of breath, difficulty breathing resulting in the inability to even walk, please visit an urgent care. Please maintain a 2 week (14 day) quarantine in your apartment until a health care agent calls you with the test results. The department of health will followup with you via phone, please do not go to your PCP while in self quarantine. Wear a mask at all times should you have to be outdoors briefly - and avoid crowds, public spaces, and mass transit at all times during this quarantine. Continue to wash your hands frequently. Please see the supplemented written instructions for further use.    You will be given a pulse oximeter upon discharge to check your oxygen saturation. Please check your oxygen saturation 3 times a day to monitor. Oxygen should be above 90%. If you are experiencing shortness of breath, respiratory distress or any difficulty breathing please return to ED immediately.     New Medications to take after discharged:    - Lovenox (for DVT ppx) 40mg subcutaneously once daily x 30 days. Please administer as directed.   - Augmentin 875-125mg PO every 12 hours x 3 days There was concern that you may have the novel new coronavirus, also known as COVID-19. This test resulted positive from a nasal and oral (mouth) swab that was done in your admission to [facility]. Your symptoms improved dramatically during your hospital stay. Treatment is supportive care, which means: rest, hydration, and maintaining a good healthy diet. You may take tylenol if you experience any fevers and Robitussin for cough. If you start experiencing extreme shortness of breath, difficulty breathing resulting in the inability to even walk, please visit your nearest emergency room. Please maintain a 2 week (14 day) quarantine in your apartment. Wear a mask at all times should you have to be outdoors briefly - and avoid crowds, public spaces, and mass transit at all times during this quarantine. Continue to wash your hands frequently. Please see the supplemented written instructions for further use.    You will be given a pulse oximeter upon discharge to check your oxygen saturation. Please check your oxygen saturation 3 times a day to monitor. Oxygen should be above 90%. If you are experiencing shortness of breath, respiratory distress or any difficulty breathing please return to ED immediately.     New Medications to take after discharged:  - Lovenox (for DVT ppx) 40mg subcutaneously once daily x 30 days. Please administer as directed.   - Augmentin 875-125mg PO every 12 hours x 3 days  - Nystatin susp 4ml every 6 hours x 7 days for oral thrush.  - Take all medications as directed.

## 2020-04-09 NOTE — PROGRESS NOTE ADULT - SUBJECTIVE AND OBJECTIVE BOX
59M pmhx HIV, tonsilar CA s/p CRT, BPH, LHD, hypothyroidism, sleep apnea, trasnferred to [facility] on 4/8, + COVID on 3/30. Patient states his symptoms are improving. Oxygen requirement decreased from 4L NC to 2L NC this morning.     SUBJECTIVE:  Denies: SOB, cough, chest pain, chills     MEDICATIONS  (STANDING):  atorvastatin 80 milliGRAM(s) Oral at bedtime  darunavir 600 milliGRAM(s) Oral <User Schedule>  dextrose 5%. 1000 milliLiter(s) (50 mL/Hr) IV Continuous <Continuous>  dextrose 50% Injectable 12.5 Gram(s) IV Push once  dextrose 50% Injectable 25 Gram(s) IV Push once  dextrose 50% Injectable 25 Gram(s) IV Push once  emtricitabine 200 mG/tenofovir alafenamide 25 mG (DESCOVY) Tablet 1 Tablet(s) Oral daily  enoxaparin Injectable 40 milliGRAM(s) SubCutaneous every 24 hours  etravirine 200 milliGRAM(s) Oral <User Schedule>  famotidine    Tablet 20 milliGRAM(s) Oral daily  guaiFENesin   Syrup  (Sugar-Free) 200 milliGRAM(s) Oral two times a day  influenza   Vaccine 0.5 milliLiter(s) IntraMuscular once  insulin lispro (HumaLOG) corrective regimen sliding scale   SubCutaneous Before meals and at bedtime  lactobacillus acidophilus and bulgaricus Chewable 1 Tablet(s) Chew daily  levothyroxine 125 MICROGram(s) Oral daily  loperamide 2 milliGRAM(s) Oral <User Schedule>  losartan 25 milliGRAM(s) Oral daily  piperacillin/tazobactam IVPB.. 4.5 Gram(s) IV Intermittent every 6 hours  raltegravir Tablet 400 milliGRAM(s) Oral <User Schedule>  ritonavir Tablet 100 milliGRAM(s) Oral two times a day  tamsulosin 0.4 milliGRAM(s) Oral at bedtime  trimethoprim  160 mG/sulfamethoxazole 800 mG 1 Tablet(s) Oral daily  valACYclovir 500 milliGRAM(s) Oral two times a day    MEDICATIONS  (PRN):  acetaminophen   Tablet .. 975 milliGRAM(s) Oral every 8 hours PRN Temp greater or equal to 38C (100.4F)  benzonatate 100 milliGRAM(s) Oral three times a day PRN Cough  dextrose 40% Gel 15 Gram(s) Oral once PRN Blood Glucose LESS THAN 70 milliGRAM(s)/deciliter  glucagon  Injectable 1 milliGRAM(s) IntraMuscular once PRN Glucose LESS THAN 70 milligrams/deciliter      Vital Signs Last 24 Hrs  T(C): 36.1 (09 Apr 2020 09:03), Max: 36.7 (08 Apr 2020 17:25)  T(F): 97 (09 Apr 2020 09:03), Max: 98.1 (09 Apr 2020 02:50)  HR: 60 (09 Apr 2020 10:24) (55 - 85)  BP: 149/92 (09 Apr 2020 09:03) (141/92 - 159/100)  BP(mean): 113 (09 Apr 2020 09:03) (113 - 113)  RR: 19 (09 Apr 2020 09:03) (18 - 20)  SpO2: 82% (09 Apr 2020 10:24) (82% - 94%)    Physical Exam:  General: NAD, resting comfortably in bed  Pulmonary: Nonlabored breathing, no respiratory distress  Neuro: A/O x 3, CNs II-XII grossly intact, no focal deficits  I&O's Summary      LABS:                        16.0   12.64 )-----------( 291      ( 09 Apr 2020 10:45 )             48.4     04-09    135  |  101  |  33<H>  ----------------------------<  96  4.4   |  24  |  1.33<H>    Ca    9.4      09 Apr 2020 10:48  Phos  2.8     04-09  Mg     2.2     04-09    TPro  7.0  /  Alb  2.5<L>  /  TBili  0.5  /  DBili  x   /  AST  40<H>  /  ALT  56<H>  /  AlkPhos  52  04-09        CAPILLARY BLOOD GLUCOSE      POCT Blood Glucose.: 85 mg/dL (09 Apr 2020 09:05)  POCT Blood Glucose.: 155 mg/dL (08 Apr 2020 21:14)  POCT Blood Glucose.: 118 mg/dL (08 Apr 2020 16:58)  POCT Blood Glucose.: 120 mg/dL (08 Apr 2020 11:47)    LIVER FUNCTIONS - ( 09 Apr 2020 10:48 )  Alb: 2.5 g/dL / Pro: 7.0 g/dL / ALK PHOS: 52 U/L / ALT: 56 U/L / AST: 40 U/L / GGT: x           A/P: 59M COVID + 3/30, afebrile > 72h, O2 Sat 94% on 2L NC, medication course complete for COVID/PNA  OOB with PT   Will review home meds with Patient/Confirm with Pharmacy to ensure home regimen is continued accurately   Dispo: Home pending improvement of O2 Saturation 59M pmhx HIV, tonsilar CA s/p CRT, BPH, LHD, hypothyroidism, sleep apnea, trasnferred to [facility] on 4/8, + COVID on 3/30. Patient states his symptoms are improving. Oxygen requirement decreased from 4L NC to 2L NC this morning.   Patient seen and evaluated with Dr. Ruiz on AM rounds.     SUBJECTIVE:  Denies: SOB, cough, chest pain, chills     MEDICATIONS  (STANDING):  atorvastatin 80 milliGRAM(s) Oral at bedtime  darunavir 600 milliGRAM(s) Oral <User Schedule>  dextrose 5%. 1000 milliLiter(s) (50 mL/Hr) IV Continuous <Continuous>  dextrose 50% Injectable 12.5 Gram(s) IV Push once  dextrose 50% Injectable 25 Gram(s) IV Push once  dextrose 50% Injectable 25 Gram(s) IV Push once  emtricitabine 200 mG/tenofovir alafenamide 25 mG (DESCOVY) Tablet 1 Tablet(s) Oral daily  enoxaparin Injectable 40 milliGRAM(s) SubCutaneous every 24 hours  etravirine 200 milliGRAM(s) Oral <User Schedule>  famotidine    Tablet 20 milliGRAM(s) Oral daily  guaiFENesin   Syrup  (Sugar-Free) 200 milliGRAM(s) Oral two times a day  influenza   Vaccine 0.5 milliLiter(s) IntraMuscular once  insulin lispro (HumaLOG) corrective regimen sliding scale   SubCutaneous Before meals and at bedtime  lactobacillus acidophilus and bulgaricus Chewable 1 Tablet(s) Chew daily  levothyroxine 125 MICROGram(s) Oral daily  loperamide 2 milliGRAM(s) Oral <User Schedule>  losartan 25 milliGRAM(s) Oral daily  piperacillin/tazobactam IVPB.. 4.5 Gram(s) IV Intermittent every 6 hours  raltegravir Tablet 400 milliGRAM(s) Oral <User Schedule>  ritonavir Tablet 100 milliGRAM(s) Oral two times a day  tamsulosin 0.4 milliGRAM(s) Oral at bedtime  trimethoprim  160 mG/sulfamethoxazole 800 mG 1 Tablet(s) Oral daily  valACYclovir 500 milliGRAM(s) Oral two times a day    MEDICATIONS  (PRN):  acetaminophen   Tablet .. 975 milliGRAM(s) Oral every 8 hours PRN Temp greater or equal to 38C (100.4F)  benzonatate 100 milliGRAM(s) Oral three times a day PRN Cough  dextrose 40% Gel 15 Gram(s) Oral once PRN Blood Glucose LESS THAN 70 milliGRAM(s)/deciliter  glucagon  Injectable 1 milliGRAM(s) IntraMuscular once PRN Glucose LESS THAN 70 milligrams/deciliter      Vital Signs Last 24 Hrs  T(C): 36.1 (09 Apr 2020 09:03), Max: 36.7 (08 Apr 2020 17:25)  T(F): 97 (09 Apr 2020 09:03), Max: 98.1 (09 Apr 2020 02:50)  HR: 60 (09 Apr 2020 10:24) (55 - 85)  BP: 149/92 (09 Apr 2020 09:03) (141/92 - 159/100)  BP(mean): 113 (09 Apr 2020 09:03) (113 - 113)  RR: 19 (09 Apr 2020 09:03) (18 - 20)  SpO2: 82% (09 Apr 2020 10:24) (82% - 94%)    Physical Exam:  General: NAD, resting comfortably in bed  Pulmonary: Nonlabored breathing, no respiratory distress  Neuro: A/O x 3, CNs II-XII grossly intact, no focal deficits  I&O's Summary      LABS:                        16.0   12.64 )-----------( 291      ( 09 Apr 2020 10:45 )             48.4     04-09    135  |  101  |  33<H>  ----------------------------<  96  4.4   |  24  |  1.33<H>    Ca    9.4      09 Apr 2020 10:48  Phos  2.8     04-09  Mg     2.2     04-09    TPro  7.0  /  Alb  2.5<L>  /  TBili  0.5  /  DBili  x   /  AST  40<H>  /  ALT  56<H>  /  AlkPhos  52  04-09        CAPILLARY BLOOD GLUCOSE      POCT Blood Glucose.: 85 mg/dL (09 Apr 2020 09:05)  POCT Blood Glucose.: 155 mg/dL (08 Apr 2020 21:14)  POCT Blood Glucose.: 118 mg/dL (08 Apr 2020 16:58)  POCT Blood Glucose.: 120 mg/dL (08 Apr 2020 11:47)    LIVER FUNCTIONS - ( 09 Apr 2020 10:48 )  Alb: 2.5 g/dL / Pro: 7.0 g/dL / ALK PHOS: 52 U/L / ALT: 56 U/L / AST: 40 U/L / GGT: x           A/P: 59M COVID + 3/30, afebrile > 72h, O2 Sat 94% on 2L NC, medication course complete for COVID/PNA  OOB with PT   Will review home meds with Patient/Confirm with Pharmacy to ensure home regimen is continued accurately   Dispo: Home pending improvement of O2 Saturation

## 2020-04-09 NOTE — CHART NOTE - NSCHARTNOTEFT_GEN_A_CORE
Pt transferred from [facility] and accepted to [facility] CAP Unit.   Sign out received from primary [facility] team and reviewed.   All inpatient orders and medications have been reviewed and updated.   Vital signs stable, pt is alert and oriented x 3.  Goals have been discussed. Pt is awaiting home.

## 2020-04-09 NOTE — CHART NOTE - NSCHARTNOTEFT_GEN_A_CORE
Admitting Diagnosis:   Patient is a 59y old  Male who presents with a chief complaint of r/o COVID (09 Apr 2020 11:20).  PMH sig for HIV, tonsilar CA s/p CRT, BPH, LHD, hypothyroidism, sleep apnea.  Initially admitted to [facility]; COVID positive.  Symptoms improving now transferred to [facility] CAP.  Oxygen req decreasing from 4L to 2L NC.  Per RD initial note, Pt note feeling well and fair appetite.  Attempted to reach Pt via contact number in [place] as well as on unit but no one available.  Suspect that with respiratory status improving likely so has appetite/intake.  RD note on initial assessment indicated Pt had own supply of oral nutrition supplements; will add to order so Pt continues to receive.      PAST MEDICAL & SURGICAL HISTORY:  History of BPH  HIV disease  Abscess  Throat cancer  No significant past surgical history      Current Nutrition Order:  Diet, DASH/TLC:   Sodium & Cholesterol Restricted (03-31-20 @ 01:59)      PO Intake: Unable to assess.  GI Issues: No issues noted in chart; last BM documented on 4.6  Pain: Denies on flowsheets  Skin Integrity: Intact; no pressure injuries noted    Labs: Serum sodium now normal.  Lytes normal as well.  Slightly elev cr and BUN but no PMH of chronic renal dysfunction.  Albumin remains depleted; likely due to negative acute phase response.  CRP downward trend.  04-09    135  |  101  |  33<H>  ----------------------------<  96  4.4   |  24  |  1.33<H>    Ca    9.4      09 Apr 2020 10:48  Phos  2.8     04-09  Mg     2.2     04-09    TPro  7.0  /  Alb  2.5<L>  /  TBili  0.5  /  DBili  x   /  AST  40<H>  /  ALT  56<H>  /  AlkPhos  52  04-09    CAPILLARY BLOOD GLUCOSE  POCT Blood Glucose.: 85 mg/dL (09 Apr 2020 09:05)  POCT Blood Glucose.: 155 mg/dL (08 Apr 2020 21:14)  POCT Blood Glucose.: 118 mg/dL (08 Apr 2020 16:58)      Medications:  MEDICATIONS  (STANDING):  atorvastatin 80 milliGRAM(s) Oral at bedtime  darunavir 600 milliGRAM(s) Oral <User Schedule>  dextrose 5%. 1000 milliLiter(s) (50 mL/Hr) IV Continuous <Continuous>  dextrose 50% Injectable 12.5 Gram(s) IV Push once  dextrose 50% Injectable 25 Gram(s) IV Push once  dextrose 50% Injectable 25 Gram(s) IV Push once  emtricitabine 200 mG/tenofovir alafenamide 25 mG (DESCOVY) Tablet 1 Tablet(s) Oral daily  enoxaparin Injectable 40 milliGRAM(s) SubCutaneous every 24 hours  etravirine 200 milliGRAM(s) Oral <User Schedule>  famotidine    Tablet 20 milliGRAM(s) Oral daily  guaiFENesin   Syrup  (Sugar-Free) 200 milliGRAM(s) Oral two times a day  influenza   Vaccine 0.5 milliLiter(s) IntraMuscular once  insulin lispro (HumaLOG) corrective regimen sliding scale   SubCutaneous Before meals and at bedtime  lactobacillus acidophilus and bulgaricus Chewable 1 Tablet(s) Chew daily  levothyroxine 125 MICROGram(s) Oral daily  loperamide 2 milliGRAM(s) Oral <User Schedule>  losartan 25 milliGRAM(s) Oral daily  piperacillin/tazobactam IVPB.. 4.5 Gram(s) IV Intermittent every 6 hours  raltegravir Tablet 400 milliGRAM(s) Oral <User Schedule>  ritonavir Tablet 100 milliGRAM(s) Oral two times a day  tamsulosin 0.4 milliGRAM(s) Oral at bedtime  trimethoprim  160 mG/sulfamethoxazole 800 mG 1 Tablet(s) Oral daily  valACYclovir 500 milliGRAM(s) Oral two times a day    MEDICATIONS  (PRN):  acetaminophen   Tablet .. 975 milliGRAM(s) Oral every 8 hours PRN Temp greater or equal to 38C (100.4F)  benzonatate 100 milliGRAM(s) Oral three times a day PRN Cough  dextrose 40% Gel 15 Gram(s) Oral once PRN Blood Glucose LESS THAN 70 milliGRAM(s)/deciliter  glucagon  Injectable 1 milliGRAM(s) IntraMuscular once PRN Glucose LESS THAN 70 milligrams/deciliter      Anthropometrics:  Height: 177.8cm; Wt: 74.8kg (3.30); IBW: 75.5kg; %IBW: 98%; BMI: 23.7  Weight Change: No new Wt's for assessment.    Nutrition Focused Physical Exam: Completed [   ]  Not Pertinent [  x ]  Unable to conduct a face to face interview or nutrition-focused physical exam due to limited contact restrictions related to the pt's medical condition and isolation precautions.     Estimated energy needs: Current body weight used for assessment as data between % IBW.  Nutrient needs based on [facility] standards of care for adults, hypermetabolic stat.  Calories: 6622-9209 kcals (30-35 kcals/kg)  Protein: 75-90 gm pro (1.0-1.2 gm pro/kg)  Fluid: 3799-0379 ml (30-35 ml/kg)    Previous Nutrition Diagnosis: Increased nutrient needs r/t increased demand of nutrient AEB infection and fever.    Active [  x ]  Resolved [   ]    Goal: Po intake to meet within 10% of EER.    Recommendations:  - Continue DASH/TLC diet order  - Added ensure enlive 2 units/day  - Replete lytes as needed  - Obtain weight q 7 days for weekly weight monitoring    Education: Deferred as unable to contact Pt    Risk Level: High [   ] Moderate [   ] Low [ x  ]

## 2020-04-10 LAB
GLUCOSE BLDC GLUCOMTR-MCNC: 75 MG/DL — SIGNIFICANT CHANGE UP (ref 70–99)
GLUCOSE BLDC GLUCOMTR-MCNC: 76 MG/DL — SIGNIFICANT CHANGE UP (ref 70–99)
GLUCOSE BLDC GLUCOMTR-MCNC: 76 MG/DL — SIGNIFICANT CHANGE UP (ref 70–99)
GLUCOSE BLDC GLUCOMTR-MCNC: 91 MG/DL — SIGNIFICANT CHANGE UP (ref 70–99)

## 2020-04-10 PROCEDURE — 99232 SBSQ HOSP IP/OBS MODERATE 35: CPT | Mod: CS

## 2020-04-10 RX ADMIN — TAMSULOSIN HYDROCHLORIDE 0.4 MILLIGRAM(S): 0.4 CAPSULE ORAL at 23:09

## 2020-04-10 RX ADMIN — ETRAVIRINE 200 MILLIGRAM(S): 200 TABLET ORAL at 19:24

## 2020-04-10 RX ADMIN — LOSARTAN POTASSIUM 50 MILLIGRAM(S): 100 TABLET, FILM COATED ORAL at 05:49

## 2020-04-10 RX ADMIN — LACTOBACILLUS ACIDOPH-L.BULGARICUS 1 MILLION CELL CHEWABLE TABLET 1 TABLET(S): at 12:26

## 2020-04-10 RX ADMIN — RITONAVIR 100 MILLIGRAM(S): 100 TABLET, FILM COATED ORAL at 05:49

## 2020-04-10 RX ADMIN — ATORVASTATIN CALCIUM 80 MILLIGRAM(S): 80 TABLET, FILM COATED ORAL at 23:09

## 2020-04-10 RX ADMIN — RITONAVIR 100 MILLIGRAM(S): 100 TABLET, FILM COATED ORAL at 17:47

## 2020-04-10 RX ADMIN — ETRAVIRINE 200 MILLIGRAM(S): 200 TABLET ORAL at 12:30

## 2020-04-10 RX ADMIN — EMTRICITABINE AND TENOFOVIR DISOPROXIL FUMARATE 1 TABLET(S): 200; 300 TABLET, FILM COATED ORAL at 12:30

## 2020-04-10 RX ADMIN — TAMSULOSIN HYDROCHLORIDE 0.4 MILLIGRAM(S): 0.4 CAPSULE ORAL at 00:16

## 2020-04-10 RX ADMIN — FAMOTIDINE 20 MILLIGRAM(S): 10 INJECTION INTRAVENOUS at 12:30

## 2020-04-10 RX ADMIN — Medication 200 MILLIGRAM(S): at 05:26

## 2020-04-10 RX ADMIN — DARUNAVIR 600 MILLIGRAM(S): 75 TABLET, FILM COATED ORAL at 12:30

## 2020-04-10 RX ADMIN — RALTEGRAVIR 400 MILLIGRAM(S): 400 TABLET, FILM COATED ORAL at 12:28

## 2020-04-10 RX ADMIN — DARUNAVIR 600 MILLIGRAM(S): 75 TABLET, FILM COATED ORAL at 19:24

## 2020-04-10 RX ADMIN — Medication 125 MICROGRAM(S): at 05:49

## 2020-04-10 RX ADMIN — VALACYCLOVIR 500 MILLIGRAM(S): 500 TABLET, FILM COATED ORAL at 19:23

## 2020-04-10 RX ADMIN — Medication 2 MILLIGRAM(S): at 19:24

## 2020-04-10 RX ADMIN — Medication 1 TABLET(S): at 12:28

## 2020-04-10 RX ADMIN — RALTEGRAVIR 400 MILLIGRAM(S): 400 TABLET, FILM COATED ORAL at 19:23

## 2020-04-10 RX ADMIN — VALACYCLOVIR 500 MILLIGRAM(S): 500 TABLET, FILM COATED ORAL at 12:31

## 2020-04-10 RX ADMIN — Medication 200 MILLIGRAM(S): at 17:46

## 2020-04-10 RX ADMIN — Medication 2 MILLIGRAM(S): at 12:27

## 2020-04-10 NOTE — PROGRESS NOTE ADULT - SUBJECTIVE AND OBJECTIVE BOX
INTERVAL HPI/OVERNIGHT EVENTS:  Patient was seen and examined at bedside with Dr. Ruiz.  No events overnight.  Pt is sitting on side of the bed, comfortable without complaints.  VITAL SIGNS:  T(F): 97.5 (04-10-20 @ 07:22)  HR: 71 (04-10-20 @ 12:45)  BP: 120/63 (04-10-20 @ 07:22)  RR: 18 (04-10-20 @ 09:34)  SpO2: 86% (04-10-20 @ 12:45)  Wt(kg): --    PHYSICAL EXAM:    General: NAD, A&Ox3  Pulmonary: Currently 92% O2 sat on 4L NC  Cardiovascular: No SOB, No chest pain, BP Stable, HR- 71  Skin: Normal temperature, warm, dry    MEDICATIONS  (STANDING):  atorvastatin 80 milliGRAM(s) Oral at bedtime  darunavir 600 milliGRAM(s) Oral <User Schedule>  dextrose 5%. 1000 milliLiter(s) (50 mL/Hr) IV Continuous <Continuous>  dextrose 50% Injectable 12.5 Gram(s) IV Push once  dextrose 50% Injectable 25 Gram(s) IV Push once  dextrose 50% Injectable 25 Gram(s) IV Push once  emtricitabine 200 mG/tenofovir alafenamide 25 mG (DESCOVY) Tablet 1 Tablet(s) Oral daily  enoxaparin Injectable 40 milliGRAM(s) SubCutaneous every 24 hours  etravirine 200 milliGRAM(s) Oral <User Schedule>  famotidine    Tablet 20 milliGRAM(s) Oral daily  guaiFENesin   Syrup  (Sugar-Free) 200 milliGRAM(s) Oral two times a day  influenza   Vaccine 0.5 milliLiter(s) IntraMuscular once  insulin lispro (HumaLOG) corrective regimen sliding scale   SubCutaneous Before meals and at bedtime  lactobacillus acidophilus and bulgaricus Chewable 1 Tablet(s) Chew daily  levothyroxine 125 MICROGram(s) Oral daily  loperamide 2 milliGRAM(s) Oral <User Schedule>  losartan 50 milliGRAM(s) Oral daily  raltegravir Tablet 400 milliGRAM(s) Oral <User Schedule>  ritonavir Tablet 100 milliGRAM(s) Oral two times a day  tamsulosin 0.4 milliGRAM(s) Oral at bedtime  trimethoprim  160 mG/sulfamethoxazole 800 mG 1 Tablet(s) Oral daily  valACYclovir 500 milliGRAM(s) Oral two times a day    MEDICATIONS  (PRN):  acetaminophen   Tablet .. 975 milliGRAM(s) Oral every 8 hours PRN Temp greater or equal to 38C (100.4F)  benzonatate 100 milliGRAM(s) Oral three times a day PRN Cough  dextrose 40% Gel 15 Gram(s) Oral once PRN Blood Glucose LESS THAN 70 milliGRAM(s)/deciliter  glucagon  Injectable 1 milliGRAM(s) IntraMuscular once PRN Glucose LESS THAN 70 milligrams/deciliter      Allergies    No Known Allergies    Intolerances        LABS:                        16.0   12.64 )-----------( 291      ( 09 Apr 2020 10:45 )             48.4     04-09    135  |  101  |  33<H>  ----------------------------<  96  4.4   |  24  |  1.33<H>    Ca    9.4      09 Apr 2020 10:48  Phos  2.8     04-09  Mg     2.2     04-09    TPro  7.0  /  Alb  2.5<L>  /  TBili  0.5  /  DBili  x   /  AST  40<H>  /  ALT  56<H>  /  AlkPhos  52  04-09          RADIOLOGY & ADDITIONAL TESTS:  Reviewed INTERVAL HPI/OVERNIGHT EVENTS:  Patient was seen and examined at bedside with Dr. Ruiz.  No events overnight.  Pt is sitting on side of the bed, comfortable without complaints.  VITAL SIGNS:  T(F): 97.5 (04-10-20 @ 07:22)  HR: 71 (04-10-20 @ 12:45)  BP: 120/63 (04-10-20 @ 07:22)  RR: 18 (04-10-20 @ 09:34)  SpO2: 86% (04-10-20 @ 12:45)  Wt(kg): --    PHYSICAL EXAM:    General: NAD, A&Ox3  Pulmonary: Currently 92% O2 sat on 4L NC, Pt dropped to 86% on RA with 300 ft ambulation  Cardiovascular: No SOB, No chest pain, BP Stable, HR- 71  Skin: Normal temperature, warm, dry    MEDICATIONS  (STANDING):  atorvastatin 80 milliGRAM(s) Oral at bedtime  darunavir 600 milliGRAM(s) Oral <User Schedule>  dextrose 5%. 1000 milliLiter(s) (50 mL/Hr) IV Continuous <Continuous>  dextrose 50% Injectable 12.5 Gram(s) IV Push once  dextrose 50% Injectable 25 Gram(s) IV Push once  dextrose 50% Injectable 25 Gram(s) IV Push once  emtricitabine 200 mG/tenofovir alafenamide 25 mG (DESCOVY) Tablet 1 Tablet(s) Oral daily  enoxaparin Injectable 40 milliGRAM(s) SubCutaneous every 24 hours  etravirine 200 milliGRAM(s) Oral <User Schedule>  famotidine    Tablet 20 milliGRAM(s) Oral daily  guaiFENesin   Syrup  (Sugar-Free) 200 milliGRAM(s) Oral two times a day  influenza   Vaccine 0.5 milliLiter(s) IntraMuscular once  insulin lispro (HumaLOG) corrective regimen sliding scale   SubCutaneous Before meals and at bedtime  lactobacillus acidophilus and bulgaricus Chewable 1 Tablet(s) Chew daily  levothyroxine 125 MICROGram(s) Oral daily  loperamide 2 milliGRAM(s) Oral <User Schedule>  losartan 50 milliGRAM(s) Oral daily  raltegravir Tablet 400 milliGRAM(s) Oral <User Schedule>  ritonavir Tablet 100 milliGRAM(s) Oral two times a day  tamsulosin 0.4 milliGRAM(s) Oral at bedtime  trimethoprim  160 mG/sulfamethoxazole 800 mG 1 Tablet(s) Oral daily  valACYclovir 500 milliGRAM(s) Oral two times a day    MEDICATIONS  (PRN):  acetaminophen   Tablet .. 975 milliGRAM(s) Oral every 8 hours PRN Temp greater or equal to 38C (100.4F)  benzonatate 100 milliGRAM(s) Oral three times a day PRN Cough  dextrose 40% Gel 15 Gram(s) Oral once PRN Blood Glucose LESS THAN 70 milliGRAM(s)/deciliter  glucagon  Injectable 1 milliGRAM(s) IntraMuscular once PRN Glucose LESS THAN 70 milligrams/deciliter      Allergies    No Known Allergies    Intolerances        LABS:                        16.0   12.64 )-----------( 291      ( 09 Apr 2020 10:45 )             48.4     04-09    135  |  101  |  33<H>  ----------------------------<  96  4.4   |  24  |  1.33<H>    Ca    9.4      09 Apr 2020 10:48  Phos  2.8     04-09  Mg     2.2     04-09    TPro  7.0  /  Alb  2.5<L>  /  TBili  0.5  /  DBili  x   /  AST  40<H>  /  ALT  56<H>  /  AlkPhos  52  04-09          RADIOLOGY & ADDITIONAL TESTS:  Reviewed INTERVAL HPI/OVERNIGHT EVENTS:  Pt is a 59M PMH significant for HIV, tonsilar CA s/p CRT, BPH, LHD, hypothyroidism, sleep apnea, [facility] [place].  Pt was found to be COVID positive on 3/30. Patient was seen and examined at bedside with Dr. Ruiz.  No events overnight.  Pt is sitting on side of the bed, comfortable without complaints.  VITAL SIGNS:  T(F): 97.5 (04-10-20 @ 07:22)  HR: 71 (04-10-20 @ 12:45)  BP: 120/63 (04-10-20 @ 07:22)  RR: 18 (04-10-20 @ 09:34)  SpO2: 86% (04-10-20 @ 12:45)  Wt(kg): --    PHYSICAL EXAM:    General: NAD, A&Ox3  Pulmonary: Currently 92% O2 sat on 4L NC, Pt dropped to 86% on RA with 300 ft ambulation  Cardiovascular: No SOB, No chest pain, BP Stable, HR- 71  Skin: Normal temperature, warm, dry    MEDICATIONS  (STANDING):  atorvastatin 80 milliGRAM(s) Oral at bedtime  darunavir 600 milliGRAM(s) Oral <User Schedule>  dextrose 5%. 1000 milliLiter(s) (50 mL/Hr) IV Continuous <Continuous>  dextrose 50% Injectable 12.5 Gram(s) IV Push once  dextrose 50% Injectable 25 Gram(s) IV Push once  dextrose 50% Injectable 25 Gram(s) IV Push once  emtricitabine 200 mG/tenofovir alafenamide 25 mG (DESCOVY) Tablet 1 Tablet(s) Oral daily  enoxaparin Injectable 40 milliGRAM(s) SubCutaneous every 24 hours  etravirine 200 milliGRAM(s) Oral <User Schedule>  famotidine    Tablet 20 milliGRAM(s) Oral daily  guaiFENesin   Syrup  (Sugar-Free) 200 milliGRAM(s) Oral two times a day  influenza   Vaccine 0.5 milliLiter(s) IntraMuscular once  insulin lispro (HumaLOG) corrective regimen sliding scale   SubCutaneous Before meals and at bedtime  lactobacillus acidophilus and bulgaricus Chewable 1 Tablet(s) Chew daily  levothyroxine 125 MICROGram(s) Oral daily  loperamide 2 milliGRAM(s) Oral <User Schedule>  losartan 50 milliGRAM(s) Oral daily  raltegravir Tablet 400 milliGRAM(s) Oral <User Schedule>  ritonavir Tablet 100 milliGRAM(s) Oral two times a day  tamsulosin 0.4 milliGRAM(s) Oral at bedtime  trimethoprim  160 mG/sulfamethoxazole 800 mG 1 Tablet(s) Oral daily  valACYclovir 500 milliGRAM(s) Oral two times a day    MEDICATIONS  (PRN):  acetaminophen   Tablet .. 975 milliGRAM(s) Oral every 8 hours PRN Temp greater or equal to 38C (100.4F)  benzonatate 100 milliGRAM(s) Oral three times a day PRN Cough  dextrose 40% Gel 15 Gram(s) Oral once PRN Blood Glucose LESS THAN 70 milliGRAM(s)/deciliter  glucagon  Injectable 1 milliGRAM(s) IntraMuscular once PRN Glucose LESS THAN 70 milligrams/deciliter      Allergies    No Known Allergies    Intolerances        LABS:                        16.0   12.64 )-----------( 291      ( 09 Apr 2020 10:45 )             48.4     04-09    135  |  101  |  33<H>  ----------------------------<  96  4.4   |  24  |  1.33<H>    Ca    9.4      09 Apr 2020 10:48  Phos  2.8     04-09  Mg     2.2     04-09    TPro  7.0  /  Alb  2.5<L>  /  TBili  0.5  /  DBili  x   /  AST  40<H>  /  ALT  56<H>  /  AlkPhos  52  04-09          RADIOLOGY & ADDITIONAL TESTS:  Reviewed

## 2020-04-10 NOTE — PROGRESS NOTE ADULT - ATTENDING COMMENTS
I was physically present for the key portions of the evaluation and management (E/M) service provided.  I agree with the above history, physical, and plan which I have reviewed and edited where appropriate.    Patient with HIV low CD4 count, COVID + respiratory disease. Patient stable. Still desaturating on RA to mid 80s when walking.  Will need continued monitoring.

## 2020-04-10 NOTE — PROGRESS NOTE ADULT - ASSESSMENT
A/P: 59M COVID + 3/30, afebrile > 72h, O2 Sat 92% on 4L NC, medication course complete for COVID/PNA Pt is a 59M COVID + 3/30, afebrile > 72h, O2 Sat 92% on 4L NC, medication course complete for COVID/PNA.       Problem/Plan - 1:  ·  Problem: Acute respiratory failure with hypoxia.  Plan: Covid + on 3/30. Solumedrol, Plaquenil, and Azithro completed.  Pt still has desaturation with ambulation, PT will continue to work with pt.  Wean O2 as tolerated.      Problem/Plan - 2:  ·  Problem: Pneumonia.  Plan: Likely viral pneumonia 2/2 COVID-19, now with bandemia concerning for superimposed bacterial pneumonia.  MRSA swab positive.  Blood Cx -NGTD, Vanc and Zosyn completed on 4/8.  CD4- 103, Will continue Bactrim for PCP prophylaxis       Problem/Plan - 3:  ·  Problem: LEONEL (acute kidney injury).  Plan: LEONEL on CKD. Cr 2.06 on admission, Improving, Baseline Cre 1.3.  On 4/8 Cr 1.2       Problem/Plan - 4:  Problem: HIV disease. Plan: Hx of HIV, undetectable for 20 yrs per patient and with recent f/u. CD4-103.  Continue with home HIV regimen and Valtrex.  Bactrim added and will be continued after discharge.     Problem/Plan - 7:  ·  Problem: HLD (hyperlipidemia).  Plan: Hx of HLD  - restarted therapeutic interchange of home crestor    Problem/Plan- 8:  Problem: Hx of HTN: Plan: Continue Losartan    Problem/Plan-9:  Problem: Hypothyroidism: Continue with Levothyroxine 125mcg daily.      Problem/Plan - 10:  ·  Problem: History of BPH.  Plan: Continue with Tamsulosin  - Monitor BPs for hypotension.      Problem/Plan - 11:  ·  Problem: Prophylactic measure.  VTE/DVT ppx: lovenox, GI ppx: famotidine      Diet:  DASH/TLC      Code status: Full      Dispo:  Pt is stable and improving.  Continue current regimen.  Wean 02 as tolerated.  Continue PT TID.

## 2020-04-11 DIAGNOSIS — B97.21 SARS-ASSOCIATED CORONAVIRUS AS THE CAUSE OF DISEASES CLASSIFIED ELSEWHERE: ICD-10-CM

## 2020-04-11 DIAGNOSIS — R63.8 OTHER SYMPTOMS AND SIGNS CONCERNING FOOD AND FLUID INTAKE: ICD-10-CM

## 2020-04-11 LAB
ALBUMIN SERPL ELPH-MCNC: 3.1 G/DL — LOW (ref 3.3–5)
ALP SERPL-CCNC: 61 U/L — SIGNIFICANT CHANGE UP (ref 40–120)
ALT FLD-CCNC: 37 U/L — SIGNIFICANT CHANGE UP (ref 10–45)
ANION GAP SERPL CALC-SCNC: 11 MMOL/L — SIGNIFICANT CHANGE UP (ref 5–17)
ANION GAP SERPL CALC-SCNC: 8 MMOL/L — LOW (ref 9–16)
AST SERPL-CCNC: 33 U/L — SIGNIFICANT CHANGE UP (ref 10–40)
BASOPHILS # BLD AUTO: 0 K/UL — SIGNIFICANT CHANGE UP (ref 0–0.2)
BASOPHILS NFR BLD AUTO: 0 % — SIGNIFICANT CHANGE UP (ref 0–2)
BILIRUB SERPL-MCNC: 0.5 MG/DL — SIGNIFICANT CHANGE UP (ref 0.2–1.2)
BUN SERPL-MCNC: 20 MG/DL — SIGNIFICANT CHANGE UP (ref 7–23)
BUN SERPL-MCNC: 22 MG/DL — SIGNIFICANT CHANGE UP (ref 7–23)
CALCIUM SERPL-MCNC: 8.9 MG/DL — SIGNIFICANT CHANGE UP (ref 8.5–10.5)
CALCIUM SERPL-MCNC: 9.4 MG/DL — SIGNIFICANT CHANGE UP (ref 8.4–10.5)
CHLORIDE SERPL-SCNC: 101 MMOL/L — SIGNIFICANT CHANGE UP (ref 96–108)
CHLORIDE SERPL-SCNC: 103 MMOL/L — SIGNIFICANT CHANGE UP (ref 96–108)
CK SERPL-CCNC: 104 U/L — SIGNIFICANT CHANGE UP (ref 30–200)
CO2 SERPL-SCNC: 24 MMOL/L — SIGNIFICANT CHANGE UP (ref 22–31)
CO2 SERPL-SCNC: 27 MMOL/L — SIGNIFICANT CHANGE UP (ref 22–31)
CREAT SERPL-MCNC: 1.03 MG/DL — SIGNIFICANT CHANGE UP (ref 0.5–1.3)
CREAT SERPL-MCNC: 1.14 MG/DL — SIGNIFICANT CHANGE UP (ref 0.5–1.3)
CRP SERPL-MCNC: 2.8 MG/DL — HIGH (ref 0–0.9)
CRP SERPL-MCNC: 3.87 MG/DL — HIGH (ref 0–0.4)
D DIMER BLD IA.RAPID-MCNC: 3125 NG/ML DDU — HIGH
D DIMER BLD IA.RAPID-MCNC: 3368 NG/ML DDU — HIGH
EOSINOPHIL # BLD AUTO: 0 K/UL — SIGNIFICANT CHANGE UP (ref 0–0.5)
EOSINOPHIL NFR BLD AUTO: 0 % — SIGNIFICANT CHANGE UP (ref 0–6)
FERRITIN SERPL-MCNC: 1563 NG/ML — HIGH (ref 30–400)
GLUCOSE BLDC GLUCOMTR-MCNC: 119 MG/DL — HIGH (ref 70–99)
GLUCOSE BLDC GLUCOMTR-MCNC: 78 MG/DL — SIGNIFICANT CHANGE UP (ref 70–99)
GLUCOSE BLDC GLUCOMTR-MCNC: 83 MG/DL — SIGNIFICANT CHANGE UP (ref 70–99)
GLUCOSE BLDC GLUCOMTR-MCNC: 93 MG/DL — SIGNIFICANT CHANGE UP (ref 70–99)
GLUCOSE SERPL-MCNC: 130 MG/DL — HIGH (ref 70–99)
GLUCOSE SERPL-MCNC: 150 MG/DL — HIGH (ref 70–99)
HBA1C BLD-MCNC: 5.7 % — HIGH (ref 4–5.6)
HCT VFR BLD CALC: 50.5 % — HIGH (ref 39–50)
HCT VFR BLD CALC: 53.8 % — HIGH (ref 39–50)
HGB BLD-MCNC: 17.4 G/DL — HIGH (ref 13–17)
HGB BLD-MCNC: 17.5 G/DL — HIGH (ref 13–17)
LDH SERPL L TO P-CCNC: 516 U/L — HIGH (ref 50–242)
LYMPHOCYTES # BLD AUTO: 0 % — LOW (ref 13–44)
LYMPHOCYTES # BLD AUTO: 0 K/UL — LOW (ref 1–3.3)
MAGNESIUM SERPL-MCNC: 2 MG/DL — SIGNIFICANT CHANGE UP (ref 1.6–2.6)
MCHC RBC-ENTMCNC: 28.9 PG — SIGNIFICANT CHANGE UP (ref 27–34)
MCHC RBC-ENTMCNC: 29.8 PG — SIGNIFICANT CHANGE UP (ref 27–34)
MCHC RBC-ENTMCNC: 32.5 GM/DL — SIGNIFICANT CHANGE UP (ref 32–36)
MCHC RBC-ENTMCNC: 34.5 GM/DL — SIGNIFICANT CHANGE UP (ref 32–36)
MCV RBC AUTO: 86.6 FL — SIGNIFICANT CHANGE UP (ref 80–100)
MCV RBC AUTO: 88.8 FL — SIGNIFICANT CHANGE UP (ref 80–100)
MONOCYTES # BLD AUTO: 0.09 K/UL — SIGNIFICANT CHANGE UP (ref 0–0.9)
MONOCYTES NFR BLD AUTO: 0.9 % — LOW (ref 2–14)
NEUTROPHILS # BLD AUTO: 9.47 K/UL — HIGH (ref 1.8–7.4)
NEUTROPHILS NFR BLD AUTO: 99.1 % — HIGH (ref 43–77)
NRBC # BLD: 0 /100 WBCS — SIGNIFICANT CHANGE UP (ref 0–0)
PHOSPHATE SERPL-MCNC: 2.3 MG/DL — LOW (ref 2.5–4.5)
PLATELET # BLD AUTO: 208 K/UL — SIGNIFICANT CHANGE UP (ref 150–400)
PLATELET # BLD AUTO: 216 K/UL — SIGNIFICANT CHANGE UP (ref 150–400)
POTASSIUM SERPL-MCNC: 4.2 MMOL/L — SIGNIFICANT CHANGE UP (ref 3.5–5.3)
POTASSIUM SERPL-MCNC: 4.3 MMOL/L — SIGNIFICANT CHANGE UP (ref 3.5–5.3)
POTASSIUM SERPL-SCNC: 4.2 MMOL/L — SIGNIFICANT CHANGE UP (ref 3.5–5.3)
POTASSIUM SERPL-SCNC: 4.3 MMOL/L — SIGNIFICANT CHANGE UP (ref 3.5–5.3)
PROCALCITONIN SERPL-MCNC: 0.06 NG/ML — SIGNIFICANT CHANGE UP (ref 0.02–0.1)
PROT SERPL-MCNC: 6.6 G/DL — SIGNIFICANT CHANGE UP (ref 6–8.3)
RBC # BLD: 5.83 M/UL — HIGH (ref 4.2–5.8)
RBC # BLD: 6.06 M/UL — HIGH (ref 4.2–5.8)
RBC # FLD: 14.1 % — SIGNIFICANT CHANGE UP (ref 10.3–14.5)
RBC # FLD: 14.5 % — SIGNIFICANT CHANGE UP (ref 10.3–14.5)
SODIUM SERPL-SCNC: 135 MMOL/L — SIGNIFICANT CHANGE UP (ref 132–145)
SODIUM SERPL-SCNC: 139 MMOL/L — SIGNIFICANT CHANGE UP (ref 135–145)
WBC # BLD: 9.56 K/UL — SIGNIFICANT CHANGE UP (ref 3.8–10.5)
WBC # BLD: 9.95 K/UL — SIGNIFICANT CHANGE UP (ref 3.8–10.5)
WBC # FLD AUTO: 9.56 K/UL — SIGNIFICANT CHANGE UP (ref 3.8–10.5)
WBC # FLD AUTO: 9.95 K/UL — SIGNIFICANT CHANGE UP (ref 3.8–10.5)

## 2020-04-11 PROCEDURE — 99291 CRITICAL CARE FIRST HOUR: CPT | Mod: CS

## 2020-04-11 PROCEDURE — 99232 SBSQ HOSP IP/OBS MODERATE 35: CPT | Mod: CS

## 2020-04-11 PROCEDURE — 71045 X-RAY EXAM CHEST 1 VIEW: CPT | Mod: 26

## 2020-04-11 RX ORDER — ENOXAPARIN SODIUM 100 MG/ML
80 INJECTION SUBCUTANEOUS EVERY 12 HOURS
Refills: 0 | Status: DISCONTINUED | OUTPATIENT
Start: 2020-04-11 | End: 2020-04-13

## 2020-04-11 RX ORDER — ATORVASTATIN CALCIUM 80 MG/1
40 TABLET, FILM COATED ORAL AT BEDTIME
Refills: 0 | Status: DISCONTINUED | OUTPATIENT
Start: 2020-04-11 | End: 2020-04-13

## 2020-04-11 RX ADMIN — ATORVASTATIN CALCIUM 40 MILLIGRAM(S): 80 TABLET, FILM COATED ORAL at 21:06

## 2020-04-11 RX ADMIN — LACTOBACILLUS ACIDOPH-L.BULGARICUS 1 MILLION CELL CHEWABLE TABLET 1 TABLET(S): at 12:26

## 2020-04-11 RX ADMIN — Medication 125 MICROGRAM(S): at 07:44

## 2020-04-11 RX ADMIN — RITONAVIR 100 MILLIGRAM(S): 100 TABLET, FILM COATED ORAL at 12:27

## 2020-04-11 RX ADMIN — ETRAVIRINE 200 MILLIGRAM(S): 200 TABLET ORAL at 12:26

## 2020-04-11 RX ADMIN — TAMSULOSIN HYDROCHLORIDE 0.4 MILLIGRAM(S): 0.4 CAPSULE ORAL at 21:06

## 2020-04-11 RX ADMIN — VALACYCLOVIR 500 MILLIGRAM(S): 500 TABLET, FILM COATED ORAL at 21:06

## 2020-04-11 RX ADMIN — Medication 1 TABLET(S): at 12:50

## 2020-04-11 RX ADMIN — Medication 2 MILLIGRAM(S): at 12:26

## 2020-04-11 RX ADMIN — Medication 2 MILLIGRAM(S): at 18:01

## 2020-04-11 RX ADMIN — RALTEGRAVIR 400 MILLIGRAM(S): 400 TABLET, FILM COATED ORAL at 12:27

## 2020-04-11 RX ADMIN — VALACYCLOVIR 500 MILLIGRAM(S): 500 TABLET, FILM COATED ORAL at 12:26

## 2020-04-11 RX ADMIN — RALTEGRAVIR 400 MILLIGRAM(S): 400 TABLET, FILM COATED ORAL at 18:01

## 2020-04-11 RX ADMIN — RITONAVIR 100 MILLIGRAM(S): 100 TABLET, FILM COATED ORAL at 21:07

## 2020-04-11 RX ADMIN — DARUNAVIR 600 MILLIGRAM(S): 75 TABLET, FILM COATED ORAL at 12:20

## 2020-04-11 RX ADMIN — FAMOTIDINE 20 MILLIGRAM(S): 10 INJECTION INTRAVENOUS at 12:26

## 2020-04-11 RX ADMIN — Medication 200 MILLIGRAM(S): at 18:01

## 2020-04-11 RX ADMIN — RITONAVIR 100 MILLIGRAM(S): 100 TABLET, FILM COATED ORAL at 07:44

## 2020-04-11 RX ADMIN — ETRAVIRINE 200 MILLIGRAM(S): 200 TABLET ORAL at 18:01

## 2020-04-11 RX ADMIN — DARUNAVIR 600 MILLIGRAM(S): 75 TABLET, FILM COATED ORAL at 18:01

## 2020-04-11 RX ADMIN — LOSARTAN POTASSIUM 50 MILLIGRAM(S): 100 TABLET, FILM COATED ORAL at 07:44

## 2020-04-11 RX ADMIN — EMTRICITABINE AND TENOFOVIR DISOPROXIL FUMARATE 1 TABLET(S): 200; 300 TABLET, FILM COATED ORAL at 12:26

## 2020-04-11 RX ADMIN — Medication 200 MILLIGRAM(S): at 07:44

## 2020-04-11 RX ADMIN — ENOXAPARIN SODIUM 80 MILLIGRAM(S): 100 INJECTION SUBCUTANEOUS at 19:21

## 2020-04-11 NOTE — PROGRESS NOTE ADULT - ATTENDING COMMENTS
See the Resident note written above, for details. I reviewed the resident documentation. I have personally seen and examined this critically ill patient. I have reviewed vitals, labs, medications, and additional imaging. I agree with the resident's findings and plans as written above with the following additions/amendments:  59 HIV patient with MONICA, HLD, BPH, hypothyroid, and h/o tonsilar CA s/p resection p/with acute hypoxemic respiratory failure due to COVID 19 requiring NRB, transferred back to [facility] from [facility] for continued care. Patient completed course of HC/A, 5D medrol, and received toci during previous admission. Patient also received 4D ceftriaxone for ?CAP, vanco and ?zosyn for PNA, course narrowed to vanco after MRSA swab returned positive. Patient noted to be 80% on nasal cannula at [facility] and patient transitioned to NRB and transferred. Upon arrival, patient noted to be satting 98-99% --> 50% venti mask -->now 35% venti mask 8L.   Awake prone on latest O2 support (35% venti 8L)  COVID treatment completed  Obtain sputum culture  Hold on abx at this time  ID HIV team consult, ?consider tx dose bactrim for potential PCP coverage  Continue ppx dose bactrim pending ID input, cont HAART, cont valtrex ppx  Initiate empiric tx dose lovenox, obtain LE duplex  Atorva 40 (therapeutic interchange for home rosuva 20)  Home antiHTN Losartan 50 daily   GI protection with famotidine, IPCs  PETEY Franco.  COVID MICU Attending

## 2020-04-11 NOTE — PROGRESS NOTE ADULT - SUBJECTIVE AND OBJECTIVE BOX
Pt is a 58 y/o F who was seen today at bedside examined with . Pt was sitting comfortable in bed, on RA. Pt with history of HIV, tonsilar CA s/p CRT, CD4 of 100. Pt was INTERVAL HPI/OVERNIGHT EVENTS:  Pt is a 59M PMH significant for HIV CD4 103, tonsilar CA s/p CRT, BPH, LHD, hypothyroidism, sleep apnea, pt was admitted on 3/30 and transferred to [facility] on 4/8.  Pt was found to be COVID positive on 3/30. Patient was seen and examined at bedside with Dr. Ruiz. Overnight, pt was on 4 L NC saturation in the low 80's, increased NC to 5 L. Pt still saturating in the low 80's, was started on a NRB 15 L at 4:30am. Pt saturations in the low 80's, was started on a venti mask rate of 4, saturating in the low 90's. Pt D-dimer>3000, CXR this am shows progression of infiltrates from previous CXR on 4/6. Covid positive last swab on 3/31. Given pt's increasing o2 requirements, he is being transferred back to [facility], [address] [place] [address] 2 for tele service/evaluation for ICU.     Pt hx of HIV, [facility].    Pt going to [address] Bed 2  T(F): 97.5 (04-10-20 @ 07:22)  HR: 71 (04-10-20 @ 12:45)  BP: 120/63 (04-10-20 @ 07:22)  RR: 18 (04-10-20 @ 09:34)  SpO2: 86% (04-10-20 @ 12:45)    PHYSICAL EXAM:    General: NAD, A&Ox3  Pulmonary: Venti Mask flow of 4, saturations in low 90's. Increasing o2 requirements overnight from 4-5 L NC saturation in low 80's.  Cardiovascular: SOB, No chest pain, BP Stable, HR- 71  Skin: Normal temperature, warm, dry    MEDICATIONS  (STANDING):  atorvastatin 80 milliGRAM(s) Oral at bedtime  darunavir 600 milliGRAM(s) Oral <User Schedule>  dextrose 5%. 1000 milliLiter(s) (50 mL/Hr) IV Continuous <Continuous>  dextrose 50% Injectable 12.5 Gram(s) IV Push once  dextrose 50% Injectable 25 Gram(s) IV Push once  dextrose 50% Injectable 25 Gram(s) IV Push once  emtricitabine 200 mG/tenofovir alafenamide 25 mG (DESCOVY) Tablet 1 Tablet(s) Oral daily  enoxaparin Injectable 40 milliGRAM(s) SubCutaneous every 24 hours  etravirine 200 milliGRAM(s) Oral <User Schedule>  famotidine    Tablet 20 milliGRAM(s) Oral daily  guaiFENesin   Syrup  (Sugar-Free) 200 milliGRAM(s) Oral two times a day  influenza   Vaccine 0.5 milliLiter(s) IntraMuscular once  insulin lispro (HumaLOG) corrective regimen sliding scale   SubCutaneous Before meals and at bedtime  lactobacillus acidophilus and bulgaricus Chewable 1 Tablet(s) Chew daily  levothyroxine 125 MICROGram(s) Oral daily  loperamide 2 milliGRAM(s) Oral <User Schedule>  losartan 50 milliGRAM(s) Oral daily  raltegravir Tablet 400 milliGRAM(s) Oral <User Schedule>  ritonavir Tablet 100 milliGRAM(s) Oral two times a day  tamsulosin 0.4 milliGRAM(s) Oral at bedtime  trimethoprim  160 mG/sulfamethoxazole 800 mG 1 Tablet(s) Oral daily  valACYclovir 500 milliGRAM(s) Oral two times a day    MEDICATIONS  (PRN):  acetaminophen   Tablet .. 975 milliGRAM(s) Oral every 8 hours PRN Temp greater or equal to 38C (100.4F)  benzonatate 100 milliGRAM(s) Oral three times a day PRN Cough  dextrose 40% Gel 15 Gram(s) Oral once PRN Blood Glucose LESS THAN 70 milliGRAM(s)/deciliter  glucagon  Injectable 1 milliGRAM(s) IntraMuscular once PRN Glucose LESS THAN 70 milligrams/deciliter      Allergies    No Known Allergies    Intolerances        LABS:                        16.0   12.64 )-----------( 291      ( 09 Apr 2020 10:45 )             48.4     04-09    135  |  101  |  33<H>  ----------------------------<  96  4.4   |  24  |  1.33<H>    Ca    9.4      09 Apr 2020 10:48  Phos  2.8     04-09  Mg     2.2     04-09    TPro  7.0  /  Alb  2.5<L>  /  TBili  0.5  /  DBili  x   /  AST  40<H>  /  ALT  56<H>  /  AlkPhos  52  04-09          RADIOLOGY & ADDITIONAL TESTS:  Reviewed INTERVAL HPI/OVERNIGHT EVENTS:  Pt is a 59M PMH significant for HIV CD4 103, tonsilar CA s/p CRT, BPH, LHD, hypothyroidism, sleep apnea, pt was admitted on 3/30 and transferred to [facility] on 4/8.  Pt was found to be COVID positive on 3/30. Patient was seen and examined at bedside with Dr. Ruiz. Overnight, pt was on 4 L NC saturation in the low 80's, increased NC to 5 L. Pt still saturating in the low 80's, was started on a NRB 15 L at 4:30am. Pt saturations in the low 80's, was started on a venti mask rate of 4, saturating in the low 90's. Pt D-dimer>3000, CXR this am shows progression of infiltrates from previous CXR on 4/6. Covid positive last swab on 3/31. Given pt's increasing o2 requirements, he is being transferred back to [facility], [address] [place] [address] Bed 2 for tele service/evaluation for ICU.     Pt hx of HIV, will need to be d/c'd with Karla for PCP PPX.    T(F): 97.5 (04-10-20 @ 07:22)  HR: 71 (04-10-20 @ 12:45)  BP: 120/63 (04-10-20 @ 07:22)  RR: 19 (04-10-20 @ 09:34)  SpO2: 86% (04-10-20 @ 12:45)    PHYSICAL EXAM:    General: NAD, A&Ox3  Pulmonary: Venti Mask flow of 4, saturations in low 90's. Increasing o2 requirements overnight from 4-5 L NC saturation in low 80's.  Cardiovascular: SOB, No chest pain, BP Stable, HR- 71  Skin: Normal temperature, warm, dry    MEDICATIONS  (STANDING):  atorvastatin 80 milliGRAM(s) Oral at bedtime  darunavir 600 milliGRAM(s) Oral <User Schedule>  dextrose 5%. 1000 milliLiter(s) (50 mL/Hr) IV Continuous <Continuous>  dextrose 50% Injectable 12.5 Gram(s) IV Push once  dextrose 50% Injectable 25 Gram(s) IV Push once  dextrose 50% Injectable 25 Gram(s) IV Push once  emtricitabine 200 mG/tenofovir alafenamide 25 mG (DESCOVY) Tablet 1 Tablet(s) Oral daily  enoxaparin Injectable 40 milliGRAM(s) SubCutaneous every 24 hours  etravirine 200 milliGRAM(s) Oral <User Schedule>  famotidine    Tablet 20 milliGRAM(s) Oral daily  guaiFENesin   Syrup  (Sugar-Free) 200 milliGRAM(s) Oral two times a day  influenza   Vaccine 0.5 milliLiter(s) IntraMuscular once  insulin lispro (HumaLOG) corrective regimen sliding scale   SubCutaneous Before meals and at bedtime  lactobacillus acidophilus and bulgaricus Chewable 1 Tablet(s) Chew daily  levothyroxine 125 MICROGram(s) Oral daily  loperamide 2 milliGRAM(s) Oral <User Schedule>  losartan 50 milliGRAM(s) Oral daily  raltegravir Tablet 400 milliGRAM(s) Oral <User Schedule>  ritonavir Tablet 100 milliGRAM(s) Oral two times a day  tamsulosin 0.4 milliGRAM(s) Oral at bedtime  trimethoprim  160 mG/sulfamethoxazole 800 mG 1 Tablet(s) Oral daily  valACYclovir 500 milliGRAM(s) Oral two times a day    MEDICATIONS  (PRN):  acetaminophen   Tablet .. 975 milliGRAM(s) Oral every 8 hours PRN Temp greater or equal to 38C (100.4F)  benzonatate 100 milliGRAM(s) Oral three times a day PRN Cough  dextrose 40% Gel 15 Gram(s) Oral once PRN Blood Glucose LESS THAN 70 milliGRAM(s)/deciliter  glucagon  Injectable 1 milliGRAM(s) IntraMuscular once PRN Glucose LESS THAN 70 milligrams/deciliter      Allergies    No Known Allergies    Intolerances        LABS:                        17.4   9.95 )-----------( 208      ( 11 Apr 2020 12:49 )             50.5     04-09    135  |  103  |  22<H>  ----------------------------<  150  4.2   |  24  |  1.14<H>    Ca    8.9      11 Apr 2020 12:52  Phos  2.8     04-09  Mg     2.2     04-09    TPro  7.0  /  Alb  2.5<L>  /  TBili  0.5  /  DBili  x   /  AST  40<H>  /  ALT  56<H>  /  AlkPhos  52  04-09    DDimer- [id number]  4-11-20 12:52      RADIOLOGY & ADDITIONAL TESTS:  Reviewed    Plan:    Pt to be transferred back to [facility] intermediate tele service [address] Bed 2 for increasing o2 requirements. Pt overnight was increased from NC of 4-5 L saturating in the low 80's, started on NRB 15 L saturating in the low 80's. Pt now on Venturi mask flow of 4, saturating in the low 90's. Pt needs to be monitored and evaluated for possible ICU admission. INTERVAL HPI/OVERNIGHT EVENTS:  Pt is a 59M PMH significant for HIV CD4 103, tonsilar CA s/p CRT, BPH, LHD, hypothyroidism, sleep apnea, pt was admitted on 3/30 and transferred to [facility] on 4/8.  Pt was found to be COVID positive on 3/30. Patient was seen and examined at bedside with Dr. Ruiz. Overnight, pt was on 4 L NC saturation in the low 80's, increased NC to 5 L. Pt still saturating in the low 80's, was started on a NRB 15 L at 4:30am. Pt saturations in the low 80's, was started on a venti mask rate of 4, saturating in the low 90's. Pt D-dimer>3000, CXR this am shows progression of infiltrates from previous CXR on 4/6. Covid positive last swab on 3/31. Given pt's increasing o2 requirements, he is being transferred back to [facility], [address] [place] [address] Bed 2 for tele service/evaluation for ICU.     Pt hx of HIV, will need to continue and be d/c'd with Bactrim for PCP PPX.    T(F): 97.5 (04-10-20 @ 07:22)  HR: 71 (04-10-20 @ 12:45)  BP: 120/63 (04-10-20 @ 07:22)  RR: 19 (04-10-20 @ 09:34)  SpO2: 86% (04-10-20 @ 12:45)    PHYSICAL EXAM:    General: NAD, A&Ox3  Pulmonary: Venti Mask flow of 4, saturations in low 90's. Increasing o2 requirements overnight from 4-5 L NC saturation in low 80's.  Cardiovascular: SOB, No chest pain, BP Stable, HR- 71  Skin: Normal temperature, warm, dry    MEDICATIONS  (STANDING):  atorvastatin 80 milliGRAM(s) Oral at bedtime  darunavir 600 milliGRAM(s) Oral <User Schedule>  dextrose 5%. 1000 milliLiter(s) (50 mL/Hr) IV Continuous <Continuous>  dextrose 50% Injectable 12.5 Gram(s) IV Push once  dextrose 50% Injectable 25 Gram(s) IV Push once  dextrose 50% Injectable 25 Gram(s) IV Push once  emtricitabine 200 mG/tenofovir alafenamide 25 mG (DESCOVY) Tablet 1 Tablet(s) Oral daily  enoxaparin Injectable 40 milliGRAM(s) SubCutaneous every 24 hours  etravirine 200 milliGRAM(s) Oral <User Schedule>  famotidine    Tablet 20 milliGRAM(s) Oral daily  guaiFENesin   Syrup  (Sugar-Free) 200 milliGRAM(s) Oral two times a day  influenza   Vaccine 0.5 milliLiter(s) IntraMuscular once  insulin lispro (HumaLOG) corrective regimen sliding scale   SubCutaneous Before meals and at bedtime  lactobacillus acidophilus and bulgaricus Chewable 1 Tablet(s) Chew daily  levothyroxine 125 MICROGram(s) Oral daily  loperamide 2 milliGRAM(s) Oral <User Schedule>  losartan 50 milliGRAM(s) Oral daily  raltegravir Tablet 400 milliGRAM(s) Oral <User Schedule>  ritonavir Tablet 100 milliGRAM(s) Oral two times a day  tamsulosin 0.4 milliGRAM(s) Oral at bedtime  trimethoprim  160 mG/sulfamethoxazole 800 mG 1 Tablet(s) Oral daily  valACYclovir 500 milliGRAM(s) Oral two times a day    MEDICATIONS  (PRN):  acetaminophen   Tablet .. 975 milliGRAM(s) Oral every 8 hours PRN Temp greater or equal to 38C (100.4F)  benzonatate 100 milliGRAM(s) Oral three times a day PRN Cough  dextrose 40% Gel 15 Gram(s) Oral once PRN Blood Glucose LESS THAN 70 milliGRAM(s)/deciliter  glucagon  Injectable 1 milliGRAM(s) IntraMuscular once PRN Glucose LESS THAN 70 milligrams/deciliter      Allergies    No Known Allergies    Intolerances        LABS:                        17.4   9.95 )-----------( 208      ( 11 Apr 2020 12:49 )             50.5     04-09    135  |  103  |  22<H>  ----------------------------<  150  4.2   |  24  |  1.14<H>    Ca    8.9      11 Apr 2020 12:52  Phos  2.8     04-09  Mg     2.2     04-09    TPro  7.0  /  Alb  2.5<L>  /  TBili  0.5  /  DBili  x   /  AST  40<H>  /  ALT  56<H>  /  AlkPhos  52  04-09    DDimer- [id number]  4-11-20 12:52      RADIOLOGY & ADDITIONAL TESTS:  Reviewed    Plan:    Pt to be transferred back to [facility] intermediate tele service [address] Bed 2 for increasing o2 requirements. Pt overnight was increased from NC of 4-5 L saturating in the low 80's, started on NRB 15 L saturating in the low 80's. Pt now on Venturi mask flow of 4, saturating in the low 90's. Pt needs to be monitored and evaluated for possible ICU admission.

## 2020-04-11 NOTE — PROGRESS NOTE ADULT - PROBLEM SELECTOR PLAN 3
Positive for COVID-19.  No known sick/COVID+ contacts. COVID-19 PCR +.  - isolation precautions  - s/p azithromycin and plaquenil  - trend daily COVID labs  - serial EKGs for QTc monitoring, 4/4 was 474  --on 4/7 QTc 496  - f/u repeat EKG 4/8 for QTc s/p treatment with 5 days of Azithro/plaquenil, solumedrol. s/p Toci on 4/3  - monitor on supplemental O2  - will consider restarting steroids if worsening respiratory status

## 2020-04-11 NOTE — PROGRESS NOTE ADULT - PROBLEM SELECTOR PLAN 8
F: none  E: replete as needed for K <4, Mg <2  N: DASH/TLC diet   GI ppx: famotidine  VTE/DVT ppx: lovenox    Code status: Full  Dispo: F F: none  E: replete PRN  N: DASH/TLC

## 2020-04-11 NOTE — PROGRESS NOTE ADULT - PROBLEM SELECTOR PLAN 9
F: s/p 1L NS and additional .5L NS  E: replete as needed for K <4, Mg <2  N: DASH/TLC diet   GI ppx: famotidine  VTE/DVT ppx: lovenox  Pain: tylenol  Code status: Full  Dispo: COVID tele
F: s/p 1L NS and additional .5L NS  E: replete as needed for K <4, Mg <2  N: DASH/TLC diet   GI ppx: famotidine  VTE/DVT ppx: lovenox  Pain: tylenol  Code status: Full  Dispo: COVID tele
F: none  E: replete as needed for K <4, Mg <2  N: DASH/TLC diet   GI ppx: famotidine  VTE/DVT ppx: lovenox    Code status: Full  Dispo: F
F: none  E: replete as needed for K <4, Mg <2  N: DASH/TLC diet   GI ppx: famotidine  VTE/DVT ppx: lovenox    Code status: Full  Dispo: F
F: s/p IVF for sBP 70s  E: replete as needed for K <4, Mg <2  N: DASH/TLC diet   GI ppx: famotidine  VTE/DVT ppx: lovenox    Code status: Full  Dispo: F
F: none  E: replete as needed for K <4, Mg <2  N: DASH/TLC diet   GI ppx: famotidine  VTE/DVT ppx: lovenox    Code status: Full  Dispo: F
DVT ppx: Lovenox 80mg BID  GI ppx: Famotidine 20 qd    Dispo: COVID tele  Code: FULL

## 2020-04-11 NOTE — PROGRESS NOTE ADULT - PROBLEM SELECTOR PLAN 1
likely 2/2 COVID-19 PNA vs MRSA PNA, s/p COVID treatment with Tocilizumab (04/03), Azithro/Plaquenil (04/02-04/06) and Solumedrol (04/03-04/08). Patient now presents with hypoxic respiratory failure. s/p Vancomycin x 8 doses (last dose 4/9)  - on NRBM 15L, spO2 low 90s  - wean off O2 as tolerated  - awake proning  - f/u inflammatory markers

## 2020-04-11 NOTE — PROGRESS NOTE ADULT - ATTENDING COMMENTS
I was physically present for the key portions of the evaluation and management (E/M) service provided.  I agree with the above history, physical, and plan which I have reviewed and edited where appropriate.    Patient with HIV low CD4 count, COVID + respiratory disease. Patient stable. Still desaturating on RA to mid 80s when walking.  Will need continued monitoring. I was physically present for the key portions of the evaluation and management (E/M) service provided.  I agree with the above history, physical, and plan which I have reviewed and edited where appropriate. I was physically present for the key portions of the evaluation and management (E/M) service provided.  I agree with the above history, physical, and plan which I have reviewed and edited where appropriate.    Patient to be transferred back to [facility].

## 2020-04-11 NOTE — CHART NOTE - NSCHARTNOTEFT_GEN_A_CORE
59 M with HIV CD4 count ~100, h/o tonsilar CA, BPH, hyperlipidemia, who was admitted on 3/30 at [place] with cough, fevers, dyspnea, diarrhea decreased PO intake.  Was treated with supplemental oxygen, plaquenil, azithromycin, and was improving, transferred to [facility] on 4/8 as was still hypoxic on RA when ambulating.  Overnight patient oxygen requirements increased.    This morning patient appeared sob.  Patient oxygen saturation <80% on RA, 80% on 6LNC, 85% on NRB.  CXR shows possible worsening of infiltrates.  D-dimer increase from 900 4/8 to over 3000 today.      Patient will need to be transferred to [facility] for evaluation of need for ICU level care.    Transfer called.

## 2020-04-11 NOTE — PROGRESS NOTE ADULT - PROBLEM SELECTOR PLAN 5
Hx of HIV, undetectable for 20 yrs per patient and with recent f/u. Tcells per pt 232.   - c/w home HIV rx  - c/w valtrex  - Started PCP ppx with Bactrim DS one tablet daily due to acute decreased in CD4 to 103, to be continued after discharge  - outpatient f/u Hx of HIV, undetectable for 20 yrs per patient and with recent f/u. Tcells per pt 232.   - c/w Home HIV rx  - c/w Valtrex  - c/w Bactrim DS one tablet qd for PCP ppx  - outpatient f/u Patient with elevated d-dimer, COVID +ve which is known to cause a pro-thrombotic state  - monitor d-dimer  - f/u doppler U/S

## 2020-04-11 NOTE — PROGRESS NOTE ADULT - PROBLEM SELECTOR PLAN 4
LEONEL on CKD (Baseline 2019 1.3); Today 4/7 1.36.  Resolved.    - trend BMP Hx of HIV, undetectable for 20 yrs per patient. Recent CD4 103, VL undetectable in March 2020   - c/w Home HIV regimen  - c/w Valtrex  - c/w Bactrim DS one tablet qd for PCP ppx  - consider HIV consult

## 2020-04-11 NOTE — PROGRESS NOTE ADULT - ASSESSMENT
Pt is a 59M COVID + 3/30, afebrile > 72h, O2 Sat 92% on 4L NC, medication course complete for COVID/PNA.       Problem/Plan - 1:  ·  Problem: Acute respiratory failure with hypoxia.  Plan: Covid + on 3/30. Solumedrol, Plaquenil, and Azithro completed.  Pt still has desaturation with ambulation, PT will continue to work with pt.  Wean O2 as tolerated.      Problem/Plan - 2:  ·  Problem: Pneumonia.  Plan: Likely viral pneumonia 2/2 COVID-19, now with bandemia concerning for superimposed bacterial pneumonia.  MRSA swab positive.  Blood Cx -NGTD, Vanc and Zosyn completed on 4/8.  CD4- 103, Will continue Bactrim for PCP prophylaxis       Problem/Plan - 3:  ·  Problem: LEONEL (acute kidney injury).  Plan: LEONEL on CKD. Cr 2.06 on admission, Improving, Baseline Cre 1.3.  On 4/8 Cr 1.2       Problem/Plan - 4:  Problem: HIV disease. Plan: Hx of HIV, undetectable for 20 yrs per patient and with recent f/u. CD4-103.  Continue with home HIV regimen and Valtrex.  Bactrim added and will be continued after discharge.     Problem/Plan - 7:  ·  Problem: HLD (hyperlipidemia).  Plan: Hx of HLD  - restarted therapeutic interchange of home crestor    Problem/Plan- 8:  Problem: Hx of HTN: Plan: Continue Losartan    Problem/Plan-9:  Problem: Hypothyroidism: Continue with Levothyroxine 125mcg daily.      Problem/Plan - 10:  ·  Problem: History of BPH.  Plan: Continue with Tamsulosin  - Monitor BPs for hypotension.      Problem/Plan - 11:  ·  Problem: Prophylactic measure.  VTE/DVT ppx: lovenox, GI ppx: famotidine      Diet:  DASH/TLC      Code status: Full      Dispo:  Pt is stable and improving.  Continue current regimen.  Wean 02 as tolerated.  Continue PT TID. Pt is a 59M COVID + 3/30, afebrile > 72h, O2 Sat 92% on 4L NC, medication course complete for COVID/PNA.       Problem/Plan - 1:  ·  Problem: Acute respiratory failure with hypoxia.  Plan: Covid + on 3/30. Solumedrol, Plaquenil, and Azithro completed.  Pt desaturation on NRB 15 L, now on Venturi Mask 4 flow. Pt being transferred to [facility] intermediate tele service [address] 28 bed 2.     Problem/Plan - 2:  ·  Problem: Pneumonia.  Plan: Likely viral pneumonia 2/2 COVID-19, now with bandemia concerning for superimposed bacterial pneumonia.  MRSA swab positive.  Blood Cx -NGTD, Vanc and Zosyn completed on 4/8.  CD4- 103, Will continue Bactrim for PCP prophylaxis       Problem/Plan - 3:  ·  Problem: LEONEL (acute kidney injury).  Plan: LEONEL on CKD. Cr 2.06 on admission, Improving, Baseline Cre 1.3.  On 4/11 Cr 1.14       Problem/Plan - 4:  Problem: HIV disease. Plan: Hx of HIV, undetectable for 20 yrs per patient and with recent f/u. CD4-103.  Continue with home HIV regimen and Valtrex.  Bactrim added and will be continued after discharge.     Problem/Plan - 7:  ·  Problem: HLD (hyperlipidemia).  Plan: Hx of HLD  - restarted therapeutic interchange of home crestor    Problem/Plan- 8:  Problem: Hx of HTN: Plan: Continue Losartan    Problem/Plan-9:  Problem: Hypothyroidism: Continue with Levothyroxine 125mcg daily.      Problem/Plan - 10:  ·  Problem: History of BPH.  Plan: Continue with Tamsulosin  - Monitor BPs for hypotension.      Problem/Plan - 11:  ·  Problem: Prophylactic measure.  VTE/DVT ppx: lovenox, GI ppx: famotidine      Diet:  DASH/TLC      Code status: Full      Dispo:  Pt to be transferred back to [facility] intermediate tele service [address] Bed 2 for increasing o2 requirements. Pt overnight was increased from NC of 4-5 L saturating in the low 80's, started on NRB 15 L saturating in the low 80's. Pt now on Venturi mask flow of 4, saturating in the low 90's. Pt needs to be monitored and evaluated for possible ICU admission Pt is a 59M COVID + 3/30, afebrile > 72h, O2 Sat 92% on 4L NC, medication course complete for COVID/PNA.       Problem/Plan - 1:  ·  Problem: Acute respiratory failure with hypoxia.  Plan: Covid + on 3/30. Solumedrol, Plaquenil, and Azithro completed.  Pt desaturation on NRB 15 L, now on Venturi Mask 4 flow. Pt being transferred to [facility] intermediate tele service 7 [address] 28 bed 2.     Problem/Plan - 2:  ·  Problem: Pneumonia.  Plan: Likely viral pneumonia 2/2 COVID-19, now with bandemia concerning for superimposed bacterial pneumonia.  MRSA swab positive.  Blood Cx -NGTD, Vanc and Zosyn completed on 4/8.  CD4- 103, Will continue Bactrim for PCP prophylaxis and continue on d/c.      Problem/Plan - 3:  ·  Problem: LEONEL (acute kidney injury).  Plan: LEONEL on CKD. Cr 2.06 on admission, Improving, Baseline Cre 1.3.  On 4/11 Cr 1.14       Problem/Plan - 4:  Problem: HIV disease. Plan: Hx of HIV, undetectable for 20 yrs per patient and with recent f/u. CD4-103.  Continue with home HIV regimen and Valtrex.  Bactrim added and will be continued after discharge.     Problem/Plan - 7:  ·  Problem: HLD (hyperlipidemia).  Plan: Hx of HLD  - restarted therapeutic interchange of home crestor    Problem/Plan- 8:  Problem: Hx of HTN: Plan: Continue Losartan    Problem/Plan-9:  Problem: Hypothyroidism: Continue with Levothyroxine 125mcg daily.      Problem/Plan - 10:  ·  Problem: History of BPH.  Plan: Continue with Tamsulosin  - Monitor BPs for hypotension.      Problem/Plan - 11:  ·  Problem: Prophylactic measure.  VTE/DVT ppx: lovenox, GI ppx: famotidine      Diet:  DASH/TLC      Code status: Full      Dispo:  Pt to be transferred back to [facility] intermediate tele service [address] 28 Bed 2 for increasing o2 requirements. Pt overnight was increased from NC of 4-5 L saturating in the low 80's, started on NRB 15 L saturating in the low 80's. Pt now on Venturi mask flow of 4, saturating in the low 90's. Pt needs to be monitored and evaluated for possible ICU admission

## 2020-04-11 NOTE — PROGRESS NOTE ADULT - SUBJECTIVE AND OBJECTIVE BOX
Transfer Acceptance Note: [facility] to Telemetry unit    Hospital course  Patient is a 60 yo M with a PMH significant for HIV CD4 103, tonsilar CA s/p CRT, BPH, LHD, hypothyroidism, sleep apnea, pt was admitted on 3/30 and transferred to [facility] on 4/8.  Pt was found to be COVID positive on 3/30. Patient was seen and examined at bedside with Dr. Ruiz. Overnight, pt was on 4 L NC saturation in the low 80's, increased NC to 5 L. Pt still saturating in the low 80's, was started on a NRB 15 L at 4:30am. Pt saturations in the low 80's, was started on a venti mask rate of 4, saturating in the low 90's. Pt D-dimer>3000, CXR this am shows progression of infiltrates from previous CXR on 4/6. Covid positive last swab on 3/31. Given pt's increasing o2 requirements, he is being transferred back to [facility], [address] Bed 2 for tele service/evaluation for ICU.       VITAL SIGNS:  Vital Signs Last 24 Hrs  T(C): 36.7 (11 Apr 2020 06:43), Max: 37.1 (10 Apr 2020 20:45)  T(F): 98 (11 Apr 2020 06:43), Max: 98.7 (10 Apr 2020 20:45)  HR: 84 (11 Apr 2020 14:51) (71 - 93)  BP: 110/75 (11 Apr 2020 14:51) (110/75 - 136/93)  BP(mean): 89 (11 Apr 2020 14:51) (89 - 89)  RR: 18 (11 Apr 2020 14:51) (16 - 18)  SpO2: 87% (11 Apr 2020 14:51) (80% - 93%)    PHYSICAL EXAM:    General: in NAD, lying comfortably in bed  HEENT: normocephalic, atraumatic; PERRL, anicteric sclera; MMM  Neck: supple, no JVD, no thyromegaly, no lymphadenopathy  Cardiovascular: +S1/S2, RRR, no M/G/R  Respiratory: clear to auscultation B/L; no wheezing, no rales, no rhonchi  Gastrointestinal: soft, NT/ND; +BSx4, no organomegaly  Extremities: WWP; no edema, clubbing or cyanosis  Vascular: 2+ radial, DP/PT pulses B/L  Neurological: AAOx3; no focal deficits    MEDICATIONS:  MEDICATIONS  (STANDING):  atorvastatin 80 milliGRAM(s) Oral at bedtime  darunavir 600 milliGRAM(s) Oral <User Schedule>  dextrose 5%. 1000 milliLiter(s) (50 mL/Hr) IV Continuous <Continuous>  dextrose 50% Injectable 12.5 Gram(s) IV Push once  dextrose 50% Injectable 25 Gram(s) IV Push once  dextrose 50% Injectable 25 Gram(s) IV Push once  emtricitabine 200 mG/tenofovir alafenamide 25 mG (DESCOVY) Tablet 1 Tablet(s) Oral daily  enoxaparin Injectable 40 milliGRAM(s) SubCutaneous every 24 hours  etravirine 200 milliGRAM(s) Oral <User Schedule>  famotidine    Tablet 20 milliGRAM(s) Oral daily  guaiFENesin   Syrup  (Sugar-Free) 200 milliGRAM(s) Oral two times a day  influenza   Vaccine 0.5 milliLiter(s) IntraMuscular once  insulin lispro (HumaLOG) corrective regimen sliding scale   SubCutaneous Before meals and at bedtime  lactobacillus acidophilus and bulgaricus Chewable 1 Tablet(s) Chew daily  levothyroxine 125 MICROGram(s) Oral daily  loperamide 2 milliGRAM(s) Oral <User Schedule>  losartan 50 milliGRAM(s) Oral daily  raltegravir Tablet 400 milliGRAM(s) Oral <User Schedule>  ritonavir Tablet 100 milliGRAM(s) Oral two times a day  tamsulosin 0.4 milliGRAM(s) Oral at bedtime  trimethoprim  160 mG/sulfamethoxazole 800 mG 1 Tablet(s) Oral daily  valACYclovir 500 milliGRAM(s) Oral two times a day    MEDICATIONS  (PRN):  acetaminophen   Tablet .. 975 milliGRAM(s) Oral every 8 hours PRN Temp greater or equal to 38C (100.4F)  benzonatate 100 milliGRAM(s) Oral three times a day PRN Cough  dextrose 40% Gel 15 Gram(s) Oral once PRN Blood Glucose LESS THAN 70 milliGRAM(s)/deciliter  glucagon  Injectable 1 milliGRAM(s) IntraMuscular once PRN Glucose LESS THAN 70 milligrams/deciliter      ALLERGIES:  Allergies    No Known Allergies    Intolerances        LABS:                        17.4   9.95  )-----------( 208      ( 11 Apr 2020 10:27 )             50.5     04-11    135  |  103  |  22  ----------------------------<  150<H>  4.2   |  24  |  1.14    Ca    8.9      11 Apr 2020 10:27    CAPILLARY BLOOD GLUCOSE      POCT Blood Glucose.: 83 mg/dL (11 Apr 2020 12:47)    MRSA/MSSA PCR (04.02.20 @ 16:35)    MRSA PCR Result.: Positive: The [employer] Xpert SA Nasal Complete Assay performed on the [employer]  System is a qualitative in vitro diagnostic test using PCR for the rapid  detection of Staphylococcus Aureus(SA)and methicillin - resistant  Staphylococcus Aureus(MRSA)DNA from nasal swabs in patients at risk for  nasal colonization.  It is not intended to diagnose, guide or monitor  treatment for MRSA/SA infections.  A negative result does not preclude  MRSA/SA nasal colonization.  The assay is FDA-approved and its  performance has been established by [employer], [employer] and the [facility], [place], [place]    Staph Aureus PCR Result: Positive    Culture - Blood (04.02.20 @ 16:09)    Specimen Source: .Blood Blood    Culture Results:   No growth    Culture - Blood (04.02.20 @ 16:09)    Specimen Source: .Blood Blood    Culture Results:   No growth    Cryptosporidium Smear (04.01.20 @ 00:57)    Specimen Source: .Stool    Culture Results:   No Cryptosporidium, Cyclospora sp. or Isospora found by modified acid  fast stain    RADIOLOGY & ADDITIONAL TESTS:   Xray Chest 1 View-PORTABLE IMMEDIATE (04.11.20 @ 10:10)  Portable examination chest demonstrates mild progression lung infiltrates in comparison to prior examination of the chest 4/6/2020.    Impression: Mild progression lung infiltrates Transfer Acceptance Note: [facility] to Telemetry unit    Hospital course  Patient is a 58 yo M with a PMH of HIV (CD4 103, VL undetectable in March 2020), tonsilar CA (s/p CRT and RT), BPH, HLD, hypothyroidism, sleep apnea, COVID +ve, recently admitted on 3/30 at [facility], transferred to [facility] on 4/8.   At [facility], on 4/11 overnight, SpO2 80s on 4-5L NC, he was placed on a NRB 15 L at 4:30am. Pt saturations in the low 80's, was started on a venti mask rate of 4, saturating in the low 90's. Pt D-dimer>3000, CXR this am shows progression of infiltrates from previous CXR on 4/6. Covid positive last swab on 3/31. Given pt's increasing o2 requirements, he is being transferred back to [facility], [address] Bed 2 for tele service/evaluation for ICU.       58YO M with PMH of HIV (VL undetectable, CD4 232 per pt), tonsillar Ca (sp chemo and radtx), HLD, hypothyroidism, BPH and sleep apnea who presented with cough intermittent sputum), subjective fevers, pain with cough, mild diarrhea, decreased PO intake for 2 weeks with 1d of increasing SOB and MENDEZ. Of note presented mid-March (3/15) to [facility] with similar sx and was prescribed Azithromycin. Imaging: CT Chest showed b/l upper lobe GGO and RML. Placed on NC 3L with SpO2 in mid-high 90s, but then required NRB and admitted to [facility]. Received CTX and azithro x 1 in the ED. ID consulted and recommend continuing home regimen of HIV meds and hold on plaquenil at this time for prolonged QTc. Stepped down to [facility] for further monitoring and management.  On RMF required increasing O2 and with QTc on repeat EKG <500 was started on azithromycin, plaquenil along with tocilizumab x1, steroids, vancomycin (MRSA swab +), zosyn, and bactrim for PCP ppx as CD4 drop to 103. Has continued to improve since initiation of the aforementioned medications and on 4/8 determined to be stable for transfer to [facility] to finalize inpatient abx and monitoring of weaning off O2. Transferred to [facility] 4/9/20.    VITAL SIGNS:  Vital Signs Last 24 Hrs  T(C): 36.7 (11 Apr 2020 06:43), Max: 37.1 (10 Apr 2020 20:45)  T(F): 98 (11 Apr 2020 06:43), Max: 98.7 (10 Apr 2020 20:45)  HR: 84 (11 Apr 2020 14:51) (71 - 93)  BP: 110/75 (11 Apr 2020 14:51) (110/75 - 136/93)  BP(mean): 89 (11 Apr 2020 14:51) (89 - 89)  RR: 18 (11 Apr 2020 14:51) (16 - 18)  SpO2: 87% (11 Apr 2020 14:51) (80% - 93%)    PHYSICAL EXAM:    General: in NAD, lying comfortably in bed  HEENT: normocephalic, atraumatic; PERRL, anicteric sclera; MMM  Neck: supple, no JVD, no thyromegaly, no lymphadenopathy  Cardiovascular: +S1/S2, RRR, no M/G/R  Respiratory: clear to auscultation B/L; no wheezing, no rales, no rhonchi  Gastrointestinal: soft, NT/ND; +BSx4, no organomegaly  Extremities: WWP; no edema, clubbing or cyanosis  Vascular: 2+ radial, DP/PT pulses B/L  Neurological: AAOx3; no focal deficits    MEDICATIONS:  MEDICATIONS  (STANDING):  atorvastatin 80 milliGRAM(s) Oral at bedtime  darunavir 600 milliGRAM(s) Oral <User Schedule>  dextrose 5%. 1000 milliLiter(s) (50 mL/Hr) IV Continuous <Continuous>  dextrose 50% Injectable 12.5 Gram(s) IV Push once  dextrose 50% Injectable 25 Gram(s) IV Push once  dextrose 50% Injectable 25 Gram(s) IV Push once  emtricitabine 200 mG/tenofovir alafenamide 25 mG (DESCOVY) Tablet 1 Tablet(s) Oral daily  enoxaparin Injectable 40 milliGRAM(s) SubCutaneous every 24 hours  etravirine 200 milliGRAM(s) Oral <User Schedule>  famotidine    Tablet 20 milliGRAM(s) Oral daily  guaiFENesin   Syrup  (Sugar-Free) 200 milliGRAM(s) Oral two times a day  influenza   Vaccine 0.5 milliLiter(s) IntraMuscular once  insulin lispro (HumaLOG) corrective regimen sliding scale   SubCutaneous Before meals and at bedtime  lactobacillus acidophilus and bulgaricus Chewable 1 Tablet(s) Chew daily  levothyroxine 125 MICROGram(s) Oral daily  loperamide 2 milliGRAM(s) Oral <User Schedule>  losartan 50 milliGRAM(s) Oral daily  raltegravir Tablet 400 milliGRAM(s) Oral <User Schedule>  ritonavir Tablet 100 milliGRAM(s) Oral two times a day  tamsulosin 0.4 milliGRAM(s) Oral at bedtime  trimethoprim  160 mG/sulfamethoxazole 800 mG 1 Tablet(s) Oral daily  valACYclovir 500 milliGRAM(s) Oral two times a day    MEDICATIONS  (PRN):  acetaminophen   Tablet .. 975 milliGRAM(s) Oral every 8 hours PRN Temp greater or equal to 38C (100.4F)  benzonatate 100 milliGRAM(s) Oral three times a day PRN Cough  dextrose 40% Gel 15 Gram(s) Oral once PRN Blood Glucose LESS THAN 70 milliGRAM(s)/deciliter  glucagon  Injectable 1 milliGRAM(s) IntraMuscular once PRN Glucose LESS THAN 70 milligrams/deciliter      ALLERGIES:  Allergies    No Known Allergies    Intolerances        LABS:                        17.4   9.95  )-----------( 208      ( 11 Apr 2020 10:27 )             50.5     04-11    135  |  103  |  22  ----------------------------<  150<H>  4.2   |  24  |  1.14    Ca    8.9      11 Apr 2020 10:27    CAPILLARY BLOOD GLUCOSE      POCT Blood Glucose.: 83 mg/dL (11 Apr 2020 12:47)    MRSA/MSSA PCR (04.02.20 @ 16:35)    MRSA PCR Result.: Positive: The [employer] Xpert SA Nasal Complete Assay performed on the [email or web address]  System is a qualitative in vitro diagnostic test using PCR for the rapid  detection of Staphylococcus Aureus(SA)and methicillin - resistant  Staphylococcus Aureus(MRSA)DNA from nasal swabs in patients at risk for  nasal colonization.  It is not intended to diagnose, guide or monitor  treatment for MRSA/SA infections.  A negative result does not preclude  MRSA/SA nasal colonization.  The assay is FDA-approved and its  performance has been established by [employer], [employer] and the [facility], [place], [place]    Staph Aureus PCR Result: Positive    Culture - Blood (04.02.20 @ 16:09)    Specimen Source: .Blood Blood    Culture Results:   No growth    Culture - Blood (04.02.20 @ 16:09)    Specimen Source: .Blood Blood    Culture Results:   No growth    Cryptosporidium Smear (04.01.20 @ 00:57)    Specimen Source: .Stool    Culture Results:   No Cryptosporidium, Cyclospora sp. or Isospora found by modified acid  fast stain    RADIOLOGY & ADDITIONAL TESTS:   Xray Chest 1 View-PORTABLE IMMEDIATE (04.11.20 @ 10:10)  Portable examination chest demonstrates mild progression lung infiltrates in comparison to prior examination of the chest 4/6/2020.    Impression: Mild progression lung infiltrates Transfer Acceptance Note: [facility] to Telemetry unit    Hospital course  Patient is a 60 yo M with a PMH of HIV (CD4 103, VL undetectable in March 2020), tonsilar CA (s/p CRT and RT), BPH, HLD, hypothyroidism, sleep apnea, COVID +ve, recently admitted on 3/30 at [facility], transferred to [facility] on 4/8. At [facility], on 4/11 overnight, SpO2 80s on 4-5L NC, he was placed on a NRB 15 L at 4:30am. Pt saturations in the low 80's, was placed on a venti mask with improvement to SpO2 low 90's. CXR showed progression of infiltrates compared to previous CXR on 4/6. Given pt's increasing O2 requirements, he was transferred to [facility] telemetry unit for further monitoring.    Subjective: patient seen and examined at bedside, in NAD. On NRBM, 15L      VITAL SIGNS:  Vital Signs Last 24 Hrs  T(C): 36.7 (11 Apr 2020 06:43), Max: 37.1 (10 Apr 2020 20:45)  T(F): 98 (11 Apr 2020 06:43), Max: 98.7 (10 Apr 2020 20:45)  HR: 84 (11 Apr 2020 14:51) (71 - 93)  BP: 110/75 (11 Apr 2020 14:51) (110/75 - 136/93)  BP(mean): 89 (11 Apr 2020 14:51) (89 - 89)  RR: 18 (11 Apr 2020 14:51) (16 - 18)  SpO2: 87% (11 Apr 2020 14:51) (80% - 93%)    PHYSICAL EXAM:  General: in NAD, lying comfortably in bed, NRBM in place  HEENT: normocephalic, atraumatic; PERRL, anicteric sclera; MMM  Neck: supple, no JVD, no thyromegaly, no lymphadenopathy  Cardiovascular: +S1/S2, RRR, no M/G/R  Respiratory: clear to auscultation B/L; no wheezing, no rales, no rhonchi  Gastrointestinal: soft, NT/ND; +BSx4, no organomegaly  Extremities: WWP; no edema, clubbing or cyanosis  Vascular: 2+ radial, DP/PT pulses B/L  Neurological: AAOx3; no focal deficits    MEDICATIONS:  MEDICATIONS  (STANDING):  atorvastatin 80 milliGRAM(s) Oral at bedtime  darunavir 600 milliGRAM(s) Oral <User Schedule>  dextrose 5%. 1000 milliLiter(s) (50 mL/Hr) IV Continuous <Continuous>  dextrose 50% Injectable 12.5 Gram(s) IV Push once  dextrose 50% Injectable 25 Gram(s) IV Push once  dextrose 50% Injectable 25 Gram(s) IV Push once  emtricitabine 200 mG/tenofovir alafenamide 25 mG (DESCOVY) Tablet 1 Tablet(s) Oral daily  enoxaparin Injectable 40 milliGRAM(s) SubCutaneous every 24 hours  etravirine 200 milliGRAM(s) Oral <User Schedule>  famotidine    Tablet 20 milliGRAM(s) Oral daily  guaiFENesin   Syrup  (Sugar-Free) 200 milliGRAM(s) Oral two times a day  influenza   Vaccine 0.5 milliLiter(s) IntraMuscular once  insulin lispro (HumaLOG) corrective regimen sliding scale   SubCutaneous Before meals and at bedtime  lactobacillus acidophilus and bulgaricus Chewable 1 Tablet(s) Chew daily  levothyroxine 125 MICROGram(s) Oral daily  loperamide 2 milliGRAM(s) Oral <User Schedule>  losartan 50 milliGRAM(s) Oral daily  raltegravir Tablet 400 milliGRAM(s) Oral <User Schedule>  ritonavir Tablet 100 milliGRAM(s) Oral two times a day  tamsulosin 0.4 milliGRAM(s) Oral at bedtime  trimethoprim  160 mG/sulfamethoxazole 800 mG 1 Tablet(s) Oral daily  valACYclovir 500 milliGRAM(s) Oral two times a day    MEDICATIONS  (PRN):  acetaminophen   Tablet .. 975 milliGRAM(s) Oral every 8 hours PRN Temp greater or equal to 38C (100.4F)  benzonatate 100 milliGRAM(s) Oral three times a day PRN Cough  dextrose 40% Gel 15 Gram(s) Oral once PRN Blood Glucose LESS THAN 70 milliGRAM(s)/deciliter  glucagon  Injectable 1 milliGRAM(s) IntraMuscular once PRN Glucose LESS THAN 70 milligrams/deciliter      ALLERGIES:  Allergies    No Known Allergies    Intolerances        LABS:                        17.4   9.95  )-----------( 208      ( 11 Apr 2020 10:27 )             50.5     04-11    135  |  103  |  22  ----------------------------<  150<H>  4.2   |  24  |  1.14    Ca    8.9      11 Apr 2020 10:27    CAPILLARY BLOOD GLUCOSE      POCT Blood Glucose.: 83 mg/dL (11 Apr 2020 12:47)    MRSA/MSSA PCR (04.02.20 @ 16:35)    MRSA PCR Result.: Positive: The [employer] Xpert SA Nasal Complete Assay performed on the [employer]  System is a qualitative in vitro diagnostic test using PCR for the rapid  detection of Staphylococcus Aureus(SA)and methicillin - resistant  Staphylococcus Aureus(MRSA)DNA from nasal swabs in patients at risk for  nasal colonization.  It is not intended to diagnose, guide or monitor  treatment for MRSA/SA infections.  A negative result does not preclude  MRSA/SA nasal colonization.  The assay is FDA-approved and its  performance has been established by [employer], [employer] and the [facility] [place]    Staph Aureus PCR Result: Positive    Culture - Blood (04.02.20 @ 16:09)    Specimen Source: .Blood Blood    Culture Results:   No growth    Culture - Blood (04.02.20 @ 16:09)    Specimen Source: .Blood Blood    Culture Results:   No growth    Cryptosporidium Smear (04.01.20 @ 00:57)    Specimen Source: .Stool    Culture Results:   No Cryptosporidium, Cyclospora sp. or Isospora found by modified acid  fast stain    RADIOLOGY & ADDITIONAL TESTS:   Xray Chest 1 View-PORTABLE IMMEDIATE (04.11.20 @ 10:10)  Portable examination chest demonstrates mild progression lung infiltrates in comparison to prior examination of the chest 4/6/2020.    Impression: Mild progression lung infiltrates Transfer Acceptance Note: [facility] to Telemetry unit    Hospital course  Patient is a 60 yo M with a PMH of HIV (CD4 103, VL undetectable in March 2020), tonsilar CA (s/p CRT and RT), BPH, HLD, hypothyroidism, sleep apnea, COVID +ve, recently admitted on 3/30 at [facility], transferred to [facility] on 4/8. At [facility], on 4/11 overnight, SpO2 80s on 4-5L NC, he was placed on a NRB 15 L at 4:30am. Pt saturations in the low 80's, was placed on a venti mask with improvement to SpO2 low 90's. CXR showed progression of infiltrates compared to previous CXR on 4/6. Given pt's increasing O2 requirements, he was transferred to [facility] telemetry unit for further monitoring.    Subjective: patient seen and examined at bedside, in NAD. On Venti mask, 15L       VITAL SIGNS:  Vital Signs Last 24 Hrs  T(C): 36.7 (11 Apr 2020 06:43), Max: 37.1 (10 Apr 2020 20:45)  T(F): 98 (11 Apr 2020 06:43), Max: 98.7 (10 Apr 2020 20:45)  HR: 84 (11 Apr 2020 14:51) (71 - 93)  BP: 110/75 (11 Apr 2020 14:51) (110/75 - 136/93)  BP(mean): 89 (11 Apr 2020 14:51) (89 - 89)  RR: 18 (11 Apr 2020 14:51) (16 - 18)  SpO2: 87% (11 Apr 2020 14:51) (80% - 93%)    PHYSICAL EXAM:  General: in NAD, lying comfortably in bed, NRBM in place  HEENT: normocephalic, atraumatic; PERRL, anicteric sclera; MMM  Neck: supple, no JVD, no thyromegaly, no lymphadenopathy  Cardiovascular: +S1/S2, RRR, no M/G/R  Respiratory: clear to auscultation B/L; no wheezing, no rales, no rhonchi  Gastrointestinal: soft, NT/ND; +BSx4, no organomegaly  Extremities: WWP; no edema, clubbing or cyanosis  Vascular: 2+ radial, DP/PT pulses B/L  Neurological: AAOx3; no focal deficits    MEDICATIONS:  MEDICATIONS  (STANDING):  atorvastatin 80 milliGRAM(s) Oral at bedtime  darunavir 600 milliGRAM(s) Oral <User Schedule>  dextrose 5%. 1000 milliLiter(s) (50 mL/Hr) IV Continuous <Continuous>  dextrose 50% Injectable 12.5 Gram(s) IV Push once  dextrose 50% Injectable 25 Gram(s) IV Push once  dextrose 50% Injectable 25 Gram(s) IV Push once  emtricitabine 200 mG/tenofovir alafenamide 25 mG (DESCOVY) Tablet 1 Tablet(s) Oral daily  enoxaparin Injectable 40 milliGRAM(s) SubCutaneous every 24 hours  etravirine 200 milliGRAM(s) Oral <User Schedule>  famotidine    Tablet 20 milliGRAM(s) Oral daily  guaiFENesin   Syrup  (Sugar-Free) 200 milliGRAM(s) Oral two times a day  influenza   Vaccine 0.5 milliLiter(s) IntraMuscular once  insulin lispro (HumaLOG) corrective regimen sliding scale   SubCutaneous Before meals and at bedtime  lactobacillus acidophilus and bulgaricus Chewable 1 Tablet(s) Chew daily  levothyroxine 125 MICROGram(s) Oral daily  loperamide 2 milliGRAM(s) Oral <User Schedule>  losartan 50 milliGRAM(s) Oral daily  raltegravir Tablet 400 milliGRAM(s) Oral <User Schedule>  ritonavir Tablet 100 milliGRAM(s) Oral two times a day  tamsulosin 0.4 milliGRAM(s) Oral at bedtime  trimethoprim  160 mG/sulfamethoxazole 800 mG 1 Tablet(s) Oral daily  valACYclovir 500 milliGRAM(s) Oral two times a day    MEDICATIONS  (PRN):  acetaminophen   Tablet .. 975 milliGRAM(s) Oral every 8 hours PRN Temp greater or equal to 38C (100.4F)  benzonatate 100 milliGRAM(s) Oral three times a day PRN Cough  dextrose 40% Gel 15 Gram(s) Oral once PRN Blood Glucose LESS THAN 70 milliGRAM(s)/deciliter  glucagon  Injectable 1 milliGRAM(s) IntraMuscular once PRN Glucose LESS THAN 70 milligrams/deciliter      ALLERGIES:  Allergies    No Known Allergies    Intolerances        LABS:                        17.4   9.95  )-----------( 208      ( 11 Apr 2020 10:27 )             50.5     04-11    135  |  103  |  22  ----------------------------<  150<H>  4.2   |  24  |  1.14    Ca    8.9      11 Apr 2020 10:27    CAPILLARY BLOOD GLUCOSE      POCT Blood Glucose.: 83 mg/dL (11 Apr 2020 12:47)    MRSA/MSSA PCR (04.02.20 @ 16:35)    MRSA PCR Result.: Positive: The [employer] Xpert SA Nasal Complete Assay performed on the [employer] Dx  System is a qualitative in vitro diagnostic test using PCR for the rapid  detection of Staphylococcus Aureus(SA)and methicillin - resistant  Staphylococcus Aureus(MRSA)DNA from nasal swabs in patients at risk for  nasal colonization.  It is not intended to diagnose, guide or monitor  treatment for MRSA/SA infections.  A negative result does not preclude  MRSA/SA nasal colonization.  The assay is FDA-approved and its  performance has been established by [employer], [employer] and the [facility] [place], [place]    Staph Aureus PCR Result: Positive    Culture - Blood (04.02.20 @ 16:09)    Specimen Source: .Blood Blood    Culture Results:   No growth    Culture - Blood (04.02.20 @ 16:09)    Specimen Source: .Blood Blood    Culture Results:   No growth    Cryptosporidium Smear (04.01.20 @ 00:57)    Specimen Source: .Stool    Culture Results:   No Cryptosporidium, Cyclospora sp. or Isospora found by modified acid  fast stain    RADIOLOGY & ADDITIONAL TESTS:   Xray Chest 1 View-PORTABLE IMMEDIATE (04.11.20 @ 10:10)  Portable examination chest demonstrates mild progression lung infiltrates in comparison to prior examination of the chest 4/6/2020.    Impression: Mild progression lung infiltrates Transfer Acceptance Note: [place] to Telemetry unit    Hospital course  Patient is a 60 yo M with a PMH of HIV (CD4 103, VL undetectable in March 2020), tonsilar CA (s/p CRT and RT), BPH, HLD, hypothyroidism, sleep apnea, COVID +ve, recently admitted on 3/30 at [facility], transferred to [place] on 4/8. At [place], on 4/11 overnight, SpO2 80s on 4-5L NC, he was placed on a NRB 15 L at 4:30am. Pt saturations in the low 80's, was placed on a venti mask with improvement to SpO2 low 90's. CXR showed progression of infiltrates compared to previous CXR on 4/6. Given pt's increasing O2 requirements, he was transferred to [facility] telemetry unit for further monitoring.    Subjective: patient seen and examined at bedside, in NAD. On Venti mask, 15L, comfortable, watching TV    VITAL SIGNS:  Vital Signs Last 24 Hrs  T(C): 36.7 (11 Apr 2020 06:43), Max: 37.1 (10 Apr 2020 20:45)  T(F): 98 (11 Apr 2020 06:43), Max: 98.7 (10 Apr 2020 20:45)  HR: 84 (11 Apr 2020 14:51) (71 - 93)  BP: 110/75 (11 Apr 2020 14:51) (110/75 - 136/93)  BP(mean): 89 (11 Apr 2020 14:51) (89 - 89)  RR: 18 (11 Apr 2020 14:51) (16 - 18)  SpO2: 87% (11 Apr 2020 14:51) (80% - 93%)    PHYSICAL EXAM:  General: in NAD, lying comfortably in bed, NRBM in place  HEENT: normocephalic, atraumatic; PERRL, anicteric sclera; MMM  Neck: supple, no JVD, no thyromegaly, no lymphadenopathy  Cardiovascular: +S1/S2, RRR, no M/G/R  Respiratory: bibasilar rales, no wheezing, no accessory muscle use  Gastrointestinal: soft, NT/ND; +BSx4, no organomegaly  Extremities: WWP; no edema, clubbing or cyanosis  Vascular: 2+ radial, DP/PT pulses B/L  Neurological: AAOx3; no focal deficits    MEDICATIONS:  MEDICATIONS  (STANDING):  atorvastatin 80 milliGRAM(s) Oral at bedtime  darunavir 600 milliGRAM(s) Oral <User Schedule>  dextrose 5%. 1000 milliLiter(s) (50 mL/Hr) IV Continuous <Continuous>  dextrose 50% Injectable 12.5 Gram(s) IV Push once  dextrose 50% Injectable 25 Gram(s) IV Push once  dextrose 50% Injectable 25 Gram(s) IV Push once  emtricitabine 200 mG/tenofovir alafenamide 25 mG (DESCOVY) Tablet 1 Tablet(s) Oral daily  enoxaparin Injectable 40 milliGRAM(s) SubCutaneous every 24 hours  etravirine 200 milliGRAM(s) Oral <User Schedule>  famotidine    Tablet 20 milliGRAM(s) Oral daily  guaiFENesin   Syrup  (Sugar-Free) 200 milliGRAM(s) Oral two times a day  influenza   Vaccine 0.5 milliLiter(s) IntraMuscular once  insulin lispro (HumaLOG) corrective regimen sliding scale   SubCutaneous Before meals and at bedtime  lactobacillus acidophilus and bulgaricus Chewable 1 Tablet(s) Chew daily  levothyroxine 125 MICROGram(s) Oral daily  loperamide 2 milliGRAM(s) Oral <User Schedule>  losartan 50 milliGRAM(s) Oral daily  raltegravir Tablet 400 milliGRAM(s) Oral <User Schedule>  ritonavir Tablet 100 milliGRAM(s) Oral two times a day  tamsulosin 0.4 milliGRAM(s) Oral at bedtime  trimethoprim  160 mG/sulfamethoxazole 800 mG 1 Tablet(s) Oral daily  valACYclovir 500 milliGRAM(s) Oral two times a day    MEDICATIONS  (PRN):  acetaminophen   Tablet .. 975 milliGRAM(s) Oral every 8 hours PRN Temp greater or equal to 38C (100.4F)  benzonatate 100 milliGRAM(s) Oral three times a day PRN Cough  dextrose 40% Gel 15 Gram(s) Oral once PRN Blood Glucose LESS THAN 70 milliGRAM(s)/deciliter  glucagon  Injectable 1 milliGRAM(s) IntraMuscular once PRN Glucose LESS THAN 70 milligrams/deciliter      ALLERGIES:  Allergies    No Known Allergies    Intolerances        LABS:                        17.4   9.95  )-----------( 208      ( 11 Apr 2020 10:27 )             50.5     04-11    135  |  103  |  22  ----------------------------<  150<H>  4.2   |  24  |  1.14    Ca    8.9      11 Apr 2020 10:27    CAPILLARY BLOOD GLUCOSE      POCT Blood Glucose.: 83 mg/dL (11 Apr 2020 12:47)    MRSA/MSSA PCR (04.02.20 @ 16:35)    MRSA PCR Result.: Positive: The [employer] Xpert SA Nasal Complete Assay performed on the [employer]  System is a qualitative in vitro diagnostic test using PCR for the rapid  detection of Staphylococcus Aureus(SA)and methicillin - resistant  Staphylococcus Aureus(MRSA)DNA from nasal swabs in patients at risk for  nasal colonization.  It is not intended to diagnose, guide or monitor  treatment for MRSA/SA infections.  A negative result does not preclude  MRSA/SA nasal colonization.  The assay is FDA-approved and its  performance has been established by [employer], [employer] and the [facility], [place]    Staph Aureus PCR Result: Positive    Culture - Blood (04.02.20 @ 16:09)    Specimen Source: .Blood Blood    Culture Results:   No growth    Culture - Blood (04.02.20 @ 16:09)    Specimen Source: .Blood Blood    Culture Results:   No growth    Cryptosporidium Smear (04.01.20 @ 00:57)    Specimen Source: .Stool    Culture Results:   No Cryptosporidium, Cyclospora sp. or Isospora found by modified acid  fast stain    RADIOLOGY & ADDITIONAL TESTS:   Xray Chest 1 View-PORTABLE IMMEDIATE (04.11.20 @ 10:10)  Portable examination chest demonstrates mild progression lung infiltrates in comparison to prior examination of the chest 4/6/2020.    Impression: Mild progression lung infiltrates

## 2020-04-11 NOTE — PROGRESS NOTE ADULT - PROBLEM SELECTOR PLAN 2
Presenting with cough, SOB, mild hypoxia and hx concerning for respiratory infection. CXR showing b/l patchy opacifications.  CT Chest showing b/l GGO concerning for COVID, viral, or atypical PNA. Likely viral pneumonia 2/2 COVID-19 with for superimposed bacterial pneumonia. No lymphopenia and +neutrophilia. After admission SBP 90s - 140s, febrile. Originally started on CAP tx; MRSA swab positive. Now on vanc & zosyn & bactrim.  - blood Cx NGTD    - given acute drop in CD4 to <200 (>200 prior to admission per pt), discharge with Bactrim for PCP ppx COVID-19 vs MRSA PNA vs cannot r/o PCP PNA.   s/p vanc & zosyn  on bactrim for pcp ppx COVID-19 vs MRSA PNA vs cannot r/o PCP PNA. s/p vanc x 8 doses (last dose 4/9), On bactrim for pcp ppx.  - f/u sputum cx  - f/u procalcitonin  - monitor respiratory status

## 2020-04-11 NOTE — PROGRESS NOTE ADULT - PROBLEM SELECTOR PLAN 6
Hx of HLD  - restarted therapeutic interchange of home crestor    #HTN  pt denied hx of HTN however has losartan as home medication  - hold anti HTNs in setting of likely infection  - restart if elevated BPs  - monitor BPs    #hypothyroidism, TSH 2.5  - c/w home levothyroxine 125mcg daily Hx of HLD, on home Crestor 20mg  - Lipitor 40mg qhs, therapeutic interchange  - f/u CK level    #HTN  - c/w Losartan 50mg qd

## 2020-04-12 LAB
ALBUMIN SERPL ELPH-MCNC: 2.4 G/DL — LOW (ref 3.3–5)
ALP SERPL-CCNC: 49 U/L — SIGNIFICANT CHANGE UP (ref 40–120)
ALT FLD-CCNC: 28 U/L — SIGNIFICANT CHANGE UP (ref 10–45)
ANION GAP SERPL CALC-SCNC: 10 MMOL/L — SIGNIFICANT CHANGE UP (ref 5–17)
AST SERPL-CCNC: 29 U/L — SIGNIFICANT CHANGE UP (ref 10–40)
BASOPHILS # BLD AUTO: 0.01 K/UL — SIGNIFICANT CHANGE UP (ref 0–0.2)
BASOPHILS NFR BLD AUTO: 0.1 % — SIGNIFICANT CHANGE UP (ref 0–2)
BILIRUB SERPL-MCNC: 0.5 MG/DL — SIGNIFICANT CHANGE UP (ref 0.2–1.2)
BUN SERPL-MCNC: 20 MG/DL — SIGNIFICANT CHANGE UP (ref 7–23)
CALCIUM SERPL-MCNC: 8.8 MG/DL — SIGNIFICANT CHANGE UP (ref 8.4–10.5)
CHLORIDE SERPL-SCNC: 102 MMOL/L — SIGNIFICANT CHANGE UP (ref 96–108)
CK SERPL-CCNC: 76 U/L — SIGNIFICANT CHANGE UP (ref 30–200)
CO2 SERPL-SCNC: 23 MMOL/L — SIGNIFICANT CHANGE UP (ref 22–31)
CREAT SERPL-MCNC: 0.87 MG/DL — SIGNIFICANT CHANGE UP (ref 0.5–1.3)
CRP SERPL-MCNC: 2.87 MG/DL — HIGH (ref 0–0.4)
D DIMER BLD IA.RAPID-MCNC: 3485 NG/ML DDU — HIGH
EOSINOPHIL # BLD AUTO: 0.06 K/UL — SIGNIFICANT CHANGE UP (ref 0–0.5)
EOSINOPHIL NFR BLD AUTO: 0.7 % — SIGNIFICANT CHANGE UP (ref 0–6)
FERRITIN SERPL-MCNC: 1367 NG/ML — HIGH (ref 30–400)
GLUCOSE BLDC GLUCOMTR-MCNC: 111 MG/DL — HIGH (ref 70–99)
GLUCOSE BLDC GLUCOMTR-MCNC: 83 MG/DL — SIGNIFICANT CHANGE UP (ref 70–99)
GLUCOSE BLDC GLUCOMTR-MCNC: 86 MG/DL — SIGNIFICANT CHANGE UP (ref 70–99)
GLUCOSE BLDC GLUCOMTR-MCNC: 88 MG/DL — SIGNIFICANT CHANGE UP (ref 70–99)
GLUCOSE SERPL-MCNC: 71 MG/DL — SIGNIFICANT CHANGE UP (ref 70–99)
HCT VFR BLD CALC: 50.2 % — HIGH (ref 39–50)
HGB BLD-MCNC: 16 G/DL — SIGNIFICANT CHANGE UP (ref 13–17)
IMM GRANULOCYTES NFR BLD AUTO: 1.7 % — HIGH (ref 0–1.5)
LDH SERPL L TO P-CCNC: 449 U/L — HIGH (ref 50–242)
LYMPHOCYTES # BLD AUTO: 0.21 K/UL — LOW (ref 1–3.3)
LYMPHOCYTES # BLD AUTO: 2.3 % — LOW (ref 13–44)
MAGNESIUM SERPL-MCNC: 2 MG/DL — SIGNIFICANT CHANGE UP (ref 1.6–2.6)
MCHC RBC-ENTMCNC: 28.4 PG — SIGNIFICANT CHANGE UP (ref 27–34)
MCHC RBC-ENTMCNC: 31.9 GM/DL — LOW (ref 32–36)
MCV RBC AUTO: 89.2 FL — SIGNIFICANT CHANGE UP (ref 80–100)
MONOCYTES # BLD AUTO: 0.26 K/UL — SIGNIFICANT CHANGE UP (ref 0–0.9)
MONOCYTES NFR BLD AUTO: 2.8 % — SIGNIFICANT CHANGE UP (ref 2–14)
NEUTROPHILS # BLD AUTO: 8.51 K/UL — HIGH (ref 1.8–7.4)
NEUTROPHILS NFR BLD AUTO: 92.4 % — HIGH (ref 43–77)
NRBC # BLD: 0 /100 WBCS — SIGNIFICANT CHANGE UP (ref 0–0)
PHOSPHATE SERPL-MCNC: 1.9 MG/DL — LOW (ref 2.5–4.5)
PLATELET # BLD AUTO: 166 K/UL — SIGNIFICANT CHANGE UP (ref 150–400)
POTASSIUM SERPL-MCNC: 4.2 MMOL/L — SIGNIFICANT CHANGE UP (ref 3.5–5.3)
POTASSIUM SERPL-SCNC: 4.2 MMOL/L — SIGNIFICANT CHANGE UP (ref 3.5–5.3)
PROCALCITONIN SERPL-MCNC: 0.05 NG/ML — SIGNIFICANT CHANGE UP (ref 0.02–0.1)
PROT SERPL-MCNC: 5.5 G/DL — LOW (ref 6–8.3)
RBC # BLD: 5.63 M/UL — SIGNIFICANT CHANGE UP (ref 4.2–5.8)
RBC # FLD: 14.1 % — SIGNIFICANT CHANGE UP (ref 10.3–14.5)
SODIUM SERPL-SCNC: 135 MMOL/L — SIGNIFICANT CHANGE UP (ref 135–145)
WBC # BLD: 9.21 K/UL — SIGNIFICANT CHANGE UP (ref 3.8–10.5)
WBC # FLD AUTO: 9.21 K/UL — SIGNIFICANT CHANGE UP (ref 3.8–10.5)

## 2020-04-12 PROCEDURE — 99291 CRITICAL CARE FIRST HOUR: CPT | Mod: CS

## 2020-04-12 PROCEDURE — 93970 EXTREMITY STUDY: CPT | Mod: 26,CS

## 2020-04-12 RX ORDER — SODIUM CHLORIDE 9 MG/ML
1000 INJECTION, SOLUTION INTRAVENOUS
Refills: 0 | Status: DISCONTINUED | OUTPATIENT
Start: 2020-04-12 | End: 2020-04-12

## 2020-04-12 RX ADMIN — ENOXAPARIN SODIUM 80 MILLIGRAM(S): 100 INJECTION SUBCUTANEOUS at 18:39

## 2020-04-12 RX ADMIN — ETRAVIRINE 200 MILLIGRAM(S): 200 TABLET ORAL at 12:28

## 2020-04-12 RX ADMIN — TAMSULOSIN HYDROCHLORIDE 0.4 MILLIGRAM(S): 0.4 CAPSULE ORAL at 22:53

## 2020-04-12 RX ADMIN — Medication 200 MILLIGRAM(S): at 06:48

## 2020-04-12 RX ADMIN — EMTRICITABINE AND TENOFOVIR DISOPROXIL FUMARATE 1 TABLET(S): 200; 300 TABLET, FILM COATED ORAL at 12:29

## 2020-04-12 RX ADMIN — LOSARTAN POTASSIUM 50 MILLIGRAM(S): 100 TABLET, FILM COATED ORAL at 06:47

## 2020-04-12 RX ADMIN — RITONAVIR 100 MILLIGRAM(S): 100 TABLET, FILM COATED ORAL at 12:26

## 2020-04-12 RX ADMIN — FAMOTIDINE 20 MILLIGRAM(S): 10 INJECTION INTRAVENOUS at 12:28

## 2020-04-12 RX ADMIN — RALTEGRAVIR 400 MILLIGRAM(S): 400 TABLET, FILM COATED ORAL at 12:26

## 2020-04-12 RX ADMIN — DARUNAVIR 600 MILLIGRAM(S): 75 TABLET, FILM COATED ORAL at 12:28

## 2020-04-12 RX ADMIN — Medication 125 MICROGRAM(S): at 06:47

## 2020-04-12 RX ADMIN — ETRAVIRINE 200 MILLIGRAM(S): 200 TABLET ORAL at 18:38

## 2020-04-12 RX ADMIN — Medication 1 TABLET(S): at 12:36

## 2020-04-12 RX ADMIN — ENOXAPARIN SODIUM 80 MILLIGRAM(S): 100 INJECTION SUBCUTANEOUS at 06:47

## 2020-04-12 RX ADMIN — LACTOBACILLUS ACIDOPH-L.BULGARICUS 1 MILLION CELL CHEWABLE TABLET 1 TABLET(S): at 12:26

## 2020-04-12 RX ADMIN — RALTEGRAVIR 400 MILLIGRAM(S): 400 TABLET, FILM COATED ORAL at 18:35

## 2020-04-12 RX ADMIN — Medication 200 MILLIGRAM(S): at 18:39

## 2020-04-12 RX ADMIN — VALACYCLOVIR 500 MILLIGRAM(S): 500 TABLET, FILM COATED ORAL at 12:27

## 2020-04-12 RX ADMIN — ATORVASTATIN CALCIUM 40 MILLIGRAM(S): 80 TABLET, FILM COATED ORAL at 22:53

## 2020-04-12 RX ADMIN — DARUNAVIR 600 MILLIGRAM(S): 75 TABLET, FILM COATED ORAL at 18:37

## 2020-04-12 NOTE — PROGRESS NOTE ADULT - SUBJECTIVE AND OBJECTIVE BOX
INCOMPLETE\    INTERVAL HPI/OVERNIGHT EVENTS:    SUBJECTIVE: Patient seen and examined at bedside.     CONSTITUTIONAL: No weakness, fevers or chills  EYES/ENT: No visual changes;  No vertigo or throat pain   NECK: No pain or stiffness  RESPIRATORY: No cough, wheezing, hemoptysis; No shortness of breath  CARDIOVASCULAR: No chest pain or palpitations  GASTROINTESTINAL: No abdominal or epigastric pain. No nausea, vomiting, or hematemesis; No diarrhea or constipation. No melena or hematochezia.  GENITOURINARY: No dysuria, frequency or hematuria  NEUROLOGICAL: No numbness or weakness  SKIN: No itching, rashes    OBJECTIVE:    VITAL SIGNS:  ICU Vital Signs Last 24 Hrs  T(C): 36 (12 Apr 2020 18:50), Max: 36.7 (11 Apr 2020 20:40)  T(F): 96.8 (12 Apr 2020 18:50), Max: 98 (11 Apr 2020 20:40)  HR: 68 (12 Apr 2020 18:50) (62 - 82)  BP: 120/79 (12 Apr 2020 18:50) (95/62 - 132/74)  BP(mean): 95 (12 Apr 2020 18:50) (74 - 98)  ABP: --  ABP(mean): --  RR: 19 (12 Apr 2020 18:50) (19 - 31)  SpO2: 96% (12 Apr 2020 18:50) (91% - 96%)        04-12 @ 07:01  -  04-12 @ 20:24  --------------------------------------------------------  IN: 0 mL / OUT: 275 mL / NET: -275 mL      CAPILLARY BLOOD GLUCOSE      POCT Blood Glucose.: 86 mg/dL (12 Apr 2020 17:25)      PHYSICAL EXAM:    General: NAD  HEENT: NC/AT; PERRL, clear conjunctiva  Neck: supple  Respiratory: CTA b/l  Cardiovascular: +S1/S2; RRR  Abdomen: soft, NT/ND; +BS x4  Extremities: WWP, 2+ peripheral pulses b/l; no LE edema  Skin: normal color and turgor; no rash  Neurological:    MEDICATIONS:  MEDICATIONS  (STANDING):  atorvastatin 40 milliGRAM(s) Oral at bedtime  darunavir 600 milliGRAM(s) Oral <User Schedule>  dextrose 5%. 1000 milliLiter(s) (50 mL/Hr) IV Continuous <Continuous>  dextrose 50% Injectable 12.5 Gram(s) IV Push once  dextrose 50% Injectable 25 Gram(s) IV Push once  dextrose 50% Injectable 25 Gram(s) IV Push once  emtricitabine 200 mG/tenofovir alafenamide 25 mG (DESCOVY) Tablet 1 Tablet(s) Oral daily  enoxaparin Injectable 80 milliGRAM(s) SubCutaneous every 12 hours  etravirine 200 milliGRAM(s) Oral <User Schedule>  famotidine    Tablet 20 milliGRAM(s) Oral daily  guaiFENesin   Syrup  (Sugar-Free) 200 milliGRAM(s) Oral two times a day  influenza   Vaccine 0.5 milliLiter(s) IntraMuscular once  insulin lispro (HumaLOG) corrective regimen sliding scale   SubCutaneous Before meals and at bedtime  lactobacillus acidophilus and bulgaricus Chewable 1 Tablet(s) Chew daily  levothyroxine 125 MICROGram(s) Oral daily  losartan 50 milliGRAM(s) Oral daily  raltegravir Tablet 400 milliGRAM(s) Oral <User Schedule>  ritonavir Tablet 100 milliGRAM(s) Oral two times a day  tamsulosin 0.4 milliGRAM(s) Oral at bedtime  trimethoprim  160 mG/sulfamethoxazole 800 mG 1 Tablet(s) Oral daily  valACYclovir 500 milliGRAM(s) Oral two times a day    MEDICATIONS  (PRN):  acetaminophen   Tablet .. 975 milliGRAM(s) Oral every 8 hours PRN Temp greater or equal to 38C (100.4F)  dextrose 40% Gel 15 Gram(s) Oral once PRN Blood Glucose LESS THAN 70 milliGRAM(s)/deciliter  glucagon  Injectable 1 milliGRAM(s) IntraMuscular once PRN Glucose LESS THAN 70 milligrams/deciliter      ALLERGIES:  Allergies    No Known Allergies    Intolerances        LABS:                        16.0   9.21  )-----------( 166      ( 12 Apr 2020 09:34 )             50.2     04-12    135  |  102  |  20  ----------------------------<  71  4.2   |  23  |  0.87    Ca    8.8      12 Apr 2020 09:34  Phos  1.9     04-12  Mg     2.0     04-12    TPro  5.5<L>  /  Alb  2.4<L>  /  TBili  0.5  /  DBili  x   /  AST  29  /  ALT  28  /  AlkPhos  49  04-12          RADIOLOGY & ADDITIONAL TESTS: Reviewed. INTERVAL HPI/OVERNIGHT EVENTS: ESTEFANY     SUBJECTIVE: Patient seen and examined at bedside. Has no acute complaints, satting well on venturi mask     OBJECTIVE:    VITAL SIGNS:  ICU Vital Signs Last 24 Hrs  T(C): 36 (12 Apr 2020 18:50), Max: 36.7 (11 Apr 2020 20:40)  T(F): 96.8 (12 Apr 2020 18:50), Max: 98 (11 Apr 2020 20:40)  HR: 68 (12 Apr 2020 18:50) (62 - 82)  BP: 120/79 (12 Apr 2020 18:50) (95/62 - 132/74)  BP(mean): 95 (12 Apr 2020 18:50) (74 - 98)  ABP: --  ABP(mean): --  RR: 19 (12 Apr 2020 18:50) (19 - 31)  SpO2: 96% (12 Apr 2020 18:50) (91% - 96%)        04-12 @ 07:01  -  04-12 @ 20:24  --------------------------------------------------------  IN: 0 mL / OUT: 275 mL / NET: -275 mL      CAPILLARY BLOOD GLUCOSE      POCT Blood Glucose.: 86 mg/dL (12 Apr 2020 17:25)      PHYSICAL EXAM:    General: NAD  HEENT: NC/AT; PERRL, clear conjunctiva  Neck: supple  Respiratory: bibasilar crackles   Cardiovascular: +S1/S2; RRR  Abdomen: soft, NT/ND; +BS x4  Extremities: WWP, 2+ peripheral pulses b/l; no LE edema  Skin: normal color and turgor; no rash  Neurological: AAOx3, no deficits    MEDICATIONS:  MEDICATIONS  (STANDING):  atorvastatin 40 milliGRAM(s) Oral at bedtime  darunavir 600 milliGRAM(s) Oral <User Schedule>  dextrose 5%. 1000 milliLiter(s) (50 mL/Hr) IV Continuous <Continuous>  dextrose 50% Injectable 12.5 Gram(s) IV Push once  dextrose 50% Injectable 25 Gram(s) IV Push once  dextrose 50% Injectable 25 Gram(s) IV Push once  emtricitabine 200 mG/tenofovir alafenamide 25 mG (DESCOVY) Tablet 1 Tablet(s) Oral daily  enoxaparin Injectable 80 milliGRAM(s) SubCutaneous every 12 hours  etravirine 200 milliGRAM(s) Oral <User Schedule>  famotidine    Tablet 20 milliGRAM(s) Oral daily  guaiFENesin   Syrup  (Sugar-Free) 200 milliGRAM(s) Oral two times a day  influenza   Vaccine 0.5 milliLiter(s) IntraMuscular once  insulin lispro (HumaLOG) corrective regimen sliding scale   SubCutaneous Before meals and at bedtime  lactobacillus acidophilus and bulgaricus Chewable 1 Tablet(s) Chew daily  levothyroxine 125 MICROGram(s) Oral daily  losartan 50 milliGRAM(s) Oral daily  raltegravir Tablet 400 milliGRAM(s) Oral <User Schedule>  ritonavir Tablet 100 milliGRAM(s) Oral two times a day  tamsulosin 0.4 milliGRAM(s) Oral at bedtime  trimethoprim  160 mG/sulfamethoxazole 800 mG 1 Tablet(s) Oral daily  valACYclovir 500 milliGRAM(s) Oral two times a day    MEDICATIONS  (PRN):  acetaminophen   Tablet .. 975 milliGRAM(s) Oral every 8 hours PRN Temp greater or equal to 38C (100.4F)  dextrose 40% Gel 15 Gram(s) Oral once PRN Blood Glucose LESS THAN 70 milliGRAM(s)/deciliter  glucagon  Injectable 1 milliGRAM(s) IntraMuscular once PRN Glucose LESS THAN 70 milligrams/deciliter      ALLERGIES:  Allergies    No Known Allergies    Intolerances        LABS:                        16.0   9.21  )-----------( 166      ( 12 Apr 2020 09:34 )             50.2     04-12    135  |  102  |  20  ----------------------------<  71  4.2   |  23  |  0.87    Ca    8.8      12 Apr 2020 09:34  Phos  1.9     04-12  Mg     2.0     04-12    TPro  5.5<L>  /  Alb  2.4<L>  /  TBili  0.5  /  DBili  x   /  AST  29  /  ALT  28  /  AlkPhos  49  04-12          RADIOLOGY & ADDITIONAL TESTS: Reviewed.

## 2020-04-12 NOTE — PROGRESS NOTE ADULT - ATTENDING COMMENTS
See the Resident note written above, for details. I reviewed the resident documentation. I have personally seen and examined this critically ill patient. I have reviewed vitals, labs, medications, and additional imaging. I agree with the resident's findings and plans as written above with the following additions/amendments:  59 HIV patient with MONICA, HLD, BPH, hypothyroid, and h/o tonsilar CA s/p resection p/with acute hypoxemic respiratory failure due to COVID 19 requiring NRB, transferred back to [facility] from [facility] for continued care. Patient completed course of HC/A, 5D medrol, and received toci during previous admission. Patient also received 4D ceftriaxone for ?CAP, vanco and ?zosyn for PNA, course narrowed to vanco after MRSA swab returned positive. Patient noted to be 80% on nasal cannula at [facility] and patient transitioned to NRB and transferred to [facility].   Awake prone and titrate down on venti support  COVID treatment completed  Obtain sputum culture  Hold on abx at this time, check procalcitonin  Awaiting ID HIV team consult  Continue ppx dose bactrim pending ID input, cont HAART, cont valtrex ppx  Initiate empiric tx dose lovenox, awaiting LE duplex  QTc 580ms, recheck PM QTc  Atorva 40 (therapeutic interchange for home rosuva 20)  HOLD hold antiHTN Losartan 50 and CTM BPs  GI protection with famotidine, IPCs  PETEY Franco.  COVID MICU Attending

## 2020-04-12 NOTE — PROGRESS NOTE ADULT - ASSESSMENT
60 yo M with a PMH of HIV (CD4 103, VL undetectable in March 2020), tonsilar CA (s/p CRT and RT), BPH, HLD, hypothyroidism, sleep apnea, COVID +ve, recently admitted on 3/30 at [facility], transferred to [facility] on 4/8. Presenting with hypoxic respiratory failure requiring 15L NRB. CXR showed progression of infiltrates compared to previous CXR on 4/6. Given pt's increasing O2 requirements, he was transferred to [facility] telemetry unit for further monitoring.    #Acute hypoxic respiratory failure  likely 2/2 COVID-19 PNA vs MRSA PNA,   s/p COVID treatment with Tocilizumab (04/03), Azithro/Plaquenil (04/02-04/06) and Solumedrol (04/03-04/08). s/p Vancomycin x 8 doses (last dose 4/9)  - weaned from NRB to venturi mask, satting mid 90s  - wean off O2 as tolerated  - awake proning  - f/u inflammatory markers    #COVID PNA   s/p treatment with 5 days of Azithro/plaquenil, solumedrol. s/p Toci on 4/3  - monitor on supplemental O2  - will consider restarting steroids if worsening respiratory status  - low suspicion for HAP given low pro danny and no focal infiltrates on imaging     #HIV  Hx of HIV, undetectable for 20 yrs per patient. Recent CD4 103, VL undetectable in March 2020   - c/w Home HIV regimen  - c/w Valtrex  - c/w Bactrim DS one tablet qd for PCP ppx, per HIV team hold off on PCP treatment     # r/o DVT  Patient with elevated d-dimer, COVID +ve which is known to cause a pro-thrombotic state  - monitor d-dimer  - f/u doppler U/S    Hx of HLD, on home Crestor 20mg  - Lipitor 40mg qhs, therapeutic interchange  - f/u CK level    #HTN  - c/w Losartan 50mg qd    #BPH  - c/w tamsulosin    #Hypothyroidism  - c/w synthroid 125mcg daily    F: none  E: replete PRN  N: DASH/TLC  DVT ppx: Lovenox 80mg BID  GI ppx: Famotidine 20 qd

## 2020-04-13 LAB
ALBUMIN SERPL ELPH-MCNC: 2.8 G/DL — LOW (ref 3.3–5)
ALP SERPL-CCNC: 52 U/L — SIGNIFICANT CHANGE UP (ref 40–120)
ALT FLD-CCNC: 27 U/L — SIGNIFICANT CHANGE UP (ref 10–45)
ANION GAP SERPL CALC-SCNC: 9 MMOL/L — SIGNIFICANT CHANGE UP (ref 5–17)
AST SERPL-CCNC: 31 U/L — SIGNIFICANT CHANGE UP (ref 10–40)
BASOPHILS # BLD AUTO: 0.01 K/UL — SIGNIFICANT CHANGE UP (ref 0–0.2)
BASOPHILS NFR BLD AUTO: 0.1 % — SIGNIFICANT CHANGE UP (ref 0–2)
BILIRUB SERPL-MCNC: 0.5 MG/DL — SIGNIFICANT CHANGE UP (ref 0.2–1.2)
BUN SERPL-MCNC: 14 MG/DL — SIGNIFICANT CHANGE UP (ref 7–23)
CALCIUM SERPL-MCNC: 8.7 MG/DL — SIGNIFICANT CHANGE UP (ref 8.4–10.5)
CHLORIDE SERPL-SCNC: 101 MMOL/L — SIGNIFICANT CHANGE UP (ref 96–108)
CO2 SERPL-SCNC: 26 MMOL/L — SIGNIFICANT CHANGE UP (ref 22–31)
CREAT SERPL-MCNC: 0.81 MG/DL — SIGNIFICANT CHANGE UP (ref 0.5–1.3)
CRP SERPL-MCNC: 2.16 MG/DL — HIGH (ref 0–0.4)
D DIMER BLD IA.RAPID-MCNC: 3095 NG/ML DDU — HIGH
EOSINOPHIL # BLD AUTO: 0.06 K/UL — SIGNIFICANT CHANGE UP (ref 0–0.5)
EOSINOPHIL NFR BLD AUTO: 0.6 % — SIGNIFICANT CHANGE UP (ref 0–6)
FERRITIN SERPL-MCNC: 1623 NG/ML — HIGH (ref 30–400)
GLUCOSE BLDC GLUCOMTR-MCNC: 89 MG/DL — SIGNIFICANT CHANGE UP (ref 70–99)
GLUCOSE BLDC GLUCOMTR-MCNC: 89 MG/DL — SIGNIFICANT CHANGE UP (ref 70–99)
GLUCOSE BLDC GLUCOMTR-MCNC: 90 MG/DL — SIGNIFICANT CHANGE UP (ref 70–99)
GLUCOSE SERPL-MCNC: 117 MG/DL — HIGH (ref 70–99)
HCT VFR BLD CALC: 50 % — SIGNIFICANT CHANGE UP (ref 39–50)
HGB BLD-MCNC: 16.2 G/DL — SIGNIFICANT CHANGE UP (ref 13–17)
IMM GRANULOCYTES NFR BLD AUTO: 0.8 % — SIGNIFICANT CHANGE UP (ref 0–1.5)
LDH SERPL L TO P-CCNC: 406 U/L — HIGH (ref 50–242)
LYMPHOCYTES # BLD AUTO: 0.18 K/UL — LOW (ref 1–3.3)
LYMPHOCYTES # BLD AUTO: 1.8 % — LOW (ref 13–44)
MAGNESIUM SERPL-MCNC: 1.8 MG/DL — SIGNIFICANT CHANGE UP (ref 1.6–2.6)
MCHC RBC-ENTMCNC: 29 PG — SIGNIFICANT CHANGE UP (ref 27–34)
MCHC RBC-ENTMCNC: 32.4 GM/DL — SIGNIFICANT CHANGE UP (ref 32–36)
MCV RBC AUTO: 89.4 FL — SIGNIFICANT CHANGE UP (ref 80–100)
MONOCYTES # BLD AUTO: 0.4 K/UL — SIGNIFICANT CHANGE UP (ref 0–0.9)
MONOCYTES NFR BLD AUTO: 3.9 % — SIGNIFICANT CHANGE UP (ref 2–14)
NEUTROPHILS # BLD AUTO: 9.43 K/UL — HIGH (ref 1.8–7.4)
NEUTROPHILS NFR BLD AUTO: 92.8 % — HIGH (ref 43–77)
NRBC # BLD: 0 /100 WBCS — SIGNIFICANT CHANGE UP (ref 0–0)
PHOSPHATE SERPL-MCNC: 1.9 MG/DL — LOW (ref 2.5–4.5)
PLATELET # BLD AUTO: 133 K/UL — LOW (ref 150–400)
POTASSIUM SERPL-MCNC: 4.1 MMOL/L — SIGNIFICANT CHANGE UP (ref 3.5–5.3)
POTASSIUM SERPL-SCNC: 4.1 MMOL/L — SIGNIFICANT CHANGE UP (ref 3.5–5.3)
PROT SERPL-MCNC: 5.7 G/DL — LOW (ref 6–8.3)
RBC # BLD: 5.59 M/UL — SIGNIFICANT CHANGE UP (ref 4.2–5.8)
RBC # FLD: 14.2 % — SIGNIFICANT CHANGE UP (ref 10.3–14.5)
SODIUM SERPL-SCNC: 136 MMOL/L — SIGNIFICANT CHANGE UP (ref 135–145)
WBC # BLD: 10.16 K/UL — SIGNIFICANT CHANGE UP (ref 3.8–10.5)
WBC # FLD AUTO: 10.16 K/UL — SIGNIFICANT CHANGE UP (ref 3.8–10.5)

## 2020-04-13 PROCEDURE — 99291 CRITICAL CARE FIRST HOUR: CPT | Mod: CS

## 2020-04-13 RX ORDER — ALPRAZOLAM 0.25 MG
0.25 TABLET ORAL AT BEDTIME
Refills: 0 | Status: DISCONTINUED | OUTPATIENT
Start: 2020-04-13 | End: 2020-04-20

## 2020-04-13 RX ORDER — ROSUVASTATIN CALCIUM 5 MG/1
20 TABLET ORAL AT BEDTIME
Refills: 0 | Status: DISCONTINUED | OUTPATIENT
Start: 2020-04-13 | End: 2020-05-01

## 2020-04-13 RX ORDER — SODIUM,POTASSIUM PHOSPHATES 278-250MG
1 POWDER IN PACKET (EA) ORAL ONCE
Refills: 0 | Status: COMPLETED | OUTPATIENT
Start: 2020-04-13 | End: 2020-04-13

## 2020-04-13 RX ORDER — ENOXAPARIN SODIUM 100 MG/ML
40 INJECTION SUBCUTANEOUS EVERY 24 HOURS
Refills: 0 | Status: DISCONTINUED | OUTPATIENT
Start: 2020-04-14 | End: 2020-04-17

## 2020-04-13 RX ORDER — ESCITALOPRAM OXALATE 10 MG/1
0 TABLET, FILM COATED ORAL
Qty: 0 | Refills: 0 | DISCHARGE

## 2020-04-13 RX ORDER — LANOLIN ALCOHOL/MO/W.PET/CERES
3 CREAM (GRAM) TOPICAL AT BEDTIME
Refills: 0 | Status: DISCONTINUED | OUTPATIENT
Start: 2020-04-13 | End: 2020-05-01

## 2020-04-13 RX ADMIN — ETRAVIRINE 200 MILLIGRAM(S): 200 TABLET ORAL at 12:27

## 2020-04-13 RX ADMIN — FAMOTIDINE 20 MILLIGRAM(S): 10 INJECTION INTRAVENOUS at 12:27

## 2020-04-13 RX ADMIN — Medication 125 MICROGRAM(S): at 06:52

## 2020-04-13 RX ADMIN — RALTEGRAVIR 400 MILLIGRAM(S): 400 TABLET, FILM COATED ORAL at 12:27

## 2020-04-13 RX ADMIN — VALACYCLOVIR 500 MILLIGRAM(S): 500 TABLET, FILM COATED ORAL at 00:36

## 2020-04-13 RX ADMIN — Medication 200 MILLIGRAM(S): at 06:51

## 2020-04-13 RX ADMIN — RITONAVIR 100 MILLIGRAM(S): 100 TABLET, FILM COATED ORAL at 12:28

## 2020-04-13 RX ADMIN — Medication 1 PACKET(S): at 13:25

## 2020-04-13 RX ADMIN — ETRAVIRINE 200 MILLIGRAM(S): 200 TABLET ORAL at 18:34

## 2020-04-13 RX ADMIN — Medication 1 TABLET(S): at 12:27

## 2020-04-13 RX ADMIN — VALACYCLOVIR 500 MILLIGRAM(S): 500 TABLET, FILM COATED ORAL at 12:28

## 2020-04-13 RX ADMIN — RALTEGRAVIR 400 MILLIGRAM(S): 400 TABLET, FILM COATED ORAL at 18:34

## 2020-04-13 RX ADMIN — ROSUVASTATIN CALCIUM 20 MILLIGRAM(S): 5 TABLET ORAL at 21:35

## 2020-04-13 RX ADMIN — LACTOBACILLUS ACIDOPH-L.BULGARICUS 1 MILLION CELL CHEWABLE TABLET 1 TABLET(S): at 12:27

## 2020-04-13 RX ADMIN — DARUNAVIR 600 MILLIGRAM(S): 75 TABLET, FILM COATED ORAL at 18:35

## 2020-04-13 RX ADMIN — ENOXAPARIN SODIUM 80 MILLIGRAM(S): 100 INJECTION SUBCUTANEOUS at 06:51

## 2020-04-13 RX ADMIN — TAMSULOSIN HYDROCHLORIDE 0.4 MILLIGRAM(S): 0.4 CAPSULE ORAL at 21:35

## 2020-04-13 RX ADMIN — DARUNAVIR 600 MILLIGRAM(S): 75 TABLET, FILM COATED ORAL at 12:28

## 2020-04-13 RX ADMIN — EMTRICITABINE AND TENOFOVIR DISOPROXIL FUMARATE 1 TABLET(S): 200; 300 TABLET, FILM COATED ORAL at 12:27

## 2020-04-13 RX ADMIN — Medication 3 MILLIGRAM(S): at 22:34

## 2020-04-13 RX ADMIN — RITONAVIR 100 MILLIGRAM(S): 100 TABLET, FILM COATED ORAL at 00:33

## 2020-04-13 RX ADMIN — Medication 200 MILLIGRAM(S): at 18:34

## 2020-04-13 NOTE — PROGRESS NOTE ADULT - ASSESSMENT
A/P: 59 M PMH of HIV (CD4 103, VL undetectable in March 2020), tonsilar CA (s/p CRT and RT), BPH, HLD, hypothyroidism, sleep apnea, COVID +ve, recently admitted on 3/30 at [facility], transferred to [facility] on 4/8. Presenting with hypoxic respiratory failure requiring 15L NRB.    NEURO: Tyelnol prn  CV: MAP>65  Pulm: weaned to Venturimask, encouraged proning  GI: DASH diet, Famotidine  : Voiding  Endo: ISS  ID: HIV home medication regimen: Ritonavir, Darunavir, Raltegravir, Bactrim daily for PCP ppx  COVID: s/p COVID treatment with Tocilizumab (04/03), Azithro/Plaquenil (04/02-04/06) and Solumedrol (04/03-04/08). s/p Vancomycin x 8 doses (last dose 4/9).   PPx: Lovenox 40 d/ SCDS for DVT ppx  Lines: PIV  PT/OT: not ordered

## 2020-04-13 NOTE — PROGRESS NOTE ADULT - ATTENDING COMMENTS
See the Resident note written above, for details. I reviewed the resident documentation. I have personally seen and examined this critically ill patient. I have reviewed vitals, labs, medications, and additional imaging. I agree with the resident's findings and plans as written above with the following additions/amendments:  59 HIV patient with MONICA, HLD, BPH, hypothyroid, and h/o tonsilar CA s/p resection p/with acute hypoxemic respiratory failure due to COVID 19 requiring NRB, transferred back to [facility] from [facility] for continued care. Patient completed course of HC/A, 5D medrol, and received toci during previous admission. Patient also received 4D ceftriaxone for ?CAP, vanco and reportedly zosyn for PNA, course narrowed to vanco after MRSA swab returned positive. Patient noted to be 80% on nasal cannula at [facility] and patient transitioned to NRB and transferred to [facility]. PCL negative  Awake prone and titrated down on 50% venti support  COVID treatment completed  Continue ppx dose bactrim per Dr. Bowles (HIV ID) cont HAART, cont valtrex ppx  Home Rosuva 20 given HAART interactions with Atorvastatin  HOLD hold antiHTN Losartan 50 and CTM BPs  Home synthroid  Famotidine, IPCs, lovenox ppx (LE duplex neg for DVT)  Melatonin at bedtime, Xanax prn for anxiety during proning  PETEY Franco.  ALLEGRA MICU Attending

## 2020-04-13 NOTE — PROGRESS NOTE ADULT - SUBJECTIVE AND OBJECTIVE BOX
Patient is a 59y old  Male who presents with a chief complaint of r/o COVID (12 Apr 2020 20:23)      INTERVAL HPI/OVERNIGHT EVENTS: Afebrile,         MEDICATIONS  (STANDING):  darunavir 600 milliGRAM(s) Oral <User Schedule>  dextrose 5%. 1000 milliLiter(s) (50 mL/Hr) IV Continuous <Continuous>  dextrose 50% Injectable 12.5 Gram(s) IV Push once  dextrose 50% Injectable 25 Gram(s) IV Push once  dextrose 50% Injectable 25 Gram(s) IV Push once  emtricitabine 200 mG/tenofovir alafenamide 25 mG (DESCOVY) Tablet 1 Tablet(s) Oral daily  etravirine 200 milliGRAM(s) Oral <User Schedule>  famotidine    Tablet 20 milliGRAM(s) Oral daily  guaiFENesin   Syrup  (Sugar-Free) 200 milliGRAM(s) Oral two times a day  influenza   Vaccine 0.5 milliLiter(s) IntraMuscular once  lactobacillus acidophilus and bulgaricus Chewable 1 Tablet(s) Chew daily  levothyroxine 125 MICROGram(s) Oral daily  raltegravir Tablet 400 milliGRAM(s) Oral <User Schedule>  ritonavir Tablet 100 milliGRAM(s) Oral two times a day  rosuvastatin 20 milliGRAM(s) Oral at bedtime  tamsulosin 0.4 milliGRAM(s) Oral at bedtime  trimethoprim  160 mG/sulfamethoxazole 800 mG 1 Tablet(s) Oral daily  valACYclovir 500 milliGRAM(s) Oral two times a day    MEDICATIONS  (PRN):  acetaminophen   Tablet .. 975 milliGRAM(s) Oral every 8 hours PRN Temp greater or equal to 38C (100.4F)  dextrose 40% Gel 15 Gram(s) Oral once PRN Blood Glucose LESS THAN 70 milliGRAM(s)/deciliter  glucagon  Injectable 1 milliGRAM(s) IntraMuscular once PRN Glucose LESS THAN 70 milligrams/deciliter      PHYSICAL EXAM:    ICU Vital Signs Last 24 Hrs  T(C): 36.5 (13 Apr 2020 16:18), Max: 36.6 (12 Apr 2020 21:45)  T(F): 97.7 (13 Apr 2020 16:18), Max: 97.8 (12 Apr 2020 21:45)  HR: 82 (13 Apr 2020 16:18) (74 - 92)  BP: 111/76 (13 Apr 2020 16:18) (107/75 - 127/68)  BP(mean): 90 (13 Apr 2020 16:18) (86 - 92)  ABP: --  ABP(mean): --  RR: 18 (13 Apr 2020 16:18) (17 - 18)  SpO2: 88% (13 Apr 2020 16:18) (83% - 97%)    I&O's Summary    12 Apr 2020 07:01  -  13 Apr 2020 07:00  --------------------------------------------------------  IN: 0 mL / OUT: 1175 mL / NET: -1175 mL    13 Apr 2020 07:01  -  13 Apr 2020 20:24  --------------------------------------------------------  IN: 0 mL / OUT: 400 mL / NET: -400 mL        GENERAL: well built, well nourished  HEAD:  Atraumatic, Normocephalic  EYES: EOMI, PERRLA, conjunctiva and sclera clear  ENT: No tonsillar erythema, exudates, or enlargement; Moist mucous membranes, Good dentition, No lesions  NECK: Supple, No JVD, Normal thyroid, no enlarged nodes  NERVOUS SYSTEM:  Alert & Oriented X3, Good concentration; Motor Strength 5/5 B/L upper and lower extremities; DTRs 2+ intact and symmetric, sensory intact  CHEST/LUNG: B/L good air entry; No rales, rhonchi, or wheezing  HEART: S1S2 normal, no S3, Regular rate and rhythm; No murmurs, rubs, or gallops  ABDOMEN: Soft, Nontender, Nondistended; Bowel sounds present  EXTREMITIES:  2+ Peripheral Pulses, No clubbing, cyanosis, or edema  LYMPH: No lymphadenopathy noted  SKIN: No rashes or lesions    LABS:                        16.2   10.16 )-----------( 133      ( 13 Apr 2020 09:56 )             50.0     04-13    136  |  101  |  14  ----------------------------<  117<H>  4.1   |  26  |  0.81    Ca    8.7      13 Apr 2020 09:56  Phos  1.9     04-13  Mg     1.8     04-13    TPro  5.7<L>  /  Alb  2.8<L>  /  TBili  0.5  /  DBili  x   /  AST  31  /  ALT  27  /  AlkPhos  52  04-13        CAPILLARY BLOOD GLUCOSE      POCT Blood Glucose.: 89 mg/dL (13 Apr 2020 16:37)  POCT Blood Glucose.: 90 mg/dL (13 Apr 2020 11:11)  POCT Blood Glucose.: 89 mg/dL (13 Apr 2020 06:57)  POCT Blood Glucose.: 111 mg/dL (12 Apr 2020 21:26)        RADIOLOGY & ADDITIONAL TESTS:    Consultant(s) Notes Reviewed:  [x ] YES  [ ] NO    Care Discussed with Consultants/Other Providers [ x] YES  [ ] NO Patient is a 59y old  Male who presents with a chief complaint of r/o COVID (12 Apr 2020 20:23)      INTERVAL HPI/OVERNIGHT EVENTS: Afebrile, VSS, ESTEFANY overnight, making appropriate UO        MEDICATIONS  (STANDING):  darunavir 600 milliGRAM(s) Oral <User Schedule>  dextrose 5%. 1000 milliLiter(s) (50 mL/Hr) IV Continuous <Continuous>  dextrose 50% Injectable 12.5 Gram(s) IV Push once  dextrose 50% Injectable 25 Gram(s) IV Push once  dextrose 50% Injectable 25 Gram(s) IV Push once  emtricitabine 200 mG/tenofovir alafenamide 25 mG (DESCOVY) Tablet 1 Tablet(s) Oral daily  etravirine 200 milliGRAM(s) Oral <User Schedule>  famotidine    Tablet 20 milliGRAM(s) Oral daily  guaiFENesin   Syrup  (Sugar-Free) 200 milliGRAM(s) Oral two times a day  influenza   Vaccine 0.5 milliLiter(s) IntraMuscular once  lactobacillus acidophilus and bulgaricus Chewable 1 Tablet(s) Chew daily  levothyroxine 125 MICROGram(s) Oral daily  raltegravir Tablet 400 milliGRAM(s) Oral <User Schedule>  ritonavir Tablet 100 milliGRAM(s) Oral two times a day  rosuvastatin 20 milliGRAM(s) Oral at bedtime  tamsulosin 0.4 milliGRAM(s) Oral at bedtime  trimethoprim  160 mG/sulfamethoxazole 800 mG 1 Tablet(s) Oral daily  valACYclovir 500 milliGRAM(s) Oral two times a day    MEDICATIONS  (PRN):  acetaminophen   Tablet .. 975 milliGRAM(s) Oral every 8 hours PRN Temp greater or equal to 38C (100.4F)  dextrose 40% Gel 15 Gram(s) Oral once PRN Blood Glucose LESS THAN 70 milliGRAM(s)/deciliter  glucagon  Injectable 1 milliGRAM(s) IntraMuscular once PRN Glucose LESS THAN 70 milligrams/deciliter      PHYSICAL EXAM:    ICU Vital Signs Last 24 Hrs  T(C): 36.5 (13 Apr 2020 16:18), Max: 36.6 (12 Apr 2020 21:45)  T(F): 97.7 (13 Apr 2020 16:18), Max: 97.8 (12 Apr 2020 21:45)  HR: 82 (13 Apr 2020 16:18) (74 - 92)  BP: 111/76 (13 Apr 2020 16:18) (107/75 - 127/68)  BP(mean): 90 (13 Apr 2020 16:18) (86 - 92)  ABP: --  ABP(mean): --  RR: 18 (13 Apr 2020 16:18) (17 - 18)  SpO2: 88% (13 Apr 2020 16:18) (83% - 97%)    I&O's Summary    12 Apr 2020 07:01  -  13 Apr 2020 07:00  --------------------------------------------------------  IN: 0 mL / OUT: 1175 mL / NET: -1175 mL    13 Apr 2020 07:01  -  13 Apr 2020 20:24  --------------------------------------------------------  IN: 0 mL / OUT: 400 mL / NET: -400 mL      General: NAD, sitting comfortably  Neuro: AAOX3, EOMI, PERRLA, no scleral icterus  Pulm: Decreased breath sounds b/l, saturating well on 15L NRB  CV: S1/S2 normal, no murmurs  Abdomen: soft, mild distention nontender, no rebound, no guarding  Extremities: WWP, No LE edema b/l      LABS:                        16.2   10.16 )-----------( 133      ( 13 Apr 2020 09:56 )             50.0     04-13    136  |  101  |  14  ----------------------------<  117<H>  4.1   |  26  |  0.81    Ca    8.7      13 Apr 2020 09:56  Phos  1.9     04-13  Mg     1.8     04-13    TPro  5.7<L>  /  Alb  2.8<L>  /  TBili  0.5  /  DBili  x   /  AST  31  /  ALT  27  /  AlkPhos  52  04-13        CAPILLARY BLOOD GLUCOSE      POCT Blood Glucose.: 89 mg/dL (13 Apr 2020 16:37)  POCT Blood Glucose.: 90 mg/dL (13 Apr 2020 11:11)  POCT Blood Glucose.: 89 mg/dL (13 Apr 2020 06:57)  POCT Blood Glucose.: 111 mg/dL (12 Apr 2020 21:26)        RADIOLOGY & ADDITIONAL TESTS:    Consultant(s) Notes Reviewed:  [x ] YES  [ ] NO    Care Discussed with Consultants/Other Providers [ x] YES  [ ] NO

## 2020-04-14 LAB
ALBUMIN SERPL ELPH-MCNC: 2.6 G/DL — LOW (ref 3.3–5)
ALP SERPL-CCNC: 47 U/L — SIGNIFICANT CHANGE UP (ref 40–120)
ALT FLD-CCNC: 26 U/L — SIGNIFICANT CHANGE UP (ref 10–45)
ANION GAP SERPL CALC-SCNC: 9 MMOL/L — SIGNIFICANT CHANGE UP (ref 5–17)
AST SERPL-CCNC: 31 U/L — SIGNIFICANT CHANGE UP (ref 10–40)
BASOPHILS # BLD AUTO: 0.01 K/UL — SIGNIFICANT CHANGE UP (ref 0–0.2)
BASOPHILS NFR BLD AUTO: 0.1 % — SIGNIFICANT CHANGE UP (ref 0–2)
BILIRUB SERPL-MCNC: 0.5 MG/DL — SIGNIFICANT CHANGE UP (ref 0.2–1.2)
BUN SERPL-MCNC: 15 MG/DL — SIGNIFICANT CHANGE UP (ref 7–23)
CALCIUM SERPL-MCNC: 8.5 MG/DL — SIGNIFICANT CHANGE UP (ref 8.4–10.5)
CHLORIDE SERPL-SCNC: 105 MMOL/L — SIGNIFICANT CHANGE UP (ref 96–108)
CO2 SERPL-SCNC: 24 MMOL/L — SIGNIFICANT CHANGE UP (ref 22–31)
CREAT SERPL-MCNC: 0.82 MG/DL — SIGNIFICANT CHANGE UP (ref 0.5–1.3)
CRP SERPL-MCNC: 1.66 MG/DL — HIGH (ref 0–0.4)
D DIMER BLD IA.RAPID-MCNC: 2389 NG/ML DDU — HIGH
EOSINOPHIL # BLD AUTO: 0.05 K/UL — SIGNIFICANT CHANGE UP (ref 0–0.5)
EOSINOPHIL NFR BLD AUTO: 0.6 % — SIGNIFICANT CHANGE UP (ref 0–6)
FERRITIN SERPL-MCNC: 1421 NG/ML — HIGH (ref 30–400)
GLUCOSE SERPL-MCNC: 84 MG/DL — SIGNIFICANT CHANGE UP (ref 70–99)
HCT VFR BLD CALC: 46.3 % — SIGNIFICANT CHANGE UP (ref 39–50)
HGB BLD-MCNC: 15.1 G/DL — SIGNIFICANT CHANGE UP (ref 13–17)
IMM GRANULOCYTES NFR BLD AUTO: 1.3 % — SIGNIFICANT CHANGE UP (ref 0–1.5)
LDH SERPL L TO P-CCNC: 356 U/L — HIGH (ref 50–242)
LYMPHOCYTES # BLD AUTO: 0.3 K/UL — LOW (ref 1–3.3)
LYMPHOCYTES # BLD AUTO: 3.8 % — LOW (ref 13–44)
MAGNESIUM SERPL-MCNC: 1.8 MG/DL — SIGNIFICANT CHANGE UP (ref 1.6–2.6)
MCHC RBC-ENTMCNC: 28.8 PG — SIGNIFICANT CHANGE UP (ref 27–34)
MCHC RBC-ENTMCNC: 32.6 GM/DL — SIGNIFICANT CHANGE UP (ref 32–36)
MCV RBC AUTO: 88.2 FL — SIGNIFICANT CHANGE UP (ref 80–100)
MONOCYTES # BLD AUTO: 0.5 K/UL — SIGNIFICANT CHANGE UP (ref 0–0.9)
MONOCYTES NFR BLD AUTO: 6.4 % — SIGNIFICANT CHANGE UP (ref 2–14)
NEUTROPHILS # BLD AUTO: 6.89 K/UL — SIGNIFICANT CHANGE UP (ref 1.8–7.4)
NEUTROPHILS NFR BLD AUTO: 87.8 % — HIGH (ref 43–77)
NRBC # BLD: 0 /100 WBCS — SIGNIFICANT CHANGE UP (ref 0–0)
PHOSPHATE SERPL-MCNC: 1.9 MG/DL — LOW (ref 2.5–4.5)
PLATELET # BLD AUTO: 134 K/UL — LOW (ref 150–400)
POTASSIUM SERPL-MCNC: 4.2 MMOL/L — SIGNIFICANT CHANGE UP (ref 3.5–5.3)
POTASSIUM SERPL-SCNC: 4.2 MMOL/L — SIGNIFICANT CHANGE UP (ref 3.5–5.3)
PROT SERPL-MCNC: 5.4 G/DL — LOW (ref 6–8.3)
RBC # BLD: 5.25 M/UL — SIGNIFICANT CHANGE UP (ref 4.2–5.8)
RBC # FLD: 14.2 % — SIGNIFICANT CHANGE UP (ref 10.3–14.5)
SODIUM SERPL-SCNC: 138 MMOL/L — SIGNIFICANT CHANGE UP (ref 135–145)
WBC # BLD: 7.85 K/UL — SIGNIFICANT CHANGE UP (ref 3.8–10.5)
WBC # FLD AUTO: 7.85 K/UL — SIGNIFICANT CHANGE UP (ref 3.8–10.5)

## 2020-04-14 PROCEDURE — 99233 SBSQ HOSP IP/OBS HIGH 50: CPT | Mod: CS,GC

## 2020-04-14 RX ORDER — RITONAVIR 100 MG/1
100 TABLET, FILM COATED ORAL
Refills: 0 | Status: DISCONTINUED | OUTPATIENT
Start: 2020-04-14 | End: 2020-05-01

## 2020-04-14 RX ADMIN — RALTEGRAVIR 400 MILLIGRAM(S): 400 TABLET, FILM COATED ORAL at 12:01

## 2020-04-14 RX ADMIN — RITONAVIR 100 MILLIGRAM(S): 100 TABLET, FILM COATED ORAL at 18:10

## 2020-04-14 RX ADMIN — RITONAVIR 100 MILLIGRAM(S): 100 TABLET, FILM COATED ORAL at 00:26

## 2020-04-14 RX ADMIN — VALACYCLOVIR 500 MILLIGRAM(S): 500 TABLET, FILM COATED ORAL at 23:30

## 2020-04-14 RX ADMIN — DARUNAVIR 600 MILLIGRAM(S): 75 TABLET, FILM COATED ORAL at 12:01

## 2020-04-14 RX ADMIN — FAMOTIDINE 20 MILLIGRAM(S): 10 INJECTION INTRAVENOUS at 12:10

## 2020-04-14 RX ADMIN — ROSUVASTATIN CALCIUM 20 MILLIGRAM(S): 5 TABLET ORAL at 21:34

## 2020-04-14 RX ADMIN — LACTOBACILLUS ACIDOPH-L.BULGARICUS 1 MILLION CELL CHEWABLE TABLET 1 TABLET(S): at 12:01

## 2020-04-14 RX ADMIN — VALACYCLOVIR 500 MILLIGRAM(S): 500 TABLET, FILM COATED ORAL at 12:00

## 2020-04-14 RX ADMIN — ETRAVIRINE 200 MILLIGRAM(S): 200 TABLET ORAL at 18:10

## 2020-04-14 RX ADMIN — Medication 125 MICROGRAM(S): at 05:38

## 2020-04-14 RX ADMIN — VALACYCLOVIR 500 MILLIGRAM(S): 500 TABLET, FILM COATED ORAL at 00:26

## 2020-04-14 RX ADMIN — ENOXAPARIN SODIUM 40 MILLIGRAM(S): 100 INJECTION SUBCUTANEOUS at 07:03

## 2020-04-14 RX ADMIN — Medication 3 MILLIGRAM(S): at 21:35

## 2020-04-14 RX ADMIN — EMTRICITABINE AND TENOFOVIR DISOPROXIL FUMARATE 1 TABLET(S): 200; 300 TABLET, FILM COATED ORAL at 12:01

## 2020-04-14 RX ADMIN — DARUNAVIR 600 MILLIGRAM(S): 75 TABLET, FILM COATED ORAL at 18:10

## 2020-04-14 RX ADMIN — Medication 200 MILLIGRAM(S): at 05:38

## 2020-04-14 RX ADMIN — ETRAVIRINE 200 MILLIGRAM(S): 200 TABLET ORAL at 12:00

## 2020-04-14 RX ADMIN — TAMSULOSIN HYDROCHLORIDE 0.4 MILLIGRAM(S): 0.4 CAPSULE ORAL at 21:35

## 2020-04-14 RX ADMIN — Medication 85 MILLIMOLE(S): at 12:03

## 2020-04-14 RX ADMIN — RALTEGRAVIR 400 MILLIGRAM(S): 400 TABLET, FILM COATED ORAL at 18:10

## 2020-04-14 RX ADMIN — Medication 1 TABLET(S): at 12:00

## 2020-04-14 NOTE — PROGRESS NOTE ADULT - SUBJECTIVE AND OBJECTIVE BOX
Patient is a 59y old  Male who presents with a chief complaint of r/o COVID (13 Apr 2020 14:32)      INTERVAL HPI/OVERNIGHT EVENTS: Afebrile, HD stable, saturating well on NRB. Tolerating diet without nausea/emesis, making appropriate UO, OOB to chair.       MEDICATIONS  (STANDING):  darunavir 600 milliGRAM(s) Oral <User Schedule>  dextrose 5%. 1000 milliLiter(s) (50 mL/Hr) IV Continuous <Continuous>  dextrose 50% Injectable 12.5 Gram(s) IV Push once  dextrose 50% Injectable 25 Gram(s) IV Push once  dextrose 50% Injectable 25 Gram(s) IV Push once  emtricitabine 200 mG/tenofovir alafenamide 25 mG (DESCOVY) Tablet 1 Tablet(s) Oral daily  enoxaparin Injectable 40 milliGRAM(s) SubCutaneous every 24 hours  etravirine 200 milliGRAM(s) Oral <User Schedule>  famotidine    Tablet 20 milliGRAM(s) Oral daily  influenza   Vaccine 0.5 milliLiter(s) IntraMuscular once  lactobacillus acidophilus and bulgaricus Chewable 1 Tablet(s) Chew daily  levothyroxine 125 MICROGram(s) Oral daily  melatonin 3 milliGRAM(s) Oral at bedtime  raltegravir Tablet 400 milliGRAM(s) Oral <User Schedule>  ritonavir Tablet 100 milliGRAM(s) Oral <User Schedule>  rosuvastatin 20 milliGRAM(s) Oral at bedtime  tamsulosin 0.4 milliGRAM(s) Oral at bedtime  trimethoprim  160 mG/sulfamethoxazole 800 mG 1 Tablet(s) Oral daily  valACYclovir 500 milliGRAM(s) Oral two times a day    MEDICATIONS  (PRN):  acetaminophen   Tablet .. 975 milliGRAM(s) Oral every 8 hours PRN Temp greater or equal to 38C (100.4F)  ALPRAZolam 0.25 milliGRAM(s) Oral at bedtime PRN Anxiety  dextrose 40% Gel 15 Gram(s) Oral once PRN Blood Glucose LESS THAN 70 milliGRAM(s)/deciliter  glucagon  Injectable 1 milliGRAM(s) IntraMuscular once PRN Glucose LESS THAN 70 milligrams/deciliter      PHYSICAL EXAM:    ICU Vital Signs Last 24 Hrs  T(C): 36.3 (14 Apr 2020 12:25), Max: 36.6 (13 Apr 2020 20:30)  T(F): 97.4 (14 Apr 2020 12:25), Max: 97.9 (13 Apr 2020 20:30)  HR: 76 (14 Apr 2020 17:10) (62 - 92)  BP: 109/73 (14 Apr 2020 17:10) (95/59 - 119/69)  BP(mean): 86 (14 Apr 2020 17:10) (71 - 86)  ABP: --  ABP(mean): --  RR: 16 (14 Apr 2020 17:10) (16 - 18)  SpO2: 93% (14 Apr 2020 17:10) (87% - 94%)    I&O's Summary    13 Apr 2020 07:01  -  14 Apr 2020 07:00  --------------------------------------------------------  IN: 500 mL / OUT: 900 mL / NET: -400 mL    14 Apr 2020 07:01  -  14 Apr 2020 19:06  --------------------------------------------------------  IN: 416.5 mL / OUT: 100 mL / NET: 316.5 mL    General: NAD, sitting comfortably  Neuro: AAOX3, EOMI, PERRLA, no scleral icterus  Pulm: Decreased breath sounds b/l, saturating well on 15L NRB  CV: S1/S2 normal, no murmurs  Abdomen: soft, mild distention nontender, no rebound, no guarding  Extremities: WWP, No LE edema b/l        LABS:                        15.1   7.85  )-----------( 134      ( 14 Apr 2020 06:46 )             46.3     04-14    138  |  105  |  15  ----------------------------<  84  4.2   |  24  |  0.82    Ca    8.5      14 Apr 2020 06:46  Phos  1.9     04-14  Mg     1.8     04-14    TPro  5.4<L>  /  Alb  2.6<L>  /  TBili  0.5  /  DBili  x   /  AST  31  /  ALT  26  /  AlkPhos  47  04-14        CAPILLARY BLOOD GLUCOSE            RADIOLOGY & ADDITIONAL TESTS:    Consultant(s) Notes Reviewed:  [x ] YES  [ ] NO    Care Discussed with Consultants/Other Providers [ x] YES  [ ] NO

## 2020-04-14 NOTE — PROGRESS NOTE ADULT - ASSESSMENT
A/P: 59 M PMH of HIV (CD4 103, VL undetectable in March 2020), tonsilar CA (s/p CRT and RT), BPH, HLD, hypothyroidism, sleep apnea, COVID +ve, recently admitted on 3/30 at [facility], transferred to [facility] on 4/8. Presenting with hypoxic respiratory failure requiring 15L NRB.    NEURO: Tyelnol prn  CV: MAP>65  Pulm: weaned to Venturimask, encouraged proning  GI: DASH diet, Famotidine  : Voiding  Endo: ISS  ID: HIV home medication regimen: Ritonavir, Darunavir, Raltegravir, Bactrim daily for PCP ppx  COVID: s/p COVID treatment with Tocilizumab (04/03), Azithro/Plaquenil (04/02-04/06) and Solumedrol (04/03-04/08). s/p Vancomycin x 8 doses (last dose 4/9).   PPx: Lovenox 40 d/ SCDS for DVT ppx  Lines: PIV  PT/OT: not ordered

## 2020-04-15 LAB
ALBUMIN SERPL ELPH-MCNC: 2.7 G/DL — LOW (ref 3.3–5)
ALP SERPL-CCNC: 50 U/L — SIGNIFICANT CHANGE UP (ref 40–120)
ALT FLD-CCNC: 51 U/L — HIGH (ref 10–45)
ANION GAP SERPL CALC-SCNC: 10 MMOL/L — SIGNIFICANT CHANGE UP (ref 5–17)
AST SERPL-CCNC: 59 U/L — HIGH (ref 10–40)
BASOPHILS # BLD AUTO: 0.01 K/UL — SIGNIFICANT CHANGE UP (ref 0–0.2)
BASOPHILS NFR BLD AUTO: 0.1 % — SIGNIFICANT CHANGE UP (ref 0–2)
BILIRUB SERPL-MCNC: 0.4 MG/DL — SIGNIFICANT CHANGE UP (ref 0.2–1.2)
BUN SERPL-MCNC: 9 MG/DL — SIGNIFICANT CHANGE UP (ref 7–23)
CALCIUM SERPL-MCNC: 8.4 MG/DL — SIGNIFICANT CHANGE UP (ref 8.4–10.5)
CHLORIDE SERPL-SCNC: 106 MMOL/L — SIGNIFICANT CHANGE UP (ref 96–108)
CO2 SERPL-SCNC: 24 MMOL/L — SIGNIFICANT CHANGE UP (ref 22–31)
CREAT SERPL-MCNC: 0.69 MG/DL — SIGNIFICANT CHANGE UP (ref 0.5–1.3)
CRP SERPL-MCNC: 1.16 MG/DL — HIGH (ref 0–0.4)
D DIMER BLD IA.RAPID-MCNC: 1978 NG/ML DDU — HIGH
EOSINOPHIL # BLD AUTO: 0.04 K/UL — SIGNIFICANT CHANGE UP (ref 0–0.5)
EOSINOPHIL NFR BLD AUTO: 0.5 % — SIGNIFICANT CHANGE UP (ref 0–6)
FERRITIN SERPL-MCNC: 1313 NG/ML — HIGH (ref 30–400)
GLUCOSE SERPL-MCNC: 90 MG/DL — SIGNIFICANT CHANGE UP (ref 70–99)
HCT VFR BLD CALC: 47 % — SIGNIFICANT CHANGE UP (ref 39–50)
HGB BLD-MCNC: 15.5 G/DL — SIGNIFICANT CHANGE UP (ref 13–17)
IMM GRANULOCYTES NFR BLD AUTO: 0.8 % — SIGNIFICANT CHANGE UP (ref 0–1.5)
LDH SERPL L TO P-CCNC: 343 U/L — HIGH (ref 50–242)
LYMPHOCYTES # BLD AUTO: 0.38 K/UL — LOW (ref 1–3.3)
LYMPHOCYTES # BLD AUTO: 4.3 % — LOW (ref 13–44)
MAGNESIUM SERPL-MCNC: 1.9 MG/DL — SIGNIFICANT CHANGE UP (ref 1.6–2.6)
MCHC RBC-ENTMCNC: 29 PG — SIGNIFICANT CHANGE UP (ref 27–34)
MCHC RBC-ENTMCNC: 33 GM/DL — SIGNIFICANT CHANGE UP (ref 32–36)
MCV RBC AUTO: 87.9 FL — SIGNIFICANT CHANGE UP (ref 80–100)
MONOCYTES # BLD AUTO: 0.69 K/UL — SIGNIFICANT CHANGE UP (ref 0–0.9)
MONOCYTES NFR BLD AUTO: 7.8 % — SIGNIFICANT CHANGE UP (ref 2–14)
NEUTROPHILS # BLD AUTO: 7.61 K/UL — HIGH (ref 1.8–7.4)
NEUTROPHILS NFR BLD AUTO: 86.5 % — HIGH (ref 43–77)
NRBC # BLD: 0 /100 WBCS — SIGNIFICANT CHANGE UP (ref 0–0)
PHOSPHATE SERPL-MCNC: 1.8 MG/DL — LOW (ref 2.5–4.5)
PLATELET # BLD AUTO: 129 K/UL — LOW (ref 150–400)
POTASSIUM SERPL-MCNC: 4.2 MMOL/L — SIGNIFICANT CHANGE UP (ref 3.5–5.3)
POTASSIUM SERPL-SCNC: 4.2 MMOL/L — SIGNIFICANT CHANGE UP (ref 3.5–5.3)
PROT SERPL-MCNC: 5.5 G/DL — LOW (ref 6–8.3)
RBC # BLD: 5.35 M/UL — SIGNIFICANT CHANGE UP (ref 4.2–5.8)
RBC # FLD: 14.1 % — SIGNIFICANT CHANGE UP (ref 10.3–14.5)
SODIUM SERPL-SCNC: 140 MMOL/L — SIGNIFICANT CHANGE UP (ref 135–145)
WBC # BLD: 8.8 K/UL — SIGNIFICANT CHANGE UP (ref 3.8–10.5)
WBC # FLD AUTO: 8.8 K/UL — SIGNIFICANT CHANGE UP (ref 3.8–10.5)

## 2020-04-15 PROCEDURE — 99233 SBSQ HOSP IP/OBS HIGH 50: CPT | Mod: CS,GC

## 2020-04-15 RX ADMIN — ETRAVIRINE 200 MILLIGRAM(S): 200 TABLET ORAL at 19:26

## 2020-04-15 RX ADMIN — Medication 85 MILLIMOLE(S): at 09:27

## 2020-04-15 RX ADMIN — DARUNAVIR 600 MILLIGRAM(S): 75 TABLET, FILM COATED ORAL at 19:26

## 2020-04-15 RX ADMIN — RITONAVIR 100 MILLIGRAM(S): 100 TABLET, FILM COATED ORAL at 19:26

## 2020-04-15 RX ADMIN — ENOXAPARIN SODIUM 40 MILLIGRAM(S): 100 INJECTION SUBCUTANEOUS at 07:09

## 2020-04-15 RX ADMIN — ROSUVASTATIN CALCIUM 20 MILLIGRAM(S): 5 TABLET ORAL at 22:08

## 2020-04-15 RX ADMIN — FAMOTIDINE 20 MILLIGRAM(S): 10 INJECTION INTRAVENOUS at 12:11

## 2020-04-15 RX ADMIN — VALACYCLOVIR 500 MILLIGRAM(S): 500 TABLET, FILM COATED ORAL at 12:11

## 2020-04-15 RX ADMIN — EMTRICITABINE AND TENOFOVIR DISOPROXIL FUMARATE 1 TABLET(S): 200; 300 TABLET, FILM COATED ORAL at 12:11

## 2020-04-15 RX ADMIN — Medication 3 MILLIGRAM(S): at 22:07

## 2020-04-15 RX ADMIN — TAMSULOSIN HYDROCHLORIDE 0.4 MILLIGRAM(S): 0.4 CAPSULE ORAL at 22:07

## 2020-04-15 RX ADMIN — RALTEGRAVIR 400 MILLIGRAM(S): 400 TABLET, FILM COATED ORAL at 12:11

## 2020-04-15 RX ADMIN — RITONAVIR 100 MILLIGRAM(S): 100 TABLET, FILM COATED ORAL at 12:11

## 2020-04-15 RX ADMIN — DARUNAVIR 600 MILLIGRAM(S): 75 TABLET, FILM COATED ORAL at 12:11

## 2020-04-15 RX ADMIN — Medication 1 TABLET(S): at 12:11

## 2020-04-15 RX ADMIN — Medication 125 MICROGRAM(S): at 05:13

## 2020-04-15 RX ADMIN — LACTOBACILLUS ACIDOPH-L.BULGARICUS 1 MILLION CELL CHEWABLE TABLET 1 TABLET(S): at 12:11

## 2020-04-15 RX ADMIN — RALTEGRAVIR 400 MILLIGRAM(S): 400 TABLET, FILM COATED ORAL at 19:26

## 2020-04-15 RX ADMIN — VALACYCLOVIR 500 MILLIGRAM(S): 500 TABLET, FILM COATED ORAL at 23:07

## 2020-04-15 RX ADMIN — ETRAVIRINE 200 MILLIGRAM(S): 200 TABLET ORAL at 12:11

## 2020-04-15 NOTE — PROGRESS NOTE ADULT - SUBJECTIVE AND OBJECTIVE BOX
Patient is a 59y old  Male who presents with a chief complaint of r/o COVID (14 Apr 2020 13:26)      INTERVAL HPI/OVERNIGHT EVENTS: Afebrile, HD stable, saturating well on NRB. Tolerating diet without nausea/emesis, making appropriate UO, OOB to chair.       MEDICATIONS  (STANDING):  darunavir 600 milliGRAM(s) Oral <User Schedule>  dextrose 5%. 1000 milliLiter(s) (50 mL/Hr) IV Continuous <Continuous>  dextrose 50% Injectable 12.5 Gram(s) IV Push once  dextrose 50% Injectable 25 Gram(s) IV Push once  dextrose 50% Injectable 25 Gram(s) IV Push once  emtricitabine 200 mG/tenofovir alafenamide 25 mG (DESCOVY) Tablet 1 Tablet(s) Oral daily  enoxaparin Injectable 40 milliGRAM(s) SubCutaneous every 24 hours  etravirine 200 milliGRAM(s) Oral <User Schedule>  famotidine    Tablet 20 milliGRAM(s) Oral daily  influenza   Vaccine 0.5 milliLiter(s) IntraMuscular once  lactobacillus acidophilus and bulgaricus Chewable 1 Tablet(s) Chew daily  levothyroxine 125 MICROGram(s) Oral daily  melatonin 3 milliGRAM(s) Oral at bedtime  raltegravir Tablet 400 milliGRAM(s) Oral <User Schedule>  ritonavir Tablet 100 milliGRAM(s) Oral <User Schedule>  rosuvastatin 20 milliGRAM(s) Oral at bedtime  tamsulosin 0.4 milliGRAM(s) Oral at bedtime  trimethoprim  160 mG/sulfamethoxazole 800 mG 1 Tablet(s) Oral daily  valACYclovir 500 milliGRAM(s) Oral two times a day    MEDICATIONS  (PRN):  acetaminophen   Tablet .. 975 milliGRAM(s) Oral every 8 hours PRN Temp greater or equal to 38C (100.4F)  ALPRAZolam 0.25 milliGRAM(s) Oral at bedtime PRN Anxiety  dextrose 40% Gel 15 Gram(s) Oral once PRN Blood Glucose LESS THAN 70 milliGRAM(s)/deciliter  glucagon  Injectable 1 milliGRAM(s) IntraMuscular once PRN Glucose LESS THAN 70 milligrams/deciliter      PHYSICAL EXAM:    ICU Vital Signs Last 24 Hrs  T(C): 36.7 (15 Apr 2020 04:41), Max: 36.7 (15 Apr 2020 04:41)  T(F): 98 (15 Apr 2020 04:41), Max: 98 (15 Apr 2020 04:41)  HR: 88 (15 Apr 2020 09:00) (76 - 88)  BP: 120/71 (15 Apr 2020 09:00) (117/78 - 132/80)  BP(mean): 90 (15 Apr 2020 09:00) (90 - 101)  ABP: --  ABP(mean): --  RR: 18 (15 Apr 2020 09:00) (18 - 18)  SpO2: 93% (15 Apr 2020 09:00) (93% - 97%)    I&O's Summary    14 Apr 2020 07:01  -  15 Apr 2020 07:00  --------------------------------------------------------  IN: 416.5 mL / OUT: 1050 mL / NET: -633.5 mL    General: NAD, sitting comfortably  Neuro: AAOX3, EOMI, PERRLA, no scleral icterus  Pulm: Decreased breath sounds b/l, saturating well on 15L NRB  CV: S1/S2 normal, no murmurs  Abdomen: soft, mild distention nontender, no rebound, no guarding  Extremities: WWP, No LE edema b/l          LABS:                        15.5   8.80  )-----------( 129      ( 15 Apr 2020 07:22 )             47.0     04-15    140  |  106  |  9   ----------------------------<  90  4.2   |  24  |  0.69    Ca    8.4      15 Apr 2020 07:22  Phos  1.8     04-15  Mg     1.9     04-15    TPro  5.5<L>  /  Alb  2.7<L>  /  TBili  0.4  /  DBili  x   /  AST  59<H>  /  ALT  51<H>  /  AlkPhos  50  04-15        CAPILLARY BLOOD GLUCOSE            RADIOLOGY & ADDITIONAL TESTS:    Consultant(s) Notes Reviewed:  [x ] YES  [ ] NO    Care Discussed with Consultants/Other Providers [ x] YES  [ ] NO Patient is a 59y old  Male who presents with a chief complaint of r/o COVID (14 Apr 2020 13:26)      INTERVAL HPI/OVERNIGHT EVENTS: Afebrile, HD stable, saturating well on NRB. Tolerating diet without nausea/emesis, making appropriate UO, OOB to chair.       MEDICATIONS  (STANDING):  darunavir 600 milliGRAM(s) Oral <User Schedule>  dextrose 5%. 1000 milliLiter(s) (50 mL/Hr) IV Continuous <Continuous>  dextrose 50% Injectable 12.5 Gram(s) IV Push once  dextrose 50% Injectable 25 Gram(s) IV Push once  dextrose 50% Injectable 25 Gram(s) IV Push once  emtricitabine 200 mG/tenofovir alafenamide 25 mG (DESCOVY) Tablet 1 Tablet(s) Oral daily  enoxaparin Injectable 40 milliGRAM(s) SubCutaneous every 24 hours  etravirine 200 milliGRAM(s) Oral <User Schedule>  famotidine    Tablet 20 milliGRAM(s) Oral daily  influenza   Vaccine 0.5 milliLiter(s) IntraMuscular once  lactobacillus acidophilus and bulgaricus Chewable 1 Tablet(s) Chew daily  levothyroxine 125 MICROGram(s) Oral daily  melatonin 3 milliGRAM(s) Oral at bedtime  raltegravir Tablet 400 milliGRAM(s) Oral <User Schedule>  ritonavir Tablet 100 milliGRAM(s) Oral <User Schedule>  rosuvastatin 20 milliGRAM(s) Oral at bedtime  tamsulosin 0.4 milliGRAM(s) Oral at bedtime  trimethoprim  160 mG/sulfamethoxazole 800 mG 1 Tablet(s) Oral daily  valACYclovir 500 milliGRAM(s) Oral two times a day    MEDICATIONS  (PRN):  acetaminophen   Tablet .. 975 milliGRAM(s) Oral every 8 hours PRN Temp greater or equal to 38C (100.4F)  ALPRAZolam 0.25 milliGRAM(s) Oral at bedtime PRN Anxiety  dextrose 40% Gel 15 Gram(s) Oral once PRN Blood Glucose LESS THAN 70 milliGRAM(s)/deciliter  glucagon  Injectable 1 milliGRAM(s) IntraMuscular once PRN Glucose LESS THAN 70 milligrams/deciliter      PHYSICAL EXAM:    ICU Vital Signs Last 24 Hrs  T(C): 36.7 (15 Apr 2020 04:41), Max: 36.7 (15 Apr 2020 04:41)  T(F): 98 (15 Apr 2020 04:41), Max: 98 (15 Apr 2020 04:41)  HR: 88 (15 Apr 2020 09:00) (76 - 88)  BP: 120/71 (15 Apr 2020 09:00) (117/78 - 132/80)  BP(mean): 90 (15 Apr 2020 09:00) (90 - 101)  ABP: --  ABP(mean): --  RR: 18 (15 Apr 2020 09:00) (18 - 18)  SpO2: 93% (15 Apr 2020 09:00) (93% - 97%)    comfortable  PERRL, MMM  neck supple  lungs clear  RRR S1S2 abd soft NT ND +BS  no CCE  no rash affect appropriate  moves all extremitites    I&O's Summary    14 Apr 2020 07:01  -  15 Apr 2020 07:00  --------------------------------------------------------  IN: 416.5 mL / OUT: 1050 mL / NET: -633.5 mL    General: NAD, sitting comfortably  Neuro: AAOX3, EOMI, PERRLA, no scleral icterus  Pulm: Decreased breath sounds b/l, saturating well on 15L NRB  CV: S1/S2 normal, no murmurs  Abdomen: soft, mild distention nontender, no rebound, no guarding  Extremities: WWP, No LE edema b/l          LABS:                        15.5   8.80  )-----------( 129      ( 15 Apr 2020 07:22 )             47.0     04-15    140  |  106  |  9   ----------------------------<  90  4.2   |  24  |  0.69    Ca    8.4      15 Apr 2020 07:22  Phos  1.8     04-15  Mg     1.9     04-15    TPro  5.5<L>  /  Alb  2.7<L>  /  TBili  0.4  /  DBili  x   /  AST  59<H>  /  ALT  51<H>  /  AlkPhos  50  04-15        CAPILLARY BLOOD GLUCOSE            RADIOLOGY & ADDITIONAL TESTS:    Consultant(s) Notes Reviewed:  [x ] YES  [ ] NO    Care Discussed with Consultants/Other Providers [ x] YES  [ ] NO

## 2020-04-15 NOTE — PROGRESS NOTE ADULT - ASSESSMENT
A/P: 59 M PMH of HIV (CD4 103, VL undetectable in March 2020), tonsilar CA (s/p CRT and RT), BPH, HLD, hypothyroidism, sleep apnea, COVID +ve, recently admitted on 3/30 at [facility], transferred to [facility] on 4/8. Presenting with hypoxic respiratory failure requiring ventimask.    NEURO: Tyelnol prn  CV: MAP>65  Pulm: weaned to ventimask, encouraged proning  GI: DASH diet, Famotidine  : Voiding  Endo: ISS  ID: HIV home medication regimen: Ritonavir, Darunavir, Raltegravir, Bactrim daily for PCP ppx  COVID: s/p COVID treatment with Tocilizumab (04/03), Azithro/Plaquenil (04/02-04/06) and Solumedrol (04/03-04/08). s/p Vancomycin x 8 doses (last dose 4/9).   PPx: Lovenox 40 d/ SCDS for DVT ppx  Lines: PIV  PT/OT: not ordered

## 2020-04-16 LAB
ALBUMIN SERPL ELPH-MCNC: 2.6 G/DL — LOW (ref 3.3–5)
ALP SERPL-CCNC: 54 U/L — SIGNIFICANT CHANGE UP (ref 40–120)
ALT FLD-CCNC: 69 U/L — HIGH (ref 10–45)
ANION GAP SERPL CALC-SCNC: 7 MMOL/L — SIGNIFICANT CHANGE UP (ref 5–17)
AST SERPL-CCNC: 59 U/L — HIGH (ref 10–40)
BASOPHILS # BLD AUTO: 0.01 K/UL — SIGNIFICANT CHANGE UP (ref 0–0.2)
BASOPHILS NFR BLD AUTO: 0.1 % — SIGNIFICANT CHANGE UP (ref 0–2)
BILIRUB SERPL-MCNC: 0.4 MG/DL — SIGNIFICANT CHANGE UP (ref 0.2–1.2)
BUN SERPL-MCNC: 10 MG/DL — SIGNIFICANT CHANGE UP (ref 7–23)
CALCIUM SERPL-MCNC: 8.4 MG/DL — SIGNIFICANT CHANGE UP (ref 8.4–10.5)
CHLORIDE SERPL-SCNC: 104 MMOL/L — SIGNIFICANT CHANGE UP (ref 96–108)
CO2 SERPL-SCNC: 28 MMOL/L — SIGNIFICANT CHANGE UP (ref 22–31)
CREAT SERPL-MCNC: 0.8 MG/DL — SIGNIFICANT CHANGE UP (ref 0.5–1.3)
CRP SERPL-MCNC: 1.81 MG/DL — HIGH (ref 0–0.4)
D DIMER BLD IA.RAPID-MCNC: 1042 NG/ML DDU — HIGH
EOSINOPHIL # BLD AUTO: 0.02 K/UL — SIGNIFICANT CHANGE UP (ref 0–0.5)
EOSINOPHIL NFR BLD AUTO: 0.3 % — SIGNIFICANT CHANGE UP (ref 0–6)
FERRITIN SERPL-MCNC: 1145 NG/ML — HIGH (ref 30–400)
GLUCOSE SERPL-MCNC: 86 MG/DL — SIGNIFICANT CHANGE UP (ref 70–99)
HCT VFR BLD CALC: 45.9 % — SIGNIFICANT CHANGE UP (ref 39–50)
HGB BLD-MCNC: 14.8 G/DL — SIGNIFICANT CHANGE UP (ref 13–17)
IMM GRANULOCYTES NFR BLD AUTO: 0.8 % — SIGNIFICANT CHANGE UP (ref 0–1.5)
LYMPHOCYTES # BLD AUTO: 0.29 K/UL — LOW (ref 1–3.3)
LYMPHOCYTES # BLD AUTO: 3.7 % — LOW (ref 13–44)
MAGNESIUM SERPL-MCNC: 1.9 MG/DL — SIGNIFICANT CHANGE UP (ref 1.6–2.6)
MCHC RBC-ENTMCNC: 28.9 PG — SIGNIFICANT CHANGE UP (ref 27–34)
MCHC RBC-ENTMCNC: 32.2 GM/DL — SIGNIFICANT CHANGE UP (ref 32–36)
MCV RBC AUTO: 89.6 FL — SIGNIFICANT CHANGE UP (ref 80–100)
MONOCYTES # BLD AUTO: 0.79 K/UL — SIGNIFICANT CHANGE UP (ref 0–0.9)
MONOCYTES NFR BLD AUTO: 10 % — SIGNIFICANT CHANGE UP (ref 2–14)
MRSA PCR RESULT.: POSITIVE
NEUTROPHILS # BLD AUTO: 6.74 K/UL — SIGNIFICANT CHANGE UP (ref 1.8–7.4)
NEUTROPHILS NFR BLD AUTO: 85.1 % — HIGH (ref 43–77)
NRBC # BLD: 0 /100 WBCS — SIGNIFICANT CHANGE UP (ref 0–0)
PHOSPHATE SERPL-MCNC: 2 MG/DL — LOW (ref 2.5–4.5)
PLATELET # BLD AUTO: 120 K/UL — LOW (ref 150–400)
POTASSIUM SERPL-MCNC: 4.6 MMOL/L — SIGNIFICANT CHANGE UP (ref 3.5–5.3)
POTASSIUM SERPL-SCNC: 4.6 MMOL/L — SIGNIFICANT CHANGE UP (ref 3.5–5.3)
PROCALCITONIN SERPL-MCNC: 0.04 NG/ML — SIGNIFICANT CHANGE UP (ref 0.02–0.1)
PROT SERPL-MCNC: 5.5 G/DL — LOW (ref 6–8.3)
RBC # BLD: 5.12 M/UL — SIGNIFICANT CHANGE UP (ref 4.2–5.8)
RBC # FLD: 14.3 % — SIGNIFICANT CHANGE UP (ref 10.3–14.5)
S AUREUS DNA NOSE QL NAA+PROBE: POSITIVE
SARS-COV-2 RNA SPEC QL NAA+PROBE: SIGNIFICANT CHANGE UP
SODIUM SERPL-SCNC: 139 MMOL/L — SIGNIFICANT CHANGE UP (ref 135–145)
WBC # BLD: 7.91 K/UL — SIGNIFICANT CHANGE UP (ref 3.8–10.5)
WBC # FLD AUTO: 7.91 K/UL — SIGNIFICANT CHANGE UP (ref 3.8–10.5)

## 2020-04-16 PROCEDURE — 99233 SBSQ HOSP IP/OBS HIGH 50: CPT | Mod: CS,GC

## 2020-04-16 PROCEDURE — 71045 X-RAY EXAM CHEST 1 VIEW: CPT | Mod: 26,CS

## 2020-04-16 PROCEDURE — 71250 CT THORAX DX C-: CPT | Mod: 26,CS

## 2020-04-16 RX ORDER — VANCOMYCIN HCL 1 G
1000 VIAL (EA) INTRAVENOUS ONCE
Refills: 0 | Status: COMPLETED | OUTPATIENT
Start: 2020-04-16 | End: 2020-04-16

## 2020-04-16 RX ORDER — VANCOMYCIN HCL 1 G
1000 VIAL (EA) INTRAVENOUS EVERY 12 HOURS
Refills: 0 | Status: DISCONTINUED | OUTPATIENT
Start: 2020-04-17 | End: 2020-04-18

## 2020-04-16 RX ORDER — PIPERACILLIN AND TAZOBACTAM 4; .5 G/20ML; G/20ML
4.5 INJECTION, POWDER, LYOPHILIZED, FOR SOLUTION INTRAVENOUS EVERY 6 HOURS
Refills: 0 | Status: DISCONTINUED | OUTPATIENT
Start: 2020-04-16 | End: 2020-04-21

## 2020-04-16 RX ORDER — PIPERACILLIN AND TAZOBACTAM 4; .5 G/20ML; G/20ML
4.5 INJECTION, POWDER, LYOPHILIZED, FOR SOLUTION INTRAVENOUS ONCE
Refills: 0 | Status: COMPLETED | OUTPATIENT
Start: 2020-04-16 | End: 2020-04-16

## 2020-04-16 RX ADMIN — RITONAVIR 100 MILLIGRAM(S): 100 TABLET, FILM COATED ORAL at 17:22

## 2020-04-16 RX ADMIN — ROSUVASTATIN CALCIUM 20 MILLIGRAM(S): 5 TABLET ORAL at 22:57

## 2020-04-16 RX ADMIN — Medication 125 MICROGRAM(S): at 07:02

## 2020-04-16 RX ADMIN — RITONAVIR 100 MILLIGRAM(S): 100 TABLET, FILM COATED ORAL at 13:33

## 2020-04-16 RX ADMIN — FAMOTIDINE 20 MILLIGRAM(S): 10 INJECTION INTRAVENOUS at 13:33

## 2020-04-16 RX ADMIN — ENOXAPARIN SODIUM 40 MILLIGRAM(S): 100 INJECTION SUBCUTANEOUS at 07:02

## 2020-04-16 RX ADMIN — Medication 318.75 MILLIGRAM(S): at 23:57

## 2020-04-16 RX ADMIN — Medication 3 MILLIGRAM(S): at 22:57

## 2020-04-16 RX ADMIN — VALACYCLOVIR 500 MILLIGRAM(S): 500 TABLET, FILM COATED ORAL at 13:33

## 2020-04-16 RX ADMIN — Medication 250 MILLIGRAM(S): at 20:30

## 2020-04-16 RX ADMIN — EMTRICITABINE AND TENOFOVIR DISOPROXIL FUMARATE 1 TABLET(S): 200; 300 TABLET, FILM COATED ORAL at 13:33

## 2020-04-16 RX ADMIN — TAMSULOSIN HYDROCHLORIDE 0.4 MILLIGRAM(S): 0.4 CAPSULE ORAL at 22:57

## 2020-04-16 RX ADMIN — LACTOBACILLUS ACIDOPH-L.BULGARICUS 1 MILLION CELL CHEWABLE TABLET 1 TABLET(S): at 13:33

## 2020-04-16 RX ADMIN — VALACYCLOVIR 500 MILLIGRAM(S): 500 TABLET, FILM COATED ORAL at 22:57

## 2020-04-16 RX ADMIN — Medication 318.75 MILLIGRAM(S): at 17:22

## 2020-04-16 RX ADMIN — PIPERACILLIN AND TAZOBACTAM 200 GRAM(S): 4; .5 INJECTION, POWDER, LYOPHILIZED, FOR SOLUTION INTRAVENOUS at 20:00

## 2020-04-16 RX ADMIN — RALTEGRAVIR 400 MILLIGRAM(S): 400 TABLET, FILM COATED ORAL at 17:22

## 2020-04-16 RX ADMIN — Medication 40 MILLIGRAM(S): at 17:22

## 2020-04-16 RX ADMIN — RALTEGRAVIR 400 MILLIGRAM(S): 400 TABLET, FILM COATED ORAL at 13:33

## 2020-04-16 RX ADMIN — Medication 318.75 MILLIGRAM(S): at 13:34

## 2020-04-16 RX ADMIN — DARUNAVIR 600 MILLIGRAM(S): 75 TABLET, FILM COATED ORAL at 17:22

## 2020-04-16 RX ADMIN — ETRAVIRINE 200 MILLIGRAM(S): 200 TABLET ORAL at 13:33

## 2020-04-16 RX ADMIN — ETRAVIRINE 200 MILLIGRAM(S): 200 TABLET ORAL at 17:22

## 2020-04-16 RX ADMIN — DARUNAVIR 600 MILLIGRAM(S): 75 TABLET, FILM COATED ORAL at 13:33

## 2020-04-16 NOTE — CHART NOTE - NSCHARTNOTEFT_GEN_A_CORE
I was asked to evaluate the patient for concerns regarding OIs especially PCP as his CXR and oxygenation is not improving on current therapy.    I reviewed the notes, X-Rays and lab data.    I believe the chance of PCP is very low considering his history of controlled HIV with UD VL for more than a decade.    The drop in CD4 is secondary to acute sickness.    At the same time there are enough unknowns associated with impact of COVID on immune system in one hand and our inability to perform invasive testing for diagnosis of PCP in other that trail of PCP treatment in this setting may be appropriate.

## 2020-04-16 NOTE — PROGRESS NOTE ADULT - ASSESSMENT
A/P: 59 M PMH of HIV (CD4 103, VL undetectable in March 2020), tonsilar CA (s/p CRT and RT), BPH, HLD, hypothyroidism, sleep apnea, COVID +ve, recently admitted on 3/30 at [facility], transferred to [facility] on 4/8. Presenting with hypoxic respiratory failure requiring nonrebreather.    NEURO: Tyelnol prn  CV: MAP>65  Pulm: Saturating well on nonrebreather, encouraged proning  GI: DASH diet, Famotidine  : Voiding  Endo: ISS  ID: HIV home medication regimen: Ritonavir, Darunavir, Raltegravir, Bactrim daily for PCP ppx  COVID: s/p COVID treatment with Tocilizumab (04/03), Azithro/Plaquenil (04/02-04/06) and Solumedrol (04/03-04/08). s/p Vancomycin x 8 doses (last dose 4/9).   PPx: Lovenox 40 d/ SCDS for DVT ppx  Lines: PIV  PT/OT: not ordered A/P: 59 M PMH of HIV (CD4 103, VL undetectable in March 2020), tonsilar CA (s/p CRT and RT), BPH, HLD, hypothyroidism, sleep apnea, COVID +ve, recently admitted on 3/30 at [facility], transferred to [facility] on 4/8. Presenting with hypoxic respiratory failure requiring nonrebreather. More SOB with worsening CXR    NEURO: Tyelnol prn  CV: MAP>65  Pulm: Saturating well on nonrebreather, encouraged proning  GI: DASH diet, Famotidine  : Voiding  Endo: ISS  ID: HIV home medication regimen: Ritonavir, Darunavir, Raltegravir, Bactrim now with steroids to rx for PCP, add zosyn for potential HAP  COVID: s/p COVID treatment with Tocilizumab (04/03), Azithro/Plaquenil (04/02-04/06) and Solumedrol (04/03-04/08). s/p Vancomycin x 8 doses (last dose 4/9).   PPx: Lovenox 40 d/ SCDS for DVT ppx  Lines: PIV  PT/OT: not ordered

## 2020-04-16 NOTE — PROGRESS NOTE ADULT - SUBJECTIVE AND OBJECTIVE BOX
Patient is a 59y old  Male who presents with a chief complaint of r/o COVID (15 Apr 2020 14:24)      INTERVAL HPI/OVERNIGHT EVENTS: Pt afebrile, Hd stable off pressors, requiring nonrebreather mask with two episodes of desaturation and mild reported increase work of breathing per pt. Awake and alert, tolerating diet, making appropriate UO.      MEDICATIONS  (STANDING):  darunavir 600 milliGRAM(s) Oral <User Schedule>  dextrose 5%. 1000 milliLiter(s) (50 mL/Hr) IV Continuous <Continuous>  dextrose 50% Injectable 12.5 Gram(s) IV Push once  dextrose 50% Injectable 25 Gram(s) IV Push once  dextrose 50% Injectable 25 Gram(s) IV Push once  emtricitabine 200 mG/tenofovir alafenamide 25 mG (DESCOVY) Tablet 1 Tablet(s) Oral daily  enoxaparin Injectable 40 milliGRAM(s) SubCutaneous every 24 hours  etravirine 200 milliGRAM(s) Oral <User Schedule>  famotidine    Tablet 20 milliGRAM(s) Oral daily  influenza   Vaccine 0.5 milliLiter(s) IntraMuscular once  lactobacillus acidophilus and bulgaricus Chewable 1 Tablet(s) Chew daily  levothyroxine 125 MICROGram(s) Oral daily  melatonin 3 milliGRAM(s) Oral at bedtime  piperacillin/tazobactam IVPB.. 4.5 Gram(s) IV Intermittent every 6 hours  predniSONE   Tablet 40 milliGRAM(s) Oral every 12 hours  raltegravir Tablet 400 milliGRAM(s) Oral <User Schedule>  ritonavir Tablet 100 milliGRAM(s) Oral <User Schedule>  rosuvastatin 20 milliGRAM(s) Oral at bedtime  tamsulosin 0.4 milliGRAM(s) Oral at bedtime  trimethoprim / sulfamethoxazole IVPB 300 milliGRAM(s) IV Intermittent every 6 hours  valACYclovir 500 milliGRAM(s) Oral two times a day    MEDICATIONS  (PRN):  acetaminophen   Tablet .. 975 milliGRAM(s) Oral every 8 hours PRN Temp greater or equal to 38C (100.4F)  ALPRAZolam 0.25 milliGRAM(s) Oral at bedtime PRN Anxiety  dextrose 40% Gel 15 Gram(s) Oral once PRN Blood Glucose LESS THAN 70 milliGRAM(s)/deciliter  glucagon  Injectable 1 milliGRAM(s) IntraMuscular once PRN Glucose LESS THAN 70 milligrams/deciliter  hydrocodone/homatropine Syrup 5 milliLiter(s) Oral every 6 hours PRN Cough      PHYSICAL EXAM:    ICU Vital Signs Last 24 Hrs  T(C): 36.6 (16 Apr 2020 12:45), Max: 37.1 (16 Apr 2020 04:00)  T(F): 97.8 (16 Apr 2020 12:45), Max: 98.7 (16 Apr 2020 04:00)  HR: 80 (16 Apr 2020 20:25) (72 - 86)  BP: 114/72 (16 Apr 2020 20:25) (101/59 - 143/71)  BP(mean): 86 (16 Apr 2020 20:25) (75 - 98)  ABP: --  ABP(mean): --  RR: 22 (16 Apr 2020 20:25) (18 - 22)  SpO2: 91% (16 Apr 2020 20:25) (89% - 94%)    I&O's Summary    15 Apr 2020 07:01  -  16 Apr 2020 07:00  --------------------------------------------------------  IN: 160 mL / OUT: 1200 mL / NET: -1040 mL    neral: NAD, sitting comfortably  Neuro: AAOX3, EOMI, PERRLA, no scleral icterus  Pulm: Decreased breath sounds b/l, saturating well on 15L NRB  CV: S1/S2 normal, no murmurs  Abdomen: soft, mild distention nontender, no rebound, no guarding  Extremities: WWP, No LE edema b/l        LABS:                        14.8   7.91  )-----------( 120      ( 16 Apr 2020 07:21 )             45.9     04-16    139  |  104  |  10  ----------------------------<  86  4.6   |  28  |  0.80    Ca    8.4      16 Apr 2020 07:21  Phos  2.0     04-16  Mg     1.9     04-16    TPro  5.5<L>  /  Alb  2.6<L>  /  TBili  0.4  /  DBili  x   /  AST  59<H>  /  ALT  69<H>  /  AlkPhos  54  04-16        CAPILLARY BLOOD GLUCOSE            RADIOLOGY & ADDITIONAL TESTS:    Consultant(s) Notes Reviewed:  [x ] YES  [ ] NO    Care Discussed with Consultants/Other Providers [ x] YES  [ ] NO

## 2020-04-17 ENCOUNTER — RESULT REVIEW (OUTPATIENT)
Age: 60
End: 2020-04-17

## 2020-04-17 DIAGNOSIS — B20 HUMAN IMMUNODEFICIENCY VIRUS [HIV] DISEASE: ICD-10-CM

## 2020-04-17 DIAGNOSIS — U07.1 COVID-19: ICD-10-CM

## 2020-04-17 DIAGNOSIS — R09.02 HYPOXEMIA: ICD-10-CM

## 2020-04-17 LAB
4/8 RATIO: 0.64 RATIO — LOW (ref 0.9–3.6)
ABS CD8: 149 /UL — SIGNIFICANT CHANGE UP (ref 142–740)
ALBUMIN SERPL ELPH-MCNC: 3.3 G/DL — SIGNIFICANT CHANGE UP (ref 3.3–5)
ALP SERPL-CCNC: 60 U/L — SIGNIFICANT CHANGE UP (ref 40–120)
ALT FLD-CCNC: 63 U/L — HIGH (ref 10–45)
ANION GAP SERPL CALC-SCNC: 11 MMOL/L — SIGNIFICANT CHANGE UP (ref 5–17)
APTT BLD: 33.9 SEC — SIGNIFICANT CHANGE UP (ref 27.5–36.3)
AST SERPL-CCNC: 40 U/L — SIGNIFICANT CHANGE UP (ref 10–40)
BILIRUB SERPL-MCNC: 0.3 MG/DL — SIGNIFICANT CHANGE UP (ref 0.2–1.2)
BUN SERPL-MCNC: 9 MG/DL — SIGNIFICANT CHANGE UP (ref 7–23)
CALCIUM SERPL-MCNC: 9.2 MG/DL — SIGNIFICANT CHANGE UP (ref 8.4–10.5)
CD16+CD56+ CELLS NFR BLD: 15 % — SIGNIFICANT CHANGE UP (ref 5–23)
CD16+CD56+ CELLS NFR SPEC: 54 /UL — LOW (ref 71–410)
CD19 BLASTS SPEC-ACNC: 11 % — SIGNIFICANT CHANGE UP (ref 6–24)
CD19 BLASTS SPEC-ACNC: 39 /UL — LOW (ref 84–469)
CD3 BLASTS SPEC-ACNC: 256 /UL — LOW (ref 672–1870)
CD3 BLASTS SPEC-ACNC: 73 % — SIGNIFICANT CHANGE UP (ref 59–83)
CD4 %: 27 % — LOW (ref 30–62)
CD8 %: 43 % — HIGH (ref 12–36)
CHLORIDE SERPL-SCNC: 100 MMOL/L — SIGNIFICANT CHANGE UP (ref 96–108)
CMV DNA CSF QL NAA+PROBE: SIGNIFICANT CHANGE UP
CMV DNA SPEC NAA+PROBE-LOG#: SIGNIFICANT CHANGE UP LOG10IU/ML
CMV IGG FLD QL: 6.3 U/ML — HIGH
CMV IGG SERPL-IMP: POSITIVE
CMV IGM FLD-ACNC: 25.9 AU/ML — SIGNIFICANT CHANGE UP
CMV IGM SERPL QL: NEGATIVE — SIGNIFICANT CHANGE UP
CO2 SERPL-SCNC: 26 MMOL/L — SIGNIFICANT CHANGE UP (ref 22–31)
CREAT SERPL-MCNC: 0.64 MG/DL — SIGNIFICANT CHANGE UP (ref 0.5–1.3)
GLUCOSE SERPL-MCNC: 122 MG/DL — HIGH (ref 70–99)
GRAM STN FLD: SIGNIFICANT CHANGE UP
HCT VFR BLD CALC: 44.8 % — SIGNIFICANT CHANGE UP (ref 39–50)
HGB BLD-MCNC: 15 G/DL — SIGNIFICANT CHANGE UP (ref 13–17)
INR BLD: 1.09 — SIGNIFICANT CHANGE UP (ref 0.88–1.16)
MAGNESIUM SERPL-MCNC: 2 MG/DL — SIGNIFICANT CHANGE UP (ref 1.6–2.6)
MCHC RBC-ENTMCNC: 28.8 PG — SIGNIFICANT CHANGE UP (ref 27–34)
MCHC RBC-ENTMCNC: 33.5 GM/DL — SIGNIFICANT CHANGE UP (ref 32–36)
MCV RBC AUTO: 86 FL — SIGNIFICANT CHANGE UP (ref 80–100)
NRBC # BLD: 0 /100 WBCS — SIGNIFICANT CHANGE UP (ref 0–0)
PHOSPHATE SERPL-MCNC: 2.3 MG/DL — LOW (ref 2.5–4.5)
PLATELET # BLD AUTO: 132 K/UL — LOW (ref 150–400)
POTASSIUM SERPL-MCNC: 4.3 MMOL/L — SIGNIFICANT CHANGE UP (ref 3.5–5.3)
POTASSIUM SERPL-SCNC: 4.3 MMOL/L — SIGNIFICANT CHANGE UP (ref 3.5–5.3)
PROT SERPL-MCNC: 6.2 G/DL — SIGNIFICANT CHANGE UP (ref 6–8.3)
PROTHROM AB SERPL-ACNC: 12.5 SEC — SIGNIFICANT CHANGE UP (ref 10–12.9)
RBC # BLD: 5.21 M/UL — SIGNIFICANT CHANGE UP (ref 4.2–5.8)
RBC # FLD: 14 % — SIGNIFICANT CHANGE UP (ref 10.3–14.5)
SODIUM SERPL-SCNC: 137 MMOL/L — SIGNIFICANT CHANGE UP (ref 135–145)
SPECIMEN SOURCE: SIGNIFICANT CHANGE UP
T-CELL CD4 SUBSET PNL BLD: 95 /UL — LOW (ref 489–1457)
WBC # BLD: 10.13 K/UL — SIGNIFICANT CHANGE UP (ref 3.8–10.5)
WBC # FLD AUTO: 10.13 K/UL — SIGNIFICANT CHANGE UP (ref 3.8–10.5)

## 2020-04-17 PROCEDURE — 43752 NASAL/OROGASTRIC W/TUBE PLMT: CPT

## 2020-04-17 PROCEDURE — 88305 TISSUE EXAM BY PATHOLOGIST: CPT | Mod: 26

## 2020-04-17 PROCEDURE — 99291 CRITICAL CARE FIRST HOUR: CPT | Mod: CS

## 2020-04-17 PROCEDURE — 88112 CYTOPATH CELL ENHANCE TECH: CPT | Mod: 26

## 2020-04-17 PROCEDURE — 31624 DX BRONCHOSCOPE/LAVAGE: CPT | Mod: CS

## 2020-04-17 PROCEDURE — 71045 X-RAY EXAM CHEST 1 VIEW: CPT | Mod: 26

## 2020-04-17 PROCEDURE — 71045 X-RAY EXAM CHEST 1 VIEW: CPT | Mod: 26,77

## 2020-04-17 PROCEDURE — 88312 SPECIAL STAINS GROUP 1: CPT | Mod: 26

## 2020-04-17 PROCEDURE — 99233 SBSQ HOSP IP/OBS HIGH 50: CPT | Mod: CS,GC,25

## 2020-04-17 RX ORDER — ENOXAPARIN SODIUM 100 MG/ML
40 INJECTION SUBCUTANEOUS ONCE
Refills: 0 | Status: DISCONTINUED | OUTPATIENT
Start: 2020-04-17 | End: 2020-04-17

## 2020-04-17 RX ORDER — FENTANYL CITRATE 50 UG/ML
50 INJECTION INTRAVENOUS ONCE
Refills: 0 | Status: DISCONTINUED | OUTPATIENT
Start: 2020-04-17 | End: 2020-04-17

## 2020-04-17 RX ORDER — NOREPINEPHRINE BITARTRATE/D5W 8 MG/250ML
0.05 PLASTIC BAG, INJECTION (ML) INTRAVENOUS
Qty: 8 | Refills: 0 | Status: DISCONTINUED | OUTPATIENT
Start: 2020-04-17 | End: 2020-04-19

## 2020-04-17 RX ORDER — ENOXAPARIN SODIUM 100 MG/ML
80 INJECTION SUBCUTANEOUS EVERY 12 HOURS
Refills: 0 | Status: DISCONTINUED | OUTPATIENT
Start: 2020-04-17 | End: 2020-04-21

## 2020-04-17 RX ORDER — FENTANYL CITRATE 50 UG/ML
0.5 INJECTION INTRAVENOUS
Qty: 2500 | Refills: 0 | Status: DISCONTINUED | OUTPATIENT
Start: 2020-04-17 | End: 2020-04-19

## 2020-04-17 RX ORDER — PROPOFOL 10 MG/ML
40 INJECTION, EMULSION INTRAVENOUS
Qty: 1000 | Refills: 0 | Status: DISCONTINUED | OUTPATIENT
Start: 2020-04-17 | End: 2020-04-19

## 2020-04-17 RX ORDER — CISATRACURIUM BESYLATE 2 MG/ML
10 INJECTION INTRAVENOUS ONCE
Refills: 0 | Status: COMPLETED | OUTPATIENT
Start: 2020-04-17 | End: 2020-04-17

## 2020-04-17 RX ORDER — PROPOFOL 10 MG/ML
20 INJECTION, EMULSION INTRAVENOUS
Qty: 500 | Refills: 0 | Status: DISCONTINUED | OUTPATIENT
Start: 2020-04-17 | End: 2020-04-17

## 2020-04-17 RX ADMIN — Medication 85 MILLIMOLE(S): at 17:55

## 2020-04-17 RX ADMIN — FENTANYL CITRATE 50 MICROGRAM(S): 50 INJECTION INTRAVENOUS at 16:39

## 2020-04-17 RX ADMIN — Medication 250 MILLIGRAM(S): at 07:04

## 2020-04-17 RX ADMIN — FENTANYL CITRATE 50 MICROGRAM(S): 50 INJECTION INTRAVENOUS at 15:53

## 2020-04-17 RX ADMIN — CISATRACURIUM BESYLATE 10 MILLIGRAM(S): 2 INJECTION INTRAVENOUS at 16:38

## 2020-04-17 RX ADMIN — Medication 318.75 MILLIGRAM(S): at 12:38

## 2020-04-17 RX ADMIN — PIPERACILLIN AND TAZOBACTAM 200 GRAM(S): 4; .5 INJECTION, POWDER, LYOPHILIZED, FOR SOLUTION INTRAVENOUS at 08:00

## 2020-04-17 RX ADMIN — PIPERACILLIN AND TAZOBACTAM 200 GRAM(S): 4; .5 INJECTION, POWDER, LYOPHILIZED, FOR SOLUTION INTRAVENOUS at 17:49

## 2020-04-17 RX ADMIN — Medication 7.01 MICROGRAM(S)/KG/MIN: at 14:12

## 2020-04-17 RX ADMIN — ROSUVASTATIN CALCIUM 20 MILLIGRAM(S): 5 TABLET ORAL at 21:18

## 2020-04-17 RX ADMIN — Medication 318.75 MILLIGRAM(S): at 18:55

## 2020-04-17 RX ADMIN — Medication 125 MICROGRAM(S): at 06:56

## 2020-04-17 RX ADMIN — Medication 318.75 MILLIGRAM(S): at 06:56

## 2020-04-17 RX ADMIN — Medication 3 MILLIGRAM(S): at 21:16

## 2020-04-17 RX ADMIN — VALACYCLOVIR 500 MILLIGRAM(S): 500 TABLET, FILM COATED ORAL at 23:20

## 2020-04-17 RX ADMIN — Medication 40 MILLIGRAM(S): at 06:56

## 2020-04-17 RX ADMIN — FENTANYL CITRATE 3.74 MICROGRAM(S)/KG/HR: 50 INJECTION INTRAVENOUS at 15:54

## 2020-04-17 RX ADMIN — TAMSULOSIN HYDROCHLORIDE 0.4 MILLIGRAM(S): 0.4 CAPSULE ORAL at 21:16

## 2020-04-17 RX ADMIN — ENOXAPARIN SODIUM 40 MILLIGRAM(S): 100 INJECTION SUBCUTANEOUS at 07:05

## 2020-04-17 RX ADMIN — PIPERACILLIN AND TAZOBACTAM 200 GRAM(S): 4; .5 INJECTION, POWDER, LYOPHILIZED, FOR SOLUTION INTRAVENOUS at 02:00

## 2020-04-17 RX ADMIN — ENOXAPARIN SODIUM 80 MILLIGRAM(S): 100 INJECTION SUBCUTANEOUS at 21:16

## 2020-04-17 RX ADMIN — Medication 250 MILLIGRAM(S): at 21:15

## 2020-04-17 NOTE — AIRWAY PLACEMENT NOTE ADULT - POST AIRWAY PLACEMENT ASSESSMENT:
CXR pending/chest excursion noted/skin color improved/positive end tidal CO2 noted/breath sounds bilateral/breath sounds equal

## 2020-04-17 NOTE — PROGRESS NOTE ADULT - ASSESSMENT
A/P: 59 M PMH of HIV (CD4 103, VL undetectable in March 2020), tonsilar CA (s/p CRT and RT), BPH, HLD, hypothyroidism, sleep apnea, COVID +ve, recently admitted on 3/30 at [facility], transferred to [facility] on 4/8. Presenting with hypoxic respiratory failure requiring nonrebreather. More SOB with worsening CXR    NEURO: Tyelnol prn  CV: MAP>65  Pulm: Saturating well on nonrebreather, encouraged proning  GI: DASH diet, Famotidine  : Voiding  Endo: ISS  ID: HIV home medication regimen: Ritonavir, Darunavir, Raltegravir, Bactrim now with steroids to rx for PCP, add zosyn for potential HAP  COVID: s/p COVID treatment with Tocilizumab (04/03), Azithro/Plaquenil (04/02-04/06) and Solumedrol (04/03-04/08). s/p Vancomycin x 8 doses (last dose 4/9).   PPx: Lovenox 40 d/ SCDS for DVT ppx  Lines: PIV  PT/OT: not ordered A/P: 59 M PMH of HIV (CD4 103, VL undetectable in March 2020), tonsilar CA (s/p CRT and RT), BPH, HLD, hypothyroidism, sleep apnea, COVID +ve, recently admitted on 3/30 at [facility], transferred to [facility] on 4/8. Presenting with hypoxic respiratory failure requiring nonrebreather. More SOB with worsening CXR    NEURO: Tyelnol prn  CV: MAP>65  Pulm: Saturating well on nonrebreather, encouraged proning however respiratory status is worse. Have consulted pulmonary and they agree with PCP Rx s/p IL-6 inhibition; checking strongyloides, CMV serology, beta D glucan and galactomannan as well as serum eosinophils. Pulmonary will perform bronchoscopy after intubation.  GI: DASH diet, Famotidine  : Voiding  Endo: ISS  ID: HIV home medication regimen: Ritonavir, Darunavir, Raltegravir, Bactrim now with steroids to rx for PCP, add zosyn for potential HAP  COVID: s/p COVID treatment with Tocilizumab (04/03), Azithro/Plaquenil (04/02-04/06) and Solumedrol (04/03-04/08). s/p Vancomycin x 8 doses (last dose 4/9).   PPx: Lovenox 40 d/ SCDS for DVT ppx  Lines: PIV  PT/OT: not ordered

## 2020-04-17 NOTE — CONSULT NOTE PEDS - ATTENDING COMMENTS
As above - healthy man w controlled HIV admitted w Covid and persistent respiratory failure and worsening infiltrates. Lymphopenic now in setting of Covid. Agree w bronchoscopy to attempt to make a specific treatable diagnosis. He was transferred to  for negative pressure room and bronchoscopy, which was performed without immediate complication and which was notable for scant sticky clear secretions on the left and nothing on the right. BAL 60/7 lingula and 90/42 RML, nonbloody. Sent for studies as above and agree w empiric Abx (though doubt based on bronch this is bacterial pneumonia) including PJP coverage and steroids pending bronch studies. For full dose AC to begin this evening. D/w MICU attg.

## 2020-04-17 NOTE — AIRWAY PLACEMENT NOTE ADULT - AIRWAY COMMENTS:
Called to patient bedside s/p bronchoscopy for concern of cuff leakage. Glidescope facilitated tube exchange per above. Patient tolerated well; placed back on ventilator with adequate expiratory VT reflected.

## 2020-04-17 NOTE — CONSULT NOTE PEDS - ASSESSMENT
60 yo M with HIV on ART, COVID pneumonia, with progression of infiltrates, early fibrosis, and worsening hyopxia concerning for fibrotic lung disease 2/2 covid vs. resistant bacterial infection vs PCP

## 2020-04-17 NOTE — AIRWAY PLACEMENT NOTE ADULT - POST AIRWAY PLACEMENT ASSESSMENT:
CXR pending/breath sounds bilateral/chest excursion noted/breath sounds equal/positive end tidal CO2 noted/skin color improved

## 2020-04-17 NOTE — PROGRESS NOTE ADULT - SUBJECTIVE AND OBJECTIVE BOX
TRANSFER NOTE FROM [facility] TO [facility]     Patient is a 59y old  Male who presents with a chief complaint of r/o COVID     HPI/Hospital course:    60 yo M with PMH HIV, well controlled on ART, tonsillar Ca s/p chemo/rad in remission, hypothyroidism who initially presented 3/30/20 with cough/fever/chills and pulmonary infiltrates associated with COVID infection. He received tocilizumab x 1, azithromycin/hydroxychloroquine/steroids x 5 days. Since admission he has had persistent hypoxia and worsening infiltrates, has intermittently received antibiotics for possible superimposed infection and . Yesterday pt continued to have subjective shortness of breath requiring NRB with episodes of hypoxia and tachypnea. He was empirically started on broad spectrum abx (Vanc/zosyn in setting of +MRSA) and bactrim + prednisone for PCP and started on full dose a/c presumed PE in setting of elevated d-dimers (although already downtrending) and worsening symptoms. CT of chest performed with significant worsening of ground glass opacities, crazy paving, early fibrotic changes including traction bronchiectasis. Pulmonary consulted for abnormal chest CT, worsening hypoxia, and possible diagnostic bronch.     interval history: Patient states aside from worsening SOB, he has no other symptoms - tolerating PO intake, had a BM yesterday, no fever for "weeks", no chest pain, no abdominal pain, no swelling of LE. He has no underlying lung disease, smokes only a few cigarettes/year, no vaping or marijuana use, no exposure history - has been living in [place], works as a , no mold exposure.     ROS: 12 pt ROS otherwise negative      PMHx: HIV, well controlled on ART, tonsillar Ca s/p chemo/rad in remission, hypothyroidism and chronic RBBB   HTN: N  DM: N  Lung Disease: N however->       Specify: prior CTs in HIE concerning for pulmonary nodule  Cardiovascular disease: N  Malignancy: Y hx tonsilar Ca  Immunosuppression: N  HIV: Y  CKD/ESRD: N  Chronic Liver Disease: N    SoHx:  Tobacco use: N  Vape use: N  Health care worker: N (31 Mar 2020 02:00)      PAST MEDICAL & SURGICAL HISTORY:  History of BPH  HIV disease  Abscess  Throat cancer  No significant past surgical history      FAMILY HISTORY:  Family history of diabetes mellitus (DM)  FH: heart disease: father, father still alive age 90      SOCIAL HISTORY:  Smoking Status: [ ] Current, [ ] Former, [X ] Never  Pack Years:    MEDICATIONS:  Pulmonary:  hydrocodone/homatropine Syrup 5 milliLiter(s) Oral every 6 hours PRN    Antimicrobials:  darunavir 600 milliGRAM(s) Oral <User Schedule>  emtricitabine 200 mG/tenofovir alafenamide 25 mG (DESCOVY) Tablet 1 Tablet(s) Oral daily  etravirine 200 milliGRAM(s) Oral <User Schedule>  piperacillin/tazobactam IVPB.. 4.5 Gram(s) IV Intermittent every 6 hours  raltegravir Tablet 400 milliGRAM(s) Oral <User Schedule>  ritonavir Tablet 100 milliGRAM(s) Oral <User Schedule>  trimethoprim / sulfamethoxazole IVPB 300 milliGRAM(s) IV Intermittent every 6 hours  valACYclovir 500 milliGRAM(s) Oral two times a day  vancomycin  IVPB 1000 milliGRAM(s) IV Intermittent every 12 hours    Anticoagulants:  enoxaparin Injectable 80 milliGRAM(s) SubCutaneous every 12 hours    Onc:    GI/:  famotidine    Tablet 20 milliGRAM(s) Oral daily    Endocrine:  dextrose 40% Gel 15 Gram(s) Oral once PRN  dextrose 50% Injectable 12.5 Gram(s) IV Push once  dextrose 50% Injectable 25 Gram(s) IV Push once  dextrose 50% Injectable 25 Gram(s) IV Push once  glucagon  Injectable 1 milliGRAM(s) IntraMuscular once PRN  levothyroxine 125 MICROGram(s) Oral daily  predniSONE   Tablet 40 milliGRAM(s) Oral every 12 hours  rosuvastatin 20 milliGRAM(s) Oral at bedtime    Cardiac:  tamsulosin 0.4 milliGRAM(s) Oral at bedtime    Other Medications:  acetaminophen   Tablet .. 975 milliGRAM(s) Oral every 8 hours PRN  ALPRAZolam 0.25 milliGRAM(s) Oral at bedtime PRN  dextrose 40% Gel 15 Gram(s) Oral once PRN  dextrose 5%. 1000 milliLiter(s) IV Continuous <Continuous>  dextrose 50% Injectable 12.5 Gram(s) IV Push once  dextrose 50% Injectable 25 Gram(s) IV Push once  dextrose 50% Injectable 25 Gram(s) IV Push once  glucagon  Injectable 1 milliGRAM(s) IntraMuscular once PRN  influenza   Vaccine 0.5 milliLiter(s) IntraMuscular once  lactobacillus acidophilus and bulgaricus Chewable 1 Tablet(s) Chew daily  levothyroxine 125 MICROGram(s) Oral daily  melatonin 3 milliGRAM(s) Oral at bedtime  predniSONE   Tablet 40 milliGRAM(s) Oral every 12 hours  rosuvastatin 20 milliGRAM(s) Oral at bedtime      Allergies    No Known Allergies    Intolerances        Vital Signs Last 24 Hrs  T(C): 36.2 (17 Apr 2020 09:39), Max: 36.8 (16 Apr 2020 20:25)  T(F): 97.1 (17 Apr 2020 09:39), Max: 98.3 (17 Apr 2020 04:00)  HR: 72 (17 Apr 2020 09:39) (66 - 86)  BP: 134/84 (17 Apr 2020 09:39) (107/70 - 134/84)  BP(mean): 104 (17 Apr 2020 08:35) (84 - 104)  RR: 20 (17 Apr 2020 09:39) (20 - 22)  SpO2: 90% (17 Apr 2020 09:39) (90% - 94%)    04-16 @ 07:01  -  04-17 @ 07:00  --------------------------------------------------------  IN: 1210 mL / OUT: 1100 mL / NET: 110 mL    04-17 @ 07:01  -  04-17 @ 11:12  --------------------------------------------------------  IN: 0 mL / OUT: 850 mL / NET: -850 mL          LABS:      CBC Full  -  ( 16 Apr 2020 07:21 )  WBC Count : 7.91 K/uL  RBC Count : 5.12 M/uL  Hemoglobin : 14.8 g/dL  Hematocrit : 45.9 %  Platelet Count - Automated : 120 K/uL  Mean Cell Volume : 89.6 fl  Mean Cell Hemoglobin : 28.9 pg  Mean Cell Hemoglobin Concentration : 32.2 gm/dL  Auto Neutrophil # : 6.74 K/uL  Auto Lymphocyte # : 0.29 K/uL  Auto Monocyte # : 0.79 K/uL  Auto Eosinophil # : 0.02 K/uL  Auto Basophil # : 0.01 K/uL  Auto Neutrophil % : 85.1 %  Auto Lymphocyte % : 3.7 %  Auto Monocyte % : 10.0 %  Auto Eosinophil % : 0.3 %  Auto Basophil % : 0.1 %    04-16    139  |  104  |  10  ----------------------------<  86  4.6   |  28  |  0.80    Ca    8.4      16 Apr 2020 07:21  Phos  2.0     04-16  Mg     1.9     04-16    TPro  5.5<L>  /  Alb  2.6<L>  /  TBili  0.4  /  DBili  x   /  AST  59<H>  /  ALT  69<H>  /  AlkPhos  54  04-16                      RADIOLOGY & ADDITIONAL STUDIES (The following images were personally reviewed):  < from: CT Chest No Cont (04.16.20 @ 23:26) >  Impression:   1.  Compared to 3/30/2020, there has been interval worsening of now moderate to severe bilateral airspace opacities, the pattern of which suggests infection including atypical pneumonia/viral infection from atypical agents including COVID-19.  2.  Mild to moderate left lower lobe atelectasis with superimposed consolidation difficult to exclude. TRANSFER NOTE FROM [facility] TO [facility]     Patient is a 59y old  Male who presents with a chief complaint of r/o COVID     HPI/Hospital course:    58 yo M with PMH HIV, well controlled on ART, tonsillar Ca s/p chemo/rad in remission, hypothyroidism who initially presented 3/30/20 with cough/fever/chills and pulmonary infiltrates associated with COVID infection. He received tocilizumab x 1, azithromycin/hydroxychloroquine/steroids x 5 days. Since admission he has had persistent hypoxia and worsening infiltrates, has intermittently received antibiotics for possible superimposed infection and . Yesterday pt continued to have subjective shortness of breath requiring NRB with episodes of hypoxia and tachypnea. He was empirically started on broad spectrum abx (Vanc/zosyn in setting of +MRSA) and bactrim + prednisone for PCP and started on full dose a/c presumed PE in setting of elevated d-dimers (although already downtrending) and worsening symptoms. CT of chest performed with significant worsening of ground glass opacities, crazy paving, early fibrotic changes including traction bronchiectasis. Pulmonary consulted for abnormal chest CT, worsening hypoxia, and possible diagnostic bronch.     interval history: Patient states aside from worsening SOB, he has no other symptoms - tolerating PO intake, had a BM yesterday, no fever for "weeks", no chest pain, no abdominal pain, no swelling of LE. He has no underlying lung disease, smokes only a few cigarettes/year, no vaping or marijuana use, no exposure history - has been living in [place], works as a , no mold exposure.     ROS: More SOB 12 pt ROS otherwise negative      PMHx: HIV, well controlled on ART, tonsillar Ca s/p chemo/rad in remission, hypothyroidism and chronic RBBB   HTN: N  DM: N  Lung Disease: N however->       Specify: prior CTs in HIE concerning for pulmonary nodule  Cardiovascular disease: N  Malignancy: Y hx tonsilar Ca  Immunosuppression: N  HIV: Y  CKD/ESRD: N  Chronic Liver Disease: N    SoHx:  Tobacco use: N  Vape use: N  Health care worker: N (31 Mar 2020 02:00)      PAST MEDICAL & SURGICAL HISTORY:  History of BPH  HIV disease  Abscess  Throat cancer  No significant past surgical history      FAMILY HISTORY:  Family history of diabetes mellitus (DM)  FH: heart disease: father, father still alive age 90      SOCIAL HISTORY:  Smoking Status: [ ] Current, [ ] Former, [X ] Never  Pack Years:    MEDICATIONS:  Pulmonary:  hydrocodone/homatropine Syrup 5 milliLiter(s) Oral every 6 hours PRN    Antimicrobials:  darunavir 600 milliGRAM(s) Oral <User Schedule>  emtricitabine 200 mG/tenofovir alafenamide 25 mG (DESCOVY) Tablet 1 Tablet(s) Oral daily  etravirine 200 milliGRAM(s) Oral <User Schedule>  piperacillin/tazobactam IVPB.. 4.5 Gram(s) IV Intermittent every 6 hours  raltegravir Tablet 400 milliGRAM(s) Oral <User Schedule>  ritonavir Tablet 100 milliGRAM(s) Oral <User Schedule>  trimethoprim / sulfamethoxazole IVPB 300 milliGRAM(s) IV Intermittent every 6 hours  valACYclovir 500 milliGRAM(s) Oral two times a day  vancomycin  IVPB 1000 milliGRAM(s) IV Intermittent every 12 hours    Anticoagulants:  enoxaparin Injectable 80 milliGRAM(s) SubCutaneous every 12 hours    Onc:    GI/:  famotidine    Tablet 20 milliGRAM(s) Oral daily    Endocrine:  dextrose 40% Gel 15 Gram(s) Oral once PRN  dextrose 50% Injectable 12.5 Gram(s) IV Push once  dextrose 50% Injectable 25 Gram(s) IV Push once  dextrose 50% Injectable 25 Gram(s) IV Push once  glucagon  Injectable 1 milliGRAM(s) IntraMuscular once PRN  levothyroxine 125 MICROGram(s) Oral daily  predniSONE   Tablet 40 milliGRAM(s) Oral every 12 hours  rosuvastatin 20 milliGRAM(s) Oral at bedtime    Cardiac:  tamsulosin 0.4 milliGRAM(s) Oral at bedtime    Other Medications:  acetaminophen   Tablet .. 975 milliGRAM(s) Oral every 8 hours PRN  ALPRAZolam 0.25 milliGRAM(s) Oral at bedtime PRN  dextrose 40% Gel 15 Gram(s) Oral once PRN  dextrose 5%. 1000 milliLiter(s) IV Continuous <Continuous>  dextrose 50% Injectable 12.5 Gram(s) IV Push once  dextrose 50% Injectable 25 Gram(s) IV Push once  dextrose 50% Injectable 25 Gram(s) IV Push once  glucagon  Injectable 1 milliGRAM(s) IntraMuscular once PRN  influenza   Vaccine 0.5 milliLiter(s) IntraMuscular once  lactobacillus acidophilus and bulgaricus Chewable 1 Tablet(s) Chew daily  levothyroxine 125 MICROGram(s) Oral daily  melatonin 3 milliGRAM(s) Oral at bedtime  predniSONE   Tablet 40 milliGRAM(s) Oral every 12 hours  rosuvastatin 20 milliGRAM(s) Oral at bedtime      Allergies    No Known Allergies    Intolerances        Vital Signs Last 24 Hrs  T(C): 36.2 (17 Apr 2020 09:39), Max: 36.8 (16 Apr 2020 20:25)  T(F): 97.1 (17 Apr 2020 09:39), Max: 98.3 (17 Apr 2020 04:00)  HR: 72 (17 Apr 2020 09:39) (66 - 86)  BP: 134/84 (17 Apr 2020 09:39) (107/70 - 134/84)  BP(mean): 104 (17 Apr 2020 08:35) (84 - 104)  RR: 20 (17 Apr 2020 09:39) (20 - 22)  SpO2: 90% (17 Apr 2020 09:39) (90% - 94%)    04-16 @ 07:01  -  04-17 @ 07:00  --------------------------------------------------------  IN: 1210 mL / OUT: 1100 mL / NET: 110 mL    04-17 @ 07:01  -  04-17 @ 11:12  --------------------------------------------------------  IN: 0 mL / OUT: 850 mL / NET: -850 mL  HEENT: PERRL, EOMI, MMM  neck supple   lungs coarse crackles in mid lung fields, egophony on left  RRR S1S2  abd soft NT ND +BS  no cce  no rash  moves all extemities        LABS:      CBC Full  -  ( 16 Apr 2020 07:21 )  WBC Count : 7.91 K/uL  RBC Count : 5.12 M/uL  Hemoglobin : 14.8 g/dL  Hematocrit : 45.9 %  Platelet Count - Automated : 120 K/uL  Mean Cell Volume : 89.6 fl  Mean Cell Hemoglobin : 28.9 pg  Mean Cell Hemoglobin Concentration : 32.2 gm/dL  Auto Neutrophil # : 6.74 K/uL  Auto Lymphocyte # : 0.29 K/uL  Auto Monocyte # : 0.79 K/uL  Auto Eosinophil # : 0.02 K/uL  Auto Basophil # : 0.01 K/uL  Auto Neutrophil % : 85.1 %  Auto Lymphocyte % : 3.7 %  Auto Monocyte % : 10.0 %  Auto Eosinophil % : 0.3 %  Auto Basophil % : 0.1 %    04-16    139  |  104  |  10  ----------------------------<  86  4.6   |  28  |  0.80    Ca    8.4      16 Apr 2020 07:21  Phos  2.0     04-16  Mg     1.9     04-16    TPro  5.5<L>  /  Alb  2.6<L>  /  TBili  0.4  /  DBili  x   /  AST  59<H>  /  ALT  69<H>  /  AlkPhos  54  04-16                      RADIOLOGY & ADDITIONAL STUDIES (The following images were personally reviewed):  < from: CT Chest No Cont (04.16.20 @ 23:26) >  Impression:   1.  Compared to 3/30/2020, there has been interval worsening of now moderate to severe bilateral airspace opacities, the pattern of which suggests infection including atypical pneumonia/viral infection from atypical agents including COVID-19.  2.  Mild to moderate left lower lobe atelectasis with superimposed consolidation difficult to exclude.

## 2020-04-17 NOTE — CONSULT NOTE PEDS - PROBLEM SELECTOR RECOMMENDATION 3
- CD4 count acutely low in setting of severe viral illness from COVID, unlikely to be AIDS, but will treat for PCP as above.

## 2020-04-17 NOTE — BRIEF OPERATIVE NOTE - OPERATION/FINDINGS
Prior to procedure, patient noted to have large leak on ventilator with VTi of 600cc, VT3 of 40cc; no desaturation, suspect balloon deflation. Attempted to re-inflate balloon with improvement in leak, but quickly recurred suggesting balloon not patent. Bronchoscope inserted through ETT to confirm ETT within airway, noted to be in good position. Anesthesia called overhead stat for ETT exchange. Patient re-intubated by anesthesia. ETT examined and noted to have hole within balloon.     Findings:  Bronchoscope inserted through ETT. ETT noted to be too high, needs 3 cm advancement. Airway evaluation revealed [place]. Oral secretions noted to have pooling of oral secretions from above into trachea and left mainstem. LUCAS and LLL evaluation revealed normal anatomy, no endobronchial lesions, minimal secretions. RUL, RML, RLL revealed normal anatomy, no endobronchial lesions, minimal secretions. Bronchoscope wedged into medial segment of left lingula, 60cc aliquot inserted for BAL with only 10cc of return. Bronchoscope then wedged into RML, 60cc of NS x 3 inserted with return of 90cc.  Bronchoscope then withdrawn from ETT. Minimal bleeding noted.    Specimens: BAL of LUCAS, BAL of RML, Bronchial wash

## 2020-04-17 NOTE — CONSULT NOTE PEDS - PROBLEM SELECTOR RECOMMENDATION 9
- CT chest compared from admission with early fibrosis and crazy paving, may be 2/2 chronic COVID infection despite treatment with tocilizumab, steroids, hydroxychloroquine, and azithromycin  - Hx of HIV places pt at risk for superimposed infections and AIDS defining illnesses, though less likely as pt was well controlled prior to admission  - Will proceed with diagnostic bronchoscopy for BAL to obtain sputum culture, cell count, CD4/CD8, and PCP PCR  - Continue with broad spectrum abx and PCP therapy for now  - Continue with steroid taper per PCP treatment for now  - Transbronchial biopsy unlikely to provide diagnostic benefit and pt unlikely to tolerate procedure at this time - CT chest compared from admission with early fibrosis and crazy paving, may be 2/2 chronic COVID infection despite treatment with tocilizumab, steroids, hydroxychloroquine, and azithromycin  - Hx of HIV places pt at risk for superimposed infections and AIDS defining illnesses, though less likely as pt was well controlled prior to admission  - Will proceed with diagnostic bronchoscopy for BAL to obtain sputum culture, cell count, CD4/CD8, PCP PCR, and CMV PCR in setting of + CMV Ab (though very low suspicion)  - Continue with broad spectrum abx and PCP therapy for now  - Continue with steroid taper per PCP treatment for now  - Transbronchial biopsy unlikely to provide diagnostic benefit and pt unlikely to tolerate procedure at this time - CT chest compared from admission with early fibrosis and crazy paving, may be 2/2 chronic COVID infection despite treatment with tocilizumab, steroids, hydroxychloroquine, and azithromycin  - Hx of HIV places pt at risk for superimposed infections and AIDS defining illnesses, though less likely as pt was well controlled prior to admission  - Will proceed with diagnostic bronchoscopy for BAL to obtain culture, cell count, CD4/CD8, PCP PCR, and CMV PCR in setting of + CMV Ab (though very low suspicion)  - Continue with broad spectrum abx and PCP therapy for now  - Continue with steroid taper per PCP treatment for now  - Transbronchial biopsy unlikely to provide diagnostic benefit and pt unlikely to tolerate procedure at this time

## 2020-04-17 NOTE — CONSULT NOTE PEDS - SUBJECTIVE AND OBJECTIVE BOX
Patient is a 59y old  Male who presents with a chief complaint of r/o COVID (16 Apr 2020 14:56)    HPI/Hospital course:    58 yo M with PMH HIV, well controlled on ART, tonsillar Ca s/p chemo/rad in remission, hypothyroidism who initially presented 3/30/20 with cough/fever/chills and pulmonary infiltrates associated with COVID infection. He received tocilizumab x 1, azithromycin/hydroxychloroquine/steroids x 5 days. Since admission he has had persistent hypoxia and worsening infiltrates, has intermittently received antibiotics and full dose a/c for possible superimposed infection and presumed PE in setting of elevated d-dimers. Yesterday pt continued to have subjective shortness of breath requiring NRB with episodes of hypoxia and tachypnea. He was empirically started on broad spectrum abx (Vanc/zosyn in setting of +MRSA) and bactrim + prednisone for PCP. CT of chest performed with significant worsening of ground glass opacities, crazy paving, early fibrotic changes including traction bronchiectasis.     Pulmonary consulted for abnormal chest CT, worsening hypoxia, and possible diagnostic bronch.    Patient states aside from worsening SOB, he has no other symptoms - tolerating PO intake, had a BM yesterday, no fever for "weeks", no chest pain, no abdominal pain, no swelling of LE. He has no underlying lung disease, smokes only a few cigarettes/year, no vaping or marijuana use, no exposure history - has been living in [place], works as a , no mold exposure.     ROS: 12 pt ROS otherwise negative      PMHx:   HTN: N  DM: N  Lung Disease: N however->       Specify: prior CTs in [facility] concerning for pulmonary nodule  Cardiovascular disease: N  Malignancy: Y hx tonsilar Ca  Immunosuppression: N  HIV: Y  CKD/ESRD: N  Chronic Liver Disease: N    SoHx:  Tobacco use: N  Vape use: N  Health care worker: N (31 Mar 2020 02:00)      PAST MEDICAL & SURGICAL HISTORY:  History of BPH  HIV disease  Abscess  Throat cancer  No significant past surgical history      FAMILY HISTORY:  Family history of diabetes mellitus (DM)  FH: heart disease: father, father still alive age 90      SOCIAL HISTORY:  Smoking Status: [ ] Current, [ ] Former, [X ] Never  Pack Years:    MEDICATIONS:  Pulmonary:  hydrocodone/homatropine Syrup 5 milliLiter(s) Oral every 6 hours PRN    Antimicrobials:  darunavir 600 milliGRAM(s) Oral <User Schedule>  emtricitabine 200 mG/tenofovir alafenamide 25 mG (DESCOVY) Tablet 1 Tablet(s) Oral daily  etravirine 200 milliGRAM(s) Oral <User Schedule>  piperacillin/tazobactam IVPB.. 4.5 Gram(s) IV Intermittent every 6 hours  raltegravir Tablet 400 milliGRAM(s) Oral <User Schedule>  ritonavir Tablet 100 milliGRAM(s) Oral <User Schedule>  trimethoprim / sulfamethoxazole IVPB 300 milliGRAM(s) IV Intermittent every 6 hours  valACYclovir 500 milliGRAM(s) Oral two times a day  vancomycin  IVPB 1000 milliGRAM(s) IV Intermittent every 12 hours    Anticoagulants:  enoxaparin Injectable 80 milliGRAM(s) SubCutaneous every 12 hours    Onc:    GI/:  famotidine    Tablet 20 milliGRAM(s) Oral daily    Endocrine:  dextrose 40% Gel 15 Gram(s) Oral once PRN  dextrose 50% Injectable 12.5 Gram(s) IV Push once  dextrose 50% Injectable 25 Gram(s) IV Push once  dextrose 50% Injectable 25 Gram(s) IV Push once  glucagon  Injectable 1 milliGRAM(s) IntraMuscular once PRN  levothyroxine 125 MICROGram(s) Oral daily  predniSONE   Tablet 40 milliGRAM(s) Oral every 12 hours  rosuvastatin 20 milliGRAM(s) Oral at bedtime    Cardiac:  tamsulosin 0.4 milliGRAM(s) Oral at bedtime    Other Medications:  acetaminophen   Tablet .. 975 milliGRAM(s) Oral every 8 hours PRN  ALPRAZolam 0.25 milliGRAM(s) Oral at bedtime PRN  dextrose 40% Gel 15 Gram(s) Oral once PRN  dextrose 5%. 1000 milliLiter(s) IV Continuous <Continuous>  dextrose 50% Injectable 12.5 Gram(s) IV Push once  dextrose 50% Injectable 25 Gram(s) IV Push once  dextrose 50% Injectable 25 Gram(s) IV Push once  glucagon  Injectable 1 milliGRAM(s) IntraMuscular once PRN  influenza   Vaccine 0.5 milliLiter(s) IntraMuscular once  lactobacillus acidophilus and bulgaricus Chewable 1 Tablet(s) Chew daily  levothyroxine 125 MICROGram(s) Oral daily  melatonin 3 milliGRAM(s) Oral at bedtime  predniSONE   Tablet 40 milliGRAM(s) Oral every 12 hours  rosuvastatin 20 milliGRAM(s) Oral at bedtime      Allergies    No Known Allergies    Intolerances        Vital Signs Last 24 Hrs  T(C): 36.2 (17 Apr 2020 09:39), Max: 36.8 (16 Apr 2020 20:25)  T(F): 97.1 (17 Apr 2020 09:39), Max: 98.3 (17 Apr 2020 04:00)  HR: 72 (17 Apr 2020 09:39) (66 - 86)  BP: 134/84 (17 Apr 2020 09:39) (107/70 - 134/84)  BP(mean): 104 (17 Apr 2020 08:35) (84 - 104)  RR: 20 (17 Apr 2020 09:39) (20 - 22)  SpO2: 90% (17 Apr 2020 09:39) (90% - 94%)    04-16 @ 07:01  -  04-17 @ 07:00  --------------------------------------------------------  IN: 1210 mL / OUT: 1100 mL / NET: 110 mL    04-17 @ 07:01  -  04-17 @ 11:12  --------------------------------------------------------  IN: 0 mL / OUT: 850 mL / NET: -850 mL          LABS:      CBC Full  -  ( 16 Apr 2020 07:21 )  WBC Count : 7.91 K/uL  RBC Count : 5.12 M/uL  Hemoglobin : 14.8 g/dL  Hematocrit : 45.9 %  Platelet Count - Automated : 120 K/uL  Mean Cell Volume : 89.6 fl  Mean Cell Hemoglobin : 28.9 pg  Mean Cell Hemoglobin Concentration : 32.2 gm/dL  Auto Neutrophil # : 6.74 K/uL  Auto Lymphocyte # : 0.29 K/uL  Auto Monocyte # : 0.79 K/uL  Auto Eosinophil # : 0.02 K/uL  Auto Basophil # : 0.01 K/uL  Auto Neutrophil % : 85.1 %  Auto Lymphocyte % : 3.7 %  Auto Monocyte % : 10.0 %  Auto Eosinophil % : 0.3 %  Auto Basophil % : 0.1 %    04-16    139  |  104  |  10  ----------------------------<  86  4.6   |  28  |  0.80    Ca    8.4      16 Apr 2020 07:21  Phos  2.0     04-16  Mg     1.9     04-16    TPro  5.5<L>  /  Alb  2.6<L>  /  TBili  0.4  /  DBili  x   /  AST  59<H>  /  ALT  69<H>  /  AlkPhos  54  04-16                      RADIOLOGY & ADDITIONAL STUDIES (The following images were personally reviewed):  < from: CT Chest No Cont (04.16.20 @ 23:26) >  Impression:   1.  Compared to 3/30/2020, there has been interval worsening of now moderate to severe bilateral airspace opacities, the pattern of which suggests infection including atypical pneumonia/viral infection from atypical agents including COVID-19.  2.  Mild to moderate left lower lobe atelectasis with superimposed consolidation difficult to exclude.    < end of copied text >

## 2020-04-17 NOTE — CONSULT NOTE PEDS - PROBLEM SELECTOR RECOMMENDATION 2
- Severe persistent hypoxia on NRB in setting of COVID  - Likely 2/2 progressive lung disease as above. Lovenox therapeutic dose to start tonight in setting of high d-dimer as PE may contribute to hypoxia, but given extent of disease of CT, less likely  - Will need to be intubated for procedure given the severity of hypoxia. Patient explained risks/benefits and possibility he will need to remain on the ventilator which he understands. Transfer to 7E negative pressure room for procedure.

## 2020-04-18 LAB
ALBUMIN SERPL ELPH-MCNC: 2.7 G/DL — LOW (ref 3.3–5)
ALP SERPL-CCNC: 49 U/L — SIGNIFICANT CHANGE UP (ref 40–120)
ALT FLD-CCNC: 46 U/L — HIGH (ref 10–45)
ANION GAP SERPL CALC-SCNC: 10 MMOL/L — SIGNIFICANT CHANGE UP (ref 5–17)
ANION GAP SERPL CALC-SCNC: 9 MMOL/L — SIGNIFICANT CHANGE UP (ref 5–17)
AST SERPL-CCNC: 23 U/L — SIGNIFICANT CHANGE UP (ref 10–40)
B PERT IGG+IGM PNL SER: CLEAR — SIGNIFICANT CHANGE UP
B PERT IGG+IGM PNL SER: SIGNIFICANT CHANGE UP
BASE EXCESS BLDA CALC-SCNC: -1 MMOL/L — SIGNIFICANT CHANGE UP (ref -2–3)
BASOPHILS # BLD AUTO: 0.02 K/UL — SIGNIFICANT CHANGE UP (ref 0–0.2)
BASOPHILS NFR BLD AUTO: 0.2 % — SIGNIFICANT CHANGE UP (ref 0–2)
BILIRUB SERPL-MCNC: 0.2 MG/DL — SIGNIFICANT CHANGE UP (ref 0.2–1.2)
BUN SERPL-MCNC: 10 MG/DL — SIGNIFICANT CHANGE UP (ref 7–23)
BUN SERPL-MCNC: 11 MG/DL — SIGNIFICANT CHANGE UP (ref 7–23)
CALCIUM SERPL-MCNC: 8.3 MG/DL — LOW (ref 8.4–10.5)
CALCIUM SERPL-MCNC: 8.5 MG/DL — SIGNIFICANT CHANGE UP (ref 8.4–10.5)
CHLORIDE SERPL-SCNC: 102 MMOL/L — SIGNIFICANT CHANGE UP (ref 96–108)
CHLORIDE SERPL-SCNC: 99 MMOL/L — SIGNIFICANT CHANGE UP (ref 96–108)
CO2 SERPL-SCNC: 25 MMOL/L — SIGNIFICANT CHANGE UP (ref 22–31)
CO2 SERPL-SCNC: 26 MMOL/L — SIGNIFICANT CHANGE UP (ref 22–31)
COLOR FLD: SIGNIFICANT CHANGE UP
COLOR FLD: SIGNIFICANT CHANGE UP
CREAT SERPL-MCNC: 0.85 MG/DL — SIGNIFICANT CHANGE UP (ref 0.5–1.3)
CREAT SERPL-MCNC: 1 MG/DL — SIGNIFICANT CHANGE UP (ref 0.5–1.3)
CRP SERPL-MCNC: 1.91 MG/DL — HIGH (ref 0–0.4)
D DIMER BLD IA.RAPID-MCNC: 1065 NG/ML DDU — HIGH
EOSINOPHIL # BLD AUTO: 0.03 K/UL — SIGNIFICANT CHANGE UP (ref 0–0.5)
EOSINOPHIL NFR BLD AUTO: 0.3 % — SIGNIFICANT CHANGE UP (ref 0–6)
FERRITIN SERPL-MCNC: 773 NG/ML — HIGH (ref 30–400)
FLUID INTAKE SUBSTANCE CLASS: SIGNIFICANT CHANGE UP
FLUID INTAKE SUBSTANCE CLASS: SIGNIFICANT CHANGE UP
FLUID SEGMENTED GRANULOCYTES: 1 % — SIGNIFICANT CHANGE UP
FLUID SEGMENTED GRANULOCYTES: 9 % — SIGNIFICANT CHANGE UP
GLUCOSE SERPL-MCNC: 124 MG/DL — HIGH (ref 70–99)
GLUCOSE SERPL-MCNC: 97 MG/DL — SIGNIFICANT CHANGE UP (ref 70–99)
HCO3 BLDA-SCNC: 24 MMOL/L — SIGNIFICANT CHANGE UP (ref 21–28)
HCT VFR BLD CALC: 41.2 % — SIGNIFICANT CHANGE UP (ref 39–50)
HGB BLD-MCNC: 13.5 G/DL — SIGNIFICANT CHANGE UP (ref 13–17)
IMM GRANULOCYTES NFR BLD AUTO: 0.5 % — SIGNIFICANT CHANGE UP (ref 0–1.5)
LYMPHOCYTES # BLD AUTO: 0.65 K/UL — LOW (ref 1–3.3)
LYMPHOCYTES # BLD AUTO: 6.5 % — LOW (ref 13–44)
LYMPHOCYTES # FLD: 9 % — SIGNIFICANT CHANGE UP
MAGNESIUM SERPL-MCNC: 1.8 MG/DL — SIGNIFICANT CHANGE UP (ref 1.6–2.6)
MAGNESIUM SERPL-MCNC: 1.8 MG/DL — SIGNIFICANT CHANGE UP (ref 1.6–2.6)
MCHC RBC-ENTMCNC: 29 PG — SIGNIFICANT CHANGE UP (ref 27–34)
MCHC RBC-ENTMCNC: 32.8 GM/DL — SIGNIFICANT CHANGE UP (ref 32–36)
MCV RBC AUTO: 88.6 FL — SIGNIFICANT CHANGE UP (ref 80–100)
MONOCYTES # BLD AUTO: 1.24 K/UL — HIGH (ref 0–0.9)
MONOCYTES NFR BLD AUTO: 12.4 % — SIGNIFICANT CHANGE UP (ref 2–14)
MONOS+MACROS # FLD: 13 % — SIGNIFICANT CHANGE UP
NEUTROPHILS # BLD AUTO: 8.04 K/UL — HIGH (ref 1.8–7.4)
NEUTROPHILS NFR BLD AUTO: 80.1 % — HIGH (ref 43–77)
NIGHT BLUE STAIN TISS: SIGNIFICANT CHANGE UP
NIGHT BLUE STAIN TISS: SIGNIFICANT CHANGE UP
NRBC # BLD: 0 /100 WBCS — SIGNIFICANT CHANGE UP (ref 0–0)
PCO2 BLDA: 39 MMHG — SIGNIFICANT CHANGE UP (ref 35–48)
PH BLDA: 7.4 — SIGNIFICANT CHANGE UP (ref 7.35–7.45)
PHOSPHATE SERPL-MCNC: 2.6 MG/DL — SIGNIFICANT CHANGE UP (ref 2.5–4.5)
PHOSPHATE SERPL-MCNC: 2.9 MG/DL — SIGNIFICANT CHANGE UP (ref 2.5–4.5)
PLATELET # BLD AUTO: 147 K/UL — LOW (ref 150–400)
PO2 BLDA: 78 MMHG — LOW (ref 83–108)
POTASSIUM SERPL-MCNC: 3.5 MMOL/L — SIGNIFICANT CHANGE UP (ref 3.5–5.3)
POTASSIUM SERPL-MCNC: 3.9 MMOL/L — SIGNIFICANT CHANGE UP (ref 3.5–5.3)
POTASSIUM SERPL-SCNC: 3.5 MMOL/L — SIGNIFICANT CHANGE UP (ref 3.5–5.3)
POTASSIUM SERPL-SCNC: 3.9 MMOL/L — SIGNIFICANT CHANGE UP (ref 3.5–5.3)
PROT SERPL-MCNC: 5.6 G/DL — LOW (ref 6–8.3)
RBC # BLD: 4.65 M/UL — SIGNIFICANT CHANGE UP (ref 4.2–5.8)
RBC # FLD: 14.4 % — SIGNIFICANT CHANGE UP (ref 10.3–14.5)
RCV VOL RI: 153 /UL — HIGH (ref 0–0)
RCV VOL RI: 7 /UL — HIGH (ref 0–0)
SAO2 % BLDA: 95 % — SIGNIFICANT CHANGE UP (ref 95–100)
SARS-COV-2 RNA SPEC QL NAA+PROBE: SIGNIFICANT CHANGE UP
SODIUM SERPL-SCNC: 134 MMOL/L — LOW (ref 135–145)
SODIUM SERPL-SCNC: 137 MMOL/L — SIGNIFICANT CHANGE UP (ref 135–145)
SPECIMEN SOURCE FLD: SIGNIFICANT CHANGE UP
SPECIMEN SOURCE FLD: SIGNIFICANT CHANGE UP
SPECIMEN SOURCE: SIGNIFICANT CHANGE UP
SPECIMEN SOURCE: SIGNIFICANT CHANGE UP
TOTAL NUCLEATED CELL COUNT, BODY FLUID: 1 /UL — SIGNIFICANT CHANGE UP
TOTAL NUCLEATED CELL COUNT, BODY FLUID: 31 /UL — SIGNIFICANT CHANGE UP
TUBE TYPE: SIGNIFICANT CHANGE UP
TUBE TYPE: SIGNIFICANT CHANGE UP
VANCOMYCIN TROUGH SERPL-MCNC: 10.5 UG/ML — SIGNIFICANT CHANGE UP (ref 10–20)
WBC # BLD: 10.03 K/UL — SIGNIFICANT CHANGE UP (ref 3.8–10.5)
WBC # FLD AUTO: 10.03 K/UL — SIGNIFICANT CHANGE UP (ref 3.8–10.5)

## 2020-04-18 PROCEDURE — 71045 X-RAY EXAM CHEST 1 VIEW: CPT | Mod: 26

## 2020-04-18 PROCEDURE — 99291 CRITICAL CARE FIRST HOUR: CPT | Mod: CS

## 2020-04-18 RX ORDER — MAGNESIUM SULFATE 500 MG/ML
1 VIAL (ML) INJECTION ONCE
Refills: 0 | Status: COMPLETED | OUTPATIENT
Start: 2020-04-18 | End: 2020-04-18

## 2020-04-18 RX ORDER — VANCOMYCIN HCL 1 G
1250 VIAL (EA) INTRAVENOUS
Refills: 0 | Status: DISCONTINUED | OUTPATIENT
Start: 2020-04-19 | End: 2020-04-19

## 2020-04-18 RX ORDER — VANCOMYCIN HCL 1 G
1250 VIAL (EA) INTRAVENOUS EVERY 12 HOURS
Refills: 0 | Status: DISCONTINUED | OUTPATIENT
Start: 2020-04-18 | End: 2020-04-18

## 2020-04-18 RX ORDER — VANCOMYCIN HCL 1 G
250 VIAL (EA) INTRAVENOUS ONCE
Refills: 0 | Status: COMPLETED | OUTPATIENT
Start: 2020-04-18 | End: 2020-04-18

## 2020-04-18 RX ORDER — POTASSIUM CHLORIDE 20 MEQ
10 PACKET (EA) ORAL ONCE
Refills: 0 | Status: COMPLETED | OUTPATIENT
Start: 2020-04-18 | End: 2020-04-18

## 2020-04-18 RX ADMIN — VALACYCLOVIR 500 MILLIGRAM(S): 500 TABLET, FILM COATED ORAL at 22:49

## 2020-04-18 RX ADMIN — Medication 100 MILLIEQUIVALENT(S): at 12:25

## 2020-04-18 RX ADMIN — Medication 250 MILLIGRAM(S): at 09:13

## 2020-04-18 RX ADMIN — DARUNAVIR 600 MILLIGRAM(S): 75 TABLET, FILM COATED ORAL at 12:26

## 2020-04-18 RX ADMIN — Medication 318.75 MILLIGRAM(S): at 18:59

## 2020-04-18 RX ADMIN — RALTEGRAVIR 400 MILLIGRAM(S): 400 TABLET, FILM COATED ORAL at 17:30

## 2020-04-18 RX ADMIN — ETRAVIRINE 200 MILLIGRAM(S): 200 TABLET ORAL at 17:30

## 2020-04-18 RX ADMIN — PIPERACILLIN AND TAZOBACTAM 200 GRAM(S): 4; .5 INJECTION, POWDER, LYOPHILIZED, FOR SOLUTION INTRAVENOUS at 07:28

## 2020-04-18 RX ADMIN — Medication 40 MILLIGRAM(S): at 05:03

## 2020-04-18 RX ADMIN — Medication 3 MILLIGRAM(S): at 22:47

## 2020-04-18 RX ADMIN — RITONAVIR 100 MILLIGRAM(S): 100 TABLET, FILM COATED ORAL at 12:25

## 2020-04-18 RX ADMIN — PIPERACILLIN AND TAZOBACTAM 200 GRAM(S): 4; .5 INJECTION, POWDER, LYOPHILIZED, FOR SOLUTION INTRAVENOUS at 21:07

## 2020-04-18 RX ADMIN — Medication 125 MICROGRAM(S): at 05:03

## 2020-04-18 RX ADMIN — Medication 40 MILLIGRAM(S): at 17:30

## 2020-04-18 RX ADMIN — Medication 318.75 MILLIGRAM(S): at 12:24

## 2020-04-18 RX ADMIN — FAMOTIDINE 20 MILLIGRAM(S): 10 INJECTION INTRAVENOUS at 12:27

## 2020-04-18 RX ADMIN — Medication 318.75 MILLIGRAM(S): at 05:03

## 2020-04-18 RX ADMIN — Medication 100 MILLIGRAM(S): at 12:25

## 2020-04-18 RX ADMIN — FENTANYL CITRATE 3.74 MICROGRAM(S)/KG/HR: 50 INJECTION INTRAVENOUS at 00:18

## 2020-04-18 RX ADMIN — PROPOFOL 18 MICROGRAM(S)/KG/MIN: 10 INJECTION, EMULSION INTRAVENOUS at 04:49

## 2020-04-18 RX ADMIN — Medication 166.67 MILLIGRAM(S): at 22:50

## 2020-04-18 RX ADMIN — Medication 7.01 MICROGRAM(S)/KG/MIN: at 04:48

## 2020-04-18 RX ADMIN — PIPERACILLIN AND TAZOBACTAM 200 GRAM(S): 4; .5 INJECTION, POWDER, LYOPHILIZED, FOR SOLUTION INTRAVENOUS at 02:58

## 2020-04-18 RX ADMIN — ROSUVASTATIN CALCIUM 20 MILLIGRAM(S): 5 TABLET ORAL at 22:47

## 2020-04-18 RX ADMIN — PROPOFOL 18 MICROGRAM(S)/KG/MIN: 10 INJECTION, EMULSION INTRAVENOUS at 07:23

## 2020-04-18 RX ADMIN — RITONAVIR 100 MILLIGRAM(S): 100 TABLET, FILM COATED ORAL at 17:30

## 2020-04-18 RX ADMIN — PROPOFOL 18 MICROGRAM(S)/KG/MIN: 10 INJECTION, EMULSION INTRAVENOUS at 00:15

## 2020-04-18 RX ADMIN — LACTOBACILLUS ACIDOPH-L.BULGARICUS 1 MILLION CELL CHEWABLE TABLET 1 TABLET(S): at 12:27

## 2020-04-18 RX ADMIN — DARUNAVIR 600 MILLIGRAM(S): 75 TABLET, FILM COATED ORAL at 17:31

## 2020-04-18 RX ADMIN — VALACYCLOVIR 500 MILLIGRAM(S): 500 TABLET, FILM COATED ORAL at 12:27

## 2020-04-18 RX ADMIN — EMTRICITABINE AND TENOFOVIR DISOPROXIL FUMARATE 1 TABLET(S): 200; 300 TABLET, FILM COATED ORAL at 12:27

## 2020-04-18 RX ADMIN — Medication 975 MILLIGRAM(S): at 05:03

## 2020-04-18 RX ADMIN — Medication 318.75 MILLIGRAM(S): at 00:15

## 2020-04-18 RX ADMIN — PIPERACILLIN AND TAZOBACTAM 200 GRAM(S): 4; .5 INJECTION, POWDER, LYOPHILIZED, FOR SOLUTION INTRAVENOUS at 12:35

## 2020-04-18 RX ADMIN — TAMSULOSIN HYDROCHLORIDE 0.4 MILLIGRAM(S): 0.4 CAPSULE ORAL at 22:47

## 2020-04-18 RX ADMIN — ENOXAPARIN SODIUM 80 MILLIGRAM(S): 100 INJECTION SUBCUTANEOUS at 09:12

## 2020-04-18 RX ADMIN — ETRAVIRINE 200 MILLIGRAM(S): 200 TABLET ORAL at 12:27

## 2020-04-18 RX ADMIN — Medication 100 GRAM(S): at 12:25

## 2020-04-18 RX ADMIN — ENOXAPARIN SODIUM 80 MILLIGRAM(S): 100 INJECTION SUBCUTANEOUS at 21:07

## 2020-04-18 RX ADMIN — RALTEGRAVIR 400 MILLIGRAM(S): 400 TABLET, FILM COATED ORAL at 12:27

## 2020-04-18 NOTE — PROGRESS NOTE ADULT - SUBJECTIVE AND OBJECTIVE BOX
Patient is a 59y old  Male who presents with a chief complaint of r/o COVID (18 Apr 2020 13:36)      INTERVAL HPI/OVERNIGHT EVENTS: Pt afebrile, hemodynamically stable, extubated today to 15 L nonrebreather. Makin appropriate UO.       MEDICATIONS  (STANDING):  darunavir 600 milliGRAM(s) Oral <User Schedule>  dextrose 5%. 1000 milliLiter(s) (50 mL/Hr) IV Continuous <Continuous>  dextrose 50% Injectable 12.5 Gram(s) IV Push once  dextrose 50% Injectable 25 Gram(s) IV Push once  dextrose 50% Injectable 25 Gram(s) IV Push once  emtricitabine 200 mG/tenofovir alafenamide 25 mG (DESCOVY) Tablet 1 Tablet(s) Oral daily  enoxaparin Injectable 80 milliGRAM(s) SubCutaneous every 12 hours  etravirine 200 milliGRAM(s) Oral <User Schedule>  famotidine    Tablet 20 milliGRAM(s) Oral daily  fentaNYL   Infusion. 0.5 MICROgram(s)/kG/Hr (3.74 mL/Hr) IV Continuous <Continuous>  influenza   Vaccine 0.5 milliLiter(s) IntraMuscular once  lactobacillus acidophilus and bulgaricus Chewable 1 Tablet(s) Chew daily  levothyroxine 125 MICROGram(s) Oral daily  melatonin 3 milliGRAM(s) Oral at bedtime  norepinephrine Infusion 0.05 MICROgram(s)/kG/Min (7.01 mL/Hr) IV Continuous <Continuous>  piperacillin/tazobactam IVPB.. 4.5 Gram(s) IV Intermittent every 6 hours  predniSONE   Tablet 40 milliGRAM(s) Oral every 12 hours  propofol Infusion 40 MICROgram(s)/kG/Min (18 mL/Hr) IV Continuous <Continuous>  raltegravir Tablet 400 milliGRAM(s) Oral <User Schedule>  ritonavir Tablet 100 milliGRAM(s) Oral <User Schedule>  rosuvastatin 20 milliGRAM(s) Oral at bedtime  tamsulosin 0.4 milliGRAM(s) Oral at bedtime  trimethoprim / sulfamethoxazole IVPB 300 milliGRAM(s) IV Intermittent every 6 hours  valACYclovir 500 milliGRAM(s) Oral two times a day  vancomycin  IVPB 1250 milliGRAM(s) IV Intermittent <User Schedule>    MEDICATIONS  (PRN):  acetaminophen   Tablet .. 975 milliGRAM(s) Oral every 8 hours PRN Temp greater or equal to 38C (100.4F)  ALPRAZolam 0.25 milliGRAM(s) Oral at bedtime PRN Anxiety  dextrose 40% Gel 15 Gram(s) Oral once PRN Blood Glucose LESS THAN 70 milliGRAM(s)/deciliter  glucagon  Injectable 1 milliGRAM(s) IntraMuscular once PRN Glucose LESS THAN 70 milligrams/deciliter  hydrocodone/homatropine Syrup 5 milliLiter(s) Oral every 6 hours PRN Cough      PHYSICAL EXAM:    ICU Vital Signs Last 24 Hrs  T(C): 37.8 (18 Apr 2020 11:08), Max: 38.1 (18 Apr 2020 08:00)  T(F): 100.1 (18 Apr 2020 11:08), Max: 100.5 (18 Apr 2020 08:00)  HR: 75 (18 Apr 2020 12:00) (57 - 104)  BP: 117/72 (18 Apr 2020 12:00) (82/51 - 148/92)  BP(mean): 89 (18 Apr 2020 12:00) (61 - 113)  ABP: --  ABP(mean): --  RR: 17 (18 Apr 2020 12:00) (17 - 28)  SpO2: 95% (18 Apr 2020 12:00) (91% - 98%)    I&O's Summary    17 Apr 2020 07:01  -  18 Apr 2020 07:00  --------------------------------------------------------  IN: 1593.3 mL / OUT: 1925 mL / NET: -331.7 mL    18 Apr 2020 07:01  -  18 Apr 2020 14:18  --------------------------------------------------------  IN: 43 mL / OUT: 0 mL / NET: 43 mL      Mode: AC/ CMV (Assist Control/ Continuous Mandatory Ventilation)  RR (machine): 28  TV (machine): 430  FiO2: 60  PEEP: 5  ITime: 0.1  MAP: 20  PIP: 40    HEENT: PERRL, EOMI, MMM  neck supple   lungs: 15 l nonrebreather mask, coarse crackles in mid lung fields, egophony on left  RRR S1S2  abd soft NT ND +BS  no cce  no rash  moves all extemities    LABS:                        13.5   10.03 )-----------( 147      ( 18 Apr 2020 05:33 )             41.2     04-18    137  |  102  |  11  ----------------------------<  97  3.9   |  25  |  1.00    Ca    8.5      18 Apr 2020 05:33  Phos  2.6     04-18  Mg     1.8     04-18    TPro  5.6<L>  /  Alb  2.7<L>  /  TBili  0.2  /  DBili  x   /  AST  23  /  ALT  46<H>  /  AlkPhos  49  04-18    PT/INR - ( 17 Apr 2020 13:01 )   PT: 12.5 sec;   INR: 1.09          PTT - ( 17 Apr 2020 13:01 )  PTT:33.9 sec    CAPILLARY BLOOD GLUCOSE        ABG - ( 18 Apr 2020 05:40 )  pH, Arterial: 7.40  pH, Blood: x     /  pCO2: 39    /  pO2: 78    / HCO3: 24    / Base Excess: -1.0  /  SaO2: 95                  RADIOLOGY & ADDITIONAL TESTS:    Consultant(s) Notes Reviewed:  [x ] YES  [ ] NO    Care Discussed with Consultants/Other Providers [ x] YES  [ ] NO Patient is a 59y old  Male who presents with a chief complaint of r/o COVID (18 Apr 2020 13:36)      INTERVAL HPI/OVERNIGHT EVENTS: Pt afebrile, hemodynamically stable, extubated today to 15 L nonrebreather. Making appropriate UO. No CP, still SOB      MEDICATIONS  (STANDING):  darunavir 600 milliGRAM(s) Oral <User Schedule>  dextrose 5%. 1000 milliLiter(s) (50 mL/Hr) IV Continuous <Continuous>  dextrose 50% Injectable 12.5 Gram(s) IV Push once  dextrose 50% Injectable 25 Gram(s) IV Push once  dextrose 50% Injectable 25 Gram(s) IV Push once  emtricitabine 200 mG/tenofovir alafenamide 25 mG (DESCOVY) Tablet 1 Tablet(s) Oral daily  enoxaparin Injectable 80 milliGRAM(s) SubCutaneous every 12 hours  etravirine 200 milliGRAM(s) Oral <User Schedule>  famotidine    Tablet 20 milliGRAM(s) Oral daily  fentaNYL   Infusion. 0.5 MICROgram(s)/kG/Hr (3.74 mL/Hr) IV Continuous <Continuous>  influenza   Vaccine 0.5 milliLiter(s) IntraMuscular once  lactobacillus acidophilus and bulgaricus Chewable 1 Tablet(s) Chew daily  levothyroxine 125 MICROGram(s) Oral daily  melatonin 3 milliGRAM(s) Oral at bedtime  norepinephrine Infusion 0.05 MICROgram(s)/kG/Min (7.01 mL/Hr) IV Continuous <Continuous>  piperacillin/tazobactam IVPB.. 4.5 Gram(s) IV Intermittent every 6 hours  predniSONE   Tablet 40 milliGRAM(s) Oral every 12 hours  propofol Infusion 40 MICROgram(s)/kG/Min (18 mL/Hr) IV Continuous <Continuous>  raltegravir Tablet 400 milliGRAM(s) Oral <User Schedule>  ritonavir Tablet 100 milliGRAM(s) Oral <User Schedule>  rosuvastatin 20 milliGRAM(s) Oral at bedtime  tamsulosin 0.4 milliGRAM(s) Oral at bedtime  trimethoprim / sulfamethoxazole IVPB 300 milliGRAM(s) IV Intermittent every 6 hours  valACYclovir 500 milliGRAM(s) Oral two times a day  vancomycin  IVPB 1250 milliGRAM(s) IV Intermittent <User Schedule>    MEDICATIONS  (PRN):  acetaminophen   Tablet .. 975 milliGRAM(s) Oral every 8 hours PRN Temp greater or equal to 38C (100.4F)  ALPRAZolam 0.25 milliGRAM(s) Oral at bedtime PRN Anxiety  dextrose 40% Gel 15 Gram(s) Oral once PRN Blood Glucose LESS THAN 70 milliGRAM(s)/deciliter  glucagon  Injectable 1 milliGRAM(s) IntraMuscular once PRN Glucose LESS THAN 70 milligrams/deciliter  hydrocodone/homatropine Syrup 5 milliLiter(s) Oral every 6 hours PRN Cough      PHYSICAL EXAM:    ICU Vital Signs Last 24 Hrs  T(C): 37.8 (18 Apr 2020 11:08), Max: 38.1 (18 Apr 2020 08:00)  T(F): 100.1 (18 Apr 2020 11:08), Max: 100.5 (18 Apr 2020 08:00)  HR: 75 (18 Apr 2020 12:00) (57 - 104)  BP: 117/72 (18 Apr 2020 12:00) (82/51 - 148/92)  BP(mean): 89 (18 Apr 2020 12:00) (61 - 113)  ABP: --  ABP(mean): --  RR: 17 (18 Apr 2020 12:00) (17 - 28)  SpO2: 95% (18 Apr 2020 12:00) (91% - 98%)    I&O's Summary    17 Apr 2020 07:01  -  18 Apr 2020 07:00  --------------------------------------------------------  IN: 1593.3 mL / OUT: 1925 mL / NET: -331.7 mL    18 Apr 2020 07:01  -  18 Apr 2020 14:18  --------------------------------------------------------  IN: 43 mL / OUT: 0 mL / NET: 43 mL      Mode: AC/ CMV (Assist Control/ Continuous Mandatory Ventilation)  RR (machine): 28  TV (machine): 430  FiO2: 60  PEEP: 5  ITime: 0.1  MAP: 20  PIP: 40    HEENT: PERRL, EOMI, MMM  neck supple   lungs: 15 l nonrebreather mask, coarse crackles in mid lung fields, egophony on left  RRR S1S2  abd soft NT ND +BS  no cce  no rash  moves all extemities    LABS:                        13.5   10.03 )-----------( 147      ( 18 Apr 2020 05:33 )             41.2     04-18    137  |  102  |  11  ----------------------------<  97  3.9   |  25  |  1.00    Ca    8.5      18 Apr 2020 05:33  Phos  2.6     04-18  Mg     1.8     04-18    TPro  5.6<L>  /  Alb  2.7<L>  /  TBili  0.2  /  DBili  x   /  AST  23  /  ALT  46<H>  /  AlkPhos  49  04-18    PT/INR - ( 17 Apr 2020 13:01 )   PT: 12.5 sec;   INR: 1.09          PTT - ( 17 Apr 2020 13:01 )  PTT:33.9 sec    CAPILLARY BLOOD GLUCOSE        ABG - ( 18 Apr 2020 05:40 )  pH, Arterial: 7.40  pH, Blood: x     /  pCO2: 39    /  pO2: 78    / HCO3: 24    / Base Excess: -1.0  /  SaO2: 95                  RADIOLOGY & ADDITIONAL TESTS:    Consultant(s) Notes Reviewed:  [x ] YES  [ ] NO    Care Discussed with Consultants/Other Providers [ x] YES  [ ] NO

## 2020-04-18 NOTE — PROGRESS NOTE ADULT - ASSESSMENT
A/P: 59 M PMH of HIV (CD4 103, VL undetectable in March 2020), tonsilar CA (s/p CRT and RT), BPH, HLD, hypothyroidism, sleep apnea, COVID +ve, recently admitted on 3/30 at [facility], transferred to [facility] on 4/8. Presenting with hypoxic respiratory failure requiring nonrebreather. More SOB with worsening CXR    NEURO: Tyelnol prn  CV: MAP>65  Pulm: Extubated today after being electively intubated for bronchoscopy yesterday afternoon.  Saturating well on nonrebreather, plan to wean to nonrebreather.  Will follow up pulm recs, PCP Rx s/p IL-6 inhibition; checking strongyloides, CMV serology, beta D glucan and galactomannan as well as serum eosinophils.   GI: DASH diet, Famotidine  : Voiding  Endo: ISS  ID: HIV home medication regimen: Ritonavir, Darunavir, Raltegravir, Bactrim now with steroids to rx for PCP. MRSA swab positive 4/17,  on Vanc (4/17- ),  zosyn (4/17- ) for potential HAP  COVID: Covid  s/p COVID treatment with Tocilizumab (04/03), Azithro/Plaquenil (04/02-04/06) and Solumedrol (04/03-04/08).   PPx: Lovenox 80 BID for elevated ddimers, SCDS for DVT ppx  Lines: PIV  PT/OT: not ordered

## 2020-04-18 NOTE — PROGRESS NOTE ADULT - ATTENDING COMMENTS
60 yo M with HIV on ART, COVID pneumonia, with progression of infiltrates, early fibrosis, and worsening hyopxia concerning for fibrotic lung disease 2/2 covid vs. resistant bacterial infection vs PCP. Patient s/p intubation on 4/17/20, bronchoscopy with BAL and extubation on 4/18/20 to HFNC    - Hypoxic respiratory failure. CT with diffuse GGO, crazy paving and early evidence of fibrosis. Differential as above. BAL pending for PCP PCR, fungal, viral and bacterial etiologies. Empiric coverage for PCP, bacterial PNA in the interim. Empiric therapeutic lovenox for hypoxia  - If hypoxia doesn't improve, will consider pulse dose steroids  - repeat COVID PCR negative x1, will repeat swab and sputum COVID. If negative, will transfer to non covid MICU  - Will keep ICU for now. On prednisone for PCP

## 2020-04-19 DIAGNOSIS — E78.5 HYPERLIPIDEMIA, UNSPECIFIED: ICD-10-CM

## 2020-04-19 DIAGNOSIS — R73.03 PREDIABETES: ICD-10-CM

## 2020-04-19 DIAGNOSIS — J18.9 PNEUMONIA, UNSPECIFIED ORGANISM: ICD-10-CM

## 2020-04-19 LAB
ALBUMIN SERPL ELPH-MCNC: 2.6 G/DL — LOW (ref 3.3–5)
ALP SERPL-CCNC: 50 U/L — SIGNIFICANT CHANGE UP (ref 40–120)
ALT FLD-CCNC: 52 U/L — HIGH (ref 10–45)
ANION GAP SERPL CALC-SCNC: 10 MMOL/L — SIGNIFICANT CHANGE UP (ref 5–17)
AST SERPL-CCNC: 49 U/L — HIGH (ref 10–40)
BASOPHILS # BLD AUTO: 0 K/UL — SIGNIFICANT CHANGE UP (ref 0–0.2)
BASOPHILS NFR BLD AUTO: 0 % — SIGNIFICANT CHANGE UP (ref 0–2)
BILIRUB SERPL-MCNC: 0.3 MG/DL — SIGNIFICANT CHANGE UP (ref 0.2–1.2)
BUN SERPL-MCNC: 11 MG/DL — SIGNIFICANT CHANGE UP (ref 7–23)
CALCIUM SERPL-MCNC: 8.6 MG/DL — SIGNIFICANT CHANGE UP (ref 8.4–10.5)
CHLORIDE SERPL-SCNC: 101 MMOL/L — SIGNIFICANT CHANGE UP (ref 96–108)
CO2 SERPL-SCNC: 25 MMOL/L — SIGNIFICANT CHANGE UP (ref 22–31)
CREAT SERPL-MCNC: 0.68 MG/DL — SIGNIFICANT CHANGE UP (ref 0.5–1.3)
CRP SERPL-MCNC: 1.67 MG/DL — HIGH (ref 0–0.4)
CULTURE RESULTS: NO GROWTH — SIGNIFICANT CHANGE UP
CULTURE RESULTS: SIGNIFICANT CHANGE UP
CULTURE RESULTS: SIGNIFICANT CHANGE UP
D DIMER BLD IA.RAPID-MCNC: 644 NG/ML DDU — HIGH
EOSINOPHIL # BLD AUTO: 0 K/UL — SIGNIFICANT CHANGE UP (ref 0–0.5)
EOSINOPHIL NFR BLD AUTO: 0 % — SIGNIFICANT CHANGE UP (ref 0–6)
FERRITIN SERPL-MCNC: 673 NG/ML — HIGH (ref 30–400)
G6PD RBC-CCNC: 14.8 U/G HGB — SIGNIFICANT CHANGE UP (ref 7–20.5)
GLUCOSE SERPL-MCNC: 114 MG/DL — HIGH (ref 70–99)
HCT VFR BLD CALC: 38.7 % — LOW (ref 39–50)
HGB BLD-MCNC: 13.1 G/DL — SIGNIFICANT CHANGE UP (ref 13–17)
LYMPHOCYTES # BLD AUTO: 0.22 K/UL — LOW (ref 1–3.3)
LYMPHOCYTES # BLD AUTO: 2.6 % — LOW (ref 13–44)
MAGNESIUM SERPL-MCNC: 2 MG/DL — SIGNIFICANT CHANGE UP (ref 1.6–2.6)
MCHC RBC-ENTMCNC: 29.2 PG — SIGNIFICANT CHANGE UP (ref 27–34)
MCHC RBC-ENTMCNC: 33.9 GM/DL — SIGNIFICANT CHANGE UP (ref 32–36)
MCV RBC AUTO: 86.2 FL — SIGNIFICANT CHANGE UP (ref 80–100)
MONOCYTES # BLD AUTO: 0.68 K/UL — SIGNIFICANT CHANGE UP (ref 0–0.9)
MONOCYTES NFR BLD AUTO: 7.9 % — SIGNIFICANT CHANGE UP (ref 2–14)
NEUTROPHILS # BLD AUTO: 7.7 K/UL — HIGH (ref 1.8–7.4)
NEUTROPHILS NFR BLD AUTO: 89.5 % — HIGH (ref 43–77)
PHOSPHATE SERPL-MCNC: 1.9 MG/DL — LOW (ref 2.5–4.5)
PLATELET # BLD AUTO: 123 K/UL — LOW (ref 150–400)
POTASSIUM SERPL-MCNC: 4.3 MMOL/L — SIGNIFICANT CHANGE UP (ref 3.5–5.3)
POTASSIUM SERPL-SCNC: 4.3 MMOL/L — SIGNIFICANT CHANGE UP (ref 3.5–5.3)
PROT SERPL-MCNC: 5.8 G/DL — LOW (ref 6–8.3)
RBC # BLD: 4.49 M/UL — SIGNIFICANT CHANGE UP (ref 4.2–5.8)
RBC # FLD: 14.1 % — SIGNIFICANT CHANGE UP (ref 10.3–14.5)
SODIUM SERPL-SCNC: 136 MMOL/L — SIGNIFICANT CHANGE UP (ref 135–145)
SPECIMEN SOURCE: SIGNIFICANT CHANGE UP
VANCOMYCIN TROUGH SERPL-MCNC: 9.5 UG/ML — LOW (ref 10–20)
WBC # BLD: 8.6 K/UL — SIGNIFICANT CHANGE UP (ref 3.8–10.5)
WBC # FLD AUTO: 8.6 K/UL — SIGNIFICANT CHANGE UP (ref 3.8–10.5)

## 2020-04-19 PROCEDURE — 71045 X-RAY EXAM CHEST 1 VIEW: CPT | Mod: 26

## 2020-04-19 PROCEDURE — 99291 CRITICAL CARE FIRST HOUR: CPT | Mod: CS

## 2020-04-19 PROCEDURE — 99233 SBSQ HOSP IP/OBS HIGH 50: CPT | Mod: CS

## 2020-04-19 RX ADMIN — PIPERACILLIN AND TAZOBACTAM 200 GRAM(S): 4; .5 INJECTION, POWDER, LYOPHILIZED, FOR SOLUTION INTRAVENOUS at 02:05

## 2020-04-19 RX ADMIN — ENOXAPARIN SODIUM 80 MILLIGRAM(S): 100 INJECTION SUBCUTANEOUS at 07:03

## 2020-04-19 RX ADMIN — RALTEGRAVIR 400 MILLIGRAM(S): 400 TABLET, FILM COATED ORAL at 12:47

## 2020-04-19 RX ADMIN — Medication 2 TABLET(S): at 21:16

## 2020-04-19 RX ADMIN — PIPERACILLIN AND TAZOBACTAM 200 GRAM(S): 4; .5 INJECTION, POWDER, LYOPHILIZED, FOR SOLUTION INTRAVENOUS at 14:16

## 2020-04-19 RX ADMIN — Medication 125 MICROGRAM(S): at 05:24

## 2020-04-19 RX ADMIN — Medication 1 DROP(S): at 17:27

## 2020-04-19 RX ADMIN — Medication 40 MILLIGRAM(S): at 17:26

## 2020-04-19 RX ADMIN — RITONAVIR 100 MILLIGRAM(S): 100 TABLET, FILM COATED ORAL at 12:48

## 2020-04-19 RX ADMIN — RALTEGRAVIR 400 MILLIGRAM(S): 400 TABLET, FILM COATED ORAL at 17:28

## 2020-04-19 RX ADMIN — Medication 318.75 MILLIGRAM(S): at 06:46

## 2020-04-19 RX ADMIN — ETRAVIRINE 200 MILLIGRAM(S): 200 TABLET ORAL at 17:27

## 2020-04-19 RX ADMIN — PIPERACILLIN AND TAZOBACTAM 200 GRAM(S): 4; .5 INJECTION, POWDER, LYOPHILIZED, FOR SOLUTION INTRAVENOUS at 07:05

## 2020-04-19 RX ADMIN — ENOXAPARIN SODIUM 80 MILLIGRAM(S): 100 INJECTION SUBCUTANEOUS at 19:46

## 2020-04-19 RX ADMIN — DARUNAVIR 600 MILLIGRAM(S): 75 TABLET, FILM COATED ORAL at 12:45

## 2020-04-19 RX ADMIN — Medication 318.75 MILLIGRAM(S): at 12:48

## 2020-04-19 RX ADMIN — EMTRICITABINE AND TENOFOVIR DISOPROXIL FUMARATE 1 TABLET(S): 200; 300 TABLET, FILM COATED ORAL at 12:47

## 2020-04-19 RX ADMIN — Medication 85 MILLIMOLE(S): at 09:49

## 2020-04-19 RX ADMIN — VALACYCLOVIR 500 MILLIGRAM(S): 500 TABLET, FILM COATED ORAL at 12:48

## 2020-04-19 RX ADMIN — Medication 40 MILLIGRAM(S): at 05:02

## 2020-04-19 RX ADMIN — VALACYCLOVIR 500 MILLIGRAM(S): 500 TABLET, FILM COATED ORAL at 23:00

## 2020-04-19 RX ADMIN — ROSUVASTATIN CALCIUM 20 MILLIGRAM(S): 5 TABLET ORAL at 21:18

## 2020-04-19 RX ADMIN — RITONAVIR 100 MILLIGRAM(S): 100 TABLET, FILM COATED ORAL at 17:28

## 2020-04-19 RX ADMIN — DARUNAVIR 600 MILLIGRAM(S): 75 TABLET, FILM COATED ORAL at 17:27

## 2020-04-19 RX ADMIN — ETRAVIRINE 200 MILLIGRAM(S): 200 TABLET ORAL at 12:47

## 2020-04-19 RX ADMIN — PIPERACILLIN AND TAZOBACTAM 200 GRAM(S): 4; .5 INJECTION, POWDER, LYOPHILIZED, FOR SOLUTION INTRAVENOUS at 20:01

## 2020-04-19 RX ADMIN — TAMSULOSIN HYDROCHLORIDE 0.4 MILLIGRAM(S): 0.4 CAPSULE ORAL at 21:18

## 2020-04-19 RX ADMIN — Medication 1 DROP(S): at 03:33

## 2020-04-19 RX ADMIN — Medication 166.67 MILLIGRAM(S): at 12:48

## 2020-04-19 RX ADMIN — LACTOBACILLUS ACIDOPH-L.BULGARICUS 1 MILLION CELL CHEWABLE TABLET 1 TABLET(S): at 12:46

## 2020-04-19 RX ADMIN — Medication 318.75 MILLIGRAM(S): at 00:43

## 2020-04-19 RX ADMIN — FAMOTIDINE 20 MILLIGRAM(S): 10 INJECTION INTRAVENOUS at 12:48

## 2020-04-19 NOTE — PROGRESS NOTE ADULT - SUBJECTIVE AND OBJECTIVE BOX
INTERVAL HPI/OVERNIGHT EVENTS:    OVERNIGHT: No overnight events.  SUBJECTIVE: Patient seen and examined at bedside.     OBJECTIVE:    VITAL SIGNS:  ICU Vital Signs Last 24 Hrs  T(C): 36.9 (19 Apr 2020 14:11), Max: 37.1 (19 Apr 2020 10:24)  T(F): 98.5 (19 Apr 2020 14:11), Max: 98.7 (19 Apr 2020 10:24)  HR: 86 (19 Apr 2020 14:11) (65 - 86)  BP: 124/64 (19 Apr 2020 14:11) (99/66 - 124/64)  BP(mean): 91 (19 Apr 2020 12:00) (77 - 91)  ABP: --  ABP(mean): --  RR: 30 (19 Apr 2020 14:11) (15 - 33)  SpO2: 92% (19 Apr 2020 14:11) (90% - 98%)        04-18 @ 07:01  -  04-19 @ 07:00  --------------------------------------------------------  IN: 1243 mL / OUT: 2800 mL / NET: -1557 mL    04-19 @ 07:01  -  04-19 @ 16:48  --------------------------------------------------------  IN: 1060 mL / OUT: 600 mL / NET: 460 mL      CAPILLARY BLOOD GLUCOSE          PHYSICAL EXAM:  General: NAD, on NRB  HEENT: NCAT, PERRL, clear conjunctiva, no scleral icterus  Neck: supple, no JVD  Respiratory: CTA b/l, no wheezing, rhonchi, rales  Cardiovascular: RRR, normal S1S2, no M/R/G  Vascular: 2+ radial and DP pulses  Abdomen: soft, NT/ND, bowel sounds in all four quadrants, no palpable masses  Extremities: WWP, no clubbing, cyanosis, or edema  Skin: No rashes present  Neuro: A&Ox3    MEDICATIONS:  MEDICATIONS  (STANDING):  artificial tears (preservative free) Ophthalmic Solution 1 Drop(s) Both EYES two times a day  darunavir 600 milliGRAM(s) Oral <User Schedule>  dextrose 5%. 1000 milliLiter(s) (50 mL/Hr) IV Continuous <Continuous>  dextrose 50% Injectable 12.5 Gram(s) IV Push once  dextrose 50% Injectable 25 Gram(s) IV Push once  dextrose 50% Injectable 25 Gram(s) IV Push once  emtricitabine 200 mG/tenofovir alafenamide 25 mG (DESCOVY) Tablet 1 Tablet(s) Oral daily  enoxaparin Injectable 80 milliGRAM(s) SubCutaneous every 12 hours  etravirine 200 milliGRAM(s) Oral <User Schedule>  famotidine    Tablet 20 milliGRAM(s) Oral daily  influenza   Vaccine 0.5 milliLiter(s) IntraMuscular once  lactobacillus acidophilus and bulgaricus Chewable 1 Tablet(s) Chew daily  levothyroxine 125 MICROGram(s) Oral daily  melatonin 3 milliGRAM(s) Oral at bedtime  piperacillin/tazobactam IVPB.. 4.5 Gram(s) IV Intermittent every 6 hours  predniSONE   Tablet 40 milliGRAM(s) Oral every 12 hours  raltegravir Tablet 400 milliGRAM(s) Oral <User Schedule>  ritonavir Tablet 100 milliGRAM(s) Oral <User Schedule>  rosuvastatin 20 milliGRAM(s) Oral at bedtime  tamsulosin 0.4 milliGRAM(s) Oral at bedtime  trimethoprim / sulfamethoxazole IVPB 300 milliGRAM(s) IV Intermittent every 6 hours  valACYclovir 500 milliGRAM(s) Oral two times a day  vancomycin  IVPB 1250 milliGRAM(s) IV Intermittent <User Schedule>    MEDICATIONS  (PRN):  acetaminophen   Tablet .. 975 milliGRAM(s) Oral every 8 hours PRN Temp greater or equal to 38C (100.4F)  ALPRAZolam 0.25 milliGRAM(s) Oral at bedtime PRN Anxiety  dextrose 40% Gel 15 Gram(s) Oral once PRN Blood Glucose LESS THAN 70 milliGRAM(s)/deciliter  glucagon  Injectable 1 milliGRAM(s) IntraMuscular once PRN Glucose LESS THAN 70 milligrams/deciliter  hydrocodone/homatropine Syrup 5 milliLiter(s) Oral every 6 hours PRN Cough      ALLERGIES:  Allergies    No Known Allergies    Intolerances        LABS:                        13.1   8.60  )-----------( 123      ( 19 Apr 2020 05:52 )             38.7     04-19    136  |  101  |  11  ----------------------------<  114<H>  4.3   |  25  |  0.68    Ca    8.6      19 Apr 2020 05:52  Phos  1.9     04-19  Mg     2.0     04-19    TPro  5.8<L>  /  Alb  2.6<L>  /  TBili  0.3  /  DBili  x   /  AST  49<H>  /  ALT  52<H>  /  AlkPhos  50  04-19          RADIOLOGY & ADDITIONAL TESTS: Reviewed. Transfer Acceptance Note  COVID ICU to [facility]    Hospital Course  HOSPITAL COURSE: 58 yo M with PMH HIV (VLUD, [id number] prior to admission), tonsillar Ca s/p chemo/rad in remission, hypothyroidism who initially presented 3/30/20 with cough/fever/chills and pulmonary infiltrates associated with COVID infection. He received tocilizumab x 1, azithromycin/hydroxychloroquine/steroids x 5 days. Since admission he has had persistent hypoxia and worsening infiltrates, has intermittently received antibiotics for possible superimposed infection and . Yesterday pt continued to have subjective shortness of breath requiring NRB with episodes of hypoxia and tachypnea. He was empirically started on broad spectrum abx (Vanc/zosyn in setting of +MRSA) and bactrim + prednisone for PCP and started on full dose a/c presumed PE in setting of elevated d-dimers (although already downtrending) and worsening symptoms. CT of chest performed with significant worsening of ground glass opacities, crazy paving, early fibrotic changes including traction bronchiectasis. Pulmonary consulted for abnormal chest CT, worsening hypoxia, and possible diagnostic bronch. S/p bronchoscopy and BAL on 4/17, with tests sent for PCP pna and covid. Extubated 4/18, and has had decreasing oxygen requirements, today saturating well on NRB. Pt hemodynamically stable and cleared for step down.    INTERVAL HPI/OVERNIGHT EVENTS:    OVERNIGHT: No overnight events.  SUBJECTIVE: Patient seen and examined at bedside.     OBJECTIVE:    VITAL SIGNS:  ICU Vital Signs Last 24 Hrs  T(C): 36.9 (19 Apr 2020 14:11), Max: 37.1 (19 Apr 2020 10:24)  T(F): 98.5 (19 Apr 2020 14:11), Max: 98.7 (19 Apr 2020 10:24)  HR: 86 (19 Apr 2020 14:11) (65 - 86)  BP: 124/64 (19 Apr 2020 14:11) (99/66 - 124/64)  BP(mean): 91 (19 Apr 2020 12:00) (77 - 91)  ABP: --  ABP(mean): --  RR: 30 (19 Apr 2020 14:11) (15 - 33)  SpO2: 92% (19 Apr 2020 14:11) (90% - 98%)        04-18 @ 07:01  -  04-19 @ 07:00  --------------------------------------------------------  IN: 1243 mL / OUT: 2800 mL / NET: -1557 mL    04-19 @ 07:01  - 04-19 @ 16:48  --------------------------------------------------------  IN: 1060 mL / OUT: 600 mL / NET: 460 mL      CAPILLARY BLOOD GLUCOSE          PHYSICAL EXAM:  General: NAD, on NRB  HEENT: NCAT, PERRL, clear conjunctiva, no scleral icterus  Neck: supple, no JVD  Respiratory: CTA b/l, no wheezing, rhonchi, rales  Cardiovascular: RRR, normal S1S2, no M/R/G  Vascular: 2+ radial and DP pulses  Abdomen: soft, NT/ND, bowel sounds in all four quadrants, no palpable masses  Extremities: WWP, no clubbing, cyanosis, or edema  Skin: No rashes present  Neuro: A&Ox3    MEDICATIONS:  MEDICATIONS  (STANDING):  artificial tears (preservative free) Ophthalmic Solution 1 Drop(s) Both EYES two times a day  darunavir 600 milliGRAM(s) Oral <User Schedule>  dextrose 5%. 1000 milliLiter(s) (50 mL/Hr) IV Continuous <Continuous>  dextrose 50% Injectable 12.5 Gram(s) IV Push once  dextrose 50% Injectable 25 Gram(s) IV Push once  dextrose 50% Injectable 25 Gram(s) IV Push once  emtricitabine 200 mG/tenofovir alafenamide 25 mG (DESCOVY) Tablet 1 Tablet(s) Oral daily  enoxaparin Injectable 80 milliGRAM(s) SubCutaneous every 12 hours  etravirine 200 milliGRAM(s) Oral <User Schedule>  famotidine    Tablet 20 milliGRAM(s) Oral daily  influenza   Vaccine 0.5 milliLiter(s) IntraMuscular once  lactobacillus acidophilus and bulgaricus Chewable 1 Tablet(s) Chew daily  levothyroxine 125 MICROGram(s) Oral daily  melatonin 3 milliGRAM(s) Oral at bedtime  piperacillin/tazobactam IVPB.. 4.5 Gram(s) IV Intermittent every 6 hours  predniSONE   Tablet 40 milliGRAM(s) Oral every 12 hours  raltegravir Tablet 400 milliGRAM(s) Oral <User Schedule>  ritonavir Tablet 100 milliGRAM(s) Oral <User Schedule>  rosuvastatin 20 milliGRAM(s) Oral at bedtime  tamsulosin 0.4 milliGRAM(s) Oral at bedtime  trimethoprim / sulfamethoxazole IVPB 300 milliGRAM(s) IV Intermittent every 6 hours  valACYclovir 500 milliGRAM(s) Oral two times a day  vancomycin  IVPB 1250 milliGRAM(s) IV Intermittent <User Schedule>    MEDICATIONS  (PRN):  acetaminophen   Tablet .. 975 milliGRAM(s) Oral every 8 hours PRN Temp greater or equal to 38C (100.4F)  ALPRAZolam 0.25 milliGRAM(s) Oral at bedtime PRN Anxiety  dextrose 40% Gel 15 Gram(s) Oral once PRN Blood Glucose LESS THAN 70 milliGRAM(s)/deciliter  glucagon  Injectable 1 milliGRAM(s) IntraMuscular once PRN Glucose LESS THAN 70 milligrams/deciliter  hydrocodone/homatropine Syrup 5 milliLiter(s) Oral every 6 hours PRN Cough      ALLERGIES:  Allergies    No Known Allergies    Intolerances        LABS:                        13.1   8.60  )-----------( 123      ( 19 Apr 2020 05:52 )             38.7     04-19    136  |  101  |  11  ----------------------------<  114<H>  4.3   |  25  |  0.68    Ca    8.6      19 Apr 2020 05:52  Phos  1.9     04-19  Mg     2.0     04-19    TPro  5.8<L>  /  Alb  2.6<L>  /  TBili  0.3  /  DBili  x   /  AST  49<H>  /  ALT  52<H>  /  AlkPhos  50  04-19          RADIOLOGY & ADDITIONAL TESTS: Reviewed.

## 2020-04-19 NOTE — PROGRESS NOTE ADULT - PROBLEM SELECTOR PLAN 3
HIV home medication regimen: Ritonavir, Darunavir, Raltegravir, Bactrim now with steroids to rx for PCP. MRSA swab positive 4/17,  on Vanc (4/17- ),  zosyn (4/17- ) for potential HAP.   - c/w home HIV medications   - continue with bactrim for 21 days   -continue with valtrex    #PCP pneumonia. CD4 count 103 with no VL back in 03/2020.   - f/u with strongyloides, CMV serology, beta D glucan and galactomannan as well as serum eosinophils.   - pulm following, continue to follow recs   - s/p bronch on 04/18; f/u on BAL of the LUCAS, RML and bronchial wash HIV home medication regimen: Ritonavir, Darunavir, Raltegravir, Bactrim now with steroids to rx for PCP. MRSA swab positive 4/17,  on Vanc (4/17- ),  zosyn (4/17- ) for potential HAP.   - c/w home HIV medications   - continue with bactrim for 21 days   -continue with valtrex    #PCP pneumonia. CD4 count 103 with no VL back in 03/2020.   - f/u with strongyloides, CMV serology, beta D glucan and galactomannan as well as serum eosinophils.   - pulm following, continue to follow recs

## 2020-04-19 NOTE — PROGRESS NOTE ADULT - ASSESSMENT
A/P: 59 M PMH of HIV (CD4 103, VL undetectable in March 2020), tonsilar CA (s/p CRT and RT), BPH, HLD, hypothyroidism, sleep apnea, COVID +ve, recently admitted on 3/30 at [facility], transferred to [facility] on 4/8. Presenting with hypoxic respiratory failure requiring nonrebreather.    NEURO: Tyelnol prn  CV: MAP>65  Pulm:  Saturating well on nonrebreather, plan to wean to nonrebreather.  Will follow up pulm recs, PCP Rx s/p IL-6 inhibition; checking strongyloides, CMV serology, beta D glucan and galactomannan as well as serum eosinophils.   GI: DASH diet, Famotidine  : Voiding  Endo: ISS  ID: HIV home medication regimen: Ritonavir, Darunavir, Raltegravir, Bactrim now with steroids to rx for PCP. MRSA swab positive 4/17,  on Vanc (4/17- ),  zosyn (4/17- ) for potential HAP  COVID: Covid  s/p COVID treatment with Tocilizumab (04/03), Azithro/Plaquenil (04/02-04/06) and Solumedrol (04/03-04/08).   PPx: Lovenox 80 BID for elevated ddimers, SCDS for DVT ppx  Lines: PIV  PT/OT:Not ordered A/P: 59 M PMH of HIV (CD4 103, VL undetectable in March 2020), tonsilar CA (s/p CRT and RT), BPH, HLD, hypothyroidism, sleep apnea, COVID +ve, recently admitted on 3/30 at [facility], transferred to [facility] on 4/8. Presenting with hypoxic respiratory failure requiring nonrebreather.    NEURO: Tyelnol prn  CV: MAP>65  Pulm:  Saturating well on nonrebreather, plan to wean.  Will follow up pulm recs, PCP Rx s/p IL-6 inhibition; checking strongyloides, CMV serology, beta D glucan and galactomannan as well as serum eosinophils.   GI: DASH diet, Famotidine  : Voiding  Endo: ISS  ID: HIV home medication regimen: Ritonavir, Darunavir, Raltegravir, Bactrim now with steroids to rx for PCP. MRSA swab positive 4/17,  on Vanc (4/17- ),  zosyn (4/17- ) for potential HAP  COVID: Covid  s/p COVID treatment with Tocilizumab (04/03), Azithro/Plaquenil (04/02-04/06) and Solumedrol (04/03-04/08).   PPx: Lovenox 80 BID for elevated ddimers, SCDS for DVT ppx  Lines: PIV  PT/OT:Not ordered

## 2020-04-19 NOTE — PROGRESS NOTE ADULT - PROBLEM SELECTOR PLAN 1
#Pneumonia 2/2 to COVID-19. Found to be covid positive. Received Tocilizumab (04/03), Azithro/Plaquenil (04/02-04/06) and Solumedrol (04/03-04/08).   -  covid-19 PCR +  - Monitor O2 saturation, monitor for respiratory distress  -c/w famotidine 20mg q48  - lab studies during hospitalization: CBC with Diff, CMP/Mg/Phos, CRP, ESR, Ferritin, CK, BNP, D-dimer. #Pneumonia 2/2 to COVID-19. Found to be covid positive. Received Tocilizumab (04/03), Azithro/Plaquenil (04/02-04/06) and Solumedrol (04/03-04/08).   -  covid-19 PCR +  - Monitor O2 saturation, monitor for respiratory distress  -c/w famotidine 20mg q48  - lab studies during hospitalization: CBC with Diff, CMP/Mg/Phos, CRP, ESR, Ferritin, CK, BNP, D-dimer.  - s/p bronch on 04/18; f/u on BAL of the LUCAS, RML and bronchial wash

## 2020-04-19 NOTE — PROGRESS NOTE ADULT - PROBLEM SELECTOR PLAN 2
Found to be MRSA swab positive and started on Vanc (4/17- ),  zosyn (4/17- ) for 7 days   - 11 pm vanc trough on 4/19

## 2020-04-19 NOTE — PROGRESS NOTE ADULT - SUBJECTIVE AND OBJECTIVE BOX
Patient is a 59y old  Male who presents with a chief complaint of r/o COVID (18 Apr 2020 13:36)      INTERVAL HPI/OVERNIGHT EVENTS: Afebrile, saturating well on HFNC, AAOX3, tolerating regular diet, voiding appropriately.      MEDICATIONS  (STANDING):  artificial tears (preservative free) Ophthalmic Solution 1 Drop(s) Both EYES two times a day  darunavir 600 milliGRAM(s) Oral <User Schedule>  dextrose 5%. 1000 milliLiter(s) (50 mL/Hr) IV Continuous <Continuous>  dextrose 50% Injectable 12.5 Gram(s) IV Push once  dextrose 50% Injectable 25 Gram(s) IV Push once  dextrose 50% Injectable 25 Gram(s) IV Push once  emtricitabine 200 mG/tenofovir alafenamide 25 mG (DESCOVY) Tablet 1 Tablet(s) Oral daily  enoxaparin Injectable 80 milliGRAM(s) SubCutaneous every 12 hours  etravirine 200 milliGRAM(s) Oral <User Schedule>  famotidine    Tablet 20 milliGRAM(s) Oral daily  influenza   Vaccine 0.5 milliLiter(s) IntraMuscular once  lactobacillus acidophilus and bulgaricus Chewable 1 Tablet(s) Chew daily  levothyroxine 125 MICROGram(s) Oral daily  melatonin 3 milliGRAM(s) Oral at bedtime  piperacillin/tazobactam IVPB.. 4.5 Gram(s) IV Intermittent every 6 hours  predniSONE   Tablet 40 milliGRAM(s) Oral every 12 hours  raltegravir Tablet 400 milliGRAM(s) Oral <User Schedule>  ritonavir Tablet 100 milliGRAM(s) Oral <User Schedule>  rosuvastatin 20 milliGRAM(s) Oral at bedtime  tamsulosin 0.4 milliGRAM(s) Oral at bedtime  trimethoprim / sulfamethoxazole IVPB 300 milliGRAM(s) IV Intermittent every 6 hours  valACYclovir 500 milliGRAM(s) Oral two times a day  vancomycin  IVPB 1250 milliGRAM(s) IV Intermittent <User Schedule>    MEDICATIONS  (PRN):  acetaminophen   Tablet .. 975 milliGRAM(s) Oral every 8 hours PRN Temp greater or equal to 38C (100.4F)  ALPRAZolam 0.25 milliGRAM(s) Oral at bedtime PRN Anxiety  dextrose 40% Gel 15 Gram(s) Oral once PRN Blood Glucose LESS THAN 70 milliGRAM(s)/deciliter  glucagon  Injectable 1 milliGRAM(s) IntraMuscular once PRN Glucose LESS THAN 70 milligrams/deciliter  hydrocodone/homatropine Syrup 5 milliLiter(s) Oral every 6 hours PRN Cough      PHYSICAL EXAM:    ICU Vital Signs Last 24 Hrs  T(C): 37.1 (19 Apr 2020 10:24), Max: 37.2 (18 Apr 2020 14:33)  T(F): 98.7 (19 Apr 2020 10:24), Max: 99 (18 Apr 2020 14:33)  HR: 71 (19 Apr 2020 12:00) (65 - 81)  BP: 116/75 (19 Apr 2020 12:00) (99/66 - 116/75)  BP(mean): 91 (19 Apr 2020 12:00) (76 - 91)  ABP: --  ABP(mean): --  RR: 33 (19 Apr 2020 12:00) (15 - 33)  SpO2: 96% (19 Apr 2020 12:00) (90% - 98%)    I&O's Summary    18 Apr 2020 07:01  -  19 Apr 2020 07:00  --------------------------------------------------------  IN: 1243 mL / OUT: 2800 mL / NET: -1557 mL    19 Apr 2020 07:01  -  19 Apr 2020 13:00  --------------------------------------------------------  IN: 0 mL / OUT: 200 mL / NET: -200 mL            LABS:                        13.1   8.60  )-----------( 123      ( 19 Apr 2020 05:52 )             38.7     04-19    136  |  101  |  11  ----------------------------<  114<H>  4.3   |  25  |  0.68    Ca    8.6      19 Apr 2020 05:52  Phos  1.9     04-19  Mg     2.0     04-19    TPro  5.8<L>  /  Alb  2.6<L>  /  TBili  0.3  /  DBili  x   /  AST  49<H>  /  ALT  52<H>  /  AlkPhos  50  04-19    PT/INR - ( 17 Apr 2020 13:01 )   PT: 12.5 sec;   INR: 1.09          PTT - ( 17 Apr 2020 13:01 )  PTT:33.9 sec    CAPILLARY BLOOD GLUCOSE        ABG - ( 18 Apr 2020 05:40 )  pH, Arterial: 7.40  pH, Blood: x     /  pCO2: 39    /  pO2: 78    / HCO3: 24    / Base Excess: -1.0  /  SaO2: 95                  RADIOLOGY & ADDITIONAL TESTS:    Consultant(s) Notes Reviewed:  [x ] YES  [ ] NO    Care Discussed with Consultants/Other Providers [ x] YES  [ ] NO Patient is a 59y old  Male who presents with a chief complaint of r/o COVID (18 Apr 2020 13:36)      INTERVAL HPI/OVERNIGHT EVENTS: Afebrile, hemodynamically stable, saturating well on HFNC, AAOX3, tolerating regular diet, voiding appropriately.      MEDICATIONS  (STANDING):  artificial tears (preservative free) Ophthalmic Solution 1 Drop(s) Both EYES two times a day  darunavir 600 milliGRAM(s) Oral <User Schedule>  dextrose 5%. 1000 milliLiter(s) (50 mL/Hr) IV Continuous <Continuous>  dextrose 50% Injectable 12.5 Gram(s) IV Push once  dextrose 50% Injectable 25 Gram(s) IV Push once  dextrose 50% Injectable 25 Gram(s) IV Push once  emtricitabine 200 mG/tenofovir alafenamide 25 mG (DESCOVY) Tablet 1 Tablet(s) Oral daily  enoxaparin Injectable 80 milliGRAM(s) SubCutaneous every 12 hours  etravirine 200 milliGRAM(s) Oral <User Schedule>  famotidine    Tablet 20 milliGRAM(s) Oral daily  influenza   Vaccine 0.5 milliLiter(s) IntraMuscular once  lactobacillus acidophilus and bulgaricus Chewable 1 Tablet(s) Chew daily  levothyroxine 125 MICROGram(s) Oral daily  melatonin 3 milliGRAM(s) Oral at bedtime  piperacillin/tazobactam IVPB.. 4.5 Gram(s) IV Intermittent every 6 hours  predniSONE   Tablet 40 milliGRAM(s) Oral every 12 hours  raltegravir Tablet 400 milliGRAM(s) Oral <User Schedule>  ritonavir Tablet 100 milliGRAM(s) Oral <User Schedule>  rosuvastatin 20 milliGRAM(s) Oral at bedtime  tamsulosin 0.4 milliGRAM(s) Oral at bedtime  trimethoprim / sulfamethoxazole IVPB 300 milliGRAM(s) IV Intermittent every 6 hours  valACYclovir 500 milliGRAM(s) Oral two times a day  vancomycin  IVPB 1250 milliGRAM(s) IV Intermittent <User Schedule>    MEDICATIONS  (PRN):  acetaminophen   Tablet .. 975 milliGRAM(s) Oral every 8 hours PRN Temp greater or equal to 38C (100.4F)  ALPRAZolam 0.25 milliGRAM(s) Oral at bedtime PRN Anxiety  dextrose 40% Gel 15 Gram(s) Oral once PRN Blood Glucose LESS THAN 70 milliGRAM(s)/deciliter  glucagon  Injectable 1 milliGRAM(s) IntraMuscular once PRN Glucose LESS THAN 70 milligrams/deciliter  hydrocodone/homatropine Syrup 5 milliLiter(s) Oral every 6 hours PRN Cough      PHYSICAL EXAM:    ICU Vital Signs Last 24 Hrs  T(C): 37.1 (19 Apr 2020 10:24), Max: 37.2 (18 Apr 2020 14:33)  T(F): 98.7 (19 Apr 2020 10:24), Max: 99 (18 Apr 2020 14:33)  HR: 71 (19 Apr 2020 12:00) (65 - 81)  BP: 116/75 (19 Apr 2020 12:00) (99/66 - 116/75)  BP(mean): 91 (19 Apr 2020 12:00) (76 - 91)  ABP: --  ABP(mean): --  RR: 33 (19 Apr 2020 12:00) (15 - 33)  SpO2: 96% (19 Apr 2020 12:00) (90% - 98%)    I&O's Summary    18 Apr 2020 07:01  -  19 Apr 2020 07:00  --------------------------------------------------------  IN: 1243 mL / OUT: 2800 mL / NET: -1557 mL    19 Apr 2020 07:01  -  19 Apr 2020 13:00  --------------------------------------------------------  IN: 0 mL / OUT: 200 mL / NET: -200 mL    HEENT: PERRL, EOMI, MMM  neck supple   lungs: 15 l nonrebreather mask, coarse crackles in mid lung fields, egophony on left  RRR S1S2  abd soft NT ND +BS  no cce  no rash  moves all extemities        LABS:                        13.1   8.60  )-----------( 123      ( 19 Apr 2020 05:52 )             38.7     04-19    136  |  101  |  11  ----------------------------<  114<H>  4.3   |  25  |  0.68    Ca    8.6      19 Apr 2020 05:52  Phos  1.9     04-19  Mg     2.0     04-19    TPro  5.8<L>  /  Alb  2.6<L>  /  TBili  0.3  /  DBili  x   /  AST  49<H>  /  ALT  52<H>  /  AlkPhos  50  04-19    PT/INR - ( 17 Apr 2020 13:01 )   PT: 12.5 sec;   INR: 1.09          PTT - ( 17 Apr 2020 13:01 )  PTT:33.9 sec    CAPILLARY BLOOD GLUCOSE        ABG - ( 18 Apr 2020 05:40 )  pH, Arterial: 7.40  pH, Blood: x     /  pCO2: 39    /  pO2: 78    / HCO3: 24    / Base Excess: -1.0  /  SaO2: 95                  RADIOLOGY & ADDITIONAL TESTS:    Consultant(s) Notes Reviewed:  [x ] YES  [ ] NO    Care Discussed with Consultants/Other Providers [ x] YES  [ ] NO **TRANSFER TO [facility]**    HOSPITAL COURSE: 60 yo M with PMH HIV (VLUD, ML020m prior to admission), tonsillar Ca s/p chemo/rad in remission, hypothyroidism who initially presented 3/30/20 with cough/fever/chills and pulmonary infiltrates associated with COVID infection. He received tocilizumab x 1, azithromycin/hydroxychloroquine/steroids x 5 days. Since admission he has had persistent hypoxia and worsening infiltrates, has intermittently received antibiotics for possible superimposed infection and . Yesterday pt continued to have subjective shortness of breath requiring NRB with episodes of hypoxia and tachypnea. He was empirically started on broad spectrum abx (Vanc/zosyn in setting of +MRSA) and bactrim + prednisone for PCP and started on full dose a/c presumed PE in setting of elevated d-dimers (although already downtrending) and worsening symptoms. CT of chest performed with significant worsening of ground glass opacities, crazy paving, early fibrotic changes including traction bronchiectasis. Pulmonary consulted for abnormal chest CT, worsening hypoxia, and possible diagnostic bronch. S/p bronchoscopy and BAL on 4/17, with tests sent for PCP pna and covid. Extubated 4/18, and has had decreasing oxygen requirements, today saturating well on NRB. Pt hemodynamically stable and cleared for step down.    INTERVAL HPI/OVERNIGHT EVENTS: Afebrile, hemodynamically stable, saturating well on HFNC, AAOX3, tolerating regular diet, voiding appropriately.      MEDICATIONS  (STANDING):  artificial tears (preservative free) Ophthalmic Solution 1 Drop(s) Both EYES two times a day  darunavir 600 milliGRAM(s) Oral <User Schedule>  dextrose 5%. 1000 milliLiter(s) (50 mL/Hr) IV Continuous <Continuous>  dextrose 50% Injectable 12.5 Gram(s) IV Push once  dextrose 50% Injectable 25 Gram(s) IV Push once  dextrose 50% Injectable 25 Gram(s) IV Push once  emtricitabine 200 mG/tenofovir alafenamide 25 mG (DESCOVY) Tablet 1 Tablet(s) Oral daily  enoxaparin Injectable 80 milliGRAM(s) SubCutaneous every 12 hours  etravirine 200 milliGRAM(s) Oral <User Schedule>  famotidine    Tablet 20 milliGRAM(s) Oral daily  influenza   Vaccine 0.5 milliLiter(s) IntraMuscular once  lactobacillus acidophilus and bulgaricus Chewable 1 Tablet(s) Chew daily  levothyroxine 125 MICROGram(s) Oral daily  melatonin 3 milliGRAM(s) Oral at bedtime  piperacillin/tazobactam IVPB.. 4.5 Gram(s) IV Intermittent every 6 hours  predniSONE   Tablet 40 milliGRAM(s) Oral every 12 hours  raltegravir Tablet 400 milliGRAM(s) Oral <User Schedule>  ritonavir Tablet 100 milliGRAM(s) Oral <User Schedule>  rosuvastatin 20 milliGRAM(s) Oral at bedtime  tamsulosin 0.4 milliGRAM(s) Oral at bedtime  trimethoprim / sulfamethoxazole IVPB 300 milliGRAM(s) IV Intermittent every 6 hours  valACYclovir 500 milliGRAM(s) Oral two times a day  vancomycin  IVPB 1250 milliGRAM(s) IV Intermittent <User Schedule>    MEDICATIONS  (PRN):  acetaminophen   Tablet .. 975 milliGRAM(s) Oral every 8 hours PRN Temp greater or equal to 38C (100.4F)  ALPRAZolam 0.25 milliGRAM(s) Oral at bedtime PRN Anxiety  dextrose 40% Gel 15 Gram(s) Oral once PRN Blood Glucose LESS THAN 70 milliGRAM(s)/deciliter  glucagon  Injectable 1 milliGRAM(s) IntraMuscular once PRN Glucose LESS THAN 70 milligrams/deciliter  hydrocodone/homatropine Syrup 5 milliLiter(s) Oral every 6 hours PRN Cough      PHYSICAL EXAM:    ICU Vital Signs Last 24 Hrs  T(C): 37.1 (19 Apr 2020 10:24), Max: 37.2 (18 Apr 2020 14:33)  T(F): 98.7 (19 Apr 2020 10:24), Max: 99 (18 Apr 2020 14:33)  HR: 71 (19 Apr 2020 12:00) (65 - 81)  BP: 116/75 (19 Apr 2020 12:00) (99/66 - 116/75)  BP(mean): 91 (19 Apr 2020 12:00) (76 - 91)  ABP: --  ABP(mean): --  RR: 33 (19 Apr 2020 12:00) (15 - 33)  SpO2: 96% (19 Apr 2020 12:00) (90% - 98%)    I&O's Summary    18 Apr 2020 07:01  -  19 Apr 2020 07:00  --------------------------------------------------------  IN: 1243 mL / OUT: 2800 mL / NET: -1557 mL    19 Apr 2020 07:01  -  19 Apr 2020 13:00  --------------------------------------------------------  IN: 0 mL / OUT: 200 mL / NET: -200 mL    HEENT: PERRL, EOMI, MMM  neck supple   lungs: 15 l nonrebreather mask, coarse crackles in mid lung fields, egophony on left  RRR S1S2  abd soft NT ND +BS  no cce  no rash  moves all extemities        LABS:                        13.1   8.60  )-----------( 123      ( 19 Apr 2020 05:52 )             38.7     04-19    136  |  101  |  11  ----------------------------<  114<H>  4.3   |  25  |  0.68    Ca    8.6      19 Apr 2020 05:52  Phos  1.9     04-19  Mg     2.0     04-19    TPro  5.8<L>  /  Alb  2.6<L>  /  TBili  0.3  /  DBili  x   /  AST  49<H>  /  ALT  52<H>  /  AlkPhos  50  04-19    PT/INR - ( 17 Apr 2020 13:01 )   PT: 12.5 sec;   INR: 1.09          PTT - ( 17 Apr 2020 13:01 )  PTT:33.9 sec    CAPILLARY BLOOD GLUCOSE        ABG - ( 18 Apr 2020 05:40 )  pH, Arterial: 7.40  pH, Blood: x     /  pCO2: 39    /  pO2: 78    / HCO3: 24    / Base Excess: -1.0  /  SaO2: 95                  RADIOLOGY & ADDITIONAL TESTS:    Consultant(s) Notes Reviewed:  [x ] YES  [ ] NO    Care Discussed with Consultants/Other Providers [ x] YES  [ ] NO

## 2020-04-19 NOTE — PROGRESS NOTE ADULT - PROBLEM SELECTOR PLAN 4
Intubated briefly 4/17 overnight for bronchoscopy. Now extubated an on NRB.   - continue to steroid taper   - oxygen sat goal >90%, wean as tolerated

## 2020-04-19 NOTE — PROGRESS NOTE ADULT - ASSESSMENT
59 M PMH of HIV (CD4 103, VL undetectable in March 2020), tonsilar CA (s/p CRT and RT), BPH, HLD, hypothyroidism, sleep apnea, COVID +ve, recently admitted on 3/30 at [facility], transferred to [facility] on 4/8. Presenting with hypoxic respiratory failure requiring nonrebreather.

## 2020-04-19 NOTE — PROGRESS NOTE ADULT - ATTENDING COMMENTS
58 yo M with HIV on ART, COVID pneumonia, with progression of infiltrates, early fibrosis, and worsening hyopxia concerning for fibrotic lung disease 2/2 covid vs. resistant bacterial infection vs PCP. Patient s/p intubation on 4/17/20, bronchoscopy with BAL and extubation on 4/18/20 to HFNC    - Hypoxic respiratory failure. CT with diffuse GGO, crazy paving and early evidence of fibrosis. Differential as above. BAL pending for PCP PCR, fungal, viral and bacterial etiologies. Empiric coverage for PCP, bacterial PNA in the interim. Empiric therapeutic lovenox for hypoxia  - prednisone for PCP  - OOB to chair, tolerating diet.   - hypoxia significantly improved, now on NRB, not tachypneic.   - repeat COVID PCR negative x2, Pending COVID BAL. Will transfer to [facility] for now as BAL PCR highly sensitive for COVID. If that is negative, can  be transferred to non covid tele with pulm consult.

## 2020-04-20 DIAGNOSIS — J96.01 ACUTE RESPIRATORY FAILURE WITH HYPOXIA: ICD-10-CM

## 2020-04-20 DIAGNOSIS — B20 HUMAN IMMUNODEFICIENCY VIRUS [HIV] DISEASE: ICD-10-CM

## 2020-04-20 LAB — MISCELLANEOUS TEST NAME: SIGNIFICANT CHANGE UP

## 2020-04-20 PROCEDURE — 93010 ELECTROCARDIOGRAM REPORT: CPT

## 2020-04-20 PROCEDURE — 99233 SBSQ HOSP IP/OBS HIGH 50: CPT | Mod: CS,GC

## 2020-04-20 PROCEDURE — 99232 SBSQ HOSP IP/OBS MODERATE 35: CPT | Mod: CS

## 2020-04-20 PROCEDURE — 93970 EXTREMITY STUDY: CPT | Mod: 26

## 2020-04-20 RX ORDER — ALPRAZOLAM 0.25 MG
0.25 TABLET ORAL AT BEDTIME
Refills: 0 | Status: DISCONTINUED | OUTPATIENT
Start: 2020-04-20 | End: 2020-04-20

## 2020-04-20 RX ORDER — VANCOMYCIN HCL 1 G
1500 VIAL (EA) INTRAVENOUS EVERY 12 HOURS
Refills: 0 | Status: DISCONTINUED | OUTPATIENT
Start: 2020-04-20 | End: 2020-04-21

## 2020-04-20 RX ORDER — VANCOMYCIN HCL 1 G
1500 VIAL (EA) INTRAVENOUS EVERY 12 HOURS
Refills: 0 | Status: DISCONTINUED | OUTPATIENT
Start: 2020-04-20 | End: 2020-04-20

## 2020-04-20 RX ADMIN — RALTEGRAVIR 400 MILLIGRAM(S): 400 TABLET, FILM COATED ORAL at 10:37

## 2020-04-20 RX ADMIN — DARUNAVIR 600 MILLIGRAM(S): 75 TABLET, FILM COATED ORAL at 10:37

## 2020-04-20 RX ADMIN — VALACYCLOVIR 500 MILLIGRAM(S): 500 TABLET, FILM COATED ORAL at 10:37

## 2020-04-20 RX ADMIN — PIPERACILLIN AND TAZOBACTAM 200 GRAM(S): 4; .5 INJECTION, POWDER, LYOPHILIZED, FOR SOLUTION INTRAVENOUS at 02:54

## 2020-04-20 RX ADMIN — PIPERACILLIN AND TAZOBACTAM 200 GRAM(S): 4; .5 INJECTION, POWDER, LYOPHILIZED, FOR SOLUTION INTRAVENOUS at 15:28

## 2020-04-20 RX ADMIN — Medication 300 MILLIGRAM(S): at 02:53

## 2020-04-20 RX ADMIN — Medication 3 MILLIGRAM(S): at 21:20

## 2020-04-20 RX ADMIN — PIPERACILLIN AND TAZOBACTAM 200 GRAM(S): 4; .5 INJECTION, POWDER, LYOPHILIZED, FOR SOLUTION INTRAVENOUS at 10:38

## 2020-04-20 RX ADMIN — FAMOTIDINE 20 MILLIGRAM(S): 10 INJECTION INTRAVENOUS at 10:37

## 2020-04-20 RX ADMIN — VALACYCLOVIR 500 MILLIGRAM(S): 500 TABLET, FILM COATED ORAL at 21:27

## 2020-04-20 RX ADMIN — RALTEGRAVIR 400 MILLIGRAM(S): 400 TABLET, FILM COATED ORAL at 17:01

## 2020-04-20 RX ADMIN — ETRAVIRINE 200 MILLIGRAM(S): 200 TABLET ORAL at 10:37

## 2020-04-20 RX ADMIN — DARUNAVIR 600 MILLIGRAM(S): 75 TABLET, FILM COATED ORAL at 17:03

## 2020-04-20 RX ADMIN — ETRAVIRINE 200 MILLIGRAM(S): 200 TABLET ORAL at 17:03

## 2020-04-20 RX ADMIN — TAMSULOSIN HYDROCHLORIDE 0.4 MILLIGRAM(S): 0.4 CAPSULE ORAL at 21:20

## 2020-04-20 RX ADMIN — Medication 40 MILLIGRAM(S): at 17:01

## 2020-04-20 RX ADMIN — Medication 300 MILLIGRAM(S): at 17:00

## 2020-04-20 RX ADMIN — Medication 1 DROP(S): at 17:01

## 2020-04-20 RX ADMIN — LACTOBACILLUS ACIDOPH-L.BULGARICUS 1 MILLION CELL CHEWABLE TABLET 1 TABLET(S): at 10:38

## 2020-04-20 RX ADMIN — Medication 1 DROP(S): at 07:08

## 2020-04-20 RX ADMIN — Medication 3 MILLIGRAM(S): at 02:53

## 2020-04-20 RX ADMIN — RITONAVIR 100 MILLIGRAM(S): 100 TABLET, FILM COATED ORAL at 10:37

## 2020-04-20 RX ADMIN — Medication 2 TABLET(S): at 07:08

## 2020-04-20 RX ADMIN — Medication 40 MILLIGRAM(S): at 07:09

## 2020-04-20 RX ADMIN — Medication 125 MICROGRAM(S): at 07:08

## 2020-04-20 RX ADMIN — ROSUVASTATIN CALCIUM 20 MILLIGRAM(S): 5 TABLET ORAL at 21:20

## 2020-04-20 RX ADMIN — PIPERACILLIN AND TAZOBACTAM 200 GRAM(S): 4; .5 INJECTION, POWDER, LYOPHILIZED, FOR SOLUTION INTRAVENOUS at 21:19

## 2020-04-20 RX ADMIN — RITONAVIR 100 MILLIGRAM(S): 100 TABLET, FILM COATED ORAL at 17:04

## 2020-04-20 RX ADMIN — ENOXAPARIN SODIUM 80 MILLIGRAM(S): 100 INJECTION SUBCUTANEOUS at 21:20

## 2020-04-20 RX ADMIN — EMTRICITABINE AND TENOFOVIR DISOPROXIL FUMARATE 1 TABLET(S): 200; 300 TABLET, FILM COATED ORAL at 10:38

## 2020-04-20 RX ADMIN — ENOXAPARIN SODIUM 80 MILLIGRAM(S): 100 INJECTION SUBCUTANEOUS at 07:09

## 2020-04-20 NOTE — PROGRESS NOTE ADULT - SUBJECTIVE AND OBJECTIVE BOX
PULMONARY CONSULT SERVICE FOLLOW-UP NOTE***INCOMPLETE    INTERVAL HPI:  Reviewed chart and overnight events; patient seen and examined at bedside.    MEDICATIONS:  Pulmonary:  hydrocodone/homatropine Syrup 5 milliLiter(s) Oral every 6 hours PRN    Antimicrobials:  darunavir 600 milliGRAM(s) Oral <User Schedule>  emtricitabine 200 mG/tenofovir alafenamide 25 mG (DESCOVY) Tablet 1 Tablet(s) Oral daily  etravirine 200 milliGRAM(s) Oral <User Schedule>  piperacillin/tazobactam IVPB.. 4.5 Gram(s) IV Intermittent every 6 hours  raltegravir Tablet 400 milliGRAM(s) Oral <User Schedule>  ritonavir Tablet 100 milliGRAM(s) Oral <User Schedule>  valACYclovir 500 milliGRAM(s) Oral two times a day  vancomycin  IVPB 1500 milliGRAM(s) IV Intermittent every 12 hours    Anticoagulants:  enoxaparin Injectable 80 milliGRAM(s) SubCutaneous every 12 hours    Cardiac:  tamsulosin 0.4 milliGRAM(s) Oral at bedtime      Allergies    No Known Allergies    Intolerances        Vital Signs Last 24 Hrs  T(C): 36.4 (20 Apr 2020 11:47), Max: 36.8 (19 Apr 2020 22:56)  T(F): 97.5 (20 Apr 2020 11:47), Max: 98.2 (19 Apr 2020 22:56)  HR: 68 (20 Apr 2020 11:47) (66 - 80)  BP: 134/89 (20 Apr 2020 11:47) (122/81 - 134/89)  BP(mean): 107 (20 Apr 2020 11:47) (91 - 107)  RR: 20 (20 Apr 2020 11:47) (20 - 30)  SpO2: 98% (20 Apr 2020 11:47) (92% - 98%)    04-19 @ 07:01  -  04-20 @ 07:00  --------------------------------------------------------  IN: 1160 mL / OUT: 1100 mL / NET: 60 mL    04-20 @ 07:01  -  04-20 @ 17:12  --------------------------------------------------------  IN: 0 mL / OUT: 650 mL / NET: -650 mL          PHYSICAL EXAM:  Constitutional: WDWN  HEENT: NC/AT; PERRL, anicteric sclera; MMM  Neck: supple  Cardiovascular: +S1/S2, RRR  Respiratory: CTA B/L; no W/R/R  Gastrointestinal: soft, NT/ND; +BSx4  Extremities: WWP; no edema, clubbing or cyanosis  Vascular: 2+ radial, DP/PT pulses B/L  Neurological: AAOx3; no focal deficits    LABS:      CBC Full  -  ( 19 Apr 2020 05:52 )  WBC Count : 8.60 K/uL  RBC Count : 4.49 M/uL  Hemoglobin : 13.1 g/dL  Hematocrit : 38.7 %  Platelet Count - Automated : 123 K/uL  Mean Cell Volume : 86.2 fl  Mean Cell Hemoglobin : 29.2 pg  Mean Cell Hemoglobin Concentration : 33.9 gm/dL  Auto Neutrophil # : 7.70 K/uL  Auto Lymphocyte # : 0.22 K/uL  Auto Monocyte # : 0.68 K/uL  Auto Eosinophil # : 0.00 K/uL  Auto Basophil # : 0.00 K/uL  Auto Neutrophil % : 89.5 %  Auto Lymphocyte % : 2.6 %  Auto Monocyte % : 7.9 %  Auto Eosinophil % : 0.0 %  Auto Basophil % : 0.0 %    04-19    136  |  101  |  11  ----------------------------<  114<H>  4.3   |  25  |  0.68    Ca    8.6      19 Apr 2020 05:52  Phos  1.9     04-19  Mg     2.0     04-19    TPro  5.8<L>  /  Alb  2.6<L>  /  TBili  0.3  /  DBili  x   /  AST  49<H>  /  ALT  52<H>  /  AlkPhos  50  04-19                      RADIOLOGY & ADDITIONAL STUDIES: PULMONARY CONSULT SERVICE FOLLOW-UP NOTE    INTERVAL HPI:  Reviewed chart and overnight events; patient seen and examined at bedside. Feels well today, thinks he is getting better. Hopeful he will not be dependent on oxygen nor require prolonged hospitalization for recovery. Denies dyspnea. Still has cough.     MEDICATIONS:  Pulmonary:  hydrocodone/homatropine Syrup 5 milliLiter(s) Oral every 6 hours PRN    Antimicrobials:  darunavir 600 milliGRAM(s) Oral <User Schedule>  emtricitabine 200 mG/tenofovir alafenamide 25 mG (DESCOVY) Tablet 1 Tablet(s) Oral daily  etravirine 200 milliGRAM(s) Oral <User Schedule>  piperacillin/tazobactam IVPB.. 4.5 Gram(s) IV Intermittent every 6 hours  raltegravir Tablet 400 milliGRAM(s) Oral <User Schedule>  ritonavir Tablet 100 milliGRAM(s) Oral <User Schedule>  valACYclovir 500 milliGRAM(s) Oral two times a day  vancomycin  IVPB 1500 milliGRAM(s) IV Intermittent every 12 hours    Anticoagulants:  enoxaparin Injectable 80 milliGRAM(s) SubCutaneous every 12 hours    Cardiac:  tamsulosin 0.4 milliGRAM(s) Oral at bedtime    Allergies    No Known Allergies    Intolerances    Vital Signs Last 24 Hrs  T(C): 36.4 (20 Apr 2020 11:47), Max: 36.8 (19 Apr 2020 22:56)  T(F): 97.5 (20 Apr 2020 11:47), Max: 98.2 (19 Apr 2020 22:56)  HR: 68 (20 Apr 2020 11:47) (66 - 80)  BP: 134/89 (20 Apr 2020 11:47) (122/81 - 134/89)  BP(mean): 107 (20 Apr 2020 11:47) (91 - 107)  RR: 20 (20 Apr 2020 11:47) (20 - 30)  SpO2: 98% (20 Apr 2020 11:47) (92% - 98%)    04-19 @ 07:01  -  04-20 @ 07:00  --------------------------------------------------------  IN: 1160 mL / OUT: 1100 mL / NET: 60 mL    04-20 @ 07:01  -  04-20 @ 17:12  --------------------------------------------------------  IN: 0 mL / OUT: 650 mL / NET: -650 mL    PHYSICAL EXAM:  Constitutional: well-appearing, comfortably resting in bed on NRB; NAD  HEENT: NC/AT; PERRL, anicteric sclera; MMM  Neck: supple  Cardiovascular: +S1/S2, RRR  Respiratory: CTA B/L, conversive in full sentences and normoxic on NRB  Gastrointestinal: soft, NT/ND  Extremities: WWP; no edema, clubbing or cyanosis  Vascular: 2+ radial pulses B/L  Neurological: awake and alert; SCHMIDT, following commands    LABS:  CBC Full  -  ( 19 Apr 2020 05:52 )  WBC Count : 8.60 K/uL  RBC Count : 4.49 M/uL  Hemoglobin : 13.1 g/dL  Hematocrit : 38.7 %  Platelet Count - Automated : 123 K/uL  Mean Cell Volume : 86.2 fl  Mean Cell Hemoglobin : 29.2 pg  Mean Cell Hemoglobin Concentration : 33.9 gm/dL  Auto Neutrophil # : 7.70 K/uL  Auto Lymphocyte # : 0.22 K/uL  Auto Monocyte # : 0.68 K/uL  Auto Eosinophil # : 0.00 K/uL  Auto Basophil # : 0.00 K/uL  Auto Neutrophil % : 89.5 %  Auto Lymphocyte % : 2.6 %  Auto Monocyte % : 7.9 %  Auto Eosinophil % : 0.0 %  Auto Basophil % : 0.0 %    04-19    136  |  101  |  11  ----------------------------<  114<H>  4.3   |  25  |  0.68    Ca    8.6      19 Apr 2020 05:52  Phos  1.9     04-19  Mg     2.0     04-19    TPro  5.8<L>  /  Alb  2.6<L>  /  TBili  0.3  /  DBili  x   /  AST  49<H>  /  ALT  52<H>  /  AlkPhos  50  04-19    RADIOLOGY & ADDITIONAL STUDIES:  Interval CXRs personally reviewed and compared with priors; scattered patchy infiltrates bilaterally, R>>L, which are overall improved on most recent CXR 4/19

## 2020-04-20 NOTE — PROGRESS NOTE ADULT - PROBLEM SELECTOR PLAN 2
#Pneumonia 2/2 to COVID-19. Found to be covid positive. Received Tocilizumab (04/03), Azithro/Plaquenil (04/02-04/06) and Solumedrol (04/03-04/08). covid-19 PCR (+)  - trend inflammatory markers  -c/w famotidine 20mg q48  - f/u  bronch studies from 4/18

## 2020-04-20 NOTE — PROGRESS NOTE ADULT - ATTENDING COMMENTS
Looks improved - comfortable on NRB, saturating 94%, speaking to us in full sentences. Bronchoscopy was remarkable for being fairly unremarkable and as there was no evidence of infection or inflammation I would recommend stopping ABx and steroids. Looks improved - comfortable on NRB, saturating 94%, speaking to us in full sentences. Bronchoscopy was remarkable for being fairly unremarkable and as there was no evidence of infection or inflammation I would recommend stopping ABx and steroids. He is on full dose AC empirically and I would also repeat a doppler looking for evidence of clot and and consider d/c AC.

## 2020-04-20 NOTE — PROGRESS NOTE ADULT - SUBJECTIVE AND OBJECTIVE BOX
Briefly: 60 yo M with PMH HIV (VLUD, [id number] prior to admission), tonsillar Ca s/p chemo/rad in remission, hypothyroidism who initially presented 3/30/20 with cough/fever/chills and pulmonary infiltrates associated with COVID infection. He received tocilizumab x 1, azithromycin/hydroxychloroquine/steroids x 5 days. Since admission he has had persistent hypoxia and worsening infiltrates, has intermittently received antibiotics for possible superimposed infection and . Yesterday pt continued to have subjective shortness of breath requiring NRB with episodes of hypoxia and tachypnea. He was empirically started on broad spectrum abx (Vanc/zosyn in setting of +MRSA) and bactrim + prednisone for PCP and started on full dose a/c presumed PE in setting of elevated d-dimers (although already downtrending) and worsening symptoms. CT of chest performed with significant worsening of ground glass opacities, crazy paving, early fibrotic changes including traction bronchiectasis. Pulmonary consulted for abnormal chest CT, worsening hypoxia, and possible diagnostic bronch. S/p bronchoscopy and BAL on 4/17, with tests sent for PCP pna and covid. Extubated 4/18, and has had decreasing oxygen requirements, now on NRB.     OVERNIGHT EVENTS: No acute events overnight    SUBJECTIVE / INTERVAL HPI: Patient seen and examined at bedside with no new acute complaints. No CP, sob, palpitations fevers, or leg pain.      Review of systems negative except as noted above.     VITAL SIGNS:  Vital Signs Last 24 Hrs  T(C): 36.1 (20 Apr 2020 07:16), Max: 36.9 (19 Apr 2020 14:11)  T(F): 97 (20 Apr 2020 07:16), Max: 98.5 (19 Apr 2020 14:11)  HR: 80 (20 Apr 2020 09:41) (66 - 86)  BP: 123/69 (20 Apr 2020 09:41) (113/73 - 131/85)  BP(mean): 91 (20 Apr 2020 09:41) (86 - 97)  RR: 20 (20 Apr 2020 09:41) (20 - 30)  SpO2: 92% (20 Apr 2020 09:41) (92% - 98%)      04-19-20 @ 07:01  -  04-20-20 @ 07:00  --------------------------------------------------------  IN: 1160 mL / OUT: 1100 mL / NET: 60 mL        PHYSICAL EXAM:    General: NAD sitting up comfortably in bed on NRB.  HEENT: MMM  Neck: -JVD  Cardiovascular: +S1/S2; RRR  Respiratory: Decreased breath sounds on L   Gastrointestinal: NTND BS+4  Extremities: WWP; no edema  Vascular: 2+ radial, DP pulses B/L  Neurological: AAOx3; no focal deficits    MEDICATIONS:  MEDICATIONS  (STANDING):  artificial tears (preservative free) Ophthalmic Solution 1 Drop(s) Both EYES two times a day  darunavir 600 milliGRAM(s) Oral <User Schedule>  dextrose 5%. 1000 milliLiter(s) (50 mL/Hr) IV Continuous <Continuous>  dextrose 50% Injectable 12.5 Gram(s) IV Push once  dextrose 50% Injectable 25 Gram(s) IV Push once  dextrose 50% Injectable 25 Gram(s) IV Push once  emtricitabine 200 mG/tenofovir alafenamide 25 mG (DESCOVY) Tablet 1 Tablet(s) Oral daily  enoxaparin Injectable 80 milliGRAM(s) SubCutaneous every 12 hours  etravirine 200 milliGRAM(s) Oral <User Schedule>  famotidine    Tablet 20 milliGRAM(s) Oral daily  influenza   Vaccine 0.5 milliLiter(s) IntraMuscular once  lactobacillus acidophilus and bulgaricus Chewable 1 Tablet(s) Chew daily  levothyroxine 125 MICROGram(s) Oral daily  melatonin 3 milliGRAM(s) Oral at bedtime  piperacillin/tazobactam IVPB.. 4.5 Gram(s) IV Intermittent every 6 hours  predniSONE   Tablet 40 milliGRAM(s) Oral every 12 hours  raltegravir Tablet 400 milliGRAM(s) Oral <User Schedule>  ritonavir Tablet 100 milliGRAM(s) Oral <User Schedule>  rosuvastatin 20 milliGRAM(s) Oral at bedtime  tamsulosin 0.4 milliGRAM(s) Oral at bedtime  valACYclovir 500 milliGRAM(s) Oral two times a day  vancomycin  IVPB 1500 milliGRAM(s) IV Intermittent every 12 hours    MEDICATIONS  (PRN):  acetaminophen   Tablet .. 975 milliGRAM(s) Oral every 8 hours PRN Temp greater or equal to 38C (100.4F)  ALPRAZolam 0.25 milliGRAM(s) Oral at bedtime PRN Anxiety  dextrose 40% Gel 15 Gram(s) Oral once PRN Blood Glucose LESS THAN 70 milliGRAM(s)/deciliter  glucagon  Injectable 1 milliGRAM(s) IntraMuscular once PRN Glucose LESS THAN 70 milligrams/deciliter  hydrocodone/homatropine Syrup 5 milliLiter(s) Oral every 6 hours PRN Cough      ALLERGIES:  Allergies    No Known Allergies    Intolerances        LABS:                        13.1   8.60  )-----------( 123      ( 19 Apr 2020 05:52 )             38.7     04-19    136  |  101  |  11  ----------------------------<  114<H>  4.3   |  25  |  0.68    Ca    8.6      19 Apr 2020 05:52  Phos  1.9     04-19  Mg     2.0     04-19    TPro  5.8<L>  /  Alb  2.6<L>  /  TBili  0.3  /  DBili  x   /  AST  49<H>  /  ALT  52<H>  /  AlkPhos  50  04-19          < from: Xray Chest 1 View- PORTABLE-Routine (04.19.20 @ 05:04) >  INTERPRETATION:  Chest x-ray    Indication: Pneumonia    A portable frontal view of the chest is compared to the prior study dated 4/18/2020. ET tube has been removed. Stable heart size. Slightly improved pulmonary infiltrates. No pleural effusion or pneumothorax.      IMPRESSION: Improved pulmonary infiltrates.          Thank you for the opportunity to participate in the care of this patient.    < end of copied text >

## 2020-04-20 NOTE — PROGRESS NOTE ADULT - ASSESSMENT
58yo man w/ HIV on ART, COVID pneumonia; pulmonary consulted for progression of lung infiltrates and of concern for early fibrotic lung dz vs. superimposed bacterial PNA vs. PCP PNA. He is status post bronchoscopy and BAL on 4/17 and has since been extubated with overall improvement in his acute hypoxemic respiratory failure, now on COVID telemetry unit.

## 2020-04-20 NOTE — PROGRESS NOTE ADULT - PROBLEM SELECTOR PLAN 3
Found to be MRSA swab positive and started on Vanc (4/17-4/23),  zosyn (4/17-4/23 ) for 7 days   - c/w vanc/zosyn  - next trough prior to 4/21 PM dose

## 2020-04-20 NOTE — PROGRESS NOTE ADULT - PROBLEM SELECTOR PLAN 4
HIV home medication regimen: Ritonavir, Darunavir, Raltegravir, Bactrim now with steroids to rx for PCP.   - c/w home HIV medications   - continue with bactrim for 21 days   -continue with valtrex    #PCP pneumonia. CD4 count 103 with no VL back in 03/2020.   - f/u with strongyloides, CMV serology, beta D glucan and galactomannan as well as serum eosinophils.   - pulm following, continue to follow recs HIV home medication regimen: Ritonavir, Darunavir, Raltegravir. Bactrim dc'd due to low suspicion for PNA  - c/w home HIV medications    -continue with valtrex    #PCP pneumonia. CD4 count 103 with no VL back in 03/2020.   - f/u with strongyloides, CMV serology, beta D glucan and galactomannan as well as serum eosinophils.   - pulm following, continue to follow recs

## 2020-04-20 NOTE — PROGRESS NOTE ADULT - ASSESSMENT
59 M PMH of HIV (CD4 103, VL undetectable in March 2020), tonsilar CA (s/p CRT and RT), BPH, HLD, hypothyroidism, sleep apnea, COVID +ve, recently admitted on 3/30 at [facility], transferred to [facility] on 4/8. Presenting with hypoxic respiratory failure requiring nonrebreather.

## 2020-04-20 NOTE — PROGRESS NOTE ADULT - PROBLEM SELECTOR PLAN 1
Finally showing some Improvement, and has since been extubated to facemask NRB over weekend, now on telemetry/SDU. Conversive in full sentences and normoxic at rest. S/p bronchoscopy w/ BAL on 4/17 which was unrevealing for infectious or inflammatory etiologies.  *Cultures NGTD  *PCP PCR neg  *Cyto pending    Recommendations:  - c/w supplemental O2 to maintain SpO2=>88%, can attempt weaning from NRB +/- increasing mobility/PT and encouraging ambulation now that he is improving  - stop TMP-SMX for PCP as the PCR was negative  - stop steroids for PCP as the PCR was negative  - would also repeat LE dopplers, and if negative, transition full dose SQL back to prophylactic dose

## 2020-04-20 NOTE — PROGRESS NOTE ADULT - PROBLEM SELECTOR PLAN 1
Intubated briefly 4/17 overnight for bronchoscopy. Now extubated an on NRB and saturting in 90s   - oxygen sat goal >90%, wean as tolerated  - COVID management as below

## 2020-04-21 LAB
ALBUMIN SERPL ELPH-MCNC: 2.9 G/DL — LOW (ref 3.3–5)
ALP SERPL-CCNC: 53 U/L — SIGNIFICANT CHANGE UP (ref 40–120)
ALT FLD-CCNC: 48 U/L — HIGH (ref 10–45)
ANION GAP SERPL CALC-SCNC: 12 MMOL/L — SIGNIFICANT CHANGE UP (ref 5–17)
AST SERPL-CCNC: 35 U/L — SIGNIFICANT CHANGE UP (ref 10–40)
BASOPHILS # BLD AUTO: 0 K/UL — SIGNIFICANT CHANGE UP (ref 0–0.2)
BASOPHILS NFR BLD AUTO: 0 % — SIGNIFICANT CHANGE UP (ref 0–2)
BILIRUB SERPL-MCNC: 0.2 MG/DL — SIGNIFICANT CHANGE UP (ref 0.2–1.2)
BUN SERPL-MCNC: 14 MG/DL — SIGNIFICANT CHANGE UP (ref 7–23)
CALCIUM SERPL-MCNC: 9 MG/DL — SIGNIFICANT CHANGE UP (ref 8.4–10.5)
CHLORIDE SERPL-SCNC: 97 MMOL/L — SIGNIFICANT CHANGE UP (ref 96–108)
CO2 SERPL-SCNC: 25 MMOL/L — SIGNIFICANT CHANGE UP (ref 22–31)
CREAT SERPL-MCNC: 0.73 MG/DL — SIGNIFICANT CHANGE UP (ref 0.5–1.3)
CRP SERPL-MCNC: 1.23 MG/DL — HIGH (ref 0–0.4)
D DIMER BLD IA.RAPID-MCNC: 405 NG/ML DDU — HIGH
EOSINOPHIL # BLD AUTO: 0 K/UL — SIGNIFICANT CHANGE UP (ref 0–0.5)
EOSINOPHIL NFR BLD AUTO: 0 % — SIGNIFICANT CHANGE UP (ref 0–6)
FERRITIN SERPL-MCNC: 672 NG/ML — HIGH (ref 30–400)
FUNGITELL B-D-GLUCAN,  BRONCHIAL LAVAGE: SIGNIFICANT CHANGE UP
FUNGITELL B-D-GLUCAN,  BRONCHIAL LAVAGE: SIGNIFICANT CHANGE UP
GALACTOMANNAN AG SERPL-ACNC: <0.5 INDEX — SIGNIFICANT CHANGE UP
GALACTOMANNAN AG SPEC IA-ACNC: <0.5 INDEX — SIGNIFICANT CHANGE UP
GLUCOSE SERPL-MCNC: 159 MG/DL — HIGH (ref 70–99)
HCT VFR BLD CALC: 40.7 % — SIGNIFICANT CHANGE UP (ref 39–50)
HGB BLD-MCNC: 13.2 G/DL — SIGNIFICANT CHANGE UP (ref 13–17)
IMM GRANULOCYTES NFR BLD AUTO: 0.3 % — SIGNIFICANT CHANGE UP (ref 0–1.5)
LYMPHOCYTES # BLD AUTO: 0.44 K/UL — LOW (ref 1–3.3)
LYMPHOCYTES # BLD AUTO: 5.7 % — LOW (ref 13–44)
MAGNESIUM SERPL-MCNC: 2 MG/DL — SIGNIFICANT CHANGE UP (ref 1.6–2.6)
MCHC RBC-ENTMCNC: 28.4 PG — SIGNIFICANT CHANGE UP (ref 27–34)
MCHC RBC-ENTMCNC: 32.4 GM/DL — SIGNIFICANT CHANGE UP (ref 32–36)
MCV RBC AUTO: 87.5 FL — SIGNIFICANT CHANGE UP (ref 80–100)
MONOCYTES # BLD AUTO: 0.43 K/UL — SIGNIFICANT CHANGE UP (ref 0–0.9)
MONOCYTES NFR BLD AUTO: 5.6 % — SIGNIFICANT CHANGE UP (ref 2–14)
NEUTROPHILS # BLD AUTO: 6.85 K/UL — SIGNIFICANT CHANGE UP (ref 1.8–7.4)
NEUTROPHILS NFR BLD AUTO: 88.4 % — HIGH (ref 43–77)
NON-GYNECOLOGICAL CYTOLOGY STUDY: SIGNIFICANT CHANGE UP
NRBC # BLD: 0 /100 WBCS — SIGNIFICANT CHANGE UP (ref 0–0)
PLATELET # BLD AUTO: 112 K/UL — LOW (ref 150–400)
POTASSIUM SERPL-MCNC: 4 MMOL/L — SIGNIFICANT CHANGE UP (ref 3.5–5.3)
POTASSIUM SERPL-SCNC: 4 MMOL/L — SIGNIFICANT CHANGE UP (ref 3.5–5.3)
PROT SERPL-MCNC: 5.9 G/DL — LOW (ref 6–8.3)
RBC # BLD: 4.65 M/UL — SIGNIFICANT CHANGE UP (ref 4.2–5.8)
RBC # FLD: 14.4 % — SIGNIFICANT CHANGE UP (ref 10.3–14.5)
SODIUM SERPL-SCNC: 134 MMOL/L — LOW (ref 135–145)
STRONGYLOIDES AB SER-ACNC: NEGATIVE — SIGNIFICANT CHANGE UP
TM INTERPRETATION: SIGNIFICANT CHANGE UP
TM INTERPRETATION: SIGNIFICANT CHANGE UP
VANCOMYCIN TROUGH SERPL-MCNC: 14.2 UG/ML — SIGNIFICANT CHANGE UP (ref 10–20)
WBC # BLD: 7.74 K/UL — SIGNIFICANT CHANGE UP (ref 3.8–10.5)
WBC # FLD AUTO: 7.74 K/UL — SIGNIFICANT CHANGE UP (ref 3.8–10.5)

## 2020-04-21 PROCEDURE — 99233 SBSQ HOSP IP/OBS HIGH 50: CPT | Mod: CS,GC

## 2020-04-21 PROCEDURE — 99232 SBSQ HOSP IP/OBS MODERATE 35: CPT | Mod: CS

## 2020-04-21 RX ORDER — ENOXAPARIN SODIUM 100 MG/ML
40 INJECTION SUBCUTANEOUS EVERY 24 HOURS
Refills: 0 | Status: DISCONTINUED | OUTPATIENT
Start: 2020-04-21 | End: 2020-04-25

## 2020-04-21 RX ADMIN — RALTEGRAVIR 400 MILLIGRAM(S): 400 TABLET, FILM COATED ORAL at 11:29

## 2020-04-21 RX ADMIN — LACTOBACILLUS ACIDOPH-L.BULGARICUS 1 MILLION CELL CHEWABLE TABLET 1 TABLET(S): at 11:29

## 2020-04-21 RX ADMIN — RALTEGRAVIR 400 MILLIGRAM(S): 400 TABLET, FILM COATED ORAL at 17:24

## 2020-04-21 RX ADMIN — VALACYCLOVIR 500 MILLIGRAM(S): 500 TABLET, FILM COATED ORAL at 22:25

## 2020-04-21 RX ADMIN — Medication 3 MILLIGRAM(S): at 22:23

## 2020-04-21 RX ADMIN — Medication 1 DROP(S): at 17:23

## 2020-04-21 RX ADMIN — DARUNAVIR 600 MILLIGRAM(S): 75 TABLET, FILM COATED ORAL at 11:29

## 2020-04-21 RX ADMIN — Medication 125 MICROGRAM(S): at 05:57

## 2020-04-21 RX ADMIN — PIPERACILLIN AND TAZOBACTAM 200 GRAM(S): 4; .5 INJECTION, POWDER, LYOPHILIZED, FOR SOLUTION INTRAVENOUS at 03:03

## 2020-04-21 RX ADMIN — RITONAVIR 100 MILLIGRAM(S): 100 TABLET, FILM COATED ORAL at 11:29

## 2020-04-21 RX ADMIN — RITONAVIR 100 MILLIGRAM(S): 100 TABLET, FILM COATED ORAL at 17:23

## 2020-04-21 RX ADMIN — FAMOTIDINE 20 MILLIGRAM(S): 10 INJECTION INTRAVENOUS at 11:29

## 2020-04-21 RX ADMIN — EMTRICITABINE AND TENOFOVIR DISOPROXIL FUMARATE 1 TABLET(S): 200; 300 TABLET, FILM COATED ORAL at 11:29

## 2020-04-21 RX ADMIN — DARUNAVIR 600 MILLIGRAM(S): 75 TABLET, FILM COATED ORAL at 17:24

## 2020-04-21 RX ADMIN — ETRAVIRINE 200 MILLIGRAM(S): 200 TABLET ORAL at 11:29

## 2020-04-21 RX ADMIN — VALACYCLOVIR 500 MILLIGRAM(S): 500 TABLET, FILM COATED ORAL at 11:29

## 2020-04-21 RX ADMIN — TAMSULOSIN HYDROCHLORIDE 0.4 MILLIGRAM(S): 0.4 CAPSULE ORAL at 22:24

## 2020-04-21 RX ADMIN — ROSUVASTATIN CALCIUM 20 MILLIGRAM(S): 5 TABLET ORAL at 22:45

## 2020-04-21 RX ADMIN — ENOXAPARIN SODIUM 40 MILLIGRAM(S): 100 INJECTION SUBCUTANEOUS at 22:23

## 2020-04-21 RX ADMIN — ETRAVIRINE 200 MILLIGRAM(S): 200 TABLET ORAL at 17:24

## 2020-04-21 RX ADMIN — Medication 300 MILLIGRAM(S): at 03:36

## 2020-04-21 RX ADMIN — Medication 1 DROP(S): at 05:57

## 2020-04-21 NOTE — PROGRESS NOTE ADULT - PROBLEM SELECTOR PLAN 1
Improving; extubated to facemask NRB 4/19 and xfer to telemetry/SDU. Conversive in full sentences and normoxic at rest. S/p bronchoscopy w/ BAL 4/17 which was unrevealing for infectious or inflammatory etiologies.  *Cultures NGTD  *PCP PCR neg, TMP-SMX and steroids thus stopped 4/20  *Cyto neg    Recommendations:  - c/w supplemental O2 to maintain SpO2=>88%, can attempt weaning from NRB +/- increasing mobility/PT and encouraging ambulation now that he is improving; acknowledge him feeling better with intermittent proning  - had suggested repeating LE dopplers and if negative, decreasing full dose SQL to prophylactic dose -- pt however transitioned to PPx dose already  - otherwise continue best supportive care for COVID-19 related respiratory failure

## 2020-04-21 NOTE — PROGRESS NOTE ADULT - ASSESSMENT
60yo man w/ HIV on ART, COVID pneumonia; pulmonary consulted for progression of lung infiltrates and of concern for early fibrotic lung dz vs. superimposed bacterial PNA vs. PCP PNA. He is status post bronchoscopy and BAL on 4/17 and has since been extubated with overall improvement in his acute hypoxemic respiratory failure, now on COVID telemetry unit.

## 2020-04-21 NOTE — PROGRESS NOTE ADULT - ATTENDING COMMENTS
I have personally seen, examined, and participated in the care of this patient. I have reviewed all pertinent clinical information, including history, physical exam, laboratory, imaging plan and the resident's note and agree with the above.    -Episode of desaturation with SOB earlier this am with standing  -Improved with NRB  -Cont NRB for today  -Inflamm biomarkers trending down  -DC Abx, steroid. LE doppler negative for DVT, decrease lovenox to ppx dose

## 2020-04-21 NOTE — PROGRESS NOTE ADULT - ATTENDING COMMENTS
Seen and examined by me - looks slightly less comfortable, remains on NRB, proned x 3 hours today and feels improved w this. Prolonged respiratory failure due to Covid - will follow closely w you.

## 2020-04-21 NOTE — PROGRESS NOTE ADULT - ASSESSMENT
59 M PMH of HIV (CD4 103, VL undetectable in March 2020), tonsilar CA (s/p CRT and RT), BPH, HLD, hypothyroidism, sleep apnea, COVID +ve, recently admitted on 3/30 at [facility], transferred to [facility] on 4/8. Presenting with hypoxic respiratory failure requiring nonrebreather.

## 2020-04-21 NOTE — PROGRESS NOTE ADULT - PROBLEM SELECTOR PLAN 3
Pulmonary infiltrates improving on CXR. Patient afebrile, with no leukocytosis.  - Dc'd antibiotics.

## 2020-04-21 NOTE — PROGRESS NOTE ADULT - PROBLEM SELECTOR PLAN 4
HIV home medication regimen: Ritonavir, Darunavir, Raltegravir. Bactrim dc'd due to low suspicion for PNA  - c/w home HIV medications    -continue with valtrex    #PCP pneumonia. CD4 count 103 with no VL back in 03/2020.   - f/u with strongyloides, CMV serology, beta D glucan and galactomannan as well as serum eosinophils.   - pulm following, continue to follow recs

## 2020-04-21 NOTE — PROGRESS NOTE ADULT - SUBJECTIVE AND OBJECTIVE BOX
Briefly: 58 yo M with PMH HIV (VLUD, [id number] prior to admission), tonsillar Ca s/p chemo/rad in remission, hypothyroidism who initially presented 3/30/20 with cough/fever/chills and pulmonary infiltrates associated with COVID infection. He received tocilizumab x 1, azithromycin/hydroxychloroquine/steroids x 5 days. Since admission he has had persistent hypoxia and worsening infiltrates, has intermittently received antibiotics for possible superimposed infection and . Yesterday pt continued to have subjective shortness of breath requiring NRB with episodes of hypoxia and tachypnea. He was empirically started on broad spectrum abx (Vanc/zosyn in setting of +MRSA) and bactrim + prednisone for PCP and started on full dose a/c presumed PE in setting of elevated d-dimers (although already downtrending) and worsening symptoms. CT of chest performed with significant worsening of ground glass opacities, crazy paving, early fibrotic changes including traction bronchiectasis. Pulmonary consulted for abnormal chest CT, worsening hypoxia, and possible diagnostic bronch. S/p bronchoscopy and BAL on 4/17, with tests sent for PCP pna and covid. Extubated 4/18, and has had decreasing oxygen requirements, now on NRB.     OVERNIGHT EVENTS: No acute events overnight    SUBJECTIVE / INTERVAL HPI: Patient seen and examined at bedside. No acute complaints. Denies f/c, CP, sob. Still with slight productive cough.     Review of systems negative except as noted above.     VITAL SIGNS:  Vital Signs Last 24 Hrs  T(C): 36.6 (21 Apr 2020 11:41), Max: 36.8 (20 Apr 2020 20:40)  T(F): 97.9 (21 Apr 2020 11:41), Max: 98.2 (20 Apr 2020 20:40)  HR: 88 (21 Apr 2020 11:41) (66 - 88)  BP: 138/83 (21 Apr 2020 11:41) (127/85 - 138/83)  BP(mean): 104 (21 Apr 2020 11:41) (100 - 104)  RR: 23 (21 Apr 2020 11:41) (20 - 24)  SpO2: 93% (21 Apr 2020 11:41) (91% - 96%)      04-20-20 @ 07:01  -  04-21-20 @ 07:00  --------------------------------------------------------  IN: 0 mL / OUT: 650 mL / NET: -650 mL        PHYSICAL EXAM:    General: WDWN, NAD on NRB  HEENT: MMM  Neck: -JVD  Cardiovascular: +S1/S2; RRR  Respiratory: CTA B/L; no W/R/R  Gastrointestinal: soft, NT/ND; +BS  Extremities: WWP; no edema  Vascular: 2+ radial, DP pulses B/L  Neurological: AAOx3; no focal deficits    MEDICATIONS:  MEDICATIONS  (STANDING):  artificial tears (preservative free) Ophthalmic Solution 1 Drop(s) Both EYES two times a day  darunavir 600 milliGRAM(s) Oral <User Schedule>  dextrose 5%. 1000 milliLiter(s) (50 mL/Hr) IV Continuous <Continuous>  dextrose 50% Injectable 12.5 Gram(s) IV Push once  dextrose 50% Injectable 25 Gram(s) IV Push once  dextrose 50% Injectable 25 Gram(s) IV Push once  emtricitabine 200 mG/tenofovir alafenamide 25 mG (DESCOVY) Tablet 1 Tablet(s) Oral daily  enoxaparin Injectable 40 milliGRAM(s) SubCutaneous every 24 hours  etravirine 200 milliGRAM(s) Oral <User Schedule>  famotidine    Tablet 20 milliGRAM(s) Oral daily  influenza   Vaccine 0.5 milliLiter(s) IntraMuscular once  lactobacillus acidophilus and bulgaricus Chewable 1 Tablet(s) Chew daily  levothyroxine 125 MICROGram(s) Oral daily  melatonin 3 milliGRAM(s) Oral at bedtime  raltegravir Tablet 400 milliGRAM(s) Oral <User Schedule>  ritonavir Tablet 100 milliGRAM(s) Oral <User Schedule>  rosuvastatin 20 milliGRAM(s) Oral at bedtime  tamsulosin 0.4 milliGRAM(s) Oral at bedtime  valACYclovir 500 milliGRAM(s) Oral two times a day    MEDICATIONS  (PRN):  acetaminophen   Tablet .. 975 milliGRAM(s) Oral every 8 hours PRN Temp greater or equal to 38C (100.4F)  ALPRAZolam 0.25 milliGRAM(s) Oral at bedtime PRN Anxiety  dextrose 40% Gel 15 Gram(s) Oral once PRN Blood Glucose LESS THAN 70 milliGRAM(s)/deciliter  glucagon  Injectable 1 milliGRAM(s) IntraMuscular once PRN Glucose LESS THAN 70 milligrams/deciliter  hydrocodone/homatropine Syrup 5 milliLiter(s) Oral every 6 hours PRN Cough      ALLERGIES:  Allergies    No Known Allergies    Intolerances        LABS:                        13.2   7.74  )-----------( 112      ( 21 Apr 2020 06:56 )             40.7     04-21    134<L>  |  97  |  14  ----------------------------<  159<H>  4.0   |  25  |  0.73    Ca    9.0      21 Apr 2020 06:56  Mg     2.0     04-21    TPro  5.9<L>  /  Alb  2.9<L>  /  TBili  0.2  /  DBili  x   /  AST  35  /  ALT  48<H>  /  AlkPhos  53  04-21        CAPILLARY BLOOD GLUCOSE              RADIOLOGY & ADDITIONAL TESTS: Reviewed.

## 2020-04-21 NOTE — PROGRESS NOTE ADULT - PROBLEM SELECTOR PLAN 2
#Pneumonia 2/2 to COVID-19. Found to be covid positive. Received Tocilizumab (04/03), Azithro/Plaquenil (04/02-04/06) and Solumedrol (04/03-04/08). covid-19 PCR (+). Bronch with non specific findings for inflammation.  - trend inflammatory markers  -c/w famotidine 20mg q48

## 2020-04-21 NOTE — PROGRESS NOTE ADULT - SUBJECTIVE AND OBJECTIVE BOX
PULMONARY CONSULT SERVICE FOLLOW-UP NOTE    INTERVAL HPI:  Reviewed chart and overnight events; patient seen and examined at bedside.    MEDICATIONS:  Pulmonary:  hydrocodone/homatropine Syrup 5 milliLiter(s) Oral every 6 hours PRN    Antimicrobials:  darunavir 600 milliGRAM(s) Oral <User Schedule>  emtricitabine 200 mG/tenofovir alafenamide 25 mG (DESCOVY) Tablet 1 Tablet(s) Oral daily  etravirine 200 milliGRAM(s) Oral <User Schedule>  raltegravir Tablet 400 milliGRAM(s) Oral <User Schedule>  ritonavir Tablet 100 milliGRAM(s) Oral <User Schedule>  valACYclovir 500 milliGRAM(s) Oral two times a day    Anticoagulants:  enoxaparin Injectable 40 milliGRAM(s) SubCutaneous every 24 hours    Cardiac:  tamsulosin 0.4 milliGRAM(s) Oral at bedtime    Allergies    No Known Allergies    Intolerances    Vital Signs Last 24 Hrs  T(C): 36.3 (21 Apr 2020 17:41), Max: 36.8 (20 Apr 2020 20:40)  T(F): 97.4 (21 Apr 2020 17:41), Max: 98.2 (20 Apr 2020 20:40)  HR: 84 (21 Apr 2020 17:41) (66 - 88)  BP: 110/75 (21 Apr 2020 17:41) (110/75 - 138/83)  BP(mean): 87 (21 Apr 2020 17:41) (87 - 104)  RR: 21 (21 Apr 2020 17:41) (20 - 24)  SpO2: 95% (21 Apr 2020 17:41) (91% - 96%)    04-20 @ 07:01  -  04-21 @ 07:00  --------------------------------------------------------  IN: 0 mL / OUT: 650 mL / NET: -650 mL    PHYSICAL EXAM:  Constitutional: WDWN  HEENT: NC/AT; PERRL, anicteric sclera; MMM  Neck: supple  Cardiovascular: +S1/S2, RRR  Respiratory: CTA B/L; no W/R/R  Gastrointestinal: soft, NT/ND; +BSx4  Extremities: WWP; no edema, clubbing or cyanosis  Vascular: 2+ radial, DP/PT pulses B/L  Neurological: AAOx3; no focal deficits    LABS:  CBC Full  -  ( 21 Apr 2020 06:56 )  WBC Count : 7.74 K/uL  RBC Count : 4.65 M/uL  Hemoglobin : 13.2 g/dL  Hematocrit : 40.7 %  Platelet Count - Automated : 112 K/uL  Mean Cell Volume : 87.5 fl  Mean Cell Hemoglobin : 28.4 pg  Mean Cell Hemoglobin Concentration : 32.4 gm/dL  Auto Neutrophil # : 6.85 K/uL  Auto Lymphocyte # : 0.44 K/uL  Auto Monocyte # : 0.43 K/uL  Auto Eosinophil # : 0.00 K/uL  Auto Basophil # : 0.00 K/uL  Auto Neutrophil % : 88.4 %  Auto Lymphocyte % : 5.7 %  Auto Monocyte % : 5.6 %  Auto Eosinophil % : 0.0 %  Auto Basophil % : 0.0 %    04-21    134<L>  |  97  |  14  ----------------------------<  159<H>  4.0   |  25  |  0.73    Ca    9.0      21 Apr 2020 06:56  Mg     2.0     04-21    TPro  5.9<L>  /  Alb  2.9<L>  /  TBili  0.2  /  DBili  x   /  AST  35  /  ALT  48<H>  /  AlkPhos  53  04-21    RADIOLOGY & ADDITIONAL STUDIES: PULMONARY CONSULT SERVICE FOLLOW-UP NOTE    INTERVAL HPI:  Reviewed chart and overnight events; patient seen and examined at bedside.    MEDICATIONS:  Pulmonary:  hydrocodone/homatropine Syrup 5 milliLiter(s) Oral every 6 hours PRN    Antimicrobials:  darunavir 600 milliGRAM(s) Oral <User Schedule>  emtricitabine 200 mG/tenofovir alafenamide 25 mG (DESCOVY) Tablet 1 Tablet(s) Oral daily  etravirine 200 milliGRAM(s) Oral <User Schedule>  raltegravir Tablet 400 milliGRAM(s) Oral <User Schedule>  ritonavir Tablet 100 milliGRAM(s) Oral <User Schedule>  valACYclovir 500 milliGRAM(s) Oral two times a day    Anticoagulants:  enoxaparin Injectable 40 milliGRAM(s) SubCutaneous every 24 hours    Cardiac:  tamsulosin 0.4 milliGRAM(s) Oral at bedtime    Allergies    No Known Allergies    Intolerances    Vital Signs Last 24 Hrs  T(C): 36.3 (21 Apr 2020 17:41), Max: 36.8 (20 Apr 2020 20:40)  T(F): 97.4 (21 Apr 2020 17:41), Max: 98.2 (20 Apr 2020 20:40)  HR: 84 (21 Apr 2020 17:41) (66 - 88)  BP: 110/75 (21 Apr 2020 17:41) (110/75 - 138/83)  BP(mean): 87 (21 Apr 2020 17:41) (87 - 104)  RR: 21 (21 Apr 2020 17:41) (20 - 24)  SpO2: 95% (21 Apr 2020 17:41) (91% - 96%)    04-20 @ 07:01  -  04-21 @ 07:00  --------------------------------------------------------  IN: 0 mL / OUT: 650 mL / NET: -650 mL    PHYSICAL EXAM:  Constitutional: slightly less comfortable appearing compared with prior eval; resting in bed on NRB  HEENT: NC/AT; PERRL, anicteric sclera; MMM  Neck: supple  Cardiovascular: +S1/S2, RRR  Respiratory: CTA B/L, conversive in full sentences and normoxic on NRB  Gastrointestinal: soft, NT/ND  Extremities: WWP; no edema, clubbing or cyanosis  Vascular: 2+ radial pulses B/L  Neurological: awake and alert; SCHMIDT, following commands    LABS:  CBC Full  -  ( 21 Apr 2020 06:56 )  WBC Count : 7.74 K/uL  RBC Count : 4.65 M/uL  Hemoglobin : 13.2 g/dL  Hematocrit : 40.7 %  Platelet Count - Automated : 112 K/uL  Mean Cell Volume : 87.5 fl  Mean Cell Hemoglobin : 28.4 pg  Mean Cell Hemoglobin Concentration : 32.4 gm/dL  Auto Neutrophil # : 6.85 K/uL  Auto Lymphocyte # : 0.44 K/uL  Auto Monocyte # : 0.43 K/uL  Auto Eosinophil # : 0.00 K/uL  Auto Basophil # : 0.00 K/uL  Auto Neutrophil % : 88.4 %  Auto Lymphocyte % : 5.7 %  Auto Monocyte % : 5.6 %  Auto Eosinophil % : 0.0 %  Auto Basophil % : 0.0 %    04-21    134<L>  |  97  |  14  ----------------------------<  159<H>  4.0   |  25  |  0.73    Ca    9.0      21 Apr 2020 06:56  Mg     2.0     04-21    TPro  5.9<L>  /  Alb  2.9<L>  /  TBili  0.2  /  DBili  x   /  AST  35  /  ALT  48<H>  /  AlkPhos  53  04-21    RADIOLOGY & ADDITIONAL STUDIES:  No interval study to review

## 2020-04-22 LAB
A FLAVUS AB FLD QL: NEGATIVE — SIGNIFICANT CHANGE UP
A NIGER AB FLD QL: NEGATIVE — SIGNIFICANT CHANGE UP
A NIGER AB FLD QL: NEGATIVE — SIGNIFICANT CHANGE UP
ALBUMIN SERPL ELPH-MCNC: 2.9 G/DL — LOW (ref 3.3–5)
ALP SERPL-CCNC: 54 U/L — SIGNIFICANT CHANGE UP (ref 40–120)
ALT FLD-CCNC: 59 U/L — HIGH (ref 10–45)
ANION GAP SERPL CALC-SCNC: 14 MMOL/L — SIGNIFICANT CHANGE UP (ref 5–17)
AST SERPL-CCNC: 35 U/L — SIGNIFICANT CHANGE UP (ref 10–40)
BASOPHILS # BLD AUTO: 0.01 K/UL — SIGNIFICANT CHANGE UP (ref 0–0.2)
BASOPHILS NFR BLD AUTO: 0.2 % — SIGNIFICANT CHANGE UP (ref 0–2)
BILIRUB SERPL-MCNC: 0.5 MG/DL — SIGNIFICANT CHANGE UP (ref 0.2–1.2)
BUN SERPL-MCNC: 14 MG/DL — SIGNIFICANT CHANGE UP (ref 7–23)
CALCIUM SERPL-MCNC: 9.1 MG/DL — SIGNIFICANT CHANGE UP (ref 8.4–10.5)
CHLORIDE SERPL-SCNC: 96 MMOL/L — SIGNIFICANT CHANGE UP (ref 96–108)
CO2 SERPL-SCNC: 27 MMOL/L — SIGNIFICANT CHANGE UP (ref 22–31)
CREAT SERPL-MCNC: 0.77 MG/DL — SIGNIFICANT CHANGE UP (ref 0.5–1.3)
CRP SERPL-MCNC: 4.1 MG/DL — HIGH (ref 0–0.4)
D DIMER BLD IA.RAPID-MCNC: 679 NG/ML DDU — HIGH
EOSINOPHIL # BLD AUTO: 0.11 K/UL — SIGNIFICANT CHANGE UP (ref 0–0.5)
EOSINOPHIL NFR BLD AUTO: 1.7 % — SIGNIFICANT CHANGE UP (ref 0–6)
FERRITIN SERPL-MCNC: 804 NG/ML — HIGH (ref 30–400)
GLUCOSE SERPL-MCNC: 86 MG/DL — SIGNIFICANT CHANGE UP (ref 70–99)
HCT VFR BLD CALC: 46.7 % — SIGNIFICANT CHANGE UP (ref 39–50)
HGB BLD-MCNC: 15.4 G/DL — SIGNIFICANT CHANGE UP (ref 13–17)
IMM GRANULOCYTES NFR BLD AUTO: 0.3 % — SIGNIFICANT CHANGE UP (ref 0–1.5)
LYMPHOCYTES # BLD AUTO: 0.39 K/UL — LOW (ref 1–3.3)
LYMPHOCYTES # BLD AUTO: 6 % — LOW (ref 13–44)
MAGNESIUM SERPL-MCNC: 2 MG/DL — SIGNIFICANT CHANGE UP (ref 1.6–2.6)
MCHC RBC-ENTMCNC: 29.1 PG — SIGNIFICANT CHANGE UP (ref 27–34)
MCHC RBC-ENTMCNC: 33 GM/DL — SIGNIFICANT CHANGE UP (ref 32–36)
MCV RBC AUTO: 88.1 FL — SIGNIFICANT CHANGE UP (ref 80–100)
MISCELLANEOUS TEST NAME: SIGNIFICANT CHANGE UP
MONOCYTES # BLD AUTO: 0.46 K/UL — SIGNIFICANT CHANGE UP (ref 0–0.9)
MONOCYTES NFR BLD AUTO: 7.1 % — SIGNIFICANT CHANGE UP (ref 2–14)
NEUTROPHILS # BLD AUTO: 5.53 K/UL — SIGNIFICANT CHANGE UP (ref 1.8–7.4)
NEUTROPHILS NFR BLD AUTO: 84.7 % — HIGH (ref 43–77)
NRBC # BLD: 0 /100 WBCS — SIGNIFICANT CHANGE UP (ref 0–0)
PHOSPHATE SERPL-MCNC: 1.6 MG/DL — LOW (ref 2.5–4.5)
PLATELET # BLD AUTO: 111 K/UL — LOW (ref 150–400)
POTASSIUM SERPL-MCNC: 4 MMOL/L — SIGNIFICANT CHANGE UP (ref 3.5–5.3)
POTASSIUM SERPL-SCNC: 4 MMOL/L — SIGNIFICANT CHANGE UP (ref 3.5–5.3)
PROT SERPL-MCNC: 6.3 G/DL — SIGNIFICANT CHANGE UP (ref 6–8.3)
RBC # BLD: 5.3 M/UL — SIGNIFICANT CHANGE UP (ref 4.2–5.8)
RBC # FLD: 14.5 % — SIGNIFICANT CHANGE UP (ref 10.3–14.5)
SARS-COV-2 RNA SPEC QL NAA+PROBE: DETECTED
SODIUM SERPL-SCNC: 137 MMOL/L — SIGNIFICANT CHANGE UP (ref 135–145)
WBC # BLD: 6.52 K/UL — SIGNIFICANT CHANGE UP (ref 3.8–10.5)
WBC # FLD AUTO: 6.52 K/UL — SIGNIFICANT CHANGE UP (ref 3.8–10.5)

## 2020-04-22 PROCEDURE — 99233 SBSQ HOSP IP/OBS HIGH 50: CPT | Mod: CS,GC

## 2020-04-22 PROCEDURE — 99232 SBSQ HOSP IP/OBS MODERATE 35: CPT | Mod: CS

## 2020-04-22 RX ORDER — LOPERAMIDE HCL 2 MG
10 TABLET ORAL
Qty: 0 | Refills: 0 | DISCHARGE

## 2020-04-22 RX ORDER — LOSARTAN POTASSIUM 100 MG/1
0 TABLET, FILM COATED ORAL
Qty: 30 | Refills: 0 | DISCHARGE

## 2020-04-22 RX ORDER — LOPERAMIDE HCL 2 MG
10 TABLET ORAL
Refills: 0 | Status: DISCONTINUED | OUTPATIENT
Start: 2020-04-22 | End: 2020-04-22

## 2020-04-22 RX ORDER — TAMSULOSIN HYDROCHLORIDE 0.4 MG/1
0 CAPSULE ORAL
Qty: 30 | Refills: 0 | DISCHARGE

## 2020-04-22 RX ORDER — ROSUVASTATIN CALCIUM 5 MG/1
0 TABLET ORAL
Qty: 30 | Refills: 0 | DISCHARGE

## 2020-04-22 RX ORDER — RALTEGRAVIR 400 MG/1
0 TABLET, FILM COATED ORAL
Qty: 60 | Refills: 0 | DISCHARGE

## 2020-04-22 RX ORDER — LEVOTHYROXINE SODIUM 125 MCG
0 TABLET ORAL
Qty: 30 | Refills: 0 | DISCHARGE

## 2020-04-22 RX ORDER — VALACYCLOVIR 500 MG/1
0 TABLET, FILM COATED ORAL
Qty: 0 | Refills: 0 | DISCHARGE

## 2020-04-22 RX ORDER — RITONAVIR 100 MG/1
0 TABLET, FILM COATED ORAL
Qty: 60 | Refills: 0 | DISCHARGE

## 2020-04-22 RX ORDER — LOPERAMIDE HCL 2 MG
8 TABLET ORAL
Refills: 0 | Status: DISCONTINUED | OUTPATIENT
Start: 2020-04-22 | End: 2020-05-01

## 2020-04-22 RX ORDER — DARUNAVIR 75 MG/1
0 TABLET, FILM COATED ORAL
Qty: 60 | Refills: 0 | DISCHARGE

## 2020-04-22 RX ADMIN — Medication 125 MICROGRAM(S): at 04:23

## 2020-04-22 RX ADMIN — ENOXAPARIN SODIUM 40 MILLIGRAM(S): 100 INJECTION SUBCUTANEOUS at 20:24

## 2020-04-22 RX ADMIN — FAMOTIDINE 20 MILLIGRAM(S): 10 INJECTION INTRAVENOUS at 12:45

## 2020-04-22 RX ADMIN — LACTOBACILLUS ACIDOPH-L.BULGARICUS 1 MILLION CELL CHEWABLE TABLET 1 TABLET(S): at 12:45

## 2020-04-22 RX ADMIN — TAMSULOSIN HYDROCHLORIDE 0.4 MILLIGRAM(S): 0.4 CAPSULE ORAL at 21:38

## 2020-04-22 RX ADMIN — Medication 1 DROP(S): at 04:22

## 2020-04-22 RX ADMIN — RITONAVIR 100 MILLIGRAM(S): 100 TABLET, FILM COATED ORAL at 18:43

## 2020-04-22 RX ADMIN — ROSUVASTATIN CALCIUM 20 MILLIGRAM(S): 5 TABLET ORAL at 21:49

## 2020-04-22 RX ADMIN — RALTEGRAVIR 400 MILLIGRAM(S): 400 TABLET, FILM COATED ORAL at 18:43

## 2020-04-22 RX ADMIN — DARUNAVIR 600 MILLIGRAM(S): 75 TABLET, FILM COATED ORAL at 12:44

## 2020-04-22 RX ADMIN — ETRAVIRINE 200 MILLIGRAM(S): 200 TABLET ORAL at 12:45

## 2020-04-22 RX ADMIN — Medication 3 MILLIGRAM(S): at 21:38

## 2020-04-22 RX ADMIN — Medication 1 DROP(S): at 18:42

## 2020-04-22 RX ADMIN — ETRAVIRINE 200 MILLIGRAM(S): 200 TABLET ORAL at 18:43

## 2020-04-22 RX ADMIN — Medication 85 MILLIMOLE(S): at 12:44

## 2020-04-22 RX ADMIN — EMTRICITABINE AND TENOFOVIR DISOPROXIL FUMARATE 1 TABLET(S): 200; 300 TABLET, FILM COATED ORAL at 14:04

## 2020-04-22 RX ADMIN — VALACYCLOVIR 500 MILLIGRAM(S): 500 TABLET, FILM COATED ORAL at 12:45

## 2020-04-22 RX ADMIN — DARUNAVIR 600 MILLIGRAM(S): 75 TABLET, FILM COATED ORAL at 18:43

## 2020-04-22 RX ADMIN — RALTEGRAVIR 400 MILLIGRAM(S): 400 TABLET, FILM COATED ORAL at 12:45

## 2020-04-22 RX ADMIN — RITONAVIR 100 MILLIGRAM(S): 100 TABLET, FILM COATED ORAL at 12:45

## 2020-04-22 NOTE — PROGRESS NOTE ADULT - SUBJECTIVE AND OBJECTIVE BOX
Brief history:  58 yo M with PMH HIV (VLUD, SV238t prior to admission), tonsillar Ca s/p chemo/rad in remission, hypothyroidism who initially presented 3/30/20 with cough/fever/chills and pulmonary infiltrates associated with COVID infection. He received tocilizumab x 1, azithromycin/hydroxychloroquine/steroids x 5 days. S/p bronchoscopy and BAL on 4/17, with tests sent for PCP pna and covid. Extubated 4/18, and has had decreasing oxygen requirements, now on NRB.     OVERNIGHT EVENTS: No acute events overnight    SUBJECTIVE / INTERVAL HPI: Patient seen and examined at bedside. No new acute complaints, denies f/c SOB, CP, leg pain.     Review of systems negative except as noted above.     VITAL SIGNS:  Vital Signs Last 24 Hrs  T(C): 36.4 (22 Apr 2020 09:09), Max: 37.3 (22 Apr 2020 00:52)  T(F): 97.6 (22 Apr 2020 09:09), Max: 99.2 (22 Apr 2020 00:52)  HR: 80 (22 Apr 2020 09:09) (78 - 87)  BP: 110/57 (22 Apr 2020 09:09) (107/67 - 126/85)  BP(mean): 87 (21 Apr 2020 17:41) (87 - 87)  RR: 20 (22 Apr 2020 09:09) (20 - 21)  SpO2: 98% (22 Apr 2020 09:09) (92% - 98%)      04-21-20 @ 07:01  -  04-22-20 @ 07:00  --------------------------------------------------------  IN: 0 mL / OUT: 1050 mL / NET: -1050 mL    04-22-20 @ 07:01  -  04-22-20 @ 12:38  --------------------------------------------------------  IN: 0 mL / OUT: 800 mL / NET: -800 mL      PHYSICAL EXAM:    General: Thin, NAD on NRB.   HEENT: MMM-JVD  Neck: -JVD  Cardiovascular: +S1/S2; RRR  Respiratory: No accessory muscle use. Lungs CTA.   Gastrointestinal: NTND BS+4  Extremities: WWP; no edema  Vascular: 2+ radial, DP pulses B/L  Neurological: AAOx3; no focal deficits    MEDICATIONS:  MEDICATIONS  (STANDING):  artificial tears (preservative free) Ophthalmic Solution 1 Drop(s) Both EYES two times a day  darunavir 600 milliGRAM(s) Oral <User Schedule>  dextrose 5%. 1000 milliLiter(s) (50 mL/Hr) IV Continuous <Continuous>  dextrose 50% Injectable 12.5 Gram(s) IV Push once  dextrose 50% Injectable 25 Gram(s) IV Push once  dextrose 50% Injectable 25 Gram(s) IV Push once  emtricitabine 200 mG/tenofovir alafenamide 25 mG (DESCOVY) Tablet 1 Tablet(s) Oral daily  enoxaparin Injectable 40 milliGRAM(s) SubCutaneous every 24 hours  etravirine 200 milliGRAM(s) Oral <User Schedule>  famotidine    Tablet 20 milliGRAM(s) Oral daily  influenza   Vaccine 0.5 milliLiter(s) IntraMuscular once  lactobacillus acidophilus and bulgaricus Chewable 1 Tablet(s) Chew daily  levothyroxine 125 MICROGram(s) Oral daily  melatonin 3 milliGRAM(s) Oral at bedtime  raltegravir Tablet 400 milliGRAM(s) Oral <User Schedule>  ritonavir Tablet 100 milliGRAM(s) Oral <User Schedule>  rosuvastatin 20 milliGRAM(s) Oral at bedtime  sodium phosphate IVPB 30 milliMole(s) IV Intermittent once  tamsulosin 0.4 milliGRAM(s) Oral at bedtime  valACYclovir 500 milliGRAM(s) Oral two times a day    MEDICATIONS  (PRN):  acetaminophen   Tablet .. 975 milliGRAM(s) Oral every 8 hours PRN Temp greater or equal to 38C (100.4F)  ALPRAZolam 0.25 milliGRAM(s) Oral at bedtime PRN Anxiety  dextrose 40% Gel 15 Gram(s) Oral once PRN Blood Glucose LESS THAN 70 milliGRAM(s)/deciliter  glucagon  Injectable 1 milliGRAM(s) IntraMuscular once PRN Glucose LESS THAN 70 milligrams/deciliter  hydrocodone/homatropine Syrup 5 milliLiter(s) Oral every 6 hours PRN Cough      ALLERGIES:  Allergies    No Known Allergies    Intolerances        LABS:                        15.4   6.52  )-----------( 111      ( 22 Apr 2020 07:28 )             46.7     04-22    137  |  96  |  14  ----------------------------<  86  4.0   |  27  |  0.77    Ca    9.1      22 Apr 2020 07:28  Phos  1.6     04-22  Mg     2.0     04-22    TPro  6.3  /  Alb  2.9<L>  /  TBili  0.5  /  DBili  x   /  AST  35  /  ALT  59<H>  /  AlkPhos  54  04-22        CAPILLARY BLOOD GLUCOSE              RADIOLOGY & ADDITIONAL TESTS: Reviewed.

## 2020-04-22 NOTE — PROGRESS NOTE ADULT - ASSESSMENT
59 M PMH of HIV (CD4 103, VL undetectable in March 2020), tonsilar CA (s/p CRT and RT), BPH, HLD, hypothyroidism, sleep apnea, COVID +ve, recently admitted on 3/30 at [facility], transferred to [facility] on 4/8. Presenting with hypoxic respiratory failure requiring nonrebreather.

## 2020-04-22 NOTE — PROGRESS NOTE ADULT - ATTENDING COMMENTS
I have personally seen, examined, and participated in the care of this patient. I have reviewed all pertinent clinical information, including history, physical exam, plan and the resident's note and agree with the above.    -Weaned to venti mask 50% today  -Cont to monitor resp status on venti mask  -Cont to monitor inflam biomarkers  -DVT/GI ppx

## 2020-04-22 NOTE — PROGRESS NOTE ADULT - PROBLEM SELECTOR PLAN 1
Intubated briefly 4/17 overnight for bronchoscopy. Now extubated an on NRB and saturting in 90s. Bronch with inflammatory cells but no infectious data  - oxygen sat goal >88%, wean as tolerated  - COVID management as below

## 2020-04-22 NOTE — PROGRESS NOTE ADULT - SUBJECTIVE AND OBJECTIVE BOX
Interval Events:  Patient seen and examined at bedside.    Reports feeling better today. Continues to have cough but feels better after coughing. Instructed to continue self proning. Currently on Venti Mask at 50%.   MEDICATIONS:  Pulmonary:  hydrocodone/homatropine Syrup 5 milliLiter(s) Oral every 6 hours PRN    Antimicrobials:  darunavir 600 milliGRAM(s) Oral <User Schedule>  emtricitabine 200 mG/tenofovir alafenamide 25 mG (DESCOVY) Tablet 1 Tablet(s) Oral daily  etravirine 200 milliGRAM(s) Oral <User Schedule>  raltegravir Tablet 400 milliGRAM(s) Oral <User Schedule>  ritonavir Tablet 100 milliGRAM(s) Oral <User Schedule>  valACYclovir 500 milliGRAM(s) Oral two times a day    Anticoagulants:  enoxaparin Injectable 40 milliGRAM(s) SubCutaneous every 24 hours    Cardiac:  tamsulosin 0.4 milliGRAM(s) Oral at bedtime    Endocrine:  dextrose 40% Gel 15 Gram(s) Oral once PRN  dextrose 50% Injectable 12.5 Gram(s) IV Push once  dextrose 50% Injectable 25 Gram(s) IV Push once  dextrose 50% Injectable 25 Gram(s) IV Push once  glucagon  Injectable 1 milliGRAM(s) IntraMuscular once PRN  levothyroxine 125 MICROGram(s) Oral daily  rosuvastatin 20 milliGRAM(s) Oral at bedtime    Allergies    No Known Allergies    Intolerances    Vital Signs Last 24 Hrs  T(C): 36.6 (22 Apr 2020 17:22), Max: 37.3 (22 Apr 2020 00:52)  T(F): 97.9 (22 Apr 2020 17:22), Max: 99.2 (22 Apr 2020 00:52)  HR: 85 (22 Apr 2020 17:22) (78 - 87)  BP: 116/72 (22 Apr 2020 17:22) (107/67 - 118/76)  BP(mean): --  RR: 20 (22 Apr 2020 17:22) (20 - 21)  SpO2: 98% (22 Apr 2020 17:22) (92% - 98%)    04-21 @ 07:01  -  04-22 @ 07:00  --------------------------------------------------------  IN: 0 mL / OUT: 1050 mL / NET: -1050 mL    04-22 @ 07:01  -  04-22 @ 22:24  --------------------------------------------------------  IN: 0 mL / OUT: 1400 mL / NET: -1400 mL    Physical Exam:  Constitutional: comfortable in bed.   HEENT: NC/AT; PERRL, anicteric sclera; MMM  Neck: supple  Cardiovascular: +S1/S2, RRR  Respiratory: CTA B/L, conversive in full sentences. On venti mask at 50%  Gastrointestinal: soft, NT/ND  Extremities: WWP; no edema, clubbing or cyanosis  Vascular: 2+ radial pulses B/L  Neurological: awake and alert; SCHMIDT, following commands  LABS:  CBC Full  -  ( 22 Apr 2020 07:28 )  WBC Count : 6.52 K/uL  RBC Count : 5.30 M/uL  Hemoglobin : 15.4 g/dL  Hematocrit : 46.7 %  Platelet Count - Automated : 111 K/uL  Mean Cell Volume : 88.1 fl  Mean Cell Hemoglobin : 29.1 pg  Mean Cell Hemoglobin Concentration : 33.0 gm/dL  Auto Neutrophil # : 5.53 K/uL  Auto Lymphocyte # : 0.39 K/uL  Auto Monocyte # : 0.46 K/uL  Auto Eosinophil # : 0.11 K/uL  Auto Basophil # : 0.01 K/uL  Auto Neutrophil % : 84.7 %  Auto Lymphocyte % : 6.0 %  Auto Monocyte % : 7.1 %  Auto Eosinophil % : 1.7 %  Auto Basophil % : 0.2 %    04-22    137  |  96  |  14  ----------------------------<  86  4.0   |  27  |  0.77    Ca    9.1      22 Apr 2020 07:28  Phos  1.6     04-22  Mg     2.0     04-22    TPro  6.3  /  Alb  2.9<L>  /  TBili  0.5  /  DBili  x   /  AST  35  /  ALT  59<H>  /  AlkPhos  54  04-22    RADIOLOGY & ADDITIONAL STUDIES (The following images were personally reviewed):  Reviewed.

## 2020-04-22 NOTE — PROGRESS NOTE ADULT - PROBLEM SELECTOR PLAN 2
#Pneumonia 2/2 to COVID-19. Found to be covid positive. Received Tocilizumab (04/03), Azithro/Plaquenil (04/02-04/06) and Solumedrol (04/03-04/08). covid-19 PCR (+). Bronch with non specific findings for inflammation.  - trend inflammatory markers    Pulm following appreciate recs  -c/w famotidine 20mg q48

## 2020-04-22 NOTE — PROGRESS NOTE ADULT - ATTENDING COMMENTS
Seen and examined by me. Feels better again today and on 50% ventimask which is an improvement. Stay the course.

## 2020-04-22 NOTE — PROGRESS NOTE ADULT - PROBLEM SELECTOR PLAN 1
Extubated to facemask NRB 4/19 and xfer to telemetry/SDU. Conversive in full sentences and normoxic at rest.   S/p bronchoscopy w/ BAL 4/17 which was unrevealing for infectious or inflammatory etiologies.    *Cultures NGTD  *PCP stain negative, TMP-SMX and steroids thus stopped 4/20  *Cyto neg    Recommendations:  - Now weaned to Venti mask at 50%. Cont weaning.   - Cont with mobility/PT   - encourage intermittent proning  - otherwise continue supportive care for COVID-19 related respiratory failure

## 2020-04-22 NOTE — PROGRESS NOTE ADULT - PROBLEM SELECTOR PLAN 3
Pulmonary infiltrates improving on CXR. Patient afebrile, with no leukocytosis. Bronch   - Dc'd antibiotics.

## 2020-04-22 NOTE — PROGRESS NOTE ADULT - PROBLEM SELECTOR PLAN 4
HIV home 6 medicine regimen, started 20 years ago for salvage therapy. Bactrim dc'd due to low suspicion for PNA  - c/w home HIV medications    -continue with valtrex

## 2020-04-23 DIAGNOSIS — E03.9 HYPOTHYROIDISM, UNSPECIFIED: ICD-10-CM

## 2020-04-23 LAB
ALBUMIN SERPL ELPH-MCNC: 2.7 G/DL — LOW (ref 3.3–5)
ALP SERPL-CCNC: 52 U/L — SIGNIFICANT CHANGE UP (ref 40–120)
ALT FLD-CCNC: 48 U/L — HIGH (ref 10–45)
ANION GAP SERPL CALC-SCNC: 10 MMOL/L — SIGNIFICANT CHANGE UP (ref 5–17)
AST SERPL-CCNC: 25 U/L — SIGNIFICANT CHANGE UP (ref 10–40)
BASOPHILS # BLD AUTO: 0.02 K/UL — SIGNIFICANT CHANGE UP (ref 0–0.2)
BASOPHILS NFR BLD AUTO: 0.3 % — SIGNIFICANT CHANGE UP (ref 0–2)
BILIRUB SERPL-MCNC: 0.5 MG/DL — SIGNIFICANT CHANGE UP (ref 0.2–1.2)
BUN SERPL-MCNC: 15 MG/DL — SIGNIFICANT CHANGE UP (ref 7–23)
CALCIUM SERPL-MCNC: 8.9 MG/DL — SIGNIFICANT CHANGE UP (ref 8.4–10.5)
CHLORIDE SERPL-SCNC: 102 MMOL/L — SIGNIFICANT CHANGE UP (ref 96–108)
CO2 SERPL-SCNC: 25 MMOL/L — SIGNIFICANT CHANGE UP (ref 22–31)
CREAT SERPL-MCNC: 0.6 MG/DL — SIGNIFICANT CHANGE UP (ref 0.5–1.3)
CRP SERPL-MCNC: 8.88 MG/DL — HIGH (ref 0–0.4)
D DIMER BLD IA.RAPID-MCNC: 667 NG/ML DDU — HIGH
EOSINOPHIL # BLD AUTO: 0.34 K/UL — SIGNIFICANT CHANGE UP (ref 0–0.5)
EOSINOPHIL NFR BLD AUTO: 5.4 % — SIGNIFICANT CHANGE UP (ref 0–6)
FERRITIN SERPL-MCNC: 904 NG/ML — HIGH (ref 30–400)
GLUCOSE SERPL-MCNC: 96 MG/DL — SIGNIFICANT CHANGE UP (ref 70–99)
HCT VFR BLD CALC: 46 % — SIGNIFICANT CHANGE UP (ref 39–50)
HGB BLD-MCNC: 15.1 G/DL — SIGNIFICANT CHANGE UP (ref 13–17)
IMM GRANULOCYTES NFR BLD AUTO: 0.3 % — SIGNIFICANT CHANGE UP (ref 0–1.5)
LYMPHOCYTES # BLD AUTO: 0.46 K/UL — LOW (ref 1–3.3)
LYMPHOCYTES # BLD AUTO: 7.3 % — LOW (ref 13–44)
MAGNESIUM SERPL-MCNC: 1.8 MG/DL — SIGNIFICANT CHANGE UP (ref 1.6–2.6)
MCHC RBC-ENTMCNC: 28.8 PG — SIGNIFICANT CHANGE UP (ref 27–34)
MCHC RBC-ENTMCNC: 32.8 GM/DL — SIGNIFICANT CHANGE UP (ref 32–36)
MCV RBC AUTO: 87.8 FL — SIGNIFICANT CHANGE UP (ref 80–100)
MISCELLANEOUS TEST NAME: SIGNIFICANT CHANGE UP
MONOCYTES # BLD AUTO: 0.43 K/UL — SIGNIFICANT CHANGE UP (ref 0–0.9)
MONOCYTES NFR BLD AUTO: 6.8 % — SIGNIFICANT CHANGE UP (ref 2–14)
NEUTROPHILS # BLD AUTO: 5.01 K/UL — SIGNIFICANT CHANGE UP (ref 1.8–7.4)
NEUTROPHILS NFR BLD AUTO: 79.9 % — HIGH (ref 43–77)
NRBC # BLD: 0 /100 WBCS — SIGNIFICANT CHANGE UP (ref 0–0)
PLATELET # BLD AUTO: 104 K/UL — LOW (ref 150–400)
POTASSIUM SERPL-MCNC: 3.8 MMOL/L — SIGNIFICANT CHANGE UP (ref 3.5–5.3)
POTASSIUM SERPL-SCNC: 3.8 MMOL/L — SIGNIFICANT CHANGE UP (ref 3.5–5.3)
PROT SERPL-MCNC: 6.2 G/DL — SIGNIFICANT CHANGE UP (ref 6–8.3)
RBC # BLD: 5.24 M/UL — SIGNIFICANT CHANGE UP (ref 4.2–5.8)
RBC # FLD: 14.3 % — SIGNIFICANT CHANGE UP (ref 10.3–14.5)
SODIUM SERPL-SCNC: 137 MMOL/L — SIGNIFICANT CHANGE UP (ref 135–145)
WBC # BLD: 6.28 K/UL — SIGNIFICANT CHANGE UP (ref 3.8–10.5)
WBC # FLD AUTO: 6.28 K/UL — SIGNIFICANT CHANGE UP (ref 3.8–10.5)

## 2020-04-23 PROCEDURE — 99233 SBSQ HOSP IP/OBS HIGH 50: CPT | Mod: CS,GC

## 2020-04-23 PROCEDURE — 99232 SBSQ HOSP IP/OBS MODERATE 35: CPT | Mod: CS

## 2020-04-23 RX ORDER — POTASSIUM CHLORIDE 20 MEQ
20 PACKET (EA) ORAL ONCE
Refills: 0 | Status: COMPLETED | OUTPATIENT
Start: 2020-04-23 | End: 2020-04-23

## 2020-04-23 RX ORDER — MAGNESIUM SULFATE 500 MG/ML
1 VIAL (ML) INJECTION ONCE
Refills: 0 | Status: COMPLETED | OUTPATIENT
Start: 2020-04-23 | End: 2020-04-23

## 2020-04-23 RX ADMIN — ETRAVIRINE 200 MILLIGRAM(S): 200 TABLET ORAL at 11:34

## 2020-04-23 RX ADMIN — VALACYCLOVIR 500 MILLIGRAM(S): 500 TABLET, FILM COATED ORAL at 00:46

## 2020-04-23 RX ADMIN — LACTOBACILLUS ACIDOPH-L.BULGARICUS 1 MILLION CELL CHEWABLE TABLET 1 TABLET(S): at 11:36

## 2020-04-23 RX ADMIN — VALACYCLOVIR 500 MILLIGRAM(S): 500 TABLET, FILM COATED ORAL at 11:54

## 2020-04-23 RX ADMIN — Medication 3 MILLIGRAM(S): at 21:49

## 2020-04-23 RX ADMIN — Medication 8 MILLIGRAM(S): at 19:50

## 2020-04-23 RX ADMIN — DARUNAVIR 600 MILLIGRAM(S): 75 TABLET, FILM COATED ORAL at 11:35

## 2020-04-23 RX ADMIN — ENOXAPARIN SODIUM 40 MILLIGRAM(S): 100 INJECTION SUBCUTANEOUS at 19:50

## 2020-04-23 RX ADMIN — Medication 1 DROP(S): at 04:39

## 2020-04-23 RX ADMIN — Medication 1 DROP(S): at 19:51

## 2020-04-23 RX ADMIN — Medication 8 MILLIGRAM(S): at 04:21

## 2020-04-23 RX ADMIN — RITONAVIR 100 MILLIGRAM(S): 100 TABLET, FILM COATED ORAL at 11:36

## 2020-04-23 RX ADMIN — VALACYCLOVIR 500 MILLIGRAM(S): 500 TABLET, FILM COATED ORAL at 21:49

## 2020-04-23 RX ADMIN — Medication 100 GRAM(S): at 11:35

## 2020-04-23 RX ADMIN — Medication 20 MILLIEQUIVALENT(S): at 11:35

## 2020-04-23 RX ADMIN — ROSUVASTATIN CALCIUM 20 MILLIGRAM(S): 5 TABLET ORAL at 21:49

## 2020-04-23 RX ADMIN — RALTEGRAVIR 400 MILLIGRAM(S): 400 TABLET, FILM COATED ORAL at 11:35

## 2020-04-23 RX ADMIN — FAMOTIDINE 20 MILLIGRAM(S): 10 INJECTION INTRAVENOUS at 11:35

## 2020-04-23 RX ADMIN — EMTRICITABINE AND TENOFOVIR DISOPROXIL FUMARATE 1 TABLET(S): 200; 300 TABLET, FILM COATED ORAL at 11:36

## 2020-04-23 RX ADMIN — ETRAVIRINE 200 MILLIGRAM(S): 200 TABLET ORAL at 19:50

## 2020-04-23 RX ADMIN — RALTEGRAVIR 400 MILLIGRAM(S): 400 TABLET, FILM COATED ORAL at 19:51

## 2020-04-23 RX ADMIN — TAMSULOSIN HYDROCHLORIDE 0.4 MILLIGRAM(S): 0.4 CAPSULE ORAL at 21:49

## 2020-04-23 RX ADMIN — DARUNAVIR 600 MILLIGRAM(S): 75 TABLET, FILM COATED ORAL at 19:50

## 2020-04-23 RX ADMIN — RITONAVIR 100 MILLIGRAM(S): 100 TABLET, FILM COATED ORAL at 19:51

## 2020-04-23 RX ADMIN — Medication 125 MICROGRAM(S): at 04:18

## 2020-04-23 NOTE — PROGRESS NOTE ADULT - ATTENDING COMMENTS
I have personally seen, examined, and participated in the care of this patient. I have reviewed all pertinent clinical information, including history, physical exam, plan, laboratory, imaging, resident's note and agree with the above.    -With sig improvement in resp status  -Tolerating 10 L NC satting 93%  -Cont DVT/GI ppx  -Likely stable for stepdown 4/24

## 2020-04-23 NOTE — PROGRESS NOTE ADULT - PROBLEM SELECTOR PLAN 1
Extubated to facemask NRB 4/19 and xfer to telemetry/SDU. Conversive in full sentences and normoxic at rest.   S/p bronchoscopy w/ BAL 4/17 which was unrevealing for infectious or inflammatory etiologies.    *Cultures NGTD  *PCP stain negative, TMP-SMX and steroids thus stopped 4/20  *Cyto neg    Recommendations:  - Now weaned to Venti mask at 50%. Cont weaning.   - Cont with mobility/PT   - encourage intermittent proning  - otherwise continue supportive care for COVID-19 related respiratory failure.

## 2020-04-23 NOTE — CHART NOTE - NSCHARTNOTEFT_GEN_A_CORE
Admitting Diagnosis:   Patient is a 59y old  Male who presents with a chief complaint of r/o COVID (22 Apr 2020 18:33)      PAST MEDICAL & SURGICAL HISTORY:  History of BPH  HIV disease  Abscess  Throat cancer  No significant past surgical history      Current Nutrition Order: Regular       PO Intake: Good (%) [   ]  Fair (50-75%) [ X  ] Poor (<25%) [   ]    GI Issues: no acute GI issues noted at this time in chart, confirmed by pt. Pt states last BM this morning 4/23.    Pain: pt denies pain/discomfort at this time per chart    Skin Integrity: no edema, no pressure injuries at this time per chart    Labs:   04-23    137  |  102  |  15  ----------------------------<  96  3.8   |  25  |  0.60    Ca    8.9      23 Apr 2020 07:08  Phos  1.6     04-22  Mg     1.8     04-23    TPro  6.2  /  Alb  2.7<L>  /  TBili  0.5  /  DBili  x   /  AST  25  /  ALT  48<H>  /  AlkPhos  52  04-23    CAPILLARY BLOOD GLUCOSE          Medications:  MEDICATIONS  (STANDING):  artificial tears (preservative free) Ophthalmic Solution 1 Drop(s) Both EYES two times a day  darunavir 600 milliGRAM(s) Oral <User Schedule>  dextrose 5%. 1000 milliLiter(s) (50 mL/Hr) IV Continuous <Continuous>  dextrose 50% Injectable 12.5 Gram(s) IV Push once  dextrose 50% Injectable 25 Gram(s) IV Push once  dextrose 50% Injectable 25 Gram(s) IV Push once  emtricitabine 200 mG/tenofovir alafenamide 25 mG (DESCOVY) Tablet 1 Tablet(s) Oral daily  enoxaparin Injectable 40 milliGRAM(s) SubCutaneous every 24 hours  etravirine 200 milliGRAM(s) Oral <User Schedule>  famotidine    Tablet 20 milliGRAM(s) Oral daily  influenza   Vaccine 0.5 milliLiter(s) IntraMuscular once  lactobacillus acidophilus and bulgaricus Chewable 1 Tablet(s) Chew daily  levothyroxine 125 MICROGram(s) Oral daily  loperamide Suspension 8 milliGRAM(s) Oral two times a day  magnesium sulfate  IVPB 1 Gram(s) IV Intermittent once  melatonin 3 milliGRAM(s) Oral at bedtime  potassium chloride    Tablet ER 20 milliEquivalent(s) Oral once  raltegravir Tablet 400 milliGRAM(s) Oral <User Schedule>  ritonavir Tablet 100 milliGRAM(s) Oral <User Schedule>  rosuvastatin 20 milliGRAM(s) Oral at bedtime  tamsulosin 0.4 milliGRAM(s) Oral at bedtime  valACYclovir 500 milliGRAM(s) Oral two times a day    MEDICATIONS  (PRN):  acetaminophen   Tablet .. 975 milliGRAM(s) Oral every 8 hours PRN Temp greater or equal to 38C (100.4F)  ALPRAZolam 0.25 milliGRAM(s) Oral at bedtime PRN Anxiety  dextrose 40% Gel 15 Gram(s) Oral once PRN Blood Glucose LESS THAN 70 milliGRAM(s)/deciliter  glucagon  Injectable 1 milliGRAM(s) IntraMuscular once PRN Glucose LESS THAN 70 milligrams/deciliter  hydrocodone/homatropine Syrup 5 milliLiter(s) Oral every 6 hours PRN Cough      Weight: 160.6lb (4/17)              164.5lb (3/30)    Weight Change: 3.9 lb/ 2.4% weight loss in ~2 1/2 weeks, not significant    Nutrition Focused Physical Exam: Completed [   ]  Not Pertinent [   ]-N/A d/t COVID    Estimated energy needs:   ABW (73kg) used for calculations as pt % IBW. Needs adjusted for hypermetabolic status    Calories: 0626-1748 kcal/day (30-35kcal/kg)  Protein: 73-87g pro/day (1.0-1.2g/day)  Fluids: 7979-2552 ml/day (30-35ml/kg)    Subjective:   59y old  Male who presents with a chief complaint of r/o COVID (09 Apr 2020 11:20).  PMH sig for HIV, tonsilar CA s/p CRT, BPH, LHD, hypothyroidism, sleep apnea.  Initially admitted to [facility]; COVID positive.  Symptoms improving now transferred to [facility] CAP.  Oxygen req decreasing from 4L to 2L NC.  Pt transferred back to [facility] MICU 4/11. NGT placement noted 4/17, however no administration of tube feeding per chart. Pt intubated 4/17, extubated 4/18. Pt noted to be hemodynamically stable and cleared for transfer to step down unit 4/19. Overall improvement of respiratory status, decreasing oxygen requirements, on 50% Ventimask.   Unable to conduct a face to face interview or nutrition-focused physical exam due to limited contact restrictions related to the pt's medical condition and isolation precautions. Spoke with pt on personal contact number provided in [place] Pt states he is "feeling a little better".  Consuming >50% of meals in house, appetite improving. Denies nausea, vomiting, constipation or diarrhea. Pt amenable to receiving Ensure Enlive x1 daily, strawberry preference. Encouraged adequate protein and calorie intake for recovery.       Previous Nutrition Diagnosis: Increased nutrient needs r/t increased demand of nutrient AEB infection and fever.    Active [ X  ]  Resolved [   ]    Goal: Pt to meet >75% estimated nutrient needs    Recommendations:   1) Continue with current Regular diet, monitor intake   2) Add Ensure Enlive x1 daily, strawberry preference (350 kcal, 20g pro, 180ml free water)  3) Monitor weights, labs, skin integrity, GI distress     Education: encouragement of adequate protein and calorie intake    Risk Level: High [   ] Moderate [   ] Low [ X ] Admitting Diagnosis:   Patient is a 59y old  Male who presents with a chief complaint of r/o COVID (22 Apr 2020 18:33)      PAST MEDICAL & SURGICAL HISTORY:  History of BPH  HIV disease  Abscess  Throat cancer  No significant past surgical history      Current Nutrition Order: Regular       PO Intake: Good (%) [   ]  Fair (50-75%) [ X  ] Poor (<25%) [   ]    GI Issues: no acute GI issues noted at this time in chart, confirmed by pt. Pt states last BM this morning 4/23.    Pain: pt denies pain/discomfort at this time per chart    Skin Integrity: no edema, no pressure injuries at this time per chart    Labs:   04-23    137  |  102  |  15  ----------------------------<  96  3.8   |  25  |  0.60    Ca    8.9      23 Apr 2020 07:08  Phos  1.6     04-22  Mg     1.8     04-23    TPro  6.2  /  Alb  2.7<L>  /  TBili  0.5  /  DBili  x   /  AST  25  /  ALT  48<H>  /  AlkPhos  52  04-23    CAPILLARY BLOOD GLUCOSE          Medications:  MEDICATIONS  (STANDING):  artificial tears (preservative free) Ophthalmic Solution 1 Drop(s) Both EYES two times a day  darunavir 600 milliGRAM(s) Oral <User Schedule>  dextrose 5%. 1000 milliLiter(s) (50 mL/Hr) IV Continuous <Continuous>  dextrose 50% Injectable 12.5 Gram(s) IV Push once  dextrose 50% Injectable 25 Gram(s) IV Push once  dextrose 50% Injectable 25 Gram(s) IV Push once  emtricitabine 200 mG/tenofovir alafenamide 25 mG (DESCOVY) Tablet 1 Tablet(s) Oral daily  enoxaparin Injectable 40 milliGRAM(s) SubCutaneous every 24 hours  etravirine 200 milliGRAM(s) Oral <User Schedule>  famotidine    Tablet 20 milliGRAM(s) Oral daily  influenza   Vaccine 0.5 milliLiter(s) IntraMuscular once  lactobacillus acidophilus and bulgaricus Chewable 1 Tablet(s) Chew daily  levothyroxine 125 MICROGram(s) Oral daily  loperamide Suspension 8 milliGRAM(s) Oral two times a day  magnesium sulfate  IVPB 1 Gram(s) IV Intermittent once  melatonin 3 milliGRAM(s) Oral at bedtime  potassium chloride    Tablet ER 20 milliEquivalent(s) Oral once  raltegravir Tablet 400 milliGRAM(s) Oral <User Schedule>  ritonavir Tablet 100 milliGRAM(s) Oral <User Schedule>  rosuvastatin 20 milliGRAM(s) Oral at bedtime  tamsulosin 0.4 milliGRAM(s) Oral at bedtime  valACYclovir 500 milliGRAM(s) Oral two times a day    MEDICATIONS  (PRN):  acetaminophen   Tablet .. 975 milliGRAM(s) Oral every 8 hours PRN Temp greater or equal to 38C (100.4F)  ALPRAZolam 0.25 milliGRAM(s) Oral at bedtime PRN Anxiety  dextrose 40% Gel 15 Gram(s) Oral once PRN Blood Glucose LESS THAN 70 milliGRAM(s)/deciliter  glucagon  Injectable 1 milliGRAM(s) IntraMuscular once PRN Glucose LESS THAN 70 milligrams/deciliter  hydrocodone/homatropine Syrup 5 milliLiter(s) Oral every 6 hours PRN Cough      Weight: 160.6lb (4/17)              164.5lb (3/30)    Weight Change: 3.9 lb/ 2.4% weight loss in ~2 1/2 weeks, not significant    Nutrition Focused Physical Exam: Completed [   ]  Not Pertinent [   ]-N/A d/t COVID    Estimated energy needs:   ABW (73kg) used for calculations as pt % IBW. Needs adjusted for hypermetabolic status    Calories: 9983-1195 kcal/day (30-35kcal/kg)  Protein: 73-87g pro/day (1.0-1.2g/day)  Fluids: 0807-3866 ml/day (30-35ml/kg)    Subjective:   59y old  Male who presents with a chief complaint of r/o COVID (09 Apr 2020 11:20).  PMH sig for HIV, tonsilar CA s/p CRT, BPH, LHD, hypothyroidism, sleep apnea.  Initially admitted to [facility]; COVID positive.  Symptoms improving now transferred to [facility] CAP.  Oxygen req decreasing from 4L to 2L NC.  Pt transferred back to [facility] MICU 4/11. NGT placement noted 4/17, however no administration of tube feeding per chart. Pt intubated 4/17, extubated 4/18. Pt noted to be hemodynamically stable and cleared for transfer to step down unit 4/19. Overall improvement of respiratory status, decreasing oxygen requirements, on 50% Ventimask.   Unable to conduct a face to face interview or nutrition-focused physical exam due to limited contact restrictions related to the pt's medical condition and isolation precautions. Spoke with pt on personal contact number provided in [place] Pt states he is "feeling a little better".  Consuming >50% of meals in house, appetite improving. Denies nausea, vomiting or constipation, reports diarrhea has been improving (pt noted on bowel regimen). Pt amenable to receiving Ensure Enlive x1 daily, strawberry preference. Encouraged adequate protein and calorie intake for recovery.       Previous Nutrition Diagnosis: Increased nutrient needs r/t increased demand of nutrient AEB infection and fever.    Active [ X  ]  Resolved [   ]    Goal: Pt to meet >75% estimated nutrient needs    Recommendations:   1) Continue with current Regular diet, monitor intake   2) Add Ensure Enlive x1 daily, strawberry preference (350 kcal, 20g pro, 180ml free water)  3) Monitor weights, labs, skin integrity, GI distress     Education: encouragement of adequate protein and calorie intake    Risk Level: High [   ] Moderate [   ] Low [ X ]

## 2020-04-23 NOTE — PROGRESS NOTE ADULT - ATTENDING COMMENTS
Found comfortable in 90s on 10L via NC, put down to 6L and tolerated well maintaining sats 91-92%. Continue oxygen support and best supportive care.

## 2020-04-23 NOTE — PROGRESS NOTE ADULT - PROBLEM SELECTOR PLAN 1
Intubated briefly 4/17 overnight for bronchoscopy. Extubated 4/18. Bronch with inflammation. No culture data. Now on 10L NC with Spo2 >90. Concern for early stages of lung fibrotic disease.   - oxygen sat goal >88%, wean as tolerated  - c/w incentive spirometry  - pulm following apprecaite recs  - COVID management as below

## 2020-04-23 NOTE — PROGRESS NOTE ADULT - ASSESSMENT
59 M PMH of HIV (CD4 103, VL undetectable in March 2020), tonsilar CA (s/p CRT and RT), BPH, HLD, hypothyroidism, sleep apnea, COVID +ve, recently admitted on 3/30 at [facility], transferred to [facility] on 4/8. Presenting with hypoxic respiratory failure requiring nonrebreather.

## 2020-04-23 NOTE — PROGRESS NOTE ADULT - SUBJECTIVE AND OBJECTIVE BOX
PULMONARY CONSULT SERVICE FOLLOW-UP NOTE    INTERVAL HPI:  Reviewed chart and overnight events; patient seen and examined at bedside.    MEDICATIONS:  Pulmonary:  hydrocodone/homatropine Syrup 5 milliLiter(s) Oral every 6 hours PRN    Antimicrobials:  darunavir 600 milliGRAM(s) Oral <User Schedule>  emtricitabine 200 mG/tenofovir alafenamide 25 mG (DESCOVY) Tablet 1 Tablet(s) Oral daily  etravirine 200 milliGRAM(s) Oral <User Schedule>  raltegravir Tablet 400 milliGRAM(s) Oral <User Schedule>  ritonavir Tablet 100 milliGRAM(s) Oral <User Schedule>  valACYclovir 500 milliGRAM(s) Oral two times a day    Anticoagulants:  enoxaparin Injectable 40 milliGRAM(s) SubCutaneous every 24 hours    Cardiac:  tamsulosin 0.4 milliGRAM(s) Oral at bedtime      Allergies    No Known Allergies    Intolerances        Vital Signs Last 24 Hrs  T(C): 36.8 (23 Apr 2020 05:33), Max: 36.8 (22 Apr 2020 20:30)  T(F): 98.2 (23 Apr 2020 05:33), Max: 98.2 (22 Apr 2020 20:30)  HR: 82 (23 Apr 2020 05:33) (80 - 90)  BP: 123/78 (23 Apr 2020 05:33) (98/62 - 123/78)  BP(mean): --  RR: 20 (23 Apr 2020 05:33) (20 - 20)  SpO2: 93% (23 Apr 2020 05:33) (93% - 98%)    04-22 @ 07:01  -  04-23 @ 07:00  --------------------------------------------------------  IN: 0 mL / OUT: 2125 mL / NET: -2125 mL          PHYSICAL EXAM:  Constitutional: WDWN  HEENT: NC/AT; PERRL, anicteric sclera; MMM  Neck: supple  Cardiovascular: +S1/S2, RRR  Respiratory: CTA B/L; no W/R/R  Gastrointestinal: soft, NT/ND; +BSx4  Extremities: WWP; no edema, clubbing or cyanosis  Vascular: 2+ radial, DP/PT pulses B/L  Neurological: AAOx3; no focal deficits    LABS:      CBC Full  -  ( 23 Apr 2020 07:08 )  WBC Count : 6.28 K/uL  RBC Count : 5.24 M/uL  Hemoglobin : 15.1 g/dL  Hematocrit : 46.0 %  Platelet Count - Automated : 104 K/uL  Mean Cell Volume : 87.8 fl  Mean Cell Hemoglobin : 28.8 pg  Mean Cell Hemoglobin Concentration : 32.8 gm/dL  Auto Neutrophil # : 5.01 K/uL  Auto Lymphocyte # : 0.46 K/uL  Auto Monocyte # : 0.43 K/uL  Auto Eosinophil # : 0.34 K/uL  Auto Basophil # : 0.02 K/uL  Auto Neutrophil % : 79.9 %  Auto Lymphocyte % : 7.3 %  Auto Monocyte % : 6.8 %  Auto Eosinophil % : 5.4 %  Auto Basophil % : 0.3 %    04-23    137  |  102  |  15  ----------------------------<  96  3.8   |  25  |  0.60    Ca    8.9      23 Apr 2020 07:08  Phos  1.6     04-22  Mg     1.8     04-23    TPro  6.2  /  Alb  2.7<L>  /  TBili  0.5  /  DBili  x   /  AST  25  /  ALT  48<H>  /  AlkPhos  52  04-23                      RADIOLOGY & ADDITIONAL STUDIES: PULMONARY CONSULT SERVICE FOLLOW-UP NOTE    INTERVAL HPI:  Reviewed chart and overnight events; patient seen and examined at bedside.    Patient feels better. titrated down to 6L NC with maintenance of his sats.     MEDICATIONS:  Pulmonary:  hydrocodone/homatropine Syrup 5 milliLiter(s) Oral every 6 hours PRN    Antimicrobials:  darunavir 600 milliGRAM(s) Oral <User Schedule>  emtricitabine 200 mG/tenofovir alafenamide 25 mG (DESCOVY) Tablet 1 Tablet(s) Oral daily  etravirine 200 milliGRAM(s) Oral <User Schedule>  raltegravir Tablet 400 milliGRAM(s) Oral <User Schedule>  ritonavir Tablet 100 milliGRAM(s) Oral <User Schedule>  valACYclovir 500 milliGRAM(s) Oral two times a day    Anticoagulants:  enoxaparin Injectable 40 milliGRAM(s) SubCutaneous every 24 hours    Cardiac:  tamsulosin 0.4 milliGRAM(s) Oral at bedtime      Allergies    No Known Allergies    Intolerances    Vital Signs Last 24 Hrs  T(C): 36.8 (23 Apr 2020 05:33), Max: 36.8 (22 Apr 2020 20:30)  T(F): 98.2 (23 Apr 2020 05:33), Max: 98.2 (22 Apr 2020 20:30)  HR: 82 (23 Apr 2020 05:33) (80 - 90)  BP: 123/78 (23 Apr 2020 05:33) (98/62 - 123/78)  BP(mean): --  RR: 20 (23 Apr 2020 05:33) (20 - 20)  SpO2: 93% (23 Apr 2020 05:33) (93% - 98%)    04-22 @ 07:01  -  04-23 @ 07:00  --------------------------------------------------------  IN: 0 mL / OUT: 2125 mL / NET: -2125 mL      PHYSICAL EXAM:  Constitutional: WDWN  HEENT: NC/AT; PERRL, anicteric sclera; MMM  Neck: supple  Cardiovascular: +S1/S2, RRR  Respiratory: CTA B/L; no W/R/R  Gastrointestinal: soft, NT/ND; +BSx4  Extremities: WWP; no edema, clubbing or cyanosis  Vascular: 2+ radial, DP/PT pulses B/L  Neurological: AAOx3; no focal deficits    LABS:  CBC Full  -  ( 23 Apr 2020 07:08 )  WBC Count : 6.28 K/uL  RBC Count : 5.24 M/uL  Hemoglobin : 15.1 g/dL  Hematocrit : 46.0 %  Platelet Count - Automated : 104 K/uL  Mean Cell Volume : 87.8 fl  Mean Cell Hemoglobin : 28.8 pg  Mean Cell Hemoglobin Concentration : 32.8 gm/dL  Auto Neutrophil # : 5.01 K/uL  Auto Lymphocyte # : 0.46 K/uL  Auto Monocyte # : 0.43 K/uL  Auto Eosinophil # : 0.34 K/uL  Auto Basophil # : 0.02 K/uL  Auto Neutrophil % : 79.9 %  Auto Lymphocyte % : 7.3 %  Auto Monocyte % : 6.8 %  Auto Eosinophil % : 5.4 %  Auto Basophil % : 0.3 %    04-23    137  |  102  |  15  ----------------------------<  96  3.8   |  25  |  0.60    Ca    8.9      23 Apr 2020 07:08  Phos  1.6     04-22  Mg     1.8     04-23    TPro  6.2  /  Alb  2.7<L>  /  TBili  0.5  /  DBili  x   /  AST  25  /  ALT  48<H>  /  AlkPhos  52  04-23    RADIOLOGY & ADDITIONAL STUDIES:  Reviewed.

## 2020-04-23 NOTE — PROGRESS NOTE ADULT - SUBJECTIVE AND OBJECTIVE BOX
Brief history:  58 yo M with PMH HIV (VLUD, [id number] prior to admission), tonsillar Ca s/p chemo/rad in remission, hypothyroidism who initially presented 3/30/20 with cough/fever/chills and pulmonary infiltrates associated with COVID infection. He received tocilizumab x 1, azithromycin/hydroxychloroquine/steroids x 5 days. S/p bronchoscopy and BAL on 4/17, with tests sent for PCP pna and covid. Extubated 4/18, and has had decreasing oxygen requirements. Patient transitioned to 10L NC.     OVERNIGHT EVENTS: No acute events overnight    SUBJECTIVE / INTERVAL HPI: Patient seen and examined at bedside. Reports improvement in productive cough, denies CP, SOB at rest, f/c, LE pain.     Review of systems negative except as noted above.     VITAL SIGNS:  Vital Signs Last 24 Hrs  T(C): 36.8 (23 Apr 2020 05:33), Max: 36.8 (22 Apr 2020 20:30)  T(F): 98.2 (23 Apr 2020 05:33), Max: 98.2 (22 Apr 2020 20:30)  HR: 82 (23 Apr 2020 05:33) (80 - 90)  BP: 123/78 (23 Apr 2020 05:33) (98/62 - 123/78)  BP(mean): --  RR: 20 (23 Apr 2020 05:33) (20 - 20)  SpO2: 93% (23 Apr 2020 05:33) (93% - 98%)      04-22-20 @ 07:01  -  04-23-20 @ 07:00  --------------------------------------------------------  IN: 0 mL / OUT: 2125 mL / NET: -2125 mL        PHYSICAL EXAM:  General: Thin, NAD now on NC  HEENT: MMM  Neck: -JVD  Cardiovascular: +S1/S2; RRR  Respiratory: No accessory muscle use. Lungs CTA.   Gastrointestinal: NTND BS+4  Extremities: WWP; no edema  Vascular: 2+ radial, DP pulses B/L  Neurological: AAOx3; no focal deficits    MEDICATIONS:  MEDICATIONS  (STANDING):  artificial tears (preservative free) Ophthalmic Solution 1 Drop(s) Both EYES two times a day  darunavir 600 milliGRAM(s) Oral <User Schedule>  dextrose 5%. 1000 milliLiter(s) (50 mL/Hr) IV Continuous <Continuous>  dextrose 50% Injectable 12.5 Gram(s) IV Push once  dextrose 50% Injectable 25 Gram(s) IV Push once  dextrose 50% Injectable 25 Gram(s) IV Push once  emtricitabine 200 mG/tenofovir alafenamide 25 mG (DESCOVY) Tablet 1 Tablet(s) Oral daily  enoxaparin Injectable 40 milliGRAM(s) SubCutaneous every 24 hours  etravirine 200 milliGRAM(s) Oral <User Schedule>  famotidine    Tablet 20 milliGRAM(s) Oral daily  influenza   Vaccine 0.5 milliLiter(s) IntraMuscular once  lactobacillus acidophilus and bulgaricus Chewable 1 Tablet(s) Chew daily  levothyroxine 125 MICROGram(s) Oral daily  loperamide Suspension 8 milliGRAM(s) Oral two times a day  melatonin 3 milliGRAM(s) Oral at bedtime  raltegravir Tablet 400 milliGRAM(s) Oral <User Schedule>  ritonavir Tablet 100 milliGRAM(s) Oral <User Schedule>  rosuvastatin 20 milliGRAM(s) Oral at bedtime  tamsulosin 0.4 milliGRAM(s) Oral at bedtime  valACYclovir 500 milliGRAM(s) Oral two times a day    MEDICATIONS  (PRN):  acetaminophen   Tablet .. 975 milliGRAM(s) Oral every 8 hours PRN Temp greater or equal to 38C (100.4F)  ALPRAZolam 0.25 milliGRAM(s) Oral at bedtime PRN Anxiety  dextrose 40% Gel 15 Gram(s) Oral once PRN Blood Glucose LESS THAN 70 milliGRAM(s)/deciliter  glucagon  Injectable 1 milliGRAM(s) IntraMuscular once PRN Glucose LESS THAN 70 milligrams/deciliter  hydrocodone/homatropine Syrup 5 milliLiter(s) Oral every 6 hours PRN Cough      ALLERGIES:  Allergies    No Known Allergies    Intolerances        LABS:                        15.1   6.28  )-----------( 104      ( 23 Apr 2020 07:08 )             46.0     04-23    137  |  102  |  15  ----------------------------<  96  3.8   |  25  |  0.60    Ca    8.9      23 Apr 2020 07:08  Phos  1.6     04-22  Mg     1.8     04-23    TPro  6.2  /  Alb  2.7<L>  /  TBili  0.5  /  DBili  x   /  AST  25  /  ALT  48<H>  /  AlkPhos  52  04-23        CAPILLARY BLOOD GLUCOSE              RADIOLOGY & ADDITIONAL TESTS: Reviewed.

## 2020-04-24 LAB
ALBUMIN SERPL ELPH-MCNC: 2.6 G/DL — LOW (ref 3.3–5)
ALP SERPL-CCNC: 50 U/L — SIGNIFICANT CHANGE UP (ref 40–120)
ALT FLD-CCNC: 44 U/L — SIGNIFICANT CHANGE UP (ref 10–45)
ANION GAP SERPL CALC-SCNC: 14 MMOL/L — SIGNIFICANT CHANGE UP (ref 5–17)
AST SERPL-CCNC: 23 U/L — SIGNIFICANT CHANGE UP (ref 10–40)
BASOPHILS # BLD AUTO: 0.02 K/UL — SIGNIFICANT CHANGE UP (ref 0–0.2)
BASOPHILS NFR BLD AUTO: 0.3 % — SIGNIFICANT CHANGE UP (ref 0–2)
BILIRUB SERPL-MCNC: 0.5 MG/DL — SIGNIFICANT CHANGE UP (ref 0.2–1.2)
BUN SERPL-MCNC: 17 MG/DL — SIGNIFICANT CHANGE UP (ref 7–23)
CALCIUM SERPL-MCNC: 8.9 MG/DL — SIGNIFICANT CHANGE UP (ref 8.4–10.5)
CHLORIDE SERPL-SCNC: 102 MMOL/L — SIGNIFICANT CHANGE UP (ref 96–108)
CO2 SERPL-SCNC: 23 MMOL/L — SIGNIFICANT CHANGE UP (ref 22–31)
CREAT SERPL-MCNC: 0.64 MG/DL — SIGNIFICANT CHANGE UP (ref 0.5–1.3)
CRP SERPL-MCNC: 9.24 MG/DL — HIGH (ref 0–0.4)
D DIMER BLD IA.RAPID-MCNC: 569 NG/ML DDU — HIGH
EOSINOPHIL # BLD AUTO: 0.43 K/UL — SIGNIFICANT CHANGE UP (ref 0–0.5)
EOSINOPHIL NFR BLD AUTO: 7.1 % — HIGH (ref 0–6)
FERRITIN SERPL-MCNC: 1064 NG/ML — HIGH (ref 30–400)
GLUCOSE SERPL-MCNC: 104 MG/DL — HIGH (ref 70–99)
HCT VFR BLD CALC: 46 % — SIGNIFICANT CHANGE UP (ref 39–50)
HGB BLD-MCNC: 15.2 G/DL — SIGNIFICANT CHANGE UP (ref 13–17)
IMM GRANULOCYTES NFR BLD AUTO: 0.7 % — SIGNIFICANT CHANGE UP (ref 0–1.5)
LYMPHOCYTES # BLD AUTO: 0.48 K/UL — LOW (ref 1–3.3)
LYMPHOCYTES # BLD AUTO: 7.9 % — LOW (ref 13–44)
MAGNESIUM SERPL-MCNC: 2 MG/DL — SIGNIFICANT CHANGE UP (ref 1.6–2.6)
MCHC RBC-ENTMCNC: 29.2 PG — SIGNIFICANT CHANGE UP (ref 27–34)
MCHC RBC-ENTMCNC: 33 GM/DL — SIGNIFICANT CHANGE UP (ref 32–36)
MCV RBC AUTO: 88.3 FL — SIGNIFICANT CHANGE UP (ref 80–100)
MONOCYTES # BLD AUTO: 0.52 K/UL — SIGNIFICANT CHANGE UP (ref 0–0.9)
MONOCYTES NFR BLD AUTO: 8.6 % — SIGNIFICANT CHANGE UP (ref 2–14)
NEUTROPHILS # BLD AUTO: 4.55 K/UL — SIGNIFICANT CHANGE UP (ref 1.8–7.4)
NEUTROPHILS NFR BLD AUTO: 75.4 % — SIGNIFICANT CHANGE UP (ref 43–77)
NRBC # BLD: 0 /100 WBCS — SIGNIFICANT CHANGE UP (ref 0–0)
PLATELET # BLD AUTO: 119 K/UL — LOW (ref 150–400)
POTASSIUM SERPL-MCNC: 3.9 MMOL/L — SIGNIFICANT CHANGE UP (ref 3.5–5.3)
POTASSIUM SERPL-SCNC: 3.9 MMOL/L — SIGNIFICANT CHANGE UP (ref 3.5–5.3)
PROCALCITONIN SERPL-MCNC: 0.06 NG/ML — SIGNIFICANT CHANGE UP (ref 0.02–0.1)
PROT SERPL-MCNC: 6.3 G/DL — SIGNIFICANT CHANGE UP (ref 6–8.3)
RBC # BLD: 5.21 M/UL — SIGNIFICANT CHANGE UP (ref 4.2–5.8)
RBC # FLD: 14.5 % — SIGNIFICANT CHANGE UP (ref 10.3–14.5)
SODIUM SERPL-SCNC: 139 MMOL/L — SIGNIFICANT CHANGE UP (ref 135–145)
WBC # BLD: 6.04 K/UL — SIGNIFICANT CHANGE UP (ref 3.8–10.5)
WBC # FLD AUTO: 6.04 K/UL — SIGNIFICANT CHANGE UP (ref 3.8–10.5)

## 2020-04-24 PROCEDURE — 99232 SBSQ HOSP IP/OBS MODERATE 35: CPT | Mod: CS

## 2020-04-24 RX ORDER — POTASSIUM CHLORIDE 20 MEQ
10 PACKET (EA) ORAL ONCE
Refills: 0 | Status: COMPLETED | OUTPATIENT
Start: 2020-04-24 | End: 2020-04-24

## 2020-04-24 RX ORDER — SODIUM CHLORIDE 0.65 %
1 AEROSOL, SPRAY (ML) NASAL
Refills: 0 | Status: DISCONTINUED | OUTPATIENT
Start: 2020-04-24 | End: 2020-05-01

## 2020-04-24 RX ADMIN — ENOXAPARIN SODIUM 40 MILLIGRAM(S): 100 INJECTION SUBCUTANEOUS at 23:26

## 2020-04-24 RX ADMIN — RALTEGRAVIR 400 MILLIGRAM(S): 400 TABLET, FILM COATED ORAL at 23:24

## 2020-04-24 RX ADMIN — Medication 8 MILLIGRAM(S): at 17:56

## 2020-04-24 RX ADMIN — Medication 125 MICROGRAM(S): at 04:52

## 2020-04-24 RX ADMIN — RALTEGRAVIR 400 MILLIGRAM(S): 400 TABLET, FILM COATED ORAL at 13:50

## 2020-04-24 RX ADMIN — Medication 8 MILLIGRAM(S): at 04:57

## 2020-04-24 RX ADMIN — Medication 10 MILLIEQUIVALENT(S): at 09:25

## 2020-04-24 RX ADMIN — ETRAVIRINE 200 MILLIGRAM(S): 200 TABLET ORAL at 13:50

## 2020-04-24 RX ADMIN — DARUNAVIR 600 MILLIGRAM(S): 75 TABLET, FILM COATED ORAL at 23:22

## 2020-04-24 RX ADMIN — RITONAVIR 100 MILLIGRAM(S): 100 TABLET, FILM COATED ORAL at 23:24

## 2020-04-24 RX ADMIN — Medication 3 MILLIGRAM(S): at 23:25

## 2020-04-24 RX ADMIN — RITONAVIR 100 MILLIGRAM(S): 100 TABLET, FILM COATED ORAL at 13:50

## 2020-04-24 RX ADMIN — Medication 1 DROP(S): at 17:58

## 2020-04-24 RX ADMIN — EMTRICITABINE AND TENOFOVIR DISOPROXIL FUMARATE 1 TABLET(S): 200; 300 TABLET, FILM COATED ORAL at 13:50

## 2020-04-24 RX ADMIN — VALACYCLOVIR 500 MILLIGRAM(S): 500 TABLET, FILM COATED ORAL at 13:50

## 2020-04-24 RX ADMIN — DARUNAVIR 600 MILLIGRAM(S): 75 TABLET, FILM COATED ORAL at 13:50

## 2020-04-24 RX ADMIN — TAMSULOSIN HYDROCHLORIDE 0.4 MILLIGRAM(S): 0.4 CAPSULE ORAL at 23:25

## 2020-04-24 RX ADMIN — Medication 1 DROP(S): at 04:57

## 2020-04-24 RX ADMIN — LACTOBACILLUS ACIDOPH-L.BULGARICUS 1 MILLION CELL CHEWABLE TABLET 1 TABLET(S): at 13:50

## 2020-04-24 RX ADMIN — ROSUVASTATIN CALCIUM 20 MILLIGRAM(S): 5 TABLET ORAL at 23:23

## 2020-04-24 RX ADMIN — FAMOTIDINE 20 MILLIGRAM(S): 10 INJECTION INTRAVENOUS at 13:50

## 2020-04-24 RX ADMIN — ETRAVIRINE 200 MILLIGRAM(S): 200 TABLET ORAL at 23:22

## 2020-04-24 NOTE — PROGRESS NOTE ADULT - SUBJECTIVE AND OBJECTIVE BOX
Brief history:  60 yo M with PMH HIV (VLUD, [id number] prior to admission), tonsillar Ca s/p chemo/rad in remission, hypothyroidism who initially presented 3/30/20 with cough/fever/chills and pulmonary infiltrates associated with COVID infection. He received tocilizumab x 1, azithromycin/hydroxychloroquine/steroids x 5 days patient with improvement and discharged to [facility] while at [facility] patient with worsening hypoxemia and admitted with concern for HAP vs. PCP PNA vs. worsening COVID. S/p bronchoscopy and BAL on 4/17 yielding no culture Data and non-specific inflammation. Extubated 4/18, and has had decreasing oxygen requirements. Patient now on 6L NC and medically optimized for step down to RMF.     OVERNIGHT EVENTS: No acute events overnight    SUBJECTIVE / INTERVAL HPI: Patient seen and examined at bedside. With no acute complaints, reports feeling improved although still with productive cough.     Review of systems negative except as noted above.     VITAL SIGNS:  Vital Signs Last 24 Hrs  T(C): 36.2 (24 Apr 2020 09:28), Max: 37 (24 Apr 2020 05:48)  T(F): 97.2 (24 Apr 2020 09:28), Max: 98.6 (24 Apr 2020 05:48)  HR: 76 (24 Apr 2020 09:28) (76 - 86)  BP: 99/69 (24 Apr 2020 09:28) (94/58 - 124/87)  BP(mean): --  RR: 20 (24 Apr 2020 09:28) (20 - 20)  SpO2: 97% (24 Apr 2020 09:28) (90% - 97%)      04-23-20 @ 07:01  -  04-24-20 @ 07:00  --------------------------------------------------------  IN: 0 mL / OUT: 1300 mL / NET: -1300 mL    04-24-20 @ 07:01  - 04-24-20 @ 11:47  --------------------------------------------------------  IN: 200 mL / OUT: 100 mL / NET: 100 mL        PHYSICAL EXAM:  General: NAD on NC. No accessory muscle use.   HEENT: MMM  Neck: -JVD  Cardiovascular: +S1/S2; RRR  Respiratory: Lungs CTA B/L   Gastrointestinal: NTND BS+4  Extremities: WWP; no edema  Vascular: 2+ radial, DP pulses B/L  Neurological: AAOx3; no focal deficits      MEDICATIONS:  MEDICATIONS  (STANDING):  artificial tears (preservative free) Ophthalmic Solution 1 Drop(s) Both EYES two times a day  darunavir 600 milliGRAM(s) Oral <User Schedule>  dextrose 5%. 1000 milliLiter(s) (50 mL/Hr) IV Continuous <Continuous>  dextrose 50% Injectable 12.5 Gram(s) IV Push once  dextrose 50% Injectable 25 Gram(s) IV Push once  dextrose 50% Injectable 25 Gram(s) IV Push once  emtricitabine 200 mG/tenofovir alafenamide 25 mG (DESCOVY) Tablet 1 Tablet(s) Oral daily  enoxaparin Injectable 40 milliGRAM(s) SubCutaneous every 24 hours  etravirine 200 milliGRAM(s) Oral <User Schedule>  famotidine    Tablet 20 milliGRAM(s) Oral daily  influenza   Vaccine 0.5 milliLiter(s) IntraMuscular once  lactobacillus acidophilus and bulgaricus Chewable 1 Tablet(s) Chew daily  levothyroxine 125 MICROGram(s) Oral daily  loperamide Suspension 8 milliGRAM(s) Oral two times a day  melatonin 3 milliGRAM(s) Oral at bedtime  raltegravir Tablet 400 milliGRAM(s) Oral <User Schedule>  ritonavir Tablet 100 milliGRAM(s) Oral <User Schedule>  rosuvastatin 20 milliGRAM(s) Oral at bedtime  tamsulosin 0.4 milliGRAM(s) Oral at bedtime  valACYclovir 500 milliGRAM(s) Oral two times a day    MEDICATIONS  (PRN):  acetaminophen   Tablet .. 975 milliGRAM(s) Oral every 8 hours PRN Temp greater or equal to 38C (100.4F)  ALPRAZolam 0.25 milliGRAM(s) Oral at bedtime PRN Anxiety  dextrose 40% Gel 15 Gram(s) Oral once PRN Blood Glucose LESS THAN 70 milliGRAM(s)/deciliter  glucagon  Injectable 1 milliGRAM(s) IntraMuscular once PRN Glucose LESS THAN 70 milligrams/deciliter      ALLERGIES:  Allergies    No Known Allergies    Intolerances        LABS:                        15.2   6.04  )-----------( 119      ( 24 Apr 2020 06:48 )             46.0     04-24    139  |  102  |  17  ----------------------------<  104<H>  3.9   |  23  |  0.64    Ca    8.9      24 Apr 2020 06:48  Mg     2.0     04-24    TPro  6.3  /  Alb  2.6<L>  /  TBili  0.5  /  DBili  x   /  AST  23  /  ALT  44  /  AlkPhos  50  04-24        CAPILLARY BLOOD GLUCOSE              RADIOLOGY & ADDITIONAL TESTS: Reviewed.

## 2020-04-24 NOTE — PROGRESS NOTE ADULT - PROBLEM SELECTOR PLAN 1
Intubated briefly 4/17 overnight for bronchoscopy. Extubated 4/18. Bronch with inflammation. No culture data. Now on 6L NC with Spo2 >90. Concern for early stages of lung fibrotic disease.   - oxygen sat goal >88%, wean as tolerated  - c/w incentive spirometry  - pulm following apprecaite recs  - COVID management as below

## 2020-04-24 NOTE — PROGRESS NOTE ADULT - ASSESSMENT
59 M PMH of HIV (CD4 103, VL undetectable in March 2020), tonsilar CA (s/p CRT and RT), BPH, HLD, hypothyroidism, sleep apnea, COVID +ve, recently admitted on 3/30 at [facility], transferred to [facility] on 4/8. Presenting with hypoxic respiratory failure requiring nonrebreather.

## 2020-04-25 DIAGNOSIS — N40.0 BENIGN PROSTATIC HYPERPLASIA WITHOUT LOWER URINARY TRACT SYMPTOMS: ICD-10-CM

## 2020-04-25 LAB
ALBUMIN SERPL ELPH-MCNC: 2.7 G/DL — LOW (ref 3.3–5)
ALP SERPL-CCNC: 49 U/L — SIGNIFICANT CHANGE UP (ref 40–120)
ALT FLD-CCNC: 41 U/L — SIGNIFICANT CHANGE UP (ref 10–45)
ANION GAP SERPL CALC-SCNC: 12 MMOL/L — SIGNIFICANT CHANGE UP (ref 5–17)
AST SERPL-CCNC: 24 U/L — SIGNIFICANT CHANGE UP (ref 10–40)
BASOPHILS # BLD AUTO: 0.02 K/UL — SIGNIFICANT CHANGE UP (ref 0–0.2)
BASOPHILS NFR BLD AUTO: 0.3 % — SIGNIFICANT CHANGE UP (ref 0–2)
BILIRUB SERPL-MCNC: 0.4 MG/DL — SIGNIFICANT CHANGE UP (ref 0.2–1.2)
BUN SERPL-MCNC: 16 MG/DL — SIGNIFICANT CHANGE UP (ref 7–23)
CALCIUM SERPL-MCNC: 9.2 MG/DL — SIGNIFICANT CHANGE UP (ref 8.4–10.5)
CHLORIDE SERPL-SCNC: 100 MMOL/L — SIGNIFICANT CHANGE UP (ref 96–108)
CO2 SERPL-SCNC: 27 MMOL/L — SIGNIFICANT CHANGE UP (ref 22–31)
CREAT SERPL-MCNC: 0.68 MG/DL — SIGNIFICANT CHANGE UP (ref 0.5–1.3)
CRP SERPL-MCNC: 6.3 MG/DL — HIGH (ref 0–0.4)
D DIMER BLD IA.RAPID-MCNC: 1959 NG/ML DDU — HIGH
EOSINOPHIL # BLD AUTO: 0.62 K/UL — HIGH (ref 0–0.5)
EOSINOPHIL NFR BLD AUTO: 10.5 % — HIGH (ref 0–6)
FERRITIN SERPL-MCNC: 1006 NG/ML — HIGH (ref 30–400)
GLUCOSE SERPL-MCNC: 95 MG/DL — SIGNIFICANT CHANGE UP (ref 70–99)
HCT VFR BLD CALC: 46.1 % — SIGNIFICANT CHANGE UP (ref 39–50)
HGB BLD-MCNC: 15 G/DL — SIGNIFICANT CHANGE UP (ref 13–17)
IMM GRANULOCYTES NFR BLD AUTO: 0.8 % — SIGNIFICANT CHANGE UP (ref 0–1.5)
LYMPHOCYTES # BLD AUTO: 0.51 K/UL — LOW (ref 1–3.3)
LYMPHOCYTES # BLD AUTO: 8.6 % — LOW (ref 13–44)
MAGNESIUM SERPL-MCNC: 1.8 MG/DL — SIGNIFICANT CHANGE UP (ref 1.6–2.6)
MCHC RBC-ENTMCNC: 28.9 PG — SIGNIFICANT CHANGE UP (ref 27–34)
MCHC RBC-ENTMCNC: 32.5 GM/DL — SIGNIFICANT CHANGE UP (ref 32–36)
MCV RBC AUTO: 88.8 FL — SIGNIFICANT CHANGE UP (ref 80–100)
MONOCYTES # BLD AUTO: 0.73 K/UL — SIGNIFICANT CHANGE UP (ref 0–0.9)
MONOCYTES NFR BLD AUTO: 12.4 % — SIGNIFICANT CHANGE UP (ref 2–14)
NEUTROPHILS # BLD AUTO: 3.97 K/UL — SIGNIFICANT CHANGE UP (ref 1.8–7.4)
NEUTROPHILS NFR BLD AUTO: 67.4 % — SIGNIFICANT CHANGE UP (ref 43–77)
NRBC # BLD: 0 /100 WBCS — SIGNIFICANT CHANGE UP (ref 0–0)
PHOSPHATE SERPL-MCNC: 2.2 MG/DL — LOW (ref 2.5–4.5)
PLATELET # BLD AUTO: 147 K/UL — LOW (ref 150–400)
POTASSIUM SERPL-MCNC: 4.2 MMOL/L — SIGNIFICANT CHANGE UP (ref 3.5–5.3)
POTASSIUM SERPL-SCNC: 4.2 MMOL/L — SIGNIFICANT CHANGE UP (ref 3.5–5.3)
PROT SERPL-MCNC: 6.2 G/DL — SIGNIFICANT CHANGE UP (ref 6–8.3)
RBC # BLD: 5.19 M/UL — SIGNIFICANT CHANGE UP (ref 4.2–5.8)
RBC # FLD: 14.7 % — HIGH (ref 10.3–14.5)
SODIUM SERPL-SCNC: 139 MMOL/L — SIGNIFICANT CHANGE UP (ref 135–145)
WBC # BLD: 5.9 K/UL — SIGNIFICANT CHANGE UP (ref 3.8–10.5)
WBC # FLD AUTO: 5.9 K/UL — SIGNIFICANT CHANGE UP (ref 3.8–10.5)

## 2020-04-25 PROCEDURE — 99233 SBSQ HOSP IP/OBS HIGH 50: CPT | Mod: CS,GC

## 2020-04-25 PROCEDURE — 99233 SBSQ HOSP IP/OBS HIGH 50: CPT | Mod: CS

## 2020-04-25 PROCEDURE — 71045 X-RAY EXAM CHEST 1 VIEW: CPT | Mod: 26

## 2020-04-25 RX ORDER — ALBUTEROL 90 UG/1
2 AEROSOL, METERED ORAL EVERY 6 HOURS
Refills: 0 | Status: DISCONTINUED | OUTPATIENT
Start: 2020-04-25 | End: 2020-05-01

## 2020-04-25 RX ORDER — SODIUM,POTASSIUM PHOSPHATES 278-250MG
1 POWDER IN PACKET (EA) ORAL
Refills: 0 | Status: COMPLETED | OUTPATIENT
Start: 2020-04-25 | End: 2020-04-26

## 2020-04-25 RX ORDER — ENOXAPARIN SODIUM 100 MG/ML
110 INJECTION SUBCUTANEOUS EVERY 24 HOURS
Refills: 0 | Status: DISCONTINUED | OUTPATIENT
Start: 2020-04-25 | End: 2020-04-27

## 2020-04-25 RX ORDER — MAGNESIUM SULFATE 500 MG/ML
2 VIAL (ML) INJECTION ONCE
Refills: 0 | Status: COMPLETED | OUTPATIENT
Start: 2020-04-25 | End: 2020-04-25

## 2020-04-25 RX ADMIN — LACTOBACILLUS ACIDOPH-L.BULGARICUS 1 MILLION CELL CHEWABLE TABLET 1 TABLET(S): at 12:12

## 2020-04-25 RX ADMIN — Medication 1 TABLET(S): at 12:11

## 2020-04-25 RX ADMIN — RITONAVIR 100 MILLIGRAM(S): 100 TABLET, FILM COATED ORAL at 17:42

## 2020-04-25 RX ADMIN — Medication 8 MILLIGRAM(S): at 21:26

## 2020-04-25 RX ADMIN — TAMSULOSIN HYDROCHLORIDE 0.4 MILLIGRAM(S): 0.4 CAPSULE ORAL at 21:24

## 2020-04-25 RX ADMIN — ROSUVASTATIN CALCIUM 20 MILLIGRAM(S): 5 TABLET ORAL at 21:25

## 2020-04-25 RX ADMIN — Medication 1 SPRAY(S): at 18:01

## 2020-04-25 RX ADMIN — VALACYCLOVIR 500 MILLIGRAM(S): 500 TABLET, FILM COATED ORAL at 17:42

## 2020-04-25 RX ADMIN — VALACYCLOVIR 500 MILLIGRAM(S): 500 TABLET, FILM COATED ORAL at 21:26

## 2020-04-25 RX ADMIN — FAMOTIDINE 20 MILLIGRAM(S): 10 INJECTION INTRAVENOUS at 12:12

## 2020-04-25 RX ADMIN — DARUNAVIR 600 MILLIGRAM(S): 75 TABLET, FILM COATED ORAL at 17:43

## 2020-04-25 RX ADMIN — Medication 1 TABLET(S): at 18:01

## 2020-04-25 RX ADMIN — RALTEGRAVIR 400 MILLIGRAM(S): 400 TABLET, FILM COATED ORAL at 12:11

## 2020-04-25 RX ADMIN — Medication 1 DROP(S): at 06:47

## 2020-04-25 RX ADMIN — Medication 8 MILLIGRAM(S): at 06:45

## 2020-04-25 RX ADMIN — ETRAVIRINE 200 MILLIGRAM(S): 200 TABLET ORAL at 12:11

## 2020-04-25 RX ADMIN — Medication 1 DROP(S): at 17:42

## 2020-04-25 RX ADMIN — ETRAVIRINE 200 MILLIGRAM(S): 200 TABLET ORAL at 17:43

## 2020-04-25 RX ADMIN — Medication 3 MILLIGRAM(S): at 21:24

## 2020-04-25 RX ADMIN — VALACYCLOVIR 500 MILLIGRAM(S): 500 TABLET, FILM COATED ORAL at 00:11

## 2020-04-25 RX ADMIN — RALTEGRAVIR 400 MILLIGRAM(S): 400 TABLET, FILM COATED ORAL at 17:43

## 2020-04-25 RX ADMIN — Medication 1 SPRAY(S): at 06:45

## 2020-04-25 RX ADMIN — ENOXAPARIN SODIUM 110 MILLIGRAM(S): 100 INJECTION SUBCUTANEOUS at 21:24

## 2020-04-25 RX ADMIN — Medication 50 GRAM(S): at 12:03

## 2020-04-25 RX ADMIN — Medication 125 MICROGRAM(S): at 06:42

## 2020-04-25 RX ADMIN — DARUNAVIR 600 MILLIGRAM(S): 75 TABLET, FILM COATED ORAL at 12:12

## 2020-04-25 RX ADMIN — RITONAVIR 100 MILLIGRAM(S): 100 TABLET, FILM COATED ORAL at 12:11

## 2020-04-25 RX ADMIN — EMTRICITABINE AND TENOFOVIR DISOPROXIL FUMARATE 1 TABLET(S): 200; 300 TABLET, FILM COATED ORAL at 12:12

## 2020-04-25 NOTE — PROGRESS NOTE ADULT - PROBLEM SELECTOR PLAN 7
F- None  E- replete as needed (Phos/Mg repleted today)  N- regular diet  DVt ppx: lovenox full a/c F- None  E- replete as needed (Phos/Mg repleted today)  N- regular diet    DVT ppx: lovenox   GI ppx: pepcid daily    DISPO: home Sunday vs Monday possibly w/ O2

## 2020-04-25 NOTE — PROGRESS NOTE ADULT - PROBLEM SELECTOR PLAN 3
HIV home 6 medicine regimen, started 20 years ago for salvage therapy. Bactrim dc'd due to low suspicion for PNA.  - c/w home HIV medications    - Continue with valtrex

## 2020-04-25 NOTE — PROGRESS NOTE ADULT - PROBLEM SELECTOR PLAN 2
COVID+ 4/2. Received Tocilizumab (04/03), Azithro/Plaquenil (04/02-04/06) and Solumedrol (04/03-04/08) c/b acute resp failure/hypoxia (HAP vs PCP vs early stage fibrosis). s/p bronch with non specific findings for inflammation.  - trend inflammatory markers, see above  - c/w famotidine 20mg daily  - Pulm following appreciate recs

## 2020-04-25 NOTE — PROGRESS NOTE ADULT - ASSESSMENT
59 M PMH of HIV (CD4 103, VL undetectable in March 2020 on HAART), tonsilar CA (s/p CRT and RT), BPH, HLD, hypothyroidism, sleep apnea, COVID+, recently admitted on 3/30 at [facility], transferred to [facility] on 4/8. Presenting back 4/11 with hypoxic respiratory failure requiring nonrebreather->reintubation 4/17 s/p bronch -> extubation 4/18, treated for suspected HAP vs PCP and questionable early stage pulm fibrosis, now weaned to 6L NC w/ SPO2 93% this AM and transferred to [facility] overnight.

## 2020-04-25 NOTE — PROGRESS NOTE ADULT - ATTENDING COMMENTS
Overall greatly improved but slow changes now. We were able to titrate him to 4L today so will observe over next day and see how he is tomorrow. If he plateaus we would consider a repeat CT and possibility of steroids.

## 2020-04-25 NOTE — PROGRESS NOTE ADULT - ATTENDING COMMENTS
24Hr Events: Transferred to [facility]  ROS sig for stable SOB/MENDEZ, fatigue otherwise ROS neg  96% on 5L by NC, NAD  Ddimer dramatically increased today  CRP down-trending  59yoM w/ h/o HIV (CD4 103, VL undetectable on HAART), tonsilar CA (s/p CRT and RT), BPH, HLD, hypothyroidism, sleep apnea a/w acute hypoxic resp failure 2/2 COVID 3/30-4/8 who returned on 4/11 for worsening resp status requiring ETT s/p bronch now extubated s/p tx for HAP vs PCP with course c/b persistent hypoxia.  S/p Azithro/Plaquenil/Solumedrol/Prednisone/Vanc/Zosyn/Toci, wean O2 as tolerated  CTA, empiric AC for dramatic increase in ddimer  No additional steroids per pulm  Continue HAART  Tonsilar ca s/p tx in remission

## 2020-04-25 NOTE — PROGRESS NOTE ADULT - SUBJECTIVE AND OBJECTIVE BOX
OVERNIGHT EVENTS: No acute events overnight. Transferred from [facility] to [facility].    SUBJECTIVE: Patient seen and examined at the bedside in full PPE. Reports having a good night but interrupted sleep. Denies any N/V/D, chills, SOB or cough.     OBJECTIVE: Pt with normal work fo breathing and no use of accessory muscles on 6L NC SpO2 93%.     12-point ROS reviewed and is otherwise negative.    Vital Signs Last 12 Hrs  T(F): 98.1 (04-25-20 @ 10:21), Max: 98.8 (04-25-20 @ 05:20)  HR: 79 (04-25-20 @ 10:21) (76 - 79)  BP: 118/74 (04-25-20 @ 10:21) (117/75 - 118/74)  BP(mean): --  RR: 18 (04-25-20 @ 10:21) (18 - 18)  SpO2: 94% (04-25-20 @ 10:21) (94% - 96%)    PHYSICAL EXAM:  General: NAD, resting comfortably in bed  HEENT: NCAT, PERR  Neck: no JVD  Respiratory: normal work of breathing, no use of accessory muscles  Cardiovascular/Vascular: 2+ radial/DP pulses b/l  Abdomen: soft, NTND  Extremities: WWP, no clubbing, no cyanosis, no edema  Skin: no gross skin abnormalities or rashes noted  Neuro: AAOx3, no focal neurological deficits    LABS:                        15.0   5.90  )-----------( 147      ( 25 Apr 2020 07:15 )             46.1     04-25    139  |  100  |  16  ----------------------------<  95  4.2   |  27  |  0.68    Ca    9.2      25 Apr 2020 07:15  Phos  2.2     04-25  Mg     1.8     04-25    TPro  6.2  /  Alb  2.7<L>  /  TBili  0.4  /  DBili  x   /  AST  24  /  ALT  41  /  AlkPhos  49  04-25          RADIOLOGY & ADDITIONAL TESTS: Reviewed    MEDICATIONS  (STANDING):  artificial tears (preservative free) Ophthalmic Solution 1 Drop(s) Both EYES two times a day  darunavir 600 milliGRAM(s) Oral <User Schedule>  dextrose 5%. 1000 milliLiter(s) (50 mL/Hr) IV Continuous <Continuous>  dextrose 50% Injectable 12.5 Gram(s) IV Push once  dextrose 50% Injectable 25 Gram(s) IV Push once  dextrose 50% Injectable 25 Gram(s) IV Push once  emtricitabine 200 mG/tenofovir alafenamide 25 mG (DESCOVY) Tablet 1 Tablet(s) Oral daily  enoxaparin Injectable 40 milliGRAM(s) SubCutaneous every 24 hours  etravirine 200 milliGRAM(s) Oral <User Schedule>  famotidine    Tablet 20 milliGRAM(s) Oral daily  influenza   Vaccine 0.5 milliLiter(s) IntraMuscular once  lactobacillus acidophilus and bulgaricus Chewable 1 Tablet(s) Chew daily  levothyroxine 125 MICROGram(s) Oral daily  loperamide Suspension 8 milliGRAM(s) Oral two times a day  melatonin 3 milliGRAM(s) Oral at bedtime  potassium acid phosphate/sodium acid phosphate tablet (K-PHOS No. 2) 1 Tablet(s) Oral three times a day with meals  raltegravir Tablet 400 milliGRAM(s) Oral <User Schedule>  ritonavir Tablet 100 milliGRAM(s) Oral <User Schedule>  rosuvastatin 20 milliGRAM(s) Oral at bedtime  sodium chloride 0.65% Nasal 1 Spray(s) Both Nostrils two times a day  tamsulosin 0.4 milliGRAM(s) Oral at bedtime  valACYclovir 500 milliGRAM(s) Oral two times a day    MEDICATIONS  (PRN):  acetaminophen   Tablet .. 975 milliGRAM(s) Oral every 8 hours PRN Temp greater or equal to 38C (100.4F)  ALBUTerol    90 MICROgram(s) HFA Inhaler 2 Puff(s) Inhalation every 6 hours PRN Shortness of Breath  ALPRAZolam 0.25 milliGRAM(s) Oral at bedtime PRN Anxiety  dextrose 40% Gel 15 Gram(s) Oral once PRN Blood Glucose LESS THAN 70 milliGRAM(s)/deciliter  glucagon  Injectable 1 milliGRAM(s) IntraMuscular once PRN Glucose LESS THAN 70 milligrams/deciliter

## 2020-04-25 NOTE — PROGRESS NOTE ADULT - SUBJECTIVE AND OBJECTIVE BOX
PULMONARY CONSULT SERVICE FOLLOW-UP NOTE    INTERVAL HPI:  Reviewed chart and overnight events; patient seen and examined at bedside.    MEDICATIONS:  Pulmonary:    Antimicrobials:  darunavir 600 milliGRAM(s) Oral <User Schedule>  emtricitabine 200 mG/tenofovir alafenamide 25 mG (DESCOVY) Tablet 1 Tablet(s) Oral daily  etravirine 200 milliGRAM(s) Oral <User Schedule>  raltegravir Tablet 400 milliGRAM(s) Oral <User Schedule>  ritonavir Tablet 100 milliGRAM(s) Oral <User Schedule>  valACYclovir 500 milliGRAM(s) Oral two times a day    Anticoagulants:  enoxaparin Injectable 40 milliGRAM(s) SubCutaneous every 24 hours    Cardiac:  tamsulosin 0.4 milliGRAM(s) Oral at bedtime      Allergies    No Known Allergies    Intolerances        Vital Signs Last 24 Hrs  T(C): 37.1 (25 Apr 2020 05:20), Max: 37.1 (24 Apr 2020 21:54)  T(F): 98.8 (25 Apr 2020 05:20), Max: 98.8 (25 Apr 2020 05:20)  HR: 76 (25 Apr 2020 05:20) (76 - 92)  BP: 117/75 (25 Apr 2020 05:20) (93/60 - 137/88)  BP(mean): --  RR: 18 (25 Apr 2020 05:20) (18 - 22)  SpO2: 96% (25 Apr 2020 05:20) (90% - 97%)    04-24 @ 07:01  -  04-25 @ 07:00  --------------------------------------------------------  IN: 560 mL / OUT: 900 mL / NET: -340 mL          PHYSICAL EXAM:  Constitutional: WDWN  HEENT: NC/AT; PERRL, anicteric sclera; MMM  Neck: supple  Cardiovascular: +S1/S2, RRR  Respiratory: CTA B/L; no W/R/R  Gastrointestinal: soft, NT/ND; +BSx4  Extremities: WWP; no edema, clubbing or cyanosis  Vascular: 2+ radial, DP/PT pulses B/L  Neurological: AAOx3; no focal deficits    LABS:      CBC Full  -  ( 25 Apr 2020 07:15 )  WBC Count : 5.90 K/uL  RBC Count : 5.19 M/uL  Hemoglobin : 15.0 g/dL  Hematocrit : 46.1 %  Platelet Count - Automated : 147 K/uL  Mean Cell Volume : 88.8 fl  Mean Cell Hemoglobin : 28.9 pg  Mean Cell Hemoglobin Concentration : 32.5 gm/dL  Auto Neutrophil # : 3.97 K/uL  Auto Lymphocyte # : 0.51 K/uL  Auto Monocyte # : 0.73 K/uL  Auto Eosinophil # : 0.62 K/uL  Auto Basophil # : 0.02 K/uL  Auto Neutrophil % : 67.4 %  Auto Lymphocyte % : 8.6 %  Auto Monocyte % : 12.4 %  Auto Eosinophil % : 10.5 %  Auto Basophil % : 0.3 %    04-24    139  |  102  |  17  ----------------------------<  104<H>  3.9   |  23  |  0.64    Ca    8.9      24 Apr 2020 06:48  Mg     2.0     04-24    TPro  6.3  /  Alb  2.6<L>  /  TBili  0.5  /  DBili  x   /  AST  23  /  ALT  44  /  AlkPhos  50  04-24                      RADIOLOGY & ADDITIONAL STUDIES:

## 2020-04-25 NOTE — PROGRESS NOTE ADULT - PROBLEM SELECTOR PLAN 1
Intubated briefly 4/17 overnight for bronchoscopy. Extubated 4/18. Bronch with inflammation. No culture data. Now on 6L NC with SpO2 93% this AM. Concern for early stages of lung fibrotic disease.   - Oxygen sat goal >88%, wean as tolerated  - C/w incentive spirometry  - CRP 1.23->4.10->8.88->9.24->6.30 (today w/ slight downtrend), D-dimer 405->679->667->569->1959 (today w/ up trend), Ferritin 672->804->904->1064->1006   - CXR this AM c/w progression infiltrates  - Pulm following appreciate recs. Contacted yesterday regarding up trending CRP -> to eval today and discuss possible need of steroids and/or therapeutic AC  - COVID management as below

## 2020-04-25 NOTE — PROGRESS NOTE ADULT - PROBLEM SELECTOR PLAN 1
-recommend lowering NC from 6LNC to 2LNC -recommend trial of 4LNC from 6LNC Extubated to facemask NRB 4/19 and xfer to telemetry/SDU. Conversive in full sentences and normoxic at rest.   S/p bronchoscopy w/ BAL 4/17 which was unrevealing for infectious or inflammatory etiologies.    *Cultures NGTD  *PCP stain negative, TMP-SMX and steroids thus stopped 4/20  *Cyto neg    Recommendations:  - Now weaned to NC 4L. Cont weaning.   - Cont with mobility/PT   - encourage intermittent proning  - otherwise continue supportive care for COVID-19 related respiratory failure.   - recommend trial of 4LNC from 6LNC  - If fails or plateaus, we will consider a repeat CT with steroids

## 2020-04-26 LAB
ALBUMIN SERPL ELPH-MCNC: 2.5 G/DL — LOW (ref 3.3–5)
ALP SERPL-CCNC: 48 U/L — SIGNIFICANT CHANGE UP (ref 40–120)
ALT FLD-CCNC: 39 U/L — SIGNIFICANT CHANGE UP (ref 10–45)
ANION GAP SERPL CALC-SCNC: 13 MMOL/L — SIGNIFICANT CHANGE UP (ref 5–17)
AST SERPL-CCNC: 27 U/L — SIGNIFICANT CHANGE UP (ref 10–40)
BASOPHILS # BLD AUTO: 0.02 K/UL — SIGNIFICANT CHANGE UP (ref 0–0.2)
BASOPHILS NFR BLD AUTO: 0.3 % — SIGNIFICANT CHANGE UP (ref 0–2)
BILIRUB SERPL-MCNC: 0.4 MG/DL — SIGNIFICANT CHANGE UP (ref 0.2–1.2)
BUN SERPL-MCNC: 16 MG/DL — SIGNIFICANT CHANGE UP (ref 7–23)
CALCIUM SERPL-MCNC: 8.9 MG/DL — SIGNIFICANT CHANGE UP (ref 8.4–10.5)
CHLORIDE SERPL-SCNC: 100 MMOL/L — SIGNIFICANT CHANGE UP (ref 96–108)
CO2 SERPL-SCNC: 25 MMOL/L — SIGNIFICANT CHANGE UP (ref 22–31)
CREAT SERPL-MCNC: 0.69 MG/DL — SIGNIFICANT CHANGE UP (ref 0.5–1.3)
CRP SERPL-MCNC: 5.19 MG/DL — HIGH (ref 0–0.4)
D DIMER BLD IA.RAPID-MCNC: 482 NG/ML DDU — HIGH
EOSINOPHIL # BLD AUTO: 0.61 K/UL — HIGH (ref 0–0.5)
EOSINOPHIL NFR BLD AUTO: 10.6 % — HIGH (ref 0–6)
FERRITIN SERPL-MCNC: 850 NG/ML — HIGH (ref 30–400)
GLUCOSE SERPL-MCNC: 101 MG/DL — HIGH (ref 70–99)
HCT VFR BLD CALC: 43.2 % — SIGNIFICANT CHANGE UP (ref 39–50)
HGB BLD-MCNC: 14.5 G/DL — SIGNIFICANT CHANGE UP (ref 13–17)
IMM GRANULOCYTES NFR BLD AUTO: 0.9 % — SIGNIFICANT CHANGE UP (ref 0–1.5)
LYMPHOCYTES # BLD AUTO: 0.69 K/UL — LOW (ref 1–3.3)
LYMPHOCYTES # BLD AUTO: 11.9 % — LOW (ref 13–44)
MAGNESIUM SERPL-MCNC: 1.8 MG/DL — SIGNIFICANT CHANGE UP (ref 1.6–2.6)
MCHC RBC-ENTMCNC: 29.4 PG — SIGNIFICANT CHANGE UP (ref 27–34)
MCHC RBC-ENTMCNC: 33.6 GM/DL — SIGNIFICANT CHANGE UP (ref 32–36)
MCV RBC AUTO: 87.6 FL — SIGNIFICANT CHANGE UP (ref 80–100)
MONOCYTES # BLD AUTO: 0.85 K/UL — SIGNIFICANT CHANGE UP (ref 0–0.9)
MONOCYTES NFR BLD AUTO: 14.7 % — HIGH (ref 2–14)
NEUTROPHILS # BLD AUTO: 3.56 K/UL — SIGNIFICANT CHANGE UP (ref 1.8–7.4)
NEUTROPHILS NFR BLD AUTO: 61.6 % — SIGNIFICANT CHANGE UP (ref 43–77)
NRBC # BLD: 0 /100 WBCS — SIGNIFICANT CHANGE UP (ref 0–0)
PHOSPHATE SERPL-MCNC: 2.3 MG/DL — LOW (ref 2.5–4.5)
PLATELET # BLD AUTO: 147 K/UL — LOW (ref 150–400)
POTASSIUM SERPL-MCNC: 4 MMOL/L — SIGNIFICANT CHANGE UP (ref 3.5–5.3)
POTASSIUM SERPL-SCNC: 4 MMOL/L — SIGNIFICANT CHANGE UP (ref 3.5–5.3)
PROT SERPL-MCNC: 6.1 G/DL — SIGNIFICANT CHANGE UP (ref 6–8.3)
RBC # BLD: 4.93 M/UL — SIGNIFICANT CHANGE UP (ref 4.2–5.8)
RBC # FLD: 14.7 % — HIGH (ref 10.3–14.5)
SODIUM SERPL-SCNC: 138 MMOL/L — SIGNIFICANT CHANGE UP (ref 135–145)
WBC # BLD: 5.78 K/UL — SIGNIFICANT CHANGE UP (ref 3.8–10.5)
WBC # FLD AUTO: 5.78 K/UL — SIGNIFICANT CHANGE UP (ref 3.8–10.5)

## 2020-04-26 PROCEDURE — 99233 SBSQ HOSP IP/OBS HIGH 50: CPT | Mod: CS

## 2020-04-26 PROCEDURE — 71275 CT ANGIOGRAPHY CHEST: CPT | Mod: 26,CS

## 2020-04-26 RX ADMIN — Medication 8 MILLIGRAM(S): at 16:33

## 2020-04-26 RX ADMIN — Medication 1 DROP(S): at 17:06

## 2020-04-26 RX ADMIN — VALACYCLOVIR 500 MILLIGRAM(S): 500 TABLET, FILM COATED ORAL at 14:19

## 2020-04-26 RX ADMIN — Medication 8 MILLIGRAM(S): at 06:18

## 2020-04-26 RX ADMIN — ETRAVIRINE 200 MILLIGRAM(S): 200 TABLET ORAL at 16:44

## 2020-04-26 RX ADMIN — RITONAVIR 100 MILLIGRAM(S): 100 TABLET, FILM COATED ORAL at 17:06

## 2020-04-26 RX ADMIN — RALTEGRAVIR 400 MILLIGRAM(S): 400 TABLET, FILM COATED ORAL at 14:18

## 2020-04-26 RX ADMIN — DARUNAVIR 600 MILLIGRAM(S): 75 TABLET, FILM COATED ORAL at 14:18

## 2020-04-26 RX ADMIN — EMTRICITABINE AND TENOFOVIR DISOPROXIL FUMARATE 1 TABLET(S): 200; 300 TABLET, FILM COATED ORAL at 14:19

## 2020-04-26 RX ADMIN — RALTEGRAVIR 400 MILLIGRAM(S): 400 TABLET, FILM COATED ORAL at 17:06

## 2020-04-26 RX ADMIN — Medication 125 MICROGRAM(S): at 06:18

## 2020-04-26 RX ADMIN — ROSUVASTATIN CALCIUM 20 MILLIGRAM(S): 5 TABLET ORAL at 22:10

## 2020-04-26 RX ADMIN — FAMOTIDINE 20 MILLIGRAM(S): 10 INJECTION INTRAVENOUS at 14:19

## 2020-04-26 RX ADMIN — Medication 3 MILLIGRAM(S): at 22:10

## 2020-04-26 RX ADMIN — Medication 1 SPRAY(S): at 16:32

## 2020-04-26 RX ADMIN — Medication 1 SPRAY(S): at 06:19

## 2020-04-26 RX ADMIN — Medication 1 TABLET(S): at 14:19

## 2020-04-26 RX ADMIN — ENOXAPARIN SODIUM 110 MILLIGRAM(S): 100 INJECTION SUBCUTANEOUS at 22:53

## 2020-04-26 RX ADMIN — RITONAVIR 100 MILLIGRAM(S): 100 TABLET, FILM COATED ORAL at 14:18

## 2020-04-26 RX ADMIN — DARUNAVIR 600 MILLIGRAM(S): 75 TABLET, FILM COATED ORAL at 17:06

## 2020-04-26 RX ADMIN — LACTOBACILLUS ACIDOPH-L.BULGARICUS 1 MILLION CELL CHEWABLE TABLET 1 TABLET(S): at 14:19

## 2020-04-26 RX ADMIN — TAMSULOSIN HYDROCHLORIDE 0.4 MILLIGRAM(S): 0.4 CAPSULE ORAL at 22:10

## 2020-04-26 RX ADMIN — ETRAVIRINE 200 MILLIGRAM(S): 200 TABLET ORAL at 14:19

## 2020-04-26 RX ADMIN — Medication 1 DROP(S): at 06:18

## 2020-04-26 NOTE — PROGRESS NOTE ADULT - SUBJECTIVE AND OBJECTIVE BOX
OVERNIGHT EVENTS: n/a    SUBJECTIVE / INTERVAL HPI: Patient seen and examined at bedside. He feels good this AM. He denies SOB, CP, abd. pain, N/V, diarrhea.    MEDICATIONS  (STANDING):  artificial tears (preservative free) Ophthalmic Solution 1 Drop(s) Both EYES two times a day  darunavir 600 milliGRAM(s) Oral <User Schedule>  dextrose 5%. 1000 milliLiter(s) (50 mL/Hr) IV Continuous <Continuous>  dextrose 50% Injectable 12.5 Gram(s) IV Push once  dextrose 50% Injectable 25 Gram(s) IV Push once  dextrose 50% Injectable 25 Gram(s) IV Push once  emtricitabine 200 mG/tenofovir alafenamide 25 mG (DESCOVY) Tablet 1 Tablet(s) Oral daily  enoxaparin Injectable 110 milliGRAM(s) SubCutaneous every 24 hours  etravirine 200 milliGRAM(s) Oral <User Schedule>  famotidine    Tablet 20 milliGRAM(s) Oral daily  influenza   Vaccine 0.5 milliLiter(s) IntraMuscular once  lactobacillus acidophilus and bulgaricus Chewable 1 Tablet(s) Chew daily  levothyroxine 125 MICROGram(s) Oral daily  loperamide Suspension 8 milliGRAM(s) Oral two times a day  melatonin 3 milliGRAM(s) Oral at bedtime  potassium acid phosphate/sodium acid phosphate tablet (K-PHOS No. 2) 1 Tablet(s) Oral three times a day with meals  raltegravir Tablet 400 milliGRAM(s) Oral <User Schedule>  ritonavir Tablet 100 milliGRAM(s) Oral <User Schedule>  rosuvastatin 20 milliGRAM(s) Oral at bedtime  sodium chloride 0.65% Nasal 1 Spray(s) Both Nostrils two times a day  tamsulosin 0.4 milliGRAM(s) Oral at bedtime  valACYclovir 500 milliGRAM(s) Oral two times a day    MEDICATIONS  (PRN):  acetaminophen   Tablet .. 975 milliGRAM(s) Oral every 8 hours PRN Temp greater or equal to 38C (100.4F)  ALBUTerol    90 MICROgram(s) HFA Inhaler 2 Puff(s) Inhalation every 6 hours PRN Shortness of Breath  ALPRAZolam 0.25 milliGRAM(s) Oral at bedtime PRN Anxiety  dextrose 40% Gel 15 Gram(s) Oral once PRN Blood Glucose LESS THAN 70 milliGRAM(s)/deciliter  glucagon  Injectable 1 milliGRAM(s) IntraMuscular once PRN Glucose LESS THAN 70 milligrams/deciliter    Allergies    No Known Allergies    Intolerances        VITAL SIGNS:  Vital Signs Last 24 Hrs  T(C): 36.8 (26 Apr 2020 05:49), Max: 36.8 (26 Apr 2020 05:49)  T(F): 98.2 (26 Apr 2020 05:49), Max: 98.2 (26 Apr 2020 05:49)  HR: 81 (26 Apr 2020 05:49) (74 - 81)  BP: 116/66 (26 Apr 2020 05:49) (116/66 - 119/81)  BP(mean): --  RR: 18 (26 Apr 2020 05:49) (18 - 18)  SpO2: 94% (26 Apr 2020 05:49) (93% - 94%)        PHYSICAL EXAM:  General: NAD, on 6L NC SpO2 94%.   HEENT: NC/AT, anicteric sclera, MMM  Neck: supple  Cardiovascular: +S1/S2, RRR, No murmurs, rubs, gallops  Respiratory: normal work of breathing, no use of accessory muscles  Gastrointestinal: soft, NT/ND, +BSx4  Extremities: WWP, no edema, clubbing or cyanosis  Vascular: 2+ radial, DP/PT pulses B/L  Neurological: AAOx3, no focal deficits      LABS:                        14.5   5.78  )-----------( 147      ( 26 Apr 2020 07:18 )             43.2     04-26    138  |  100  |  16  ----------------------------<  101<H>  4.0   |  25  |  0.69    Ca    8.9      26 Apr 2020 07:16  Phos  2.3     04-26  Mg     1.8     04-26    TPro  6.1  /  Alb  2.5<L>  /  TBili  0.4  /  DBili  x   /  AST  27  /  ALT  39  /  AlkPhos  48  04-26        CAPILLARY BLOOD GLUCOSE              RADIOLOGY & ADDITIONAL TESTS: Reviewed.

## 2020-04-26 NOTE — PROGRESS NOTE ADULT - ATTENDING COMMENTS
24Hr Events: None  C/o persistent SOB/MENDEZ, fatigue otherwise ROS neg  Sat'ing well on 4 L, lung exam unchanged  Ddimer decreased today  59yoM w/ h/o (CD4 103, VL undetectable on HAART), tonsilar CA (s/p CRT and RT), BPH, HLD, hypothyroidism, sleep apnea admitted to 3/30-4/8 for COVID who returned 4/11 for acute hypoxic respiratory failure requiring ETT now extubated and being weaned off O2.  S/p tx for COVID, wean O2 as tolerated  Awaiting CTA for elevated ddimer and persistent hypoxia  Continue HAART  Anticipate home w/ O2 when stable

## 2020-04-26 NOTE — PROGRESS NOTE ADULT - PROBLEM SELECTOR PROBLEM 6
BPH (benign prostatic hyperplasia)
BPH (benign prostatic hyperplasia)
HLD (hyperlipidemia)
Hyperlipidemia, unspecified hyperlipidemia type
Hypothyroid
Hypothyroid
HIV disease
Hyperlipidemia, unspecified hyperlipidemia type
HIV disease
HLD (hyperlipidemia)
HLD (hyperlipidemia)

## 2020-04-26 NOTE — PROGRESS NOTE ADULT - PROBLEM SELECTOR PROBLEM 7
History of BPH
LEONEL (acute kidney injury)
LEONEL (acute kidney injury)
Nutrition, metabolism, and development symptoms
HLD (hyperlipidemia)
Nutrition, metabolism, and development symptoms
HLD (hyperlipidemia)
History of BPH
History of BPH

## 2020-04-26 NOTE — PROGRESS NOTE ADULT - PROBLEM SELECTOR PLAN 7
F- None  E- replete as needed (Phos 2.3 this AM, will replete)  N- regular diet    DVT ppx: lovenox   GI ppx: pepcid daily    DISPO: home most likely Monday possibly w/ O2

## 2020-04-26 NOTE — PROGRESS NOTE ADULT - NSHPATTENDINGPLANDISCUSS_GEN_ALL_CORE
house staff
MICU team and patient
MICU team
Dr. Justin Sultana
housestaff
Dr. Kalyan Cronin
HS
house staff
HS
HS
the patient and MICU team

## 2020-04-26 NOTE — PROGRESS NOTE ADULT - PROBLEM SELECTOR PLAN 1
Intubated briefly 4/17 overnight for bronchoscopy. Extubated 4/18. Bronch with inflammation. No culture data. Now on 6L NC with SpO2 94% this AM. Concern for early stages of lung fibrotic disease.   - Oxygen sat goal >88%, wean as tolerated  - C/w incentive spirometry  - CRP 1.23->4.10->8.88->9.24->6.30->5.19 (today w/ slight downtrend), D-dimer 405->679->667->569->1959->482 (downtrending from yesterday), Ferritin 672->804->904->1064->1006->850(today)   - awaiting CTA to r/o PE since D-Dimer spiked up yesterday  - Pulm following appreciate recs. -> for possible need of steroids and/or therapeutic AC  - COVID management as below

## 2020-04-26 NOTE — PROGRESS NOTE ADULT - ASSESSMENT
59 M PMH of HIV (CD4 103, VL undetectable in March 2020 on HAART), tonsilar CA (s/p CRT and RT), BPH, HLD, hypothyroidism, sleep apnea, COVID+, recently admitted on 3/30 at [facility], transferred to [facility] on 4/8. Presenting back 4/11 with hypoxic respiratory failure requiring nonrebreather->reintubation 4/17 s/p bronch -> extubation 4/18, treated for suspected HAP vs PCP and questionable early stage pulm fibrosis, now weaned to 6L NC w/ SPO2 94% this AM.

## 2020-04-27 LAB
ALBUMIN SERPL ELPH-MCNC: 2.8 G/DL — LOW (ref 3.3–5)
ALP SERPL-CCNC: 47 U/L — SIGNIFICANT CHANGE UP (ref 40–120)
ALT FLD-CCNC: 35 U/L — SIGNIFICANT CHANGE UP (ref 10–45)
ANION GAP SERPL CALC-SCNC: 11 MMOL/L — SIGNIFICANT CHANGE UP (ref 5–17)
APPEARANCE UR: CLEAR — SIGNIFICANT CHANGE UP
AST SERPL-CCNC: 29 U/L — SIGNIFICANT CHANGE UP (ref 10–40)
BASOPHILS # BLD AUTO: 0.02 K/UL — SIGNIFICANT CHANGE UP (ref 0–0.2)
BASOPHILS NFR BLD AUTO: 0.3 % — SIGNIFICANT CHANGE UP (ref 0–2)
BILIRUB SERPL-MCNC: 0.4 MG/DL — SIGNIFICANT CHANGE UP (ref 0.2–1.2)
BILIRUB UR-MCNC: NEGATIVE — SIGNIFICANT CHANGE UP
BUN SERPL-MCNC: 14 MG/DL — SIGNIFICANT CHANGE UP (ref 7–23)
CALCIUM SERPL-MCNC: 9 MG/DL — SIGNIFICANT CHANGE UP (ref 8.4–10.5)
CHLORIDE SERPL-SCNC: 101 MMOL/L — SIGNIFICANT CHANGE UP (ref 96–108)
CO2 SERPL-SCNC: 25 MMOL/L — SIGNIFICANT CHANGE UP (ref 22–31)
COLOR SPEC: YELLOW — SIGNIFICANT CHANGE UP
CREAT SERPL-MCNC: 0.67 MG/DL — SIGNIFICANT CHANGE UP (ref 0.5–1.3)
CRP SERPL-MCNC: 8.24 MG/DL — HIGH (ref 0–0.4)
D DIMER BLD IA.RAPID-MCNC: 510 NG/ML DDU — HIGH
DIFF PNL FLD: ABNORMAL
EOSINOPHIL # BLD AUTO: 0.55 K/UL — HIGH (ref 0–0.5)
EOSINOPHIL NFR BLD AUTO: 8.4 % — HIGH (ref 0–6)
FERRITIN SERPL-MCNC: 850 NG/ML — HIGH (ref 30–400)
GLUCOSE SERPL-MCNC: 112 MG/DL — HIGH (ref 70–99)
GLUCOSE UR QL: NEGATIVE — SIGNIFICANT CHANGE UP
HCT VFR BLD CALC: 41.4 % — SIGNIFICANT CHANGE UP (ref 39–50)
HGB BLD-MCNC: 13.5 G/DL — SIGNIFICANT CHANGE UP (ref 13–17)
IMM GRANULOCYTES NFR BLD AUTO: 0.8 % — SIGNIFICANT CHANGE UP (ref 0–1.5)
KETONES UR-MCNC: NEGATIVE — SIGNIFICANT CHANGE UP
LEUKOCYTE ESTERASE UR-ACNC: ABNORMAL
LYMPHOCYTES # BLD AUTO: 0.59 K/UL — LOW (ref 1–3.3)
LYMPHOCYTES # BLD AUTO: 9 % — LOW (ref 13–44)
MCHC RBC-ENTMCNC: 28.4 PG — SIGNIFICANT CHANGE UP (ref 27–34)
MCHC RBC-ENTMCNC: 32.6 GM/DL — SIGNIFICANT CHANGE UP (ref 32–36)
MCV RBC AUTO: 87.2 FL — SIGNIFICANT CHANGE UP (ref 80–100)
MONOCYTES # BLD AUTO: 1.05 K/UL — HIGH (ref 0–0.9)
MONOCYTES NFR BLD AUTO: 16 % — HIGH (ref 2–14)
NEUTROPHILS # BLD AUTO: 4.29 K/UL — SIGNIFICANT CHANGE UP (ref 1.8–7.4)
NEUTROPHILS NFR BLD AUTO: 65.5 % — SIGNIFICANT CHANGE UP (ref 43–77)
NITRITE UR-MCNC: NEGATIVE — SIGNIFICANT CHANGE UP
NRBC # BLD: 0 /100 WBCS — SIGNIFICANT CHANGE UP (ref 0–0)
PH UR: 7.5 — SIGNIFICANT CHANGE UP (ref 5–8)
PHOSPHATE SERPL-MCNC: 2.6 MG/DL — SIGNIFICANT CHANGE UP (ref 2.5–4.5)
PLATELET # BLD AUTO: 161 K/UL — SIGNIFICANT CHANGE UP (ref 150–400)
POTASSIUM SERPL-MCNC: 4 MMOL/L — SIGNIFICANT CHANGE UP (ref 3.5–5.3)
POTASSIUM SERPL-SCNC: 4 MMOL/L — SIGNIFICANT CHANGE UP (ref 3.5–5.3)
PROT SERPL-MCNC: 6 G/DL — SIGNIFICANT CHANGE UP (ref 6–8.3)
PROT UR-MCNC: NEGATIVE MG/DL — SIGNIFICANT CHANGE UP
RBC # BLD: 4.75 M/UL — SIGNIFICANT CHANGE UP (ref 4.2–5.8)
RBC # FLD: 14.5 % — SIGNIFICANT CHANGE UP (ref 10.3–14.5)
SODIUM SERPL-SCNC: 137 MMOL/L — SIGNIFICANT CHANGE UP (ref 135–145)
SP GR SPEC: 1.01 — SIGNIFICANT CHANGE UP (ref 1–1.03)
UROBILINOGEN FLD QL: 0.2 E.U./DL — SIGNIFICANT CHANGE UP
WBC # BLD: 6.55 K/UL — SIGNIFICANT CHANGE UP (ref 3.8–10.5)
WBC # FLD AUTO: 6.55 K/UL — SIGNIFICANT CHANGE UP (ref 3.8–10.5)

## 2020-04-27 PROCEDURE — 71045 X-RAY EXAM CHEST 1 VIEW: CPT | Mod: 26

## 2020-04-27 PROCEDURE — 99232 SBSQ HOSP IP/OBS MODERATE 35: CPT | Mod: CS

## 2020-04-27 PROCEDURE — 99233 SBSQ HOSP IP/OBS HIGH 50: CPT | Mod: CS,GC

## 2020-04-27 RX ORDER — ENOXAPARIN SODIUM 100 MG/ML
40 INJECTION SUBCUTANEOUS EVERY 24 HOURS
Refills: 0 | Status: DISCONTINUED | OUTPATIENT
Start: 2020-04-27 | End: 2020-05-01

## 2020-04-27 RX ORDER — VANCOMYCIN HCL 1 G
1000 VIAL (EA) INTRAVENOUS EVERY 12 HOURS
Refills: 0 | Status: DISCONTINUED | OUTPATIENT
Start: 2020-04-27 | End: 2020-04-27

## 2020-04-27 RX ORDER — PIPERACILLIN AND TAZOBACTAM 4; .5 G/20ML; G/20ML
4.5 INJECTION, POWDER, LYOPHILIZED, FOR SOLUTION INTRAVENOUS EVERY 6 HOURS
Refills: 0 | Status: DISCONTINUED | OUTPATIENT
Start: 2020-04-27 | End: 2020-04-27

## 2020-04-27 RX ADMIN — Medication 1 DROP(S): at 18:29

## 2020-04-27 RX ADMIN — LACTOBACILLUS ACIDOPH-L.BULGARICUS 1 MILLION CELL CHEWABLE TABLET 1 TABLET(S): at 11:21

## 2020-04-27 RX ADMIN — ETRAVIRINE 200 MILLIGRAM(S): 200 TABLET ORAL at 11:05

## 2020-04-27 RX ADMIN — ROSUVASTATIN CALCIUM 20 MILLIGRAM(S): 5 TABLET ORAL at 21:32

## 2020-04-27 RX ADMIN — RALTEGRAVIR 400 MILLIGRAM(S): 400 TABLET, FILM COATED ORAL at 11:06

## 2020-04-27 RX ADMIN — RALTEGRAVIR 400 MILLIGRAM(S): 400 TABLET, FILM COATED ORAL at 18:28

## 2020-04-27 RX ADMIN — Medication 1 SPRAY(S): at 18:32

## 2020-04-27 RX ADMIN — PIPERACILLIN AND TAZOBACTAM 200 GRAM(S): 4; .5 INJECTION, POWDER, LYOPHILIZED, FOR SOLUTION INTRAVENOUS at 11:03

## 2020-04-27 RX ADMIN — RITONAVIR 100 MILLIGRAM(S): 100 TABLET, FILM COATED ORAL at 11:06

## 2020-04-27 RX ADMIN — ETRAVIRINE 200 MILLIGRAM(S): 200 TABLET ORAL at 22:39

## 2020-04-27 RX ADMIN — Medication 3 MILLIGRAM(S): at 21:32

## 2020-04-27 RX ADMIN — Medication 8 MILLIGRAM(S): at 05:39

## 2020-04-27 RX ADMIN — ENOXAPARIN SODIUM 40 MILLIGRAM(S): 100 INJECTION SUBCUTANEOUS at 21:32

## 2020-04-27 RX ADMIN — Medication 125 MICROGRAM(S): at 05:36

## 2020-04-27 RX ADMIN — FAMOTIDINE 20 MILLIGRAM(S): 10 INJECTION INTRAVENOUS at 11:05

## 2020-04-27 RX ADMIN — DARUNAVIR 600 MILLIGRAM(S): 75 TABLET, FILM COATED ORAL at 18:29

## 2020-04-27 RX ADMIN — Medication 250 MILLIGRAM(S): at 05:37

## 2020-04-27 RX ADMIN — EMTRICITABINE AND TENOFOVIR DISOPROXIL FUMARATE 1 TABLET(S): 200; 300 TABLET, FILM COATED ORAL at 13:22

## 2020-04-27 RX ADMIN — Medication 8 MILLIGRAM(S): at 18:31

## 2020-04-27 RX ADMIN — RITONAVIR 100 MILLIGRAM(S): 100 TABLET, FILM COATED ORAL at 18:30

## 2020-04-27 RX ADMIN — TAMSULOSIN HYDROCHLORIDE 0.4 MILLIGRAM(S): 0.4 CAPSULE ORAL at 22:40

## 2020-04-27 RX ADMIN — Medication 1 DROP(S): at 05:34

## 2020-04-27 RX ADMIN — VALACYCLOVIR 500 MILLIGRAM(S): 500 TABLET, FILM COATED ORAL at 11:06

## 2020-04-27 RX ADMIN — Medication 975 MILLIGRAM(S): at 03:22

## 2020-04-27 RX ADMIN — PIPERACILLIN AND TAZOBACTAM 200 GRAM(S): 4; .5 INJECTION, POWDER, LYOPHILIZED, FOR SOLUTION INTRAVENOUS at 04:28

## 2020-04-27 RX ADMIN — Medication 1 SPRAY(S): at 08:54

## 2020-04-27 RX ADMIN — DARUNAVIR 600 MILLIGRAM(S): 75 TABLET, FILM COATED ORAL at 11:04

## 2020-04-27 NOTE — PROGRESS NOTE ADULT - ASSESSMENT
60yo man w/ HIV on ART, COVID pneumonia; pulmonary consulted for progression of lung infiltrates and of concern for early fibrotic lung dz vs. superimposed bacterial PNA vs. PCP PNA. He is status post bronchoscopy and BAL on 4/17 and has since been extubated with overall improvement in his acute hypoxemic respiratory failure, now on COVID telemetry unit.  Patient developed fever of 101.3F overnight, otherwise feels good. Denies headache, CP, SOB, cough, abdominal pain, N/V/D.

## 2020-04-27 NOTE — PROGRESS NOTE ADULT - PROBLEM SELECTOR PLAN 1
Patient is saturating 94% on 4 L this AM  S/p bronchoscopy w/ BAL 4/17 which was unrevealing for infectious or inflammatory etiologies.    *Cultures NGTD, new blood cultures were collected overnight, will f/u  *PCP stain negative, TMP-SMX and steroids thus stopped 4/20  *Cyto neg    Recommendations:  - Now weaned to NC 4L. Cont weaning.   - Cont with mobility/PT   - encourage intermittent proning  - patient was given Vanco/Zosyn this AM for fever, but will hold for now, since no leukocytosis, CXR is unchanged  - otherwise continue supportive care for COVID-19 related respiratory failure.

## 2020-04-27 NOTE — PROGRESS NOTE ADULT - SUBJECTIVE AND OBJECTIVE BOX
OVERNIGHT EVENTS: fever of 101.3    SUBJECTIVE / INTERVAL HPI: Patient seen and examined at bedside. Patient c/o nasal congestion otherwise he feels good and asks when he can go home. No SOB, CP, abd.pain, N/V/D.  He ambulates to a bathroom and denies SOB while walking.    MEDICATIONS  (STANDING):  artificial tears (preservative free) Ophthalmic Solution 1 Drop(s) Both EYES two times a day  darunavir 600 milliGRAM(s) Oral <User Schedule>  dextrose 5%. 1000 milliLiter(s) (50 mL/Hr) IV Continuous <Continuous>  dextrose 50% Injectable 12.5 Gram(s) IV Push once  dextrose 50% Injectable 25 Gram(s) IV Push once  dextrose 50% Injectable 25 Gram(s) IV Push once  emtricitabine 200 mG/tenofovir alafenamide 25 mG (DESCOVY) Tablet 1 Tablet(s) Oral daily  enoxaparin Injectable 110 milliGRAM(s) SubCutaneous every 24 hours  etravirine 200 milliGRAM(s) Oral <User Schedule>  famotidine    Tablet 20 milliGRAM(s) Oral daily  influenza   Vaccine 0.5 milliLiter(s) IntraMuscular once  lactobacillus acidophilus and bulgaricus Chewable 1 Tablet(s) Chew daily  levothyroxine 125 MICROGram(s) Oral daily  loperamide Suspension 8 milliGRAM(s) Oral two times a day  melatonin 3 milliGRAM(s) Oral at bedtime  piperacillin/tazobactam IVPB.. 4.5 Gram(s) IV Intermittent every 6 hours  raltegravir Tablet 400 milliGRAM(s) Oral <User Schedule>  ritonavir Tablet 100 milliGRAM(s) Oral <User Schedule>  rosuvastatin 20 milliGRAM(s) Oral at bedtime  sodium chloride 0.65% Nasal 1 Spray(s) Both Nostrils two times a day  tamsulosin 0.4 milliGRAM(s) Oral at bedtime  valACYclovir 500 milliGRAM(s) Oral two times a day  vancomycin  IVPB 1000 milliGRAM(s) IV Intermittent every 12 hours    MEDICATIONS  (PRN):  acetaminophen   Tablet .. 975 milliGRAM(s) Oral every 8 hours PRN Temp greater or equal to 38C (100.4F)  ALBUTerol    90 MICROgram(s) HFA Inhaler 2 Puff(s) Inhalation every 6 hours PRN Shortness of Breath  ALPRAZolam 0.25 milliGRAM(s) Oral at bedtime PRN Anxiety  dextrose 40% Gel 15 Gram(s) Oral once PRN Blood Glucose LESS THAN 70 milliGRAM(s)/deciliter  glucagon  Injectable 1 milliGRAM(s) IntraMuscular once PRN Glucose LESS THAN 70 milligrams/deciliter    Allergies    No Known Allergies    Intolerances        VITAL SIGNS:  Vital Signs Last 24 Hrs  T(C): 36.7 (27 Apr 2020 06:59), Max: 38.5 (27 Apr 2020 03:09)  T(F): 98.1 (27 Apr 2020 06:59), Max: 101.3 (27 Apr 2020 03:09)  HR: 76 (27 Apr 2020 06:59) (76 - 81)  BP: 103/67 (27 Apr 2020 07:30) (95/58 - 111/67)  BP(mean): --  RR: 18 (27 Apr 2020 06:59) (18 - 20)  SpO2: 95% (27 Apr 2020 06:59) (88% - 95%)      04-26-20 @ 07:01  -  04-27-20 @ 07:00  --------------------------------------------------------  IN: 0 mL / OUT: 600 mL / NET: -600 mL        PHYSICAL EXAM:  General: NAD, Laying comfortably in bed, saturating 94% on 4L  HEENT: NC/AT  Neck: supple  Cardiovascular: +S1/S2, RRR, No murmurs, rubs, gallops  Respiratory: CTA B/L, no W/R/R  Gastrointestinal: soft, NT/ND, +BSx4  Extremities: WWP, no edema, clubbing or cyanosis  Vascular: 2+ radial, DP/PT pulses B/L  Neurological: AAOx3, no focal deficits      LABS:                        13.5   6.55  )-----------( 161      ( 27 Apr 2020 06:03 )             41.4     04-27    137  |  101  |  14  ----------------------------<  112<H>  4.0   |  25  |  0.67    Ca    9.0      27 Apr 2020 06:03  Phos  2.6     04-27  Mg     1.8     04-26    TPro  6.0  /  Alb  2.8<L>  /  TBili  0.4  /  DBili  x   /  AST  29  /  ALT  35  /  AlkPhos  47  04-27        CAPILLARY BLOOD GLUCOSE              RADIOLOGY & ADDITIONAL TESTS: Reviewed.

## 2020-04-27 NOTE — PROGRESS NOTE ADULT - PROBLEM SELECTOR PLAN 1
Extubated to facemask NRB 4/19 and xfer to telemetry/SDU. Conversive in full sentences and normoxic at rest.   S/p bronchoscopy w/ BAL 4/17 which was unrevealing for infectious or inflammatory etiologies.    *Cultures NGTD  *PCP stain negative, TMP-SMX and steroids thus stopped 4/20  *Cyto neg    Recommendations:  - Now weaned to NC 4L. Cont weaning.   - Cont with mobility/PT   - encourage intermittent proning  - otherwise continue supportive care for COVID-19 related respiratory failure.   - recommend trial of 4LNC from 6LNC  - If fails or plateaus, we will consider a repeat CT with steroids Extubated to facemask NRB 4/19 and xfer to telemetry/SDU. Conversive in full sentences and normoxic at rest.   S/p bronchoscopy w/ BAL 4/17 which was unrevealing for infectious or inflammatory etiologies.    *Cultures NGTD  *PCP stain negative, TMP-SMX and steroids thus stopped 4/20  *Cyto neg    Recommendations:  - Now weaned to NC 4L. Cont weaning.   - Cont with mobility/PT   - encourage intermittent proning  - otherwise continue supportive care for COVID-19 related respiratory failure.

## 2020-04-27 NOTE — PROGRESS NOTE ADULT - ATTENDING COMMENTS
Reviewed the parenchyma on his scan from yesterday, which looks improved from 10 days prior. He is down to 4L. He had a fever with no new infiltrate on CXR; culture as per a usual fever workup.

## 2020-04-27 NOTE — PROGRESS NOTE ADULT - SUBJECTIVE AND OBJECTIVE BOX
PULMONARY CONSULT SERVICE FOLLOW-UP NOTE    INTERVAL HPI:  Reviewed chart and overnight events; patient seen and examined at bedside.    MEDICATIONS:  Pulmonary:  ALBUTerol    90 MICROgram(s) HFA Inhaler 2 Puff(s) Inhalation every 6 hours PRN    Antimicrobials:  darunavir 600 milliGRAM(s) Oral <User Schedule>  emtricitabine 200 mG/tenofovir alafenamide 25 mG (DESCOVY) Tablet 1 Tablet(s) Oral daily  etravirine 200 milliGRAM(s) Oral <User Schedule>  piperacillin/tazobactam IVPB.. 4.5 Gram(s) IV Intermittent every 6 hours  raltegravir Tablet 400 milliGRAM(s) Oral <User Schedule>  ritonavir Tablet 100 milliGRAM(s) Oral <User Schedule>  valACYclovir 500 milliGRAM(s) Oral two times a day  vancomycin  IVPB 1000 milliGRAM(s) IV Intermittent every 12 hours    Anticoagulants:  enoxaparin Injectable 110 milliGRAM(s) SubCutaneous every 24 hours    Cardiac:  tamsulosin 0.4 milliGRAM(s) Oral at bedtime      Allergies    No Known Allergies    Intolerances        Vital Signs Last 24 Hrs  T(C): 36.7 (27 Apr 2020 06:59), Max: 38.5 (27 Apr 2020 03:09)  T(F): 98.1 (27 Apr 2020 06:59), Max: 101.3 (27 Apr 2020 03:09)  HR: 76 (27 Apr 2020 06:59) (76 - 81)  BP: 103/67 (27 Apr 2020 07:30) (95/58 - 111/67)  BP(mean): --  RR: 18 (27 Apr 2020 06:59) (18 - 20)  SpO2: 95% (27 Apr 2020 06:59) (88% - 95%)    04-26 @ 07:01  -  04-27 @ 07:00  --------------------------------------------------------  IN: 0 mL / OUT: 600 mL / NET: -600 mL          PHYSICAL EXAM:  Constitutional: WDWN  HEENT: NC/AT; PERRL, anicteric sclera; MMM  Neck: supple  Cardiovascular: +S1/S2, RRR  Respiratory: CTA B/L; no W/R/R  Gastrointestinal: soft, NT/ND; +BSx4  Extremities: WWP; no edema, clubbing or cyanosis  Vascular: 2+ radial, DP/PT pulses B/L  Neurological: AAOx3; no focal deficits    LABS:      CBC Full  -  ( 27 Apr 2020 06:03 )  WBC Count : 6.55 K/uL  RBC Count : 4.75 M/uL  Hemoglobin : 13.5 g/dL  Hematocrit : 41.4 %  Platelet Count - Automated : 161 K/uL  Mean Cell Volume : 87.2 fl  Mean Cell Hemoglobin : 28.4 pg  Mean Cell Hemoglobin Concentration : 32.6 gm/dL  Auto Neutrophil # : 4.29 K/uL  Auto Lymphocyte # : 0.59 K/uL  Auto Monocyte # : 1.05 K/uL  Auto Eosinophil # : 0.55 K/uL  Auto Basophil # : 0.02 K/uL  Auto Neutrophil % : 65.5 %  Auto Lymphocyte % : 9.0 %  Auto Monocyte % : 16.0 %  Auto Eosinophil % : 8.4 %  Auto Basophil % : 0.3 %    04-27    137  |  101  |  14  ----------------------------<  112<H>  4.0   |  25  |  0.67    Ca    9.0      27 Apr 2020 06:03  Phos  2.6     04-27  Mg     1.8     04-26    TPro  6.0  /  Alb  2.8<L>  /  TBili  0.4  /  DBili  x   /  AST  29  /  ALT  35  /  AlkPhos  47  04-27                      RADIOLOGY & ADDITIONAL STUDIES:

## 2020-04-27 NOTE — PROGRESS NOTE ADULT - PROBLEM SELECTOR PLAN 3
No new recommendations at this point. Will continue to follow along peripherally and chart review for results of bronchoscopy.  Suspect prolonged slow recovery from COVID-19. Fever work up as per primary team.   If patient worsens please call Pulmonology.     The patient was s/e/d with Dr Blood.

## 2020-04-28 DIAGNOSIS — Z85.819 PERSONAL HISTORY OF MALIGNANT NEOPLASM OF UNSPECIFIED SITE OF LIP, ORAL CAVITY, AND PHARYNX: ICD-10-CM

## 2020-04-28 LAB
ALBUMIN SERPL ELPH-MCNC: 2.6 G/DL — LOW (ref 3.3–5)
ALP SERPL-CCNC: 49 U/L — SIGNIFICANT CHANGE UP (ref 40–120)
ALT FLD-CCNC: 33 U/L — SIGNIFICANT CHANGE UP (ref 10–45)
ANION GAP SERPL CALC-SCNC: 12 MMOL/L — SIGNIFICANT CHANGE UP (ref 5–17)
AST SERPL-CCNC: 23 U/L — SIGNIFICANT CHANGE UP (ref 10–40)
BASOPHILS # BLD AUTO: 0.03 K/UL — SIGNIFICANT CHANGE UP (ref 0–0.2)
BASOPHILS NFR BLD AUTO: 0.5 % — SIGNIFICANT CHANGE UP (ref 0–2)
BILIRUB SERPL-MCNC: 0.5 MG/DL — SIGNIFICANT CHANGE UP (ref 0.2–1.2)
BUN SERPL-MCNC: 14 MG/DL — SIGNIFICANT CHANGE UP (ref 7–23)
CALCIUM SERPL-MCNC: 9.3 MG/DL — SIGNIFICANT CHANGE UP (ref 8.4–10.5)
CHLORIDE SERPL-SCNC: 101 MMOL/L — SIGNIFICANT CHANGE UP (ref 96–108)
CO2 SERPL-SCNC: 26 MMOL/L — SIGNIFICANT CHANGE UP (ref 22–31)
CREAT SERPL-MCNC: 0.74 MG/DL — SIGNIFICANT CHANGE UP (ref 0.5–1.3)
CRP SERPL-MCNC: 17.65 MG/DL — HIGH (ref 0–0.4)
D DIMER BLD IA.RAPID-MCNC: 539 NG/ML DDU — HIGH
EOSINOPHIL # BLD AUTO: 0.29 K/UL — SIGNIFICANT CHANGE UP (ref 0–0.5)
EOSINOPHIL NFR BLD AUTO: 4.5 % — SIGNIFICANT CHANGE UP (ref 0–6)
FERRITIN SERPL-MCNC: 985 NG/ML — HIGH (ref 30–400)
GLUCOSE BLDC GLUCOMTR-MCNC: 91 MG/DL — SIGNIFICANT CHANGE UP (ref 70–99)
GLUCOSE SERPL-MCNC: 99 MG/DL — SIGNIFICANT CHANGE UP (ref 70–99)
HCT VFR BLD CALC: 41.5 % — SIGNIFICANT CHANGE UP (ref 39–50)
HGB BLD-MCNC: 13.9 G/DL — SIGNIFICANT CHANGE UP (ref 13–17)
IMM GRANULOCYTES NFR BLD AUTO: 1.4 % — SIGNIFICANT CHANGE UP (ref 0–1.5)
LYMPHOCYTES # BLD AUTO: 0.76 K/UL — LOW (ref 1–3.3)
LYMPHOCYTES # BLD AUTO: 11.7 % — LOW (ref 13–44)
MCHC RBC-ENTMCNC: 29 PG — SIGNIFICANT CHANGE UP (ref 27–34)
MCHC RBC-ENTMCNC: 33.5 GM/DL — SIGNIFICANT CHANGE UP (ref 32–36)
MCV RBC AUTO: 86.6 FL — SIGNIFICANT CHANGE UP (ref 80–100)
MONOCYTES # BLD AUTO: 1 K/UL — HIGH (ref 0–0.9)
MONOCYTES NFR BLD AUTO: 15.4 % — HIGH (ref 2–14)
NEUTROPHILS # BLD AUTO: 4.33 K/UL — SIGNIFICANT CHANGE UP (ref 1.8–7.4)
NEUTROPHILS NFR BLD AUTO: 66.5 % — SIGNIFICANT CHANGE UP (ref 43–77)
NRBC # BLD: 0 /100 WBCS — SIGNIFICANT CHANGE UP (ref 0–0)
PLATELET # BLD AUTO: 191 K/UL — SIGNIFICANT CHANGE UP (ref 150–400)
POTASSIUM SERPL-MCNC: 4.2 MMOL/L — SIGNIFICANT CHANGE UP (ref 3.5–5.3)
POTASSIUM SERPL-SCNC: 4.2 MMOL/L — SIGNIFICANT CHANGE UP (ref 3.5–5.3)
PROCALCITONIN SERPL-MCNC: 0.11 NG/ML — HIGH (ref 0.02–0.1)
PROT SERPL-MCNC: 6.6 G/DL — SIGNIFICANT CHANGE UP (ref 6–8.3)
RBC # BLD: 4.79 M/UL — SIGNIFICANT CHANGE UP (ref 4.2–5.8)
RBC # FLD: 14.5 % — SIGNIFICANT CHANGE UP (ref 10.3–14.5)
SODIUM SERPL-SCNC: 139 MMOL/L — SIGNIFICANT CHANGE UP (ref 135–145)
WBC # BLD: 6.5 K/UL — SIGNIFICANT CHANGE UP (ref 3.8–10.5)
WBC # FLD AUTO: 6.5 K/UL — SIGNIFICANT CHANGE UP (ref 3.8–10.5)

## 2020-04-28 PROCEDURE — 99232 SBSQ HOSP IP/OBS MODERATE 35: CPT | Mod: CS

## 2020-04-28 PROCEDURE — 71045 X-RAY EXAM CHEST 1 VIEW: CPT | Mod: 26

## 2020-04-28 PROCEDURE — 99233 SBSQ HOSP IP/OBS HIGH 50: CPT | Mod: CS,GC

## 2020-04-28 RX ORDER — AMPICILLIN SODIUM AND SULBACTAM SODIUM 250; 125 MG/ML; MG/ML
1.5 INJECTION, POWDER, FOR SUSPENSION INTRAMUSCULAR; INTRAVENOUS EVERY 6 HOURS
Refills: 0 | Status: DISCONTINUED | OUTPATIENT
Start: 2020-04-28 | End: 2020-05-01

## 2020-04-28 RX ORDER — AMPICILLIN SODIUM AND SULBACTAM SODIUM 250; 125 MG/ML; MG/ML
1.5 INJECTION, POWDER, FOR SUSPENSION INTRAMUSCULAR; INTRAVENOUS ONCE
Refills: 0 | Status: COMPLETED | OUTPATIENT
Start: 2020-04-28 | End: 2020-04-28

## 2020-04-28 RX ORDER — AMPICILLIN SODIUM AND SULBACTAM SODIUM 250; 125 MG/ML; MG/ML
INJECTION, POWDER, FOR SUSPENSION INTRAMUSCULAR; INTRAVENOUS
Refills: 0 | Status: DISCONTINUED | OUTPATIENT
Start: 2020-04-28 | End: 2020-05-01

## 2020-04-28 RX ADMIN — RALTEGRAVIR 400 MILLIGRAM(S): 400 TABLET, FILM COATED ORAL at 12:17

## 2020-04-28 RX ADMIN — VALACYCLOVIR 500 MILLIGRAM(S): 500 TABLET, FILM COATED ORAL at 00:05

## 2020-04-28 RX ADMIN — FAMOTIDINE 20 MILLIGRAM(S): 10 INJECTION INTRAVENOUS at 12:16

## 2020-04-28 RX ADMIN — Medication 1 SPRAY(S): at 18:37

## 2020-04-28 RX ADMIN — Medication 8 MILLIGRAM(S): at 17:49

## 2020-04-28 RX ADMIN — ETRAVIRINE 200 MILLIGRAM(S): 200 TABLET ORAL at 12:16

## 2020-04-28 RX ADMIN — Medication 8 MILLIGRAM(S): at 06:51

## 2020-04-28 RX ADMIN — TAMSULOSIN HYDROCHLORIDE 0.4 MILLIGRAM(S): 0.4 CAPSULE ORAL at 21:40

## 2020-04-28 RX ADMIN — Medication 3 MILLIGRAM(S): at 21:40

## 2020-04-28 RX ADMIN — AMPICILLIN SODIUM AND SULBACTAM SODIUM 100 GRAM(S): 250; 125 INJECTION, POWDER, FOR SUSPENSION INTRAMUSCULAR; INTRAVENOUS at 23:06

## 2020-04-28 RX ADMIN — ETRAVIRINE 200 MILLIGRAM(S): 200 TABLET ORAL at 17:49

## 2020-04-28 RX ADMIN — ROSUVASTATIN CALCIUM 20 MILLIGRAM(S): 5 TABLET ORAL at 21:39

## 2020-04-28 RX ADMIN — Medication 1 SPRAY(S): at 07:04

## 2020-04-28 RX ADMIN — RITONAVIR 100 MILLIGRAM(S): 100 TABLET, FILM COATED ORAL at 17:49

## 2020-04-28 RX ADMIN — Medication 1 DROP(S): at 07:03

## 2020-04-28 RX ADMIN — Medication 125 MICROGRAM(S): at 06:52

## 2020-04-28 RX ADMIN — AMPICILLIN SODIUM AND SULBACTAM SODIUM 100 GRAM(S): 250; 125 INJECTION, POWDER, FOR SUSPENSION INTRAMUSCULAR; INTRAVENOUS at 18:36

## 2020-04-28 RX ADMIN — EMTRICITABINE AND TENOFOVIR DISOPROXIL FUMARATE 1 TABLET(S): 200; 300 TABLET, FILM COATED ORAL at 12:17

## 2020-04-28 RX ADMIN — RITONAVIR 100 MILLIGRAM(S): 100 TABLET, FILM COATED ORAL at 12:18

## 2020-04-28 RX ADMIN — VALACYCLOVIR 500 MILLIGRAM(S): 500 TABLET, FILM COATED ORAL at 12:17

## 2020-04-28 RX ADMIN — DARUNAVIR 600 MILLIGRAM(S): 75 TABLET, FILM COATED ORAL at 12:17

## 2020-04-28 RX ADMIN — AMPICILLIN SODIUM AND SULBACTAM SODIUM 100 GRAM(S): 250; 125 INJECTION, POWDER, FOR SUSPENSION INTRAMUSCULAR; INTRAVENOUS at 14:05

## 2020-04-28 RX ADMIN — Medication 975 MILLIGRAM(S): at 22:07

## 2020-04-28 RX ADMIN — RALTEGRAVIR 400 MILLIGRAM(S): 400 TABLET, FILM COATED ORAL at 17:49

## 2020-04-28 RX ADMIN — ALBUTEROL 2 PUFF(S): 90 AEROSOL, METERED ORAL at 23:02

## 2020-04-28 RX ADMIN — ENOXAPARIN SODIUM 40 MILLIGRAM(S): 100 INJECTION SUBCUTANEOUS at 21:39

## 2020-04-28 RX ADMIN — VALACYCLOVIR 500 MILLIGRAM(S): 500 TABLET, FILM COATED ORAL at 21:39

## 2020-04-28 RX ADMIN — DARUNAVIR 600 MILLIGRAM(S): 75 TABLET, FILM COATED ORAL at 17:49

## 2020-04-28 RX ADMIN — Medication 1 DROP(S): at 17:48

## 2020-04-28 RX ADMIN — LACTOBACILLUS ACIDOPH-L.BULGARICUS 1 MILLION CELL CHEWABLE TABLET 1 TABLET(S): at 12:17

## 2020-04-28 NOTE — PROGRESS NOTE ADULT - PROBLEM SELECTOR PLAN 4
- Radiation/chemotherapy in the past  - home diet is regular, complaining of chewy cold food that is difficult to swallow  - Will order speech and swallow consult - Radiation/chemotherapy in the past  - home diet is regular, complaining of chewy cold food that is difficult to swallow  - Will order speech and swallow consult  - Soft diet for now

## 2020-04-28 NOTE — PROGRESS NOTE ADULT - PROBLEM SELECTOR PLAN 2
- New respiratory changes  - No fever or WBC count  - Repeat chest xray shows atelectasis vs aspiration PNA  - will add on procalcitonin - No fever or WBC count  - Repeat chest xray shows atelectasis vs aspiration PNA RLL  - procalcitonin normal  - Patient is immunocompromised, will start Unasyn 1.5mg q 6 hours x 7 days.

## 2020-04-28 NOTE — PROGRESS NOTE ADULT - SUBJECTIVE AND OBJECTIVE BOX
PULMONARY CONSULT FOLLOW-UP NOTE    INTERVAL HPI:  Reviewed chart and overnight events; patient seen and examined at bedside.    MEDICATIONS:  Pulmonary:  ALBUTerol    90 MICROgram(s) HFA Inhaler 2 Puff(s) Inhalation every 6 hours PRN    Antimicrobials:  darunavir 600 milliGRAM(s) Oral <User Schedule>  emtricitabine 200 mG/tenofovir alafenamide 25 mG (DESCOVY) Tablet 1 Tablet(s) Oral daily  etravirine 200 milliGRAM(s) Oral <User Schedule>  raltegravir Tablet 400 milliGRAM(s) Oral <User Schedule>  ritonavir Tablet 100 milliGRAM(s) Oral <User Schedule>  valACYclovir 500 milliGRAM(s) Oral two times a day    Anticoagulants:  enoxaparin Injectable 40 milliGRAM(s) SubCutaneous every 24 hours    Cardiac:  tamsulosin 0.4 milliGRAM(s) Oral at bedtime      Allergies    No Known Allergies    Intolerances        Vital Signs Last 24 Hrs  T(C): 36.7 (2020 06:02), Max: 37.8 (2020 20:17)  T(F): 98.1 (2020 06:02), Max: 100.1 (2020 20:17)  HR: 81 (2020 06:02) (81 - 102)  BP: 102/64 (2020 06:02) (102/64 - 121/74)  BP(mean): --  RR: 20 (2020 06:02) (20 - 24)  SpO2: 96% (2020 06:02) (70% - 96%)     @ 07:01  -   @ 07:00  --------------------------------------------------------  IN: 0 mL / OUT: 800 mL / NET: -800 mL          PHYSICAL EXAM:  Constitutional: WDWN  HEENT: NC/AT; PERRL, anicteric sclera; MMM  Neck: supple  Cardiovascular: +S1/S2, RRR  Respiratory: CTA B/L; no W/R/R  Gastrointestinal: soft, NT/ND; +BSx4  Extremities: WWP; no edema, clubbing or cyanosis  Vascular: 2+ radial, DP/PT pulses B/L  Neurological: AAOx3; no focal deficits    LABS:      CBC Full  -  ( 2020 06:49 )  WBC Count : 6.50 K/uL  RBC Count : 4.79 M/uL  Hemoglobin : 13.9 g/dL  Hematocrit : 41.5 %  Platelet Count - Automated : 191 K/uL  Mean Cell Volume : 86.6 fl  Mean Cell Hemoglobin : 29.0 pg  Mean Cell Hemoglobin Concentration : 33.5 gm/dL  Auto Neutrophil # : 4.33 K/uL  Auto Lymphocyte # : 0.76 K/uL  Auto Monocyte # : 1.00 K/uL  Auto Eosinophil # : 0.29 K/uL  Auto Basophil # : 0.03 K/uL  Auto Neutrophil % : 66.5 %  Auto Lymphocyte % : 11.7 %  Auto Monocyte % : 15.4 %  Auto Eosinophil % : 4.5 %  Auto Basophil % : 0.5 %        139  |  101  |  14  ----------------------------<  99  4.2   |  26  |  0.74    Ca    9.3      2020 06:46  Phos  2.6     -    TPro  6.6  /  Alb  2.6<L>  /  TBili  0.5  /  DBili  x   /  AST  23  /  ALT  33  /  AlkPhos  49            Urinalysis Basic - ( 2020 12:51 )    Color: Yellow / Appearance: Clear / S.010 / pH: x  Gluc: x / Ketone: NEGATIVE  / Bili: Negative / Urobili: 0.2 E.U./dL   Blood: x / Protein: NEGATIVE mg/dL / Nitrite: NEGATIVE   Leuk Esterase: Trace / RBC: < 5 /HPF / WBC < 5 /HPF   Sq Epi: x / Non Sq Epi: 0-5 /HPF / Bacteria: Present /HPF                RADIOLOGY & ADDITIONAL STUDIES: PULMONARY CONSULT FOLLOW-UP NOTE    INTERVAL HPI:  Reviewed chart and overnight events; patient seen and examined at bedside. intially on non -rebreather thia morning, now weaned back to 4LPM O2.     MEDICATIONS:  Pulmonary:  ALBUTerol    90 MICROgram(s) HFA Inhaler 2 Puff(s) Inhalation every 6 hours PRN    Antimicrobials:  darunavir 600 milliGRAM(s) Oral <User Schedule>  emtricitabine 200 mG/tenofovir alafenamide 25 mG (DESCOVY) Tablet 1 Tablet(s) Oral daily  etravirine 200 milliGRAM(s) Oral <User Schedule>  raltegravir Tablet 400 milliGRAM(s) Oral <User Schedule>  ritonavir Tablet 100 milliGRAM(s) Oral <User Schedule>  valACYclovir 500 milliGRAM(s) Oral two times a day    Anticoagulants:  enoxaparin Injectable 40 milliGRAM(s) SubCutaneous every 24 hours    Cardiac:  tamsulosin 0.4 milliGRAM(s) Oral at bedtime      Allergies    No Known Allergies    Intolerances        Vital Signs Last 24 Hrs  T(C): 36.7 (2020 06:02), Max: 37.8 (2020 20:17)  T(F): 98.1 (2020 06:02), Max: 100.1 (2020 20:17)  HR: 81 (2020 06:02) (81 - 102)  BP: 102/64 (2020 06:02) (102/64 - 121/74)  BP(mean): --  RR: 20 (2020 06:02) (20 - 24)  SpO2: 96% (2020 06:02) (70% - 96%)     @ 07:01  -   @ 07:00  --------------------------------------------------------  IN: 0 mL / OUT: 800 mL / NET: -800 mL          PHYSICAL EXAM:  Constitutional: WDWN  HEENT: NC/AT; PERRL, anicteric sclera; MMM  Neck: supple  Cardiovascular: +S1/S2, RRR  Respiratory: CTA B/L; no W/R/R  Gastrointestinal: soft, NT/ND; +BSx4  Extremities: WWP; no edema, clubbing or cyanosis  Vascular: 2+ radial, DP/PT pulses B/L  Neurological: AAOx3; no focal deficits    LABS:      CBC Full  -  ( 2020 06:49 )  WBC Count : 6.50 K/uL  RBC Count : 4.79 M/uL  Hemoglobin : 13.9 g/dL  Hematocrit : 41.5 %  Platelet Count - Automated : 191 K/uL  Mean Cell Volume : 86.6 fl  Mean Cell Hemoglobin : 29.0 pg  Mean Cell Hemoglobin Concentration : 33.5 gm/dL  Auto Neutrophil # : 4.33 K/uL  Auto Lymphocyte # : 0.76 K/uL  Auto Monocyte # : 1.00 K/uL  Auto Eosinophil # : 0.29 K/uL  Auto Basophil # : 0.03 K/uL  Auto Neutrophil % : 66.5 %  Auto Lymphocyte % : 11.7 %  Auto Monocyte % : 15.4 %  Auto Eosinophil % : 4.5 %  Auto Basophil % : 0.5 %        139  |  101  |  14  ----------------------------<  99  4.2   |  26  |  0.74    Ca    9.3      2020 06:46  Phos  2.6     04-27    TPro  6.6  /  Alb  2.6<L>  /  TBili  0.5  /  DBili  x   /  AST  23  /  ALT  33  /  AlkPhos  49  04-28          Urinalysis Basic - ( 2020 12:51 )    Color: Yellow / Appearance: Clear / S.010 / pH: x  Gluc: x / Ketone: NEGATIVE  / Bili: Negative / Urobili: 0.2 E.U./dL   Blood: x / Protein: NEGATIVE mg/dL / Nitrite: NEGATIVE   Leuk Esterase: Trace / RBC: < 5 /HPF / WBC < 5 /HPF   Sq Epi: x / Non Sq Epi: 0-5 /HPF / Bacteria: Present /HPF                RADIOLOGY & ADDITIONAL STUDIES:

## 2020-04-28 NOTE — PROGRESS NOTE ADULT - ASSESSMENT
58yo man w/ HIV on ART, COVID pneumonia; pulmonary consulted for progression of lung infiltrates and of concern for early fibrotic lung dz vs. superimposed bacterial PNA vs. PCP PNA. He is status post bronchoscopy and BAL on 4/17 and has since been extubated with overall improvement in his acute hypoxemic respiratory failure, now on COVID telemetry unit. Last night became hypoxic during dinner and transitioned to NRB.

## 2020-04-28 NOTE — PROGRESS NOTE ADULT - SUBJECTIVE AND OBJECTIVE BOX
SUBJECTIVE:  Patient was seen at bedside, eating breakfast  Last night during dinner he was having trouble eating and some SOB asked for O2 to be increased.   On 4L he was 70% they increased to 6L and he was 77% and switched him to NRB.   He has a history of throat cancer with radiation and chemotherapy years ago. On normal diet at home, feels like the food here is cold and chewy and is difficult for him to tolerate.     MEDICATIONS  (STANDING):  artificial tears (preservative free) Ophthalmic Solution 1 Drop(s) Both EYES two times a day  darunavir 600 milliGRAM(s) Oral <User Schedule>  dextrose 5%. 1000 milliLiter(s) (50 mL/Hr) IV Continuous <Continuous>  dextrose 50% Injectable 12.5 Gram(s) IV Push once  dextrose 50% Injectable 25 Gram(s) IV Push once  dextrose 50% Injectable 25 Gram(s) IV Push once  emtricitabine 200 mG/tenofovir alafenamide 25 mG (DESCOVY) Tablet 1 Tablet(s) Oral daily  enoxaparin Injectable 40 milliGRAM(s) SubCutaneous every 24 hours  etravirine 200 milliGRAM(s) Oral <User Schedule>  famotidine    Tablet 20 milliGRAM(s) Oral daily  influenza   Vaccine 0.5 milliLiter(s) IntraMuscular once  lactobacillus acidophilus and bulgaricus Chewable 1 Tablet(s) Chew daily  levothyroxine 125 MICROGram(s) Oral daily  loperamide Suspension 8 milliGRAM(s) Oral two times a day  melatonin 3 milliGRAM(s) Oral at bedtime  raltegravir Tablet 400 milliGRAM(s) Oral <User Schedule>  ritonavir Tablet 100 milliGRAM(s) Oral <User Schedule>  rosuvastatin 20 milliGRAM(s) Oral at bedtime  sodium chloride 0.65% Nasal 1 Spray(s) Both Nostrils two times a day  tamsulosin 0.4 milliGRAM(s) Oral at bedtime  valACYclovir 500 milliGRAM(s) Oral two times a day    MEDICATIONS  (PRN):  acetaminophen   Tablet .. 975 milliGRAM(s) Oral every 8 hours PRN Temp greater or equal to 38C (100.4F)  ALBUTerol    90 MICROgram(s) HFA Inhaler 2 Puff(s) Inhalation every 6 hours PRN Shortness of Breath  dextrose 40% Gel 15 Gram(s) Oral once PRN Blood Glucose LESS THAN 70 milliGRAM(s)/deciliter  glucagon  Injectable 1 milliGRAM(s) IntraMuscular once PRN Glucose LESS THAN 70 milligrams/deciliter      Vital Signs Last 24 Hrs  T(C): 36.7 (2020 06:02), Max: 37.8 (2020 20:17)  T(F): 98.1 (2020 06:02), Max: 100.1 (2020 20:17)  HR: 81 (2020 06:02) (81 - 102)  BP: 102/64 (2020 06:02) (102/64 - 121/74)  BP(mean): --  RR: 20 (2020 06:02) (18 - 24)  SpO2: 96% (2020 06:02) (70% - 96%)    Physical Exam:  General: NAD, resting comfortably in bed, some difficulty speaking at baseline  Pulmonary: Nonlabored breathing, no respiratory distress  Cardiovascular: NSR  Abdominal: soft, NT/ND  Extremities: WWP, normal strength  Neuro: A/O x 3, CNs II-XII grossly intact, no focal deficits, normal motor/sensation    I&O's Summary    2020 07:01  -  2020 07:00  --------------------------------------------------------  IN: 0 mL / OUT: 800 mL / NET: -800 mL        LABS:                        13.9   6.50  )-----------( 191      ( 2020 06:49 )             41.5     28    139  |  101  |  14  ----------------------------<  99  4.2   |  26  |  0.74    Ca    9.3      2020 06:46  Phos  2.6         TPro  6.6  /  Alb  2.6<L>  /  TBili  0.5  /  DBili  x   /  AST  23  /  ALT  33  /  AlkPhos  49  0428      Urinalysis Basic - ( 2020 12:51 )    Color: Yellow / Appearance: Clear / S.010 / pH: x  Gluc: x / Ketone: NEGATIVE  / Bili: Negative / Urobili: 0.2 E.U./dL   Blood: x / Protein: NEGATIVE mg/dL / Nitrite: NEGATIVE   Leuk Esterase: Trace / RBC: < 5 /HPF / WBC < 5 /HPF   Sq Epi: x / Non Sq Epi: 0-5 /HPF / Bacteria: Present /HPF      CAPILLARY BLOOD GLUCOSE      POCT Blood Glucose.: 91 mg/dL (2020 08:18)    LIVER FUNCTIONS - ( 2020 06:46 )  Alb: 2.6 g/dL / Pro: 6.6 g/dL / ALK PHOS: 49 U/L / ALT: 33 U/L / AST: 23 U/L / GGT: x             RADIOLOGY & ADDITIONAL STUDIES: SUBJECTIVE:  Patient was seen at bedside, eating breakfast  Last night during dinner he was having trouble eating and some SOB asked for O2 to be increased.   On 4L he was 70% they increased to 6L and he was 77% and switched him to NRB.   He has a history of throat cancer with radiation and chemotherapy years ago. On normal diet at home, feels like the food here is cold and chewy and is difficult for him to tolerate.     MEDICATIONS  (STANDING):  artificial tears (preservative free) Ophthalmic Solution 1 Drop(s) Both EYES two times a day  darunavir 600 milliGRAM(s) Oral <User Schedule>  dextrose 5%. 1000 milliLiter(s) (50 mL/Hr) IV Continuous <Continuous>  dextrose 50% Injectable 12.5 Gram(s) IV Push once  dextrose 50% Injectable 25 Gram(s) IV Push once  dextrose 50% Injectable 25 Gram(s) IV Push once  emtricitabine 200 mG/tenofovir alafenamide 25 mG (DESCOVY) Tablet 1 Tablet(s) Oral daily  enoxaparin Injectable 40 milliGRAM(s) SubCutaneous every 24 hours  etravirine 200 milliGRAM(s) Oral <User Schedule>  famotidine    Tablet 20 milliGRAM(s) Oral daily  influenza   Vaccine 0.5 milliLiter(s) IntraMuscular once  lactobacillus acidophilus and bulgaricus Chewable 1 Tablet(s) Chew daily  levothyroxine 125 MICROGram(s) Oral daily  loperamide Suspension 8 milliGRAM(s) Oral two times a day  melatonin 3 milliGRAM(s) Oral at bedtime  raltegravir Tablet 400 milliGRAM(s) Oral <User Schedule>  ritonavir Tablet 100 milliGRAM(s) Oral <User Schedule>  rosuvastatin 20 milliGRAM(s) Oral at bedtime  sodium chloride 0.65% Nasal 1 Spray(s) Both Nostrils two times a day  tamsulosin 0.4 milliGRAM(s) Oral at bedtime  valACYclovir 500 milliGRAM(s) Oral two times a day    MEDICATIONS  (PRN):  acetaminophen   Tablet .. 975 milliGRAM(s) Oral every 8 hours PRN Temp greater or equal to 38C (100.4F)  ALBUTerol    90 MICROgram(s) HFA Inhaler 2 Puff(s) Inhalation every 6 hours PRN Shortness of Breath  dextrose 40% Gel 15 Gram(s) Oral once PRN Blood Glucose LESS THAN 70 milliGRAM(s)/deciliter  glucagon  Injectable 1 milliGRAM(s) IntraMuscular once PRN Glucose LESS THAN 70 milligrams/deciliter      Vital Signs Last 24 Hrs  T(C): 36.7 (2020 06:02), Max: 37.8 (2020 20:17)  T(F): 98.1 (2020 06:02), Max: 100.1 (2020 20:17)  HR: 81 (2020 06:02) (81 - 102)  BP: 102/64 (2020 06:02) (102/64 - 121/74)  BP(mean): --  RR: 20 (2020 06:02) (18 - 24)  SpO2: 96% (2020 06:02) (70% - 96%)    Physical Exam:  General: NAD, resting comfortably in bed, some difficulty speaking/hoarseness at baseline  Pulmonary: Nonlabored breathing, no respiratory distress  Cardiovascular: NSR  Abdominal: soft, NT/ND  Extremities: WWP, normal strength  Neuro: A/O x 3, CNs II-XII grossly intact, no focal deficits, normal motor/sensation    I&O's Summary    2020 07:01  -  2020 07:00  --------------------------------------------------------  IN: 0 mL / OUT: 800 mL / NET: -800 mL        LABS:                        13.9   6.50  )-----------( 191      ( 2020 06:49 )             41.5     0428    139  |  101  |  14  ----------------------------<  99  4.2   |  26  |  0.74    Ca    9.3      2020 06:46  Phos  2.6         TPro  6.6  /  Alb  2.6<L>  /  TBili  0.5  /  DBili  x   /  AST  23  /  ALT  33  /  AlkPhos  49  28      Urinalysis Basic - ( 2020 12:51 )    Color: Yellow / Appearance: Clear / S.010 / pH: x  Gluc: x / Ketone: NEGATIVE  / Bili: Negative / Urobili: 0.2 E.U./dL   Blood: x / Protein: NEGATIVE mg/dL / Nitrite: NEGATIVE   Leuk Esterase: Trace / RBC: < 5 /HPF / WBC < 5 /HPF   Sq Epi: x / Non Sq Epi: 0-5 /HPF / Bacteria: Present /HPF      CAPILLARY BLOOD GLUCOSE      POCT Blood Glucose.: 91 mg/dL (2020 08:18)    LIVER FUNCTIONS - ( 2020 06:46 )  Alb: 2.6 g/dL / Pro: 6.6 g/dL / ALK PHOS: 49 U/L / ALT: 33 U/L / AST: 23 U/L / GGT: x             RADIOLOGY & ADDITIONAL STUDIES:

## 2020-04-28 NOTE — PROGRESS NOTE ADULT - PROBLEM SELECTOR PLAN 1
- Currently 94% on 15L NRB  -Worsening hypoxia during dinner, COVID vs new aspiration  - S/p bronchoscopy w/ BAL 4/17 which was unrevealing for infectious or inflammatory etiologies.    *Cultures NGTD, new blood cultures negative  *PCP stain negative, TMP-SMX and steroids thus stopped 4/20  *Cyto neg    Recommendations:  - Will wean from NRB to 6L NC and see if he tolerates  - Cont with mobility/PT   - encourage intermittent proning  - patient was given one dose of Vanco/Zosyn for fever, but cacnelled, since no leukocytosis, CXR is unchanged  - otherwise continue supportive care for COVID-19 related respiratory failure.  - Stressed importance of incentive spirometry  - CTA chest negative for PE 4/26 -Worsening hypoxia during dinner last night, and transitioned to NRB, states he is not SOB and would like to go back to NC, above 90% on 4L, possible aspiration, inflammation vc infectious  - S/p bronchoscopy w/ BAL 4/17 which was unrevealing for infectious or inflammatory etiologies.    *Cultures NGTD, new blood cultures negative  *PCP stain negative, TMP-SMX and steroids thus stopped 4/20  *Cyto neg    Recommendations:  - Able to wean down to 4L NC  - Cont with mobility/PT   - encourage intermittent proning  - patient was given one dose of Vanco/Zosyn for fever, but cancelled, since no leukocytosis, CXR shows worsening RLL patchy infiltrate  - otherwise continue supportive care for COVID-19 related respiratory failure.  - Stressed importance of incentive spirometry  - CTA chest negative for PE 4/26  - consider second dose of Toci vs plasma if no improvement

## 2020-04-28 NOTE — PROGRESS NOTE ADULT - PROBLEM SELECTOR PLAN 3
No new recommendations at this point. Will continue to follow along peripherally and chart review for results of bronchoscopy.  Suspect prolonged slow recovery from COVID-19. Fever work up as per primary team.   If patient worsens please call Pulmonology.     The patient was s/e/d with Dr Blood. No new recommendations at this point. Will continue to follow along peripherally and chart review for results of bronchoscopy.  Suspect prolonged slow recovery from COVID-19.     The patient was s/e/d with Dr Blood.

## 2020-04-28 NOTE — PROGRESS NOTE ADULT - PROBLEM SELECTOR PLAN 1
Extubated to facemask NRB 4/19 and xfer to telemetry/SDU. Conversive in full sentences and normoxic at rest.   S/p bronchoscopy w/ BAL 4/17 which was unrevealing for infectious or inflammatory etiologies.    *Cultures NGTD  *PCP stain negative, TMP-SMX and steroids thus stopped 4/20  *Cyto neg    Recommendations:  - Now weaned to NC 4L. Cont weaning.   - Cont with mobility/PT   - encourage intermittent proning  - otherwise continue supportive care for COVID-19 related respiratory failure. Extubated to facemask NRB 4/19 and xfer to telemetry/SDU.   S/p bronchoscopy w/ BAL 4/17 which was unrevealing for infectious or inflammatory etiologies.    *Cultures NGTD  *PCP stain negative, TMP-SMX and steroids thus stopped 4/20  *Cyto neg    Recommendations:  - currently on 4LPM O2, intermittently on nno -rebreather.  chest xray reviewed, essentially unchanged from 5-6 days before.   - Cont with mobility/PT   - encourage intermittent proning  - otherwise continue supportive care for COVID-19 related respiratory failure.  - currently on unasyn as per primary team.

## 2020-04-29 LAB
ALBUMIN SERPL ELPH-MCNC: 2.5 G/DL — LOW (ref 3.3–5)
ALP SERPL-CCNC: 50 U/L — SIGNIFICANT CHANGE UP (ref 40–120)
ALT FLD-CCNC: 36 U/L — SIGNIFICANT CHANGE UP (ref 10–45)
ANION GAP SERPL CALC-SCNC: 10 MMOL/L — SIGNIFICANT CHANGE UP (ref 5–17)
AST SERPL-CCNC: 28 U/L — SIGNIFICANT CHANGE UP (ref 10–40)
BASOPHILS # BLD AUTO: 0.05 K/UL — SIGNIFICANT CHANGE UP (ref 0–0.2)
BASOPHILS NFR BLD AUTO: 0.9 % — SIGNIFICANT CHANGE UP (ref 0–2)
BILIRUB SERPL-MCNC: 0.4 MG/DL — SIGNIFICANT CHANGE UP (ref 0.2–1.2)
BUN SERPL-MCNC: 10 MG/DL — SIGNIFICANT CHANGE UP (ref 7–23)
CALCIUM SERPL-MCNC: 8.9 MG/DL — SIGNIFICANT CHANGE UP (ref 8.4–10.5)
CHLORIDE SERPL-SCNC: 101 MMOL/L — SIGNIFICANT CHANGE UP (ref 96–108)
CO2 SERPL-SCNC: 28 MMOL/L — SIGNIFICANT CHANGE UP (ref 22–31)
CREAT SERPL-MCNC: 0.61 MG/DL — SIGNIFICANT CHANGE UP (ref 0.5–1.3)
CRP SERPL-MCNC: 16.29 MG/DL — HIGH (ref 0–0.4)
D DIMER BLD IA.RAPID-MCNC: 447 NG/ML DDU — HIGH
EOSINOPHIL # BLD AUTO: 0.27 K/UL — SIGNIFICANT CHANGE UP (ref 0–0.5)
EOSINOPHIL NFR BLD AUTO: 5.2 % — SIGNIFICANT CHANGE UP (ref 0–6)
FERRITIN SERPL-MCNC: 1154 NG/ML — HIGH (ref 30–400)
GLUCOSE SERPL-MCNC: 106 MG/DL — HIGH (ref 70–99)
HCT VFR BLD CALC: 38.2 % — LOW (ref 39–50)
HGB BLD-MCNC: 12.8 G/DL — LOW (ref 13–17)
LYMPHOCYTES # BLD AUTO: 0.6 K/UL — LOW (ref 1–3.3)
LYMPHOCYTES # BLD AUTO: 11.4 % — LOW (ref 13–44)
MAGNESIUM SERPL-MCNC: 1.9 MG/DL — SIGNIFICANT CHANGE UP (ref 1.6–2.6)
MCHC RBC-ENTMCNC: 29.1 PG — SIGNIFICANT CHANGE UP (ref 27–34)
MCHC RBC-ENTMCNC: 33.5 GM/DL — SIGNIFICANT CHANGE UP (ref 32–36)
MCV RBC AUTO: 86.8 FL — SIGNIFICANT CHANGE UP (ref 80–100)
MONOCYTES # BLD AUTO: 0.41 K/UL — SIGNIFICANT CHANGE UP (ref 0–0.9)
MONOCYTES NFR BLD AUTO: 7.9 % — SIGNIFICANT CHANGE UP (ref 2–14)
NEUTROPHILS # BLD AUTO: 3.81 K/UL — SIGNIFICANT CHANGE UP (ref 1.8–7.4)
NEUTROPHILS NFR BLD AUTO: 72.8 % — SIGNIFICANT CHANGE UP (ref 43–77)
PHOSPHATE SERPL-MCNC: 2.6 MG/DL — SIGNIFICANT CHANGE UP (ref 2.5–4.5)
PLATELET # BLD AUTO: 187 K/UL — SIGNIFICANT CHANGE UP (ref 150–400)
POTASSIUM SERPL-MCNC: 3.7 MMOL/L — SIGNIFICANT CHANGE UP (ref 3.5–5.3)
POTASSIUM SERPL-SCNC: 3.7 MMOL/L — SIGNIFICANT CHANGE UP (ref 3.5–5.3)
PROT SERPL-MCNC: 6.4 G/DL — SIGNIFICANT CHANGE UP (ref 6–8.3)
RBC # BLD: 4.4 M/UL — SIGNIFICANT CHANGE UP (ref 4.2–5.8)
RBC # FLD: 14.3 % — SIGNIFICANT CHANGE UP (ref 10.3–14.5)
SODIUM SERPL-SCNC: 139 MMOL/L — SIGNIFICANT CHANGE UP (ref 135–145)
WBC # BLD: 5.23 K/UL — SIGNIFICANT CHANGE UP (ref 3.8–10.5)
WBC # FLD AUTO: 5.23 K/UL — SIGNIFICANT CHANGE UP (ref 3.8–10.5)

## 2020-04-29 PROCEDURE — 99233 SBSQ HOSP IP/OBS HIGH 50: CPT | Mod: CS,GC

## 2020-04-29 PROCEDURE — 99232 SBSQ HOSP IP/OBS MODERATE 35: CPT | Mod: CS

## 2020-04-29 RX ORDER — FLUTICASONE PROPIONATE 50 MCG
1 SPRAY, SUSPENSION NASAL
Refills: 0 | Status: DISCONTINUED | OUTPATIENT
Start: 2020-04-29 | End: 2020-05-01

## 2020-04-29 RX ORDER — SODIUM CHLORIDE 9 MG/ML
1000 INJECTION, SOLUTION INTRAVENOUS
Refills: 0 | Status: DISCONTINUED | OUTPATIENT
Start: 2020-04-29 | End: 2020-05-01

## 2020-04-29 RX ORDER — SALIVA SUBSTITUTE COMB NO.11 351 MG
5 POWDER IN PACKET (EA) MUCOUS MEMBRANE THREE TIMES A DAY
Refills: 0 | Status: DISCONTINUED | OUTPATIENT
Start: 2020-04-29 | End: 2020-05-01

## 2020-04-29 RX ADMIN — AMPICILLIN SODIUM AND SULBACTAM SODIUM 100 GRAM(S): 250; 125 INJECTION, POWDER, FOR SUSPENSION INTRAMUSCULAR; INTRAVENOUS at 12:03

## 2020-04-29 RX ADMIN — Medication 125 MICROGRAM(S): at 06:14

## 2020-04-29 RX ADMIN — ETRAVIRINE 200 MILLIGRAM(S): 200 TABLET ORAL at 12:04

## 2020-04-29 RX ADMIN — RALTEGRAVIR 400 MILLIGRAM(S): 400 TABLET, FILM COATED ORAL at 12:04

## 2020-04-29 RX ADMIN — EMTRICITABINE AND TENOFOVIR DISOPROXIL FUMARATE 1 TABLET(S): 200; 300 TABLET, FILM COATED ORAL at 12:04

## 2020-04-29 RX ADMIN — ROSUVASTATIN CALCIUM 20 MILLIGRAM(S): 5 TABLET ORAL at 22:14

## 2020-04-29 RX ADMIN — Medication 3 MILLIGRAM(S): at 22:14

## 2020-04-29 RX ADMIN — Medication 1 DROP(S): at 17:54

## 2020-04-29 RX ADMIN — TAMSULOSIN HYDROCHLORIDE 0.4 MILLIGRAM(S): 0.4 CAPSULE ORAL at 22:14

## 2020-04-29 RX ADMIN — AMPICILLIN SODIUM AND SULBACTAM SODIUM 100 GRAM(S): 250; 125 INJECTION, POWDER, FOR SUSPENSION INTRAMUSCULAR; INTRAVENOUS at 22:13

## 2020-04-29 RX ADMIN — AMPICILLIN SODIUM AND SULBACTAM SODIUM 100 GRAM(S): 250; 125 INJECTION, POWDER, FOR SUSPENSION INTRAMUSCULAR; INTRAVENOUS at 17:53

## 2020-04-29 RX ADMIN — Medication 1 SPRAY(S): at 06:16

## 2020-04-29 RX ADMIN — LACTOBACILLUS ACIDOPH-L.BULGARICUS 1 MILLION CELL CHEWABLE TABLET 1 TABLET(S): at 12:19

## 2020-04-29 RX ADMIN — RITONAVIR 100 MILLIGRAM(S): 100 TABLET, FILM COATED ORAL at 17:55

## 2020-04-29 RX ADMIN — RALTEGRAVIR 400 MILLIGRAM(S): 400 TABLET, FILM COATED ORAL at 17:55

## 2020-04-29 RX ADMIN — ENOXAPARIN SODIUM 40 MILLIGRAM(S): 100 INJECTION SUBCUTANEOUS at 22:14

## 2020-04-29 RX ADMIN — Medication 8 MILLIGRAM(S): at 17:54

## 2020-04-29 RX ADMIN — Medication 8 MILLIGRAM(S): at 06:10

## 2020-04-29 RX ADMIN — RITONAVIR 100 MILLIGRAM(S): 100 TABLET, FILM COATED ORAL at 12:04

## 2020-04-29 RX ADMIN — FAMOTIDINE 20 MILLIGRAM(S): 10 INJECTION INTRAVENOUS at 12:03

## 2020-04-29 RX ADMIN — AMPICILLIN SODIUM AND SULBACTAM SODIUM 100 GRAM(S): 250; 125 INJECTION, POWDER, FOR SUSPENSION INTRAMUSCULAR; INTRAVENOUS at 06:13

## 2020-04-29 RX ADMIN — VALACYCLOVIR 500 MILLIGRAM(S): 500 TABLET, FILM COATED ORAL at 12:03

## 2020-04-29 RX ADMIN — VALACYCLOVIR 500 MILLIGRAM(S): 500 TABLET, FILM COATED ORAL at 22:14

## 2020-04-29 RX ADMIN — DARUNAVIR 600 MILLIGRAM(S): 75 TABLET, FILM COATED ORAL at 17:54

## 2020-04-29 RX ADMIN — SODIUM CHLORIDE 100 MILLILITER(S): 9 INJECTION, SOLUTION INTRAVENOUS at 17:56

## 2020-04-29 RX ADMIN — Medication 1 DROP(S): at 06:16

## 2020-04-29 RX ADMIN — ETRAVIRINE 200 MILLIGRAM(S): 200 TABLET ORAL at 17:54

## 2020-04-29 RX ADMIN — DARUNAVIR 600 MILLIGRAM(S): 75 TABLET, FILM COATED ORAL at 12:04

## 2020-04-29 NOTE — PROGRESS NOTE ADULT - PROBLEM SELECTOR PLAN 1
-Worsening hypoxia 4/27, and transitioned to NRB, now back on NC 6L and comfortable  - S/p bronchoscopy w/ BAL 4/17 which was unrevealing for infectious or inflammatory etiologies.    *Cultures NGTD, new blood cultures negative  *PCP stain negative, TMP-SMX and steroids stopped 4/20  *Cyto neg    Recommendations:  - Able to wean down to 5L NC today  - Cont with mobility/PT   - encourage intermittent proning  - patient was given one dose of Vanco/Zosyn for fever 4/27, but cancelled, since no leukocytosis, CXR shows worsening RLL patchy infiltrate  - low grade fever last night, currently on Unasyn, will continue and monitor WBC/fever  - otherwise continue supportive care for COVID-19 related respiratory failure.  - Stressed importance of incentive spirometry  - CTA chest negative for PE 4/26

## 2020-04-29 NOTE — SWALLOW BEDSIDE ASSESSMENT ADULT - SLP PERTINENT HISTORY OF CURRENT PROBLEM
HIV, tonsillar CA treated with chemo/RT at [facility] 5 years ago. Pt being followed every 6 months by radiation-oncology team at [facility]. His primary ENT is Dr. Singleton at [facility]

## 2020-04-29 NOTE — SWALLOW BEDSIDE ASSESSMENT ADULT - NS SPL SWALLOW CLINIC TRIAL FT
Pharyngeal stage was significant for overt signs of airway protection deficits across trials in setting of increased SOB with use of abdominal muscles and O2 sat at 88%. Deficits potentially due to decreased coordination of respiration and deglutition. Question baseline swallowing deficits as long term sequale of chemo/RT for tonsil cancer. This service will continue to follow closely. Pharyngeal stage was significant for overt signs of airway protection deficits across trials in setting of increased SOB with use of abdominal muscles and O2 sat at 88%. Deficits potentially due to decreased coordination of respiration and deglutition. Question baseline swallowing deficits as long term sequale of chemo/RT for tonsil/throat cancer. This service will continue to follow closely. Above was discussed with Dr. Pagan.

## 2020-04-29 NOTE — SWALLOW BEDSIDE ASSESSMENT ADULT - SWALLOW EVAL: DIAGNOSIS
Spoke with patient and she understands- she states she only takes 5 on saturdays and 10 the rest of the week.  Per dr. Ganesh Ruiz that's ok and repeat in 1-2 weeks  Updated anti cog sheet Suspected pharyngeal dysphagia. Aspiration concern

## 2020-04-29 NOTE — SWALLOW BEDSIDE ASSESSMENT ADULT - COMMENTS
Pt awake, alert, verbal. Pt reports exacerbated xerostomia with use of O2 via nasal canula. Pt reports food being cold and dry and therefore difficult to swallow in setting of chronic xerostomia, now exacerbated. Pt was provided with nasal saline spray to alleviate some naso-pharyngeal dryness.

## 2020-04-29 NOTE — PROGRESS NOTE ADULT - ASSESSMENT
60yo man w/ HIV on ART, COVID pneumonia; pulmonary consulted for progression of lung infiltrates and of concern for early fibrotic lung dz vs. superimposed bacterial PNA vs. PCP PNA. He is status post bronchoscopy and BAL on 4/17 and has since been extubated with overall improvement in his acute hypoxemic respiratory failure, now on COVID RMF. Trying to wean O2 daily

## 2020-04-29 NOTE — PROGRESS NOTE ADULT - PROBLEM SELECTOR PLAN 4
- Radiation/chemotherapy in the past  - home diet is regular switched to soft diet and is much happier.

## 2020-04-29 NOTE — PROGRESS NOTE ADULT - PROBLEM SELECTOR PLAN 3
cont HAART    in the view of low grade fever and elevated CRP, agree with unasyn for 5-7 days. please repeat chest xray tomorrow.    The patient was s/e/d with Dr Blood.

## 2020-04-29 NOTE — PROGRESS NOTE ADULT - ATTENDING COMMENTS
Feels sad and discouraged at being here so long, feels his chest is a little more congested, otherwise no major changes. Does seem to aspirate, has coughed up food particles. In light of low grade temp favor cont unasyn and avoidance of prednisone at this time.

## 2020-04-29 NOTE — PROGRESS NOTE ADULT - PROBLEM SELECTOR PLAN 2
- No fever or WBC count  - Repeat chest xray shows atelectasis vs aspiration PNA RLL  - procalcitonin normal  - Patient is immunocompromised, will c/w Unasyn 1.5mg q 6 hours x 7 days. 4/28-5/4

## 2020-04-29 NOTE — PROGRESS NOTE ADULT - SUBJECTIVE AND OBJECTIVE BOX
PULMONARY CONSULT FOLLOW-UP NOTE    INTERVAL HPI:  Reviewed chart and overnight events; patient seen and examined at bedside. patient intermittently on NRB overnight, currently on 4-6 LPM O2 by NC. using incentive spirometer    MEDICATIONS:  Pulmonary:  ALBUTerol    90 MICROgram(s) HFA Inhaler 2 Puff(s) Inhalation every 6 hours PRN    Antimicrobials:  ampicillin/sulbactam  IVPB 1.5 Gram(s) IV Intermittent every 6 hours  ampicillin/sulbactam  IVPB      darunavir 600 milliGRAM(s) Oral <User Schedule>  emtricitabine 200 mG/tenofovir alafenamide 25 mG (DESCOVY) Tablet 1 Tablet(s) Oral daily  etravirine 200 milliGRAM(s) Oral <User Schedule>  raltegravir Tablet 400 milliGRAM(s) Oral <User Schedule>  ritonavir Tablet 100 milliGRAM(s) Oral <User Schedule>  valACYclovir 500 milliGRAM(s) Oral two times a day    Anticoagulants:  enoxaparin Injectable 40 milliGRAM(s) SubCutaneous every 24 hours    Cardiac:  tamsulosin 0.4 milliGRAM(s) Oral at bedtime      Allergies    No Known Allergies    Intolerances        Vital Signs Last 24 Hrs  T(C): 36.4 (29 Apr 2020 10:41), Max: 37.9 (28 Apr 2020 21:55)  T(F): 97.6 (29 Apr 2020 10:41), Max: 100.3 (28 Apr 2020 21:55)  HR: 76 (29 Apr 2020 10:41) (74 - 82)  BP: 100/69 (29 Apr 2020 10:41) (100/69 - 115/71)  BP(mean): --  RR: 20 (29 Apr 2020 10:41) (20 - 22)  SpO2: 95% (29 Apr 2020 10:41) (93% - 97%)        PHYSICAL EXAM:  Constitutional: WDWN  HEENT: NC/AT; PERRL, anicteric sclera; MMM  Neck: supple  Cardiovascular: +S1/S2, RRR  Respiratory: bilateral crackles.  Gastrointestinal: soft, NT/ND; +BSx4  Extremities: WWP; no edema, clubbing or cyanosis  Vascular: 2+ radial, DP/PT pulses B/L  Neurological: AAOx3; no focal deficits    LABS:      CBC Full  -  ( 29 Apr 2020 07:24 )  WBC Count : 5.23 K/uL  RBC Count : 4.40 M/uL  Hemoglobin : 12.8 g/dL  Hematocrit : 38.2 %  Platelet Count - Automated : 187 K/uL  Mean Cell Volume : 86.8 fl  Mean Cell Hemoglobin : 29.1 pg  Mean Cell Hemoglobin Concentration : 33.5 gm/dL  Auto Neutrophil # : 3.81 K/uL  Auto Lymphocyte # : 0.60 K/uL  Auto Monocyte # : 0.41 K/uL  Auto Eosinophil # : 0.27 K/uL  Auto Basophil # : 0.05 K/uL  Auto Neutrophil % : 72.8 %  Auto Lymphocyte % : 11.4 %  Auto Monocyte % : 7.9 %  Auto Eosinophil % : 5.2 %  Auto Basophil % : 0.9 %    04-29    139  |  101  |  10  ----------------------------<  106<H>  3.7   |  28  |  0.61    Ca    8.9      29 Apr 2020 07:24  Phos  2.6     04-29  Mg     1.9     04-29    TPro  6.4  /  Alb  2.5<L>  /  TBili  0.4  /  DBili  x   /  AST  28  /  ALT  36  /  AlkPhos  50  04-29                      RADIOLOGY & ADDITIONAL STUDIES:

## 2020-04-29 NOTE — SWALLOW BEDSIDE ASSESSMENT ADULT - PHARYNGEAL PHASE
increased SOB with use of accessory muscles during and post PO trials with O2 sat 88%/Throat clear post oral intake/Delayed throat clear post oral intake/Delayed cough post oral intake

## 2020-04-29 NOTE — SWALLOW BEDSIDE ASSESSMENT ADULT - ASR SWALLOW ASPIRATION MONITOR
pneumonia/upper respiratory infection/cough/oral hygiene/gurgly voice/change of breathing pattern/position upright (90Y)/fever/throat clearing

## 2020-04-29 NOTE — PROGRESS NOTE ADULT - PROBLEM SELECTOR PLAN 1
Extubated to facemask NRB 4/19 and xfer to telemetry/SDU.   S/p bronchoscopy w/ BAL 4/17 which was unrevealing for infectious or inflammatory etiologies.    *Cultures NGTD  *PCP stain negative, TMP-SMX and steroids thus stopped 4/20  *Cyto neg    Recommendations:  - currently on 4LPM O2, intermittently on nno -rebreather.  chest xray reviewed, essentially unchanged from 5-6 days before.   - Cont with mobility/PT   - encourage intermittent proning  - otherwise continue supportive care for COVID-19 related respiratory failure.  - currently on unasyn as per primary team.

## 2020-04-29 NOTE — PROGRESS NOTE ADULT - SUBJECTIVE AND OBJECTIVE BOX
SUBJECTIVE:  Overnight: Patient had fever of 100.3, tylenol given  Patient was seen at bedside, currently on 6L NC 95%, no issues with SOB, just finished breakfast, decreased to 5L while in the room and came down to 92-93%. Holding spirometry in hand and using every hour.     MEDICATIONS  (STANDING):  ampicillin/sulbactam  IVPB 1.5 Gram(s) IV Intermittent every 6 hours  ampicillin/sulbactam  IVPB      artificial tears (preservative free) Ophthalmic Solution 1 Drop(s) Both EYES two times a day  darunavir 600 milliGRAM(s) Oral <User Schedule>  dextrose 5%. 1000 milliLiter(s) (50 mL/Hr) IV Continuous <Continuous>  dextrose 50% Injectable 12.5 Gram(s) IV Push once  dextrose 50% Injectable 25 Gram(s) IV Push once  dextrose 50% Injectable 25 Gram(s) IV Push once  emtricitabine 200 mG/tenofovir alafenamide 25 mG (DESCOVY) Tablet 1 Tablet(s) Oral daily  enoxaparin Injectable 40 milliGRAM(s) SubCutaneous every 24 hours  etravirine 200 milliGRAM(s) Oral <User Schedule>  famotidine    Tablet 20 milliGRAM(s) Oral daily  influenza   Vaccine 0.5 milliLiter(s) IntraMuscular once  lactobacillus acidophilus and bulgaricus Chewable 1 Tablet(s) Chew daily  levothyroxine 125 MICROGram(s) Oral daily  loperamide Suspension 8 milliGRAM(s) Oral two times a day  melatonin 3 milliGRAM(s) Oral at bedtime  raltegravir Tablet 400 milliGRAM(s) Oral <User Schedule>  ritonavir Tablet 100 milliGRAM(s) Oral <User Schedule>  rosuvastatin 20 milliGRAM(s) Oral at bedtime  sodium chloride 0.65% Nasal 1 Spray(s) Both Nostrils two times a day  tamsulosin 0.4 milliGRAM(s) Oral at bedtime  valACYclovir 500 milliGRAM(s) Oral two times a day    MEDICATIONS  (PRN):  acetaminophen   Tablet .. 975 milliGRAM(s) Oral every 8 hours PRN Temp greater or equal to 38C (100.4F)  ALBUTerol    90 MICROgram(s) HFA Inhaler 2 Puff(s) Inhalation every 6 hours PRN Shortness of Breath  dextrose 40% Gel 15 Gram(s) Oral once PRN Blood Glucose LESS THAN 70 milliGRAM(s)/deciliter  fluticasone propionate 50 MICROgram(s)/spray Nasal Spray 1 Spray(s) Both Nostrils two times a day PRN nasal congestion  glucagon  Injectable 1 milliGRAM(s) IntraMuscular once PRN Glucose LESS THAN 70 milligrams/deciliter      Vital Signs Last 24 Hrs  T(C): 36.4 (2020 10:41), Max: 37.9 (2020 21:55)  T(F): 97.6 (2020 10:41), Max: 100.3 (2020 21:55)  HR: 76 (2020 10:41) (74 - 84)  BP: 100/69 (2020 10:41) (98/59 - 115/71)  BP(mean): --  RR: 20 (2020 10:41) (19 - 22)  SpO2: 95% (2020 10:41) (93% - 97%)    Physical Exam:  General: NAD, resting comfortably in bed  Pulmonary: Nonlabored breathing, no respiratory distress  Cardiovascular: NSR  Abdominal: soft, NT/ND  Extremities: WWP, normal strength  Neuro: A/O x 3, CNs II-XII grossly intact, no focal deficits, normal motor/sensation      I&O's Summary      LABS:                        12.8   5.23  )-----------( 187      ( 2020 07:24 )             38.2     04-29    139  |  101  |  10  ----------------------------<  106<H>  3.7   |  28  |  0.61    Ca    8.9      2020 07:24  Phos  2.6     04-29  Mg     1.9     04-29    TPro  6.4  /  Alb  2.5<L>  /  TBili  0.4  /  DBili  x   /  AST  28  /  ALT  36  /  AlkPhos  50  04-29      Urinalysis Basic - ( 2020 12:51 )    Color: Yellow / Appearance: Clear / S.010 / pH: x  Gluc: x / Ketone: NEGATIVE  / Bili: Negative / Urobili: 0.2 E.U./dL   Blood: x / Protein: NEGATIVE mg/dL / Nitrite: NEGATIVE   Leuk Esterase: Trace / RBC: < 5 /HPF / WBC < 5 /HPF   Sq Epi: x / Non Sq Epi: 0-5 /HPF / Bacteria: Present /HPF      CAPILLARY BLOOD GLUCOSE        LIVER FUNCTIONS - ( 2020 07:24 )  Alb: 2.5 g/dL / Pro: 6.4 g/dL / ALK PHOS: 50 U/L / ALT: 36 U/L / AST: 28 U/L / GGT: x             RADIOLOGY & ADDITIONAL STUDIES:

## 2020-04-30 LAB
ALBUMIN SERPL ELPH-MCNC: 2.5 G/DL — LOW (ref 3.3–5)
ALP SERPL-CCNC: 47 U/L — SIGNIFICANT CHANGE UP (ref 40–120)
ALT FLD-CCNC: 39 U/L — SIGNIFICANT CHANGE UP (ref 10–45)
ANION GAP SERPL CALC-SCNC: 9 MMOL/L — SIGNIFICANT CHANGE UP (ref 5–17)
AST SERPL-CCNC: 27 U/L — SIGNIFICANT CHANGE UP (ref 10–40)
BASOPHILS # BLD AUTO: 0.02 K/UL — SIGNIFICANT CHANGE UP (ref 0–0.2)
BASOPHILS NFR BLD AUTO: 0.4 % — SIGNIFICANT CHANGE UP (ref 0–2)
BILIRUB SERPL-MCNC: 0.2 MG/DL — SIGNIFICANT CHANGE UP (ref 0.2–1.2)
BUN SERPL-MCNC: 7 MG/DL — SIGNIFICANT CHANGE UP (ref 7–23)
CALCIUM SERPL-MCNC: 8.7 MG/DL — SIGNIFICANT CHANGE UP (ref 8.4–10.5)
CHLORIDE SERPL-SCNC: 103 MMOL/L — SIGNIFICANT CHANGE UP (ref 96–108)
CO2 SERPL-SCNC: 27 MMOL/L — SIGNIFICANT CHANGE UP (ref 22–31)
CREAT SERPL-MCNC: 0.59 MG/DL — SIGNIFICANT CHANGE UP (ref 0.5–1.3)
CRP SERPL-MCNC: 8.61 MG/DL — HIGH (ref 0–0.4)
D DIMER BLD IA.RAPID-MCNC: 513 NG/ML DDU — HIGH
EOSINOPHIL # BLD AUTO: 0.53 K/UL — HIGH (ref 0–0.5)
EOSINOPHIL NFR BLD AUTO: 10.2 % — HIGH (ref 0–6)
FERRITIN SERPL-MCNC: 947 NG/ML — HIGH (ref 30–400)
GLUCOSE SERPL-MCNC: 119 MG/DL — HIGH (ref 70–99)
HCT VFR BLD CALC: 37 % — LOW (ref 39–50)
HGB BLD-MCNC: 12.4 G/DL — LOW (ref 13–17)
IMM GRANULOCYTES NFR BLD AUTO: 0.8 % — SIGNIFICANT CHANGE UP (ref 0–1.5)
LYMPHOCYTES # BLD AUTO: 0.57 K/UL — LOW (ref 1–3.3)
LYMPHOCYTES # BLD AUTO: 11 % — LOW (ref 13–44)
MAGNESIUM SERPL-MCNC: 2 MG/DL — SIGNIFICANT CHANGE UP (ref 1.6–2.6)
MCHC RBC-ENTMCNC: 29 PG — SIGNIFICANT CHANGE UP (ref 27–34)
MCHC RBC-ENTMCNC: 33.5 GM/DL — SIGNIFICANT CHANGE UP (ref 32–36)
MCV RBC AUTO: 86.7 FL — SIGNIFICANT CHANGE UP (ref 80–100)
MONOCYTES # BLD AUTO: 0.54 K/UL — SIGNIFICANT CHANGE UP (ref 0–0.9)
MONOCYTES NFR BLD AUTO: 10.4 % — SIGNIFICANT CHANGE UP (ref 2–14)
NEUTROPHILS # BLD AUTO: 3.49 K/UL — SIGNIFICANT CHANGE UP (ref 1.8–7.4)
NEUTROPHILS NFR BLD AUTO: 67.2 % — SIGNIFICANT CHANGE UP (ref 43–77)
NRBC # BLD: 0 /100 WBCS — SIGNIFICANT CHANGE UP (ref 0–0)
PHOSPHATE SERPL-MCNC: 2.4 MG/DL — LOW (ref 2.5–4.5)
PLATELET # BLD AUTO: 218 K/UL — SIGNIFICANT CHANGE UP (ref 150–400)
POTASSIUM SERPL-MCNC: 3.5 MMOL/L — SIGNIFICANT CHANGE UP (ref 3.5–5.3)
POTASSIUM SERPL-SCNC: 3.5 MMOL/L — SIGNIFICANT CHANGE UP (ref 3.5–5.3)
PROT SERPL-MCNC: 6 G/DL — SIGNIFICANT CHANGE UP (ref 6–8.3)
RBC # BLD: 4.27 M/UL — SIGNIFICANT CHANGE UP (ref 4.2–5.8)
RBC # FLD: 14.5 % — SIGNIFICANT CHANGE UP (ref 10.3–14.5)
SARS-COV-2 RNA SPEC QL NAA+PROBE: SIGNIFICANT CHANGE UP
SARS-COV-2 RNA SPEC QL NAA+PROBE: SIGNIFICANT CHANGE UP
SODIUM SERPL-SCNC: 139 MMOL/L — SIGNIFICANT CHANGE UP (ref 135–145)
WBC # BLD: 5.19 K/UL — SIGNIFICANT CHANGE UP (ref 3.8–10.5)
WBC # FLD AUTO: 5.19 K/UL — SIGNIFICANT CHANGE UP (ref 3.8–10.5)

## 2020-04-30 PROCEDURE — 99233 SBSQ HOSP IP/OBS HIGH 50: CPT | Mod: CS

## 2020-04-30 PROCEDURE — 99232 SBSQ HOSP IP/OBS MODERATE 35: CPT | Mod: CS

## 2020-04-30 RX ORDER — PHENYLEPHRINE HCL 0.25 %
2 AEROSOL, SPRAY WITH PUMP (ML) NASAL EVERY 4 HOURS
Refills: 0 | Status: DISCONTINUED | OUTPATIENT
Start: 2020-04-30 | End: 2020-05-01

## 2020-04-30 RX ORDER — POTASSIUM PHOSPHATE, MONOBASIC POTASSIUM PHOSPHATE, DIBASIC 236; 224 MG/ML; MG/ML
15 INJECTION, SOLUTION INTRAVENOUS ONCE
Refills: 0 | Status: COMPLETED | OUTPATIENT
Start: 2020-04-30 | End: 2020-04-30

## 2020-04-30 RX ORDER — PHENYLEPHRINE HCL 0.25 %
2 AEROSOL, SPRAY WITH PUMP (ML) NASAL EVERY 4 HOURS
Refills: 0 | Status: DISCONTINUED | OUTPATIENT
Start: 2020-04-30 | End: 2020-04-30

## 2020-04-30 RX ADMIN — DARUNAVIR 600 MILLIGRAM(S): 75 TABLET, FILM COATED ORAL at 19:22

## 2020-04-30 RX ADMIN — Medication 125 MICROGRAM(S): at 06:59

## 2020-04-30 RX ADMIN — POTASSIUM PHOSPHATE, MONOBASIC POTASSIUM PHOSPHATE, DIBASIC 63.75 MILLIMOLE(S): 236; 224 INJECTION, SOLUTION INTRAVENOUS at 09:06

## 2020-04-30 RX ADMIN — Medication 5 MILLILITER(S): at 15:31

## 2020-04-30 RX ADMIN — Medication 1 DROP(S): at 07:02

## 2020-04-30 RX ADMIN — Medication 5 MILLILITER(S): at 21:30

## 2020-04-30 RX ADMIN — VALACYCLOVIR 500 MILLIGRAM(S): 500 TABLET, FILM COATED ORAL at 11:43

## 2020-04-30 RX ADMIN — RITONAVIR 100 MILLIGRAM(S): 100 TABLET, FILM COATED ORAL at 13:06

## 2020-04-30 RX ADMIN — Medication 8 MILLIGRAM(S): at 07:00

## 2020-04-30 RX ADMIN — SODIUM CHLORIDE 100 MILLILITER(S): 9 INJECTION, SOLUTION INTRAVENOUS at 05:06

## 2020-04-30 RX ADMIN — Medication 3 MILLIGRAM(S): at 21:30

## 2020-04-30 RX ADMIN — ETRAVIRINE 200 MILLIGRAM(S): 200 TABLET ORAL at 11:43

## 2020-04-30 RX ADMIN — AMPICILLIN SODIUM AND SULBACTAM SODIUM 100 GRAM(S): 250; 125 INJECTION, POWDER, FOR SUSPENSION INTRAMUSCULAR; INTRAVENOUS at 18:00

## 2020-04-30 RX ADMIN — Medication 8 MILLIGRAM(S): at 19:23

## 2020-04-30 RX ADMIN — Medication 1 DROP(S): at 19:22

## 2020-04-30 RX ADMIN — Medication 5 MILLILITER(S): at 07:02

## 2020-04-30 RX ADMIN — Medication 2 DROP(S): at 07:04

## 2020-04-30 RX ADMIN — EMTRICITABINE AND TENOFOVIR DISOPROXIL FUMARATE 1 TABLET(S): 200; 300 TABLET, FILM COATED ORAL at 11:43

## 2020-04-30 RX ADMIN — AMPICILLIN SODIUM AND SULBACTAM SODIUM 100 GRAM(S): 250; 125 INJECTION, POWDER, FOR SUSPENSION INTRAMUSCULAR; INTRAVENOUS at 14:09

## 2020-04-30 RX ADMIN — TAMSULOSIN HYDROCHLORIDE 0.4 MILLIGRAM(S): 0.4 CAPSULE ORAL at 21:29

## 2020-04-30 RX ADMIN — Medication 1 SPRAY(S): at 07:02

## 2020-04-30 RX ADMIN — RITONAVIR 100 MILLIGRAM(S): 100 TABLET, FILM COATED ORAL at 21:31

## 2020-04-30 RX ADMIN — DARUNAVIR 600 MILLIGRAM(S): 75 TABLET, FILM COATED ORAL at 11:43

## 2020-04-30 RX ADMIN — RALTEGRAVIR 400 MILLIGRAM(S): 400 TABLET, FILM COATED ORAL at 11:42

## 2020-04-30 RX ADMIN — FAMOTIDINE 20 MILLIGRAM(S): 10 INJECTION INTRAVENOUS at 11:43

## 2020-04-30 RX ADMIN — RALTEGRAVIR 400 MILLIGRAM(S): 400 TABLET, FILM COATED ORAL at 19:23

## 2020-04-30 RX ADMIN — SODIUM CHLORIDE 100 MILLILITER(S): 9 INJECTION, SOLUTION INTRAVENOUS at 18:00

## 2020-04-30 RX ADMIN — LACTOBACILLUS ACIDOPH-L.BULGARICUS 1 MILLION CELL CHEWABLE TABLET 1 TABLET(S): at 11:43

## 2020-04-30 RX ADMIN — AMPICILLIN SODIUM AND SULBACTAM SODIUM 100 GRAM(S): 250; 125 INJECTION, POWDER, FOR SUSPENSION INTRAMUSCULAR; INTRAVENOUS at 07:00

## 2020-04-30 RX ADMIN — Medication 1 SPRAY(S): at 19:35

## 2020-04-30 RX ADMIN — ENOXAPARIN SODIUM 40 MILLIGRAM(S): 100 INJECTION SUBCUTANEOUS at 21:30

## 2020-04-30 NOTE — PROGRESS NOTE ADULT - SUBJECTIVE AND OBJECTIVE BOX
PULMONARY CONSULT FOLLOW-UP NOTE    INTERVAL HPI:  Reviewed chart and overnight events; patient seen and examined at bedside. remains on 4-5 LPM O2 by NC. using incentive spirometer  Feels better today - coughing up sputum, feels less chest congestion.  Has been told he may get to go home and hopeful in this regard.    Meds reviewed. On unasyn, antiretrovirals, and ppx lovenox.    Vital Signs Last 24 Hrs  T(C): 36.7 (30 Apr 2020 11:19), Max: 36.8 (29 Apr 2020 21:53)  T(F): 98 (30 Apr 2020 11:19), Max: 98.3 (29 Apr 2020 21:53)  HR: 75 (30 Apr 2020 11:19) (74 - 88)  BP: 121/84 (30 Apr 2020 11:19) (111/66 - 131/64)  BP(mean): --  RR: 20 (30 Apr 2020 11:19) (19 - 20)  SpO2: 96% (30 Apr 2020 11:19) (93% - 96%)    PHYSICAL EXAM:  Constitutional: WDWN  HEENT: NC/AT; PERRL, anicteric sclera; MMM  Neck: supple  Cardiovascular: +S1/S2, RRR  Respiratory: bilateral crackles.  Gastrointestinal: soft, NT/ND; +BSx4  Extremities: WWP; no edema, clubbing or cyanosis  Vascular: 2+ radial, DP/PT pulses B/L  Neurological: AAOx3; no focal deficits    LABS: reviewed  All images reviewed.

## 2020-04-30 NOTE — PROGRESS NOTE ADULT - ASSESSMENT
60yo man w/ HIV on ART, COVID pneumonia; pulmonary consulted for progression of lung infiltrates and of concern for early fibrotic lung dz vs. superimposed bacterial PNA vs. PCP PNA. He is status post bronchoscopy and BAL on 4/17 and has since been extubated with overall improvement in his acute hypoxemic respiratory failure, now on COVID RMF.

## 2020-04-30 NOTE — PROGRESS NOTE ADULT - PROBLEM SELECTOR PLAN 1
-Worsening hypoxia 4/27, and transitioned to NRB, now weaned down to 4L NC satting 94% without signs of resp distress   - S/p bronchoscopy w/ BAL 4/17 which was unrevealing for infectious or inflammatory etiologies.    *Cultures NGTD, blood cultures 4/27 no growth prelim  *PCP stain negative, TMP-SMX and steroids stopped 4/20  *Cyto neg    Recommendations:  - Cont to wean O2 as tolerated   - Cont with mobility/PT   - Encourage intermittent proning  - Patient was given one dose of Vanco/Zosyn for fever 4/27, but cancelled, since no leukocytosis, CXR shows worsening RLL patchy infiltrate  - C/w Unasyn, monitor WBC/fevers  - C/w supportive care for COVID-19 related respiratory failure  - Stressed importance of incentive spirometry  - CTA chest negative for PE 4/26

## 2020-04-30 NOTE — PROGRESS NOTE ADULT - PROBLEM SELECTOR PLAN 1
Extubated to facemask NRB 4/19 and xfer to telemetry/SDU.   S/p bronchoscopy w/ BAL 4/17 which was unrevealing for infectious or inflammatory etiologies.    *Cultures NGTD  *PCP stain negative, TMP-SMX and steroids thus stopped 4/20  *Cyto neg    Recommendations:  - currently on 4-5LPM O2, no sig change in CXRs  - Cont with mobility/PT   - encourage intermittent proning  - otherwise continue supportive care for COVID-19 related respiratory failure.  - complete course of unasyn

## 2020-04-30 NOTE — PROGRESS NOTE ADULT - PROBLEM SELECTOR PLAN 4
- Radiation/chemotherapy in the past  - Reports food being cold and dry in setting of chronic xerostomia, now exacerbated. Speech & Swallow consulted, will follow recs. Pt currently NPO.

## 2020-04-30 NOTE — PROGRESS NOTE ADULT - SUBJECTIVE AND OBJECTIVE BOX
INTERVAL HPI/OVERNIGHT EVENTS: c/o nasal congestion, gave nasal gtts. VSS    Patient was seen and examined at bedside. As per patient, no o/n events, patient resting comfortably. No complaints at this time. Patient denies: fever, chills, dizziness, HA, Changes in vision, CP, palpitations, SOB, cough, N/V/D/C, dysuria, changes in bowel movements, LE edema. ROS otherwise negative. Weaned down to 4L NC at bedside, now satting 94% without evidence of resp distress.    VITAL SIGNS:  T(F): 98 (04-30-20 @ 05:32)  HR: 74 (04-30-20 @ 05:32)  BP: 119/73 (04-30-20 @ 05:32)  RR: 20 (04-30-20 @ 05:32)  SpO2: 96% (04-30-20 @ 05:32)  Wt(kg): --    PHYSICAL EXAM:    Constitutional: WDWN, NAD  HEENT: PERRL, EOMI, sclera non-icteric, neck supple, trachea midline, no masses, no JVD, MMM, good dentition  Respiratory: bibasilar crackles b/l without accessory muscle use and no intercostal retractions  Cardiovascular: RRR, normal S1S2, no M/R/G  Gastrointestinal: soft, NTND, no masses palpable, BS normal  Extremities: Warm, well perfused, pulses equal bilateral upper and lower extremities, no edema, no clubbing  Neurological: AAOx3, CN Grossly intact  Skin: Normal temperature, warm, dry    MEDICATIONS  (STANDING):  ampicillin/sulbactam  IVPB 1.5 Gram(s) IV Intermittent every 6 hours  ampicillin/sulbactam  IVPB      artificial tears (preservative free) Ophthalmic Solution 1 Drop(s) Both EYES two times a day  Biotene Dry Mouth Oral Rinse 5 milliLiter(s) Swish and Spit three times a day  darunavir 600 milliGRAM(s) Oral <User Schedule>  dextrose 5% + sodium chloride 0.9%. 1000 milliLiter(s) (100 mL/Hr) IV Continuous <Continuous>  dextrose 5%. 1000 milliLiter(s) (50 mL/Hr) IV Continuous <Continuous>  dextrose 50% Injectable 12.5 Gram(s) IV Push once  dextrose 50% Injectable 25 Gram(s) IV Push once  dextrose 50% Injectable 25 Gram(s) IV Push once  emtricitabine 200 mG/tenofovir alafenamide 25 mG (DESCOVY) Tablet 1 Tablet(s) Oral daily  enoxaparin Injectable 40 milliGRAM(s) SubCutaneous every 24 hours  etravirine 200 milliGRAM(s) Oral <User Schedule>  famotidine    Tablet 20 milliGRAM(s) Oral daily  influenza   Vaccine 0.5 milliLiter(s) IntraMuscular once  lactobacillus acidophilus and bulgaricus Chewable 1 Tablet(s) Chew daily  levothyroxine 125 MICROGram(s) Oral daily  loperamide Suspension 8 milliGRAM(s) Oral two times a day  melatonin 3 milliGRAM(s) Oral at bedtime  potassium phosphate IVPB 15 milliMole(s) IV Intermittent once  raltegravir Tablet 400 milliGRAM(s) Oral <User Schedule>  ritonavir Tablet 100 milliGRAM(s) Oral <User Schedule>  rosuvastatin 20 milliGRAM(s) Oral at bedtime  sodium chloride 0.65% Nasal 1 Spray(s) Both Nostrils two times a day  tamsulosin 0.4 milliGRAM(s) Oral at bedtime  valACYclovir 500 milliGRAM(s) Oral two times a day    MEDICATIONS  (PRN):  acetaminophen   Tablet .. 975 milliGRAM(s) Oral every 8 hours PRN Temp greater or equal to 38C (100.4F)  ALBUTerol    90 MICROgram(s) HFA Inhaler 2 Puff(s) Inhalation every 6 hours PRN Shortness of Breath  dextrose 40% Gel 15 Gram(s) Oral once PRN Blood Glucose LESS THAN 70 milliGRAM(s)/deciliter  fluticasone propionate 50 MICROgram(s)/spray Nasal Spray 1 Spray(s) Both Nostrils two times a day PRN nasal congestion  glucagon  Injectable 1 milliGRAM(s) IntraMuscular once PRN Glucose LESS THAN 70 milligrams/deciliter  phenylephrine 0.125% Nasal 2 Drop(s) Nasal every 4 hours PRN Nasal Congestion      Allergies    No Known Allergies    Intolerances        LABS:                        12.4   5.19  )-----------( 218      ( 30 Apr 2020 06:24 )             37.0     04-30    139  |  103  |  7   ----------------------------<  119<H>  3.5   |  27  |  0.59    Ca    8.7      30 Apr 2020 06:24  Phos  2.4     04-30  Mg     2.0     04-30    TPro  6.0  /  Alb  2.5<L>  /  TBili  0.2  /  DBili  x   /  AST  27  /  ALT  39  /  AlkPhos  47  04-30          RADIOLOGY & ADDITIONAL TESTS:  Reviewed

## 2020-04-30 NOTE — PROGRESS NOTE ADULT - PROBLEM SELECTOR PLAN 3
cont HAART    in the view of low grade fever and elevated CRP, agree with unasyn for 5-7 days.     The patient was s/e/d with Dr Blood.

## 2020-04-30 NOTE — PROGRESS NOTE ADULT - PROBLEM SELECTOR PLAN 2
- No fever or WBC count  - Repeat CXR shows atelectasis vs aspiration PNA RLL  - Procalcitonin 0.11  - Patient is immunocompromised, will c/w Unasyn 1.5mg q 6 hours x 7 days. 4/28-5/4

## 2020-04-30 NOTE — PROGRESS NOTE ADULT - ATTENDING COMMENTS
For discharge planning, he can follow up with Pulmonary by calling [phone]. Looks better today.

## 2020-05-01 ENCOUNTER — TRANSCRIPTION ENCOUNTER (OUTPATIENT)
Age: 60
End: 2020-05-01

## 2020-05-01 VITALS
SYSTOLIC BLOOD PRESSURE: 165 MMHG | HEART RATE: 87 BPM | RESPIRATION RATE: 20 BRPM | OXYGEN SATURATION: 93 % | DIASTOLIC BLOOD PRESSURE: 93 MMHG

## 2020-05-01 PROBLEM — B20 HUMAN IMMUNODEFICIENCY VIRUS [HIV] DISEASE: Chronic | Status: ACTIVE | Noted: 2020-03-31

## 2020-05-01 PROBLEM — Z87.438 PERSONAL HISTORY OF OTHER DISEASES OF MALE GENITAL ORGANS: Chronic | Status: ACTIVE | Noted: 2020-03-31

## 2020-05-01 LAB
ALBUMIN SERPL ELPH-MCNC: 2.7 G/DL — LOW (ref 3.3–5)
ALP SERPL-CCNC: 53 U/L — SIGNIFICANT CHANGE UP (ref 40–120)
ALT FLD-CCNC: 37 U/L — SIGNIFICANT CHANGE UP (ref 10–45)
ANION GAP SERPL CALC-SCNC: 12 MMOL/L — SIGNIFICANT CHANGE UP (ref 5–17)
AST SERPL-CCNC: 22 U/L — SIGNIFICANT CHANGE UP (ref 10–40)
BASOPHILS # BLD AUTO: 0.03 K/UL — SIGNIFICANT CHANGE UP (ref 0–0.2)
BASOPHILS NFR BLD AUTO: 0.5 % — SIGNIFICANT CHANGE UP (ref 0–2)
BILIRUB SERPL-MCNC: 0.2 MG/DL — SIGNIFICANT CHANGE UP (ref 0.2–1.2)
BUN SERPL-MCNC: 6 MG/DL — LOW (ref 7–23)
CALCIUM SERPL-MCNC: 9.2 MG/DL — SIGNIFICANT CHANGE UP (ref 8.4–10.5)
CHLORIDE SERPL-SCNC: 104 MMOL/L — SIGNIFICANT CHANGE UP (ref 96–108)
CO2 SERPL-SCNC: 25 MMOL/L — SIGNIFICANT CHANGE UP (ref 22–31)
CREAT SERPL-MCNC: 0.6 MG/DL — SIGNIFICANT CHANGE UP (ref 0.5–1.3)
CRP SERPL-MCNC: 5.82 MG/DL — HIGH (ref 0–0.4)
D DIMER BLD IA.RAPID-MCNC: 628 NG/ML DDU — HIGH
EOSINOPHIL # BLD AUTO: 0.37 K/UL — SIGNIFICANT CHANGE UP (ref 0–0.5)
EOSINOPHIL NFR BLD AUTO: 6.5 % — HIGH (ref 0–6)
FERRITIN SERPL-MCNC: 861 NG/ML — HIGH (ref 30–400)
GLUCOSE BLDC GLUCOMTR-MCNC: 105 MG/DL — HIGH (ref 70–99)
GLUCOSE BLDC GLUCOMTR-MCNC: 105 MG/DL — HIGH (ref 70–99)
GLUCOSE SERPL-MCNC: 105 MG/DL — HIGH (ref 70–99)
HCT VFR BLD CALC: 39.8 % — SIGNIFICANT CHANGE UP (ref 39–50)
HGB BLD-MCNC: 13.1 G/DL — SIGNIFICANT CHANGE UP (ref 13–17)
IMM GRANULOCYTES NFR BLD AUTO: 0.5 % — SIGNIFICANT CHANGE UP (ref 0–1.5)
LYMPHOCYTES # BLD AUTO: 0.69 K/UL — LOW (ref 1–3.3)
LYMPHOCYTES # BLD AUTO: 12.1 % — LOW (ref 13–44)
MAGNESIUM SERPL-MCNC: 1.8 MG/DL — SIGNIFICANT CHANGE UP (ref 1.6–2.6)
MCHC RBC-ENTMCNC: 28.8 PG — SIGNIFICANT CHANGE UP (ref 27–34)
MCHC RBC-ENTMCNC: 32.9 GM/DL — SIGNIFICANT CHANGE UP (ref 32–36)
MCV RBC AUTO: 87.5 FL — SIGNIFICANT CHANGE UP (ref 80–100)
MONOCYTES # BLD AUTO: 0.61 K/UL — SIGNIFICANT CHANGE UP (ref 0–0.9)
MONOCYTES NFR BLD AUTO: 10.7 % — SIGNIFICANT CHANGE UP (ref 2–14)
NEUTROPHILS # BLD AUTO: 3.96 K/UL — SIGNIFICANT CHANGE UP (ref 1.8–7.4)
NEUTROPHILS NFR BLD AUTO: 69.7 % — SIGNIFICANT CHANGE UP (ref 43–77)
NRBC # BLD: 0 /100 WBCS — SIGNIFICANT CHANGE UP (ref 0–0)
PHOSPHATE SERPL-MCNC: 2.7 MG/DL — SIGNIFICANT CHANGE UP (ref 2.5–4.5)
PLATELET # BLD AUTO: 224 K/UL — SIGNIFICANT CHANGE UP (ref 150–400)
POTASSIUM SERPL-MCNC: 3.9 MMOL/L — SIGNIFICANT CHANGE UP (ref 3.5–5.3)
POTASSIUM SERPL-SCNC: 3.9 MMOL/L — SIGNIFICANT CHANGE UP (ref 3.5–5.3)
PROT SERPL-MCNC: 6.6 G/DL — SIGNIFICANT CHANGE UP (ref 6–8.3)
RBC # BLD: 4.55 M/UL — SIGNIFICANT CHANGE UP (ref 4.2–5.8)
RBC # FLD: 14.4 % — SIGNIFICANT CHANGE UP (ref 10.3–14.5)
SODIUM SERPL-SCNC: 141 MMOL/L — SIGNIFICANT CHANGE UP (ref 135–145)
WBC # BLD: 5.69 K/UL — SIGNIFICANT CHANGE UP (ref 3.8–10.5)
WBC # FLD AUTO: 5.69 K/UL — SIGNIFICANT CHANGE UP (ref 3.8–10.5)

## 2020-05-01 PROCEDURE — 99239 HOSP IP/OBS DSCHRG MGMT >30: CPT | Mod: CS

## 2020-05-01 PROCEDURE — 87205 SMEAR GRAM STAIN: CPT

## 2020-05-01 PROCEDURE — 89051 BODY FLUID CELL COUNT: CPT

## 2020-05-01 PROCEDURE — 87324 CLOSTRIDIUM AG IA: CPT

## 2020-05-01 PROCEDURE — 82962 GLUCOSE BLOOD TEST: CPT

## 2020-05-01 PROCEDURE — 93970 EXTREMITY STUDY: CPT

## 2020-05-01 PROCEDURE — 87798 DETECT AGENT NOS DNA AMP: CPT

## 2020-05-01 PROCEDURE — 88312 SPECIAL STAINS GROUP 1: CPT

## 2020-05-01 PROCEDURE — 86682 HELMINTH ANTIBODY: CPT

## 2020-05-01 PROCEDURE — 92526 ORAL FUNCTION THERAPY: CPT

## 2020-05-01 PROCEDURE — 86645 CMV ANTIBODY IGM: CPT

## 2020-05-01 PROCEDURE — 83036 HEMOGLOBIN GLYCOSYLATED A1C: CPT

## 2020-05-01 PROCEDURE — 84145 PROCALCITONIN (PCT): CPT

## 2020-05-01 PROCEDURE — 99233 SBSQ HOSP IP/OBS HIGH 50: CPT | Mod: CS,GC

## 2020-05-01 PROCEDURE — 81001 URINALYSIS AUTO W/SCOPE: CPT

## 2020-05-01 PROCEDURE — 87040 BLOOD CULTURE FOR BACTERIA: CPT

## 2020-05-01 PROCEDURE — 87581 M.PNEUMON DNA AMP PROBE: CPT

## 2020-05-01 PROCEDURE — 86644 CMV ANTIBODY: CPT

## 2020-05-01 PROCEDURE — 99285 EMERGENCY DEPT VISIT HI MDM: CPT | Mod: 25

## 2020-05-01 PROCEDURE — 87449 NOS EACH ORGANISM AG IA: CPT

## 2020-05-01 PROCEDURE — 82803 BLOOD GASES ANY COMBINATION: CPT

## 2020-05-01 PROCEDURE — 87177 OVA AND PARASITES SMEARS: CPT

## 2020-05-01 PROCEDURE — 86803 HEPATITIS C AB TEST: CPT

## 2020-05-01 PROCEDURE — 97164 PT RE-EVAL EST PLAN CARE: CPT

## 2020-05-01 PROCEDURE — 87641 MR-STAPH DNA AMP PROBE: CPT

## 2020-05-01 PROCEDURE — 87045 FECES CULTURE AEROBIC BACT: CPT

## 2020-05-01 PROCEDURE — 71275 CT ANGIOGRAPHY CHEST: CPT

## 2020-05-01 PROCEDURE — 85379 FIBRIN DEGRADATION QUANT: CPT

## 2020-05-01 PROCEDURE — 96375 TX/PRO/DX INJ NEW DRUG ADDON: CPT

## 2020-05-01 PROCEDURE — 82550 ASSAY OF CK (CPK): CPT

## 2020-05-01 PROCEDURE — 87070 CULTURE OTHR SPECIMN AEROBIC: CPT

## 2020-05-01 PROCEDURE — 86480 TB TEST CELL IMMUN MEASURE: CPT

## 2020-05-01 PROCEDURE — 83735 ASSAY OF MAGNESIUM: CPT

## 2020-05-01 PROCEDURE — 86140 C-REACTIVE PROTEIN: CPT

## 2020-05-01 PROCEDURE — 85730 THROMBOPLASTIN TIME PARTIAL: CPT

## 2020-05-01 PROCEDURE — 84484 ASSAY OF TROPONIN QUANT: CPT

## 2020-05-01 PROCEDURE — 93005 ELECTROCARDIOGRAM TRACING: CPT

## 2020-05-01 PROCEDURE — 82728 ASSAY OF FERRITIN: CPT

## 2020-05-01 PROCEDURE — 88305 TISSUE EXAM BY PATHOLOGIST: CPT

## 2020-05-01 PROCEDURE — 87486 CHLMYD PNEUM DNA AMP PROBE: CPT

## 2020-05-01 PROCEDURE — 87102 FUNGUS ISOLATION CULTURE: CPT

## 2020-05-01 PROCEDURE — 87116 MYCOBACTERIA CULTURE: CPT

## 2020-05-01 PROCEDURE — 85025 COMPLETE CBC W/AUTO DIFF WBC: CPT

## 2020-05-01 PROCEDURE — 97161 PT EVAL LOW COMPLEX 20 MIN: CPT

## 2020-05-01 PROCEDURE — 85610 PROTHROMBIN TIME: CPT

## 2020-05-01 PROCEDURE — 96374 THER/PROPH/DIAG INJ IV PUSH: CPT

## 2020-05-01 PROCEDURE — 87046 STOOL CULTR AEROBIC BACT EA: CPT

## 2020-05-01 PROCEDURE — 36415 COLL VENOUS BLD VENIPUNCTURE: CPT

## 2020-05-01 PROCEDURE — 80053 COMPREHEN METABOLIC PANEL: CPT

## 2020-05-01 PROCEDURE — 87206 SMEAR FLUORESCENT/ACID STAI: CPT

## 2020-05-01 PROCEDURE — 83615 LACTATE (LD) (LDH) ENZYME: CPT

## 2020-05-01 PROCEDURE — 80202 ASSAY OF VANCOMYCIN: CPT

## 2020-05-01 PROCEDURE — 94640 AIRWAY INHALATION TREATMENT: CPT

## 2020-05-01 PROCEDURE — 85027 COMPLETE CBC AUTOMATED: CPT

## 2020-05-01 PROCEDURE — 86606 ASPERGILLUS ANTIBODY: CPT

## 2020-05-01 PROCEDURE — 84100 ASSAY OF PHOSPHORUS: CPT

## 2020-05-01 PROCEDURE — 87536 HIV-1 QUANT&REVRSE TRNSCRPJ: CPT

## 2020-05-01 PROCEDURE — 71250 CT THORAX DX C-: CPT

## 2020-05-01 PROCEDURE — 87635 SARS-COV-2 COVID-19 AMP PRB: CPT

## 2020-05-01 PROCEDURE — 83605 ASSAY OF LACTIC ACID: CPT

## 2020-05-01 PROCEDURE — 97530 THERAPEUTIC ACTIVITIES: CPT

## 2020-05-01 PROCEDURE — 86359 T CELLS TOTAL COUNT: CPT

## 2020-05-01 PROCEDURE — 71045 X-RAY EXAM CHEST 1 VIEW: CPT

## 2020-05-01 PROCEDURE — 88112 CYTOPATH CELL ENHANCE TECH: CPT

## 2020-05-01 PROCEDURE — 74230 X-RAY XM SWLNG FUNCJ C+: CPT

## 2020-05-01 PROCEDURE — 74230 X-RAY XM SWLNG FUNCJ C+: CPT | Mod: 26

## 2020-05-01 PROCEDURE — 82955 ASSAY OF G6PD ENZYME: CPT

## 2020-05-01 PROCEDURE — 92611 MOTION FLUOROSCOPY/SWALLOW: CPT

## 2020-05-01 PROCEDURE — 80048 BASIC METABOLIC PNL TOTAL CA: CPT

## 2020-05-01 PROCEDURE — 84443 ASSAY THYROID STIM HORMONE: CPT

## 2020-05-01 PROCEDURE — 87305 ASPERGILLUS AG IA: CPT

## 2020-05-01 PROCEDURE — 87633 RESP VIRUS 12-25 TARGETS: CPT

## 2020-05-01 PROCEDURE — 87015 SPECIMEN INFECT AGNT CONCNTJ: CPT

## 2020-05-01 PROCEDURE — 87507 IADNA-DNA/RNA PROBE TQ 12-25: CPT

## 2020-05-01 PROCEDURE — 83880 ASSAY OF NATRIURETIC PEPTIDE: CPT

## 2020-05-01 RX ORDER — NYSTATIN 500MM UNIT
4 POWDER (EA) MISCELLANEOUS
Qty: 112 | Refills: 0
Start: 2020-05-01 | End: 2020-05-07

## 2020-05-01 RX ORDER — ENOXAPARIN SODIUM 100 MG/ML
40 INJECTION SUBCUTANEOUS
Qty: 30 | Refills: 0
Start: 2020-05-01 | End: 2020-05-30

## 2020-05-01 RX ADMIN — ETRAVIRINE 200 MILLIGRAM(S): 200 TABLET ORAL at 11:32

## 2020-05-01 RX ADMIN — VALACYCLOVIR 500 MILLIGRAM(S): 500 TABLET, FILM COATED ORAL at 00:06

## 2020-05-01 RX ADMIN — Medication 5 MILLILITER(S): at 16:50

## 2020-05-01 RX ADMIN — FAMOTIDINE 20 MILLIGRAM(S): 10 INJECTION INTRAVENOUS at 11:31

## 2020-05-01 RX ADMIN — VALACYCLOVIR 500 MILLIGRAM(S): 500 TABLET, FILM COATED ORAL at 11:31

## 2020-05-01 RX ADMIN — Medication 1 DROP(S): at 06:04

## 2020-05-01 RX ADMIN — Medication 5 MILLILITER(S): at 06:04

## 2020-05-01 RX ADMIN — AMPICILLIN SODIUM AND SULBACTAM SODIUM 100 GRAM(S): 250; 125 INJECTION, POWDER, FOR SUSPENSION INTRAMUSCULAR; INTRAVENOUS at 00:00

## 2020-05-01 RX ADMIN — Medication 8 MILLIGRAM(S): at 06:01

## 2020-05-01 RX ADMIN — EMTRICITABINE AND TENOFOVIR DISOPROXIL FUMARATE 1 TABLET(S): 200; 300 TABLET, FILM COATED ORAL at 11:32

## 2020-05-01 RX ADMIN — Medication 125 MICROGRAM(S): at 06:00

## 2020-05-01 RX ADMIN — DARUNAVIR 600 MILLIGRAM(S): 75 TABLET, FILM COATED ORAL at 12:00

## 2020-05-01 RX ADMIN — LACTOBACILLUS ACIDOPH-L.BULGARICUS 1 MILLION CELL CHEWABLE TABLET 1 TABLET(S): at 11:31

## 2020-05-01 RX ADMIN — RALTEGRAVIR 400 MILLIGRAM(S): 400 TABLET, FILM COATED ORAL at 11:31

## 2020-05-01 RX ADMIN — Medication 1 SPRAY(S): at 18:35

## 2020-05-01 RX ADMIN — AMPICILLIN SODIUM AND SULBACTAM SODIUM 100 GRAM(S): 250; 125 INJECTION, POWDER, FOR SUSPENSION INTRAMUSCULAR; INTRAVENOUS at 18:26

## 2020-05-01 RX ADMIN — RITONAVIR 100 MILLIGRAM(S): 100 TABLET, FILM COATED ORAL at 11:32

## 2020-05-01 RX ADMIN — AMPICILLIN SODIUM AND SULBACTAM SODIUM 100 GRAM(S): 250; 125 INJECTION, POWDER, FOR SUSPENSION INTRAMUSCULAR; INTRAVENOUS at 11:31

## 2020-05-01 RX ADMIN — AMPICILLIN SODIUM AND SULBACTAM SODIUM 100 GRAM(S): 250; 125 INJECTION, POWDER, FOR SUSPENSION INTRAMUSCULAR; INTRAVENOUS at 05:59

## 2020-05-01 RX ADMIN — ROSUVASTATIN CALCIUM 20 MILLIGRAM(S): 5 TABLET ORAL at 00:05

## 2020-05-01 NOTE — SWALLOW VFSS/MBS ASSESSMENT ADULT - RECOMMENDED FEEDING/EATING TECHNIQUES
allow for swallow between intakes/alternate food with liquid/position upright (90 degrees)/oral hygiene/provide rest periods between swallows/turn head right

## 2020-05-01 NOTE — SWALLOW VFSS/MBS ASSESSMENT ADULT - COMMENTS
Pt received on 6L NC, increased to 8L for Pt comfort during assessment. Language skills are intact at the conversational level. Voice is mildly hypophonic.

## 2020-05-01 NOTE — PROGRESS NOTE ADULT - REASON FOR ADMISSION
r/o COVID
COVID
r/o COVID

## 2020-05-01 NOTE — PROGRESS NOTE ADULT - PROBLEM SELECTOR PLAN 2
- No fever or WBC count  - CXR shows atelectasis vs aspiration PNA RLL  - Procalcitonin 0.11  - Patient is immunocompromised, will c/w Unasyn 1.5mg q 6 hours x 7 days. 4/28-5/4

## 2020-05-01 NOTE — DISCHARGE NOTE NURSING/CASE MANAGEMENT/SOCIAL WORK - PATIENT PORTAL LINK FT
You can access the FollowMyHealth Patient Portal offered by [facility] by registering at the following website: [email or web address]. By joining [facility]’s FollowMyHealth portal, you will also be able to view your health information using other applications (apps) compatible with our system.

## 2020-05-01 NOTE — DISCHARGE NOTE NURSING/CASE MANAGEMENT/SOCIAL WORK - NSDCPELOVENOX_GEN_ALL_CORE
Enoxaparin/Lovenox - Compliance/Enoxaparin/Lovenox - Follow up monitoring/Enoxaparin/Lovenox - Potential for adverse drug reactions and interactions

## 2020-05-01 NOTE — PROGRESS NOTE ADULT - ASSESSMENT
58yo man w/ HIV on ART, COVID pneumonia; pulmonary consulted for progression of lung infiltrates and of concern for early fibrotic lung dz vs. superimposed bacterial PNA vs. PCP PNA. He is status post bronchoscopy and BAL on 4/17 and has since been extubated with overall improvement in his acute hypoxemic respiratory failure, now on COVID RMF.

## 2020-05-01 NOTE — SWALLOW VFSS/MBS ASSESSMENT ADULT - DIAGNOSTIC IMPRESSIONS
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 Swallow function is most likely c/w baseline swallow due to late effects of CRT, although with potential exacerbation of symptoms due to COVID and related shortness of breath with PO intake. Pt appears safe to continue with an oral diet as described below and would benefit from a short course of dysphagia therapy as an outpatient to prevent progression of dysphagia.

## 2020-05-01 NOTE — DISCHARGE NOTE NURSING/CASE MANAGEMENT/SOCIAL WORK - NSDCFUADDAPPT_GEN_ALL_CORE_FT
Please follow up with your primary care physician 1 week from discharge.  Please follow up with Dr. Ashraf/Dr. Blood (pulmonary) after discharge. The nurse manager will contact you regarding scheduling your appt.   Please follow up with Dr. Moss (internal medicine) on.......

## 2020-05-01 NOTE — PROGRESS NOTE ADULT - PROBLEM SELECTOR PLAN 1
Extubated to facemask NRB 4/19 and xfer to telemetry/SDU.   S/p bronchoscopy w/ BAL 4/17 which was unrevealing for infectious or inflammatory etiologies.    *Cultures NGTD  *PCP stain negative, TMP-SMX and steroids thus stopped 4/20  *Cyto neg    Recommendations:  - currently on 4-5LPM O2, no sig change in CXRs; sx remain stable  - cont mobility/PT   - encourage intermittent proning  - otherwise continue supportive care for COVID-19 related respiratory failure.  - would complete course of unasyn

## 2020-05-01 NOTE — SWALLOW VFSS/MBS ASSESSMENT ADULT - SLP PERTINENT HISTORY OF CURRENT PROBLEM
h/o CRT for tonsillar ca 5 yrs ago, baseline dysphagia but exacerbated during hospitalization for COVID. Swab on 4/30 neg for COVID

## 2020-05-01 NOTE — SWALLOW VFSS/MBS ASSESSMENT ADULT - PHARYNGEAL PHASE COMMENTS
Timely swallow trigger with normal hyolaryngeal excursion. Mildly reduced BOT retraction, absent epiglottic retroflexion, and decreased pharyngeal squeeze resulted in trace-mild bolus stasis along the BOT & velum, in the vallecuale, pyriform sinuses and along the A-E folds and PPW after the swallow with increasing stasis as bolus viscosity increased. There was trace but deep penetration during & after the swallow with thin liquids to the level of the vocal folds. Cough x1 observed. Pt benefitted from a double swallow and a right head-turn to facilitate bolus clearance through the pharynx and eliminate penetration of thin liquid.

## 2020-05-01 NOTE — PROGRESS NOTE ADULT - SUBJECTIVE AND OBJECTIVE BOX
INTERVAL HPI/OVERNIGHT EVENTS: ESTEFANY, AFVSS    Patient was seen and examined at bedside. As per patient, no o/n events, patient resting comfortably. Reporting persistent difficulty w/ eating. Spoke to pt about plan for modified barium swallow today per speech and swallow recs. Also reporting nasal congestion. Encouraged ocean spray and albuterol inhaler use. Patient denies: fever, chills, dizziness, HA, Changes in vision, CP, palpitations, SOB, cough, N/V/D/C, dysuria, changes in bowel movements, LE edema. ROS otherwise negative.    VITAL SIGNS:  T(F): 98 (05-01-20 @ 05:12)  HR: 75 (05-01-20 @ 05:12)  BP: 128/82 (05-01-20 @ 05:12)  RR: 18 (05-01-20 @ 05:12)  SpO2: 94% (05-01-20 @ 05:12)  Wt(kg): --    PHYSICAL EXAM:    Constitutional: WDWN, NAD  HEENT: PERRL, EOMI, sclera non-icteric, neck supple, trachea midline, no masses, no JVD, MMM, good dentition  Respiratory: bibasilar crackles b/l without accessory muscle use and no intercostal retractions  Cardiovascular: RRR, normal S1S2, no M/R/G  Gastrointestinal: soft, NTND, no masses palpable, BS normal  Extremities: Warm, well perfused, pulses equal bilateral upper and lower extremities, no edema, no clubbing  Neurological: AAOx3, CN Grossly intact  Skin: Normal temperature, warm, dry    MEDICATIONS  (STANDING):  ampicillin/sulbactam  IVPB 1.5 Gram(s) IV Intermittent every 6 hours  ampicillin/sulbactam  IVPB      artificial tears (preservative free) Ophthalmic Solution 1 Drop(s) Both EYES two times a day  Biotene Dry Mouth Oral Rinse 5 milliLiter(s) Swish and Spit three times a day  darunavir 600 milliGRAM(s) Oral <User Schedule>  dextrose 5% + sodium chloride 0.9%. 1000 milliLiter(s) (100 mL/Hr) IV Continuous <Continuous>  dextrose 5%. 1000 milliLiter(s) (50 mL/Hr) IV Continuous <Continuous>  dextrose 50% Injectable 12.5 Gram(s) IV Push once  dextrose 50% Injectable 25 Gram(s) IV Push once  dextrose 50% Injectable 25 Gram(s) IV Push once  emtricitabine 200 mG/tenofovir alafenamide 25 mG (DESCOVY) Tablet 1 Tablet(s) Oral daily  enoxaparin Injectable 40 milliGRAM(s) SubCutaneous every 24 hours  etravirine 200 milliGRAM(s) Oral <User Schedule>  famotidine    Tablet 20 milliGRAM(s) Oral daily  influenza   Vaccine 0.5 milliLiter(s) IntraMuscular once  lactobacillus acidophilus and bulgaricus Chewable 1 Tablet(s) Chew daily  levothyroxine 125 MICROGram(s) Oral daily  loperamide Suspension 8 milliGRAM(s) Oral two times a day  melatonin 3 milliGRAM(s) Oral at bedtime  raltegravir Tablet 400 milliGRAM(s) Oral <User Schedule>  ritonavir Tablet 100 milliGRAM(s) Oral <User Schedule>  rosuvastatin 20 milliGRAM(s) Oral at bedtime  sodium chloride 0.65% Nasal 1 Spray(s) Both Nostrils two times a day  tamsulosin 0.4 milliGRAM(s) Oral at bedtime  valACYclovir 500 milliGRAM(s) Oral two times a day    MEDICATIONS  (PRN):  acetaminophen   Tablet .. 975 milliGRAM(s) Oral every 8 hours PRN Temp greater or equal to 38C (100.4F)  ALBUTerol    90 MICROgram(s) HFA Inhaler 2 Puff(s) Inhalation every 6 hours PRN Shortness of Breath  dextrose 40% Gel 15 Gram(s) Oral once PRN Blood Glucose LESS THAN 70 milliGRAM(s)/deciliter  fluticasone propionate 50 MICROgram(s)/spray Nasal Spray 1 Spray(s) Both Nostrils two times a day PRN nasal congestion  glucagon  Injectable 1 milliGRAM(s) IntraMuscular once PRN Glucose LESS THAN 70 milligrams/deciliter  phenylephrine 0.125% Nasal 2 Drop(s) Nasal every 4 hours PRN Nasal Congestion      Allergies    No Known Allergies    Intolerances        LABS:                        13.1   5.69  )-----------( 224      ( 01 May 2020 06:20 )             39.8     05-01    141  |  104  |  6<L>  ----------------------------<  105<H>  3.9   |  25  |  0.60    Ca    9.2      01 May 2020 06:20  Phos  2.7     05-01  Mg     1.8     05-01    TPro  6.6  /  Alb  2.7<L>  /  TBili  0.2  /  DBili  x   /  AST  22  /  ALT  37  /  AlkPhos  53  05-01          RADIOLOGY & ADDITIONAL TESTS:  Reviewed INTERVAL HPI/OVERNIGHT EVENTS: ESTEFANY, AFVSS    Patient was seen and examined at bedside. As per patient, no o/n events, patient resting comfortably. Reporting persistent difficulty w/ eating. Spoke to pt about plan for modified barium swallow today per speech and swallow recs. Also reporting nasal congestion. Encouraged ocean spray and albuterol inhaler use. +productive cough. Patient denies: fever, chills, dizziness, HA, Changes in vision, CP, palpitations, SOB, N/V/D/C, dysuria, changes in bowel movements, LE edema. ROS otherwise negative. Satting well on 5L NC, weaned down to 4L NC at bedside satting 92-93% without any resp distress.     VITAL SIGNS:  T(F): 98 (05-01-20 @ 05:12)  HR: 75 (05-01-20 @ 05:12)  BP: 128/82 (05-01-20 @ 05:12)  RR: 18 (05-01-20 @ 05:12)  SpO2: 94% (05-01-20 @ 05:12)  Wt(kg): --    PHYSICAL EXAM:    Constitutional: WDWN, NAD  HEENT: PERRL, EOMI, sclera non-icteric, neck supple, trachea midline, no masses, no JVD, MMM, good dentition  Respiratory: bibasilar crackles b/l without accessory muscle use and no intercostal retractions  Cardiovascular: RRR, normal S1S2, no M/R/G  Gastrointestinal: soft, NTND, no masses palpable, BS normal  Extremities: Warm, well perfused, pulses equal bilateral upper and lower extremities, no edema, no clubbing  Neurological: AAOx3, CN Grossly intact  Skin: Normal temperature, warm, dry    MEDICATIONS  (STANDING):  ampicillin/sulbactam  IVPB 1.5 Gram(s) IV Intermittent every 6 hours  ampicillin/sulbactam  IVPB      artificial tears (preservative free) Ophthalmic Solution 1 Drop(s) Both EYES two times a day  Biotene Dry Mouth Oral Rinse 5 milliLiter(s) Swish and Spit three times a day  darunavir 600 milliGRAM(s) Oral <User Schedule>  dextrose 5% + sodium chloride 0.9%. 1000 milliLiter(s) (100 mL/Hr) IV Continuous <Continuous>  dextrose 5%. 1000 milliLiter(s) (50 mL/Hr) IV Continuous <Continuous>  dextrose 50% Injectable 12.5 Gram(s) IV Push once  dextrose 50% Injectable 25 Gram(s) IV Push once  dextrose 50% Injectable 25 Gram(s) IV Push once  emtricitabine 200 mG/tenofovir alafenamide 25 mG (DESCOVY) Tablet 1 Tablet(s) Oral daily  enoxaparin Injectable 40 milliGRAM(s) SubCutaneous every 24 hours  etravirine 200 milliGRAM(s) Oral <User Schedule>  famotidine    Tablet 20 milliGRAM(s) Oral daily  influenza   Vaccine 0.5 milliLiter(s) IntraMuscular once  lactobacillus acidophilus and bulgaricus Chewable 1 Tablet(s) Chew daily  levothyroxine 125 MICROGram(s) Oral daily  loperamide Suspension 8 milliGRAM(s) Oral two times a day  melatonin 3 milliGRAM(s) Oral at bedtime  raltegravir Tablet 400 milliGRAM(s) Oral <User Schedule>  ritonavir Tablet 100 milliGRAM(s) Oral <User Schedule>  rosuvastatin 20 milliGRAM(s) Oral at bedtime  sodium chloride 0.65% Nasal 1 Spray(s) Both Nostrils two times a day  tamsulosin 0.4 milliGRAM(s) Oral at bedtime  valACYclovir 500 milliGRAM(s) Oral two times a day    MEDICATIONS  (PRN):  acetaminophen   Tablet .. 975 milliGRAM(s) Oral every 8 hours PRN Temp greater or equal to 38C (100.4F)  ALBUTerol    90 MICROgram(s) HFA Inhaler 2 Puff(s) Inhalation every 6 hours PRN Shortness of Breath  dextrose 40% Gel 15 Gram(s) Oral once PRN Blood Glucose LESS THAN 70 milliGRAM(s)/deciliter  fluticasone propionate 50 MICROgram(s)/spray Nasal Spray 1 Spray(s) Both Nostrils two times a day PRN nasal congestion  glucagon  Injectable 1 milliGRAM(s) IntraMuscular once PRN Glucose LESS THAN 70 milligrams/deciliter  phenylephrine 0.125% Nasal 2 Drop(s) Nasal every 4 hours PRN Nasal Congestion      Allergies    No Known Allergies    Intolerances        LABS:                        13.1   5.69  )-----------( 224      ( 01 May 2020 06:20 )             39.8     05-01    141  |  104  |  6<L>  ----------------------------<  105<H>  3.9   |  25  |  0.60    Ca    9.2      01 May 2020 06:20  Phos  2.7     05-01  Mg     1.8     05-01    TPro  6.6  /  Alb  2.7<L>  /  TBili  0.2  /  DBili  x   /  AST  22  /  ALT  37  /  AlkPhos  53  05-01          RADIOLOGY & ADDITIONAL TESTS:  Reviewed

## 2020-05-01 NOTE — PROGRESS NOTE ADULT - PROBLEM SELECTOR PLAN 4
- Radiation/chemotherapy in the past  - Reports food being cold and dry in setting of chronic xerostomia, now exacerbated. Speech & Swallow consulted, rec'd reswab COVID-19 PCR, found to be negative 4/30. Will obtain modified barium swallow today.

## 2020-05-01 NOTE — PROGRESS NOTE ADULT - PROBLEM SELECTOR PLAN 1
-Worsening hypoxia 4/27, and transitioned to NRB, now weaned down to 4L NC satting 92-93% without signs of resp distress   - S/p bronchoscopy w/ BAL 4/17 which was unrevealing for infectious or inflammatory etiologies.    *Cultures NGTD, blood cultures 4/27 no growth prelim  *PCP stain negative, TMP-SMX and steroids stopped 4/20  *Cyto neg    Recommendations:  - Cont to wean O2 as tolerated   - Cont with mobility/PT   - Encourage intermittent proning  - Patient was given one dose of Vanco/Zosyn for fever 4/27, but cancelled, since no leukocytosis, CXR shows worsening RLL patchy infiltrate  - C/w Unasyn, monitor WBC/fevers  - C/w supportive care for COVID-19 related respiratory failure  - Stressed importance of incentive spirometry  - CTA chest negative for PE 4/26

## 2020-05-01 NOTE — PROGRESS NOTE ADULT - ATTENDING COMMENTS
Doing the same - on 4L. Less chest congestion. Hoping to go home. When he does, can follow w us in pulmonary at [phone].

## 2020-05-01 NOTE — PROGRESS NOTE ADULT - SUBJECTIVE AND OBJECTIVE BOX
PULMONARY CONSULT SERVICE FOLLOW-UP NOTE    INTERVAL HPI:  Reviewed chart and overnight events; patient seen and examined at bedside. About the same today - on 4L NC; anxious to go home. No new or worsening sx.     MEDICATIONS:  Pulmonary:  ALBUTerol    90 MICROgram(s) HFA Inhaler 2 Puff(s) Inhalation every 6 hours PRN    Antimicrobials:  ampicillin/sulbactam  IVPB 1.5 Gram(s) IV Intermittent every 6 hours  ampicillin/sulbactam  IVPB      darunavir 600 milliGRAM(s) Oral <User Schedule>  emtricitabine 200 mG/tenofovir alafenamide 25 mG (DESCOVY) Tablet 1 Tablet(s) Oral daily  etravirine 200 milliGRAM(s) Oral <User Schedule>  raltegravir Tablet 400 milliGRAM(s) Oral <User Schedule>  ritonavir Tablet 100 milliGRAM(s) Oral <User Schedule>  valACYclovir 500 milliGRAM(s) Oral two times a day    Anticoagulants:  enoxaparin Injectable 40 milliGRAM(s) SubCutaneous every 24 hours    Cardiac:  tamsulosin 0.4 milliGRAM(s) Oral at bedtime    Allergies    No Known Allergies    Intolerances    Vital Signs Last 24 Hrs  T(C): 36.4 (01 May 2020 11:42), Max: 36.7 (01 May 2020 05:12)  T(F): 97.6 (01 May 2020 11:42), Max: 98 (01 May 2020 05:12)  HR: 87 (01 May 2020 11:42) (75 - 89)  BP: 135/91 (01 May 2020 11:42) (128/82 - 143/96)  BP(mean): 106 (01 May 2020 11:42) (106 - 112)  RR: 18 (01 May 2020 11:42) (18 - 20)  SpO2: 92% (01 May 2020 11:42) (92% - 96%)    04-30 @ 07:01  -  05-01 @ 07:00  --------------------------------------------------------  IN: 0 mL / OUT: 1400 mL / NET: -1400 mL    PHYSICAL EXAM:  Constitutional: WDWN man resting comfortably in bed; NAD  HEENT: NC/AT; PERRL, anicteric sclera; MMM  Neck: supple  Cardiovascular: +S1/S2, RRR  Respiratory: crackles B/L  Gastrointestinal: soft, NT/ND  Extremities: WWP; no edema, clubbing or cyanosis  Vascular: 2+ radial pulses B/L  Neurological: awake and alert; SCHMIDT    LABS:  CBC Full  -  ( 01 May 2020 06:20 )  WBC Count : 5.69 K/uL  RBC Count : 4.55 M/uL  Hemoglobin : 13.1 g/dL  Hematocrit : 39.8 %  Platelet Count - Automated : 224 K/uL  Mean Cell Volume : 87.5 fl  Mean Cell Hemoglobin : 28.8 pg  Mean Cell Hemoglobin Concentration : 32.9 gm/dL  Auto Neutrophil # : 3.96 K/uL  Auto Lymphocyte # : 0.69 K/uL  Auto Monocyte # : 0.61 K/uL  Auto Eosinophil # : 0.37 K/uL  Auto Basophil # : 0.03 K/uL  Auto Neutrophil % : 69.7 %  Auto Lymphocyte % : 12.1 %  Auto Monocyte % : 10.7 %  Auto Eosinophil % : 6.5 %  Auto Basophil % : 0.5 %    05-01    141  |  104  |  6<L>  ----------------------------<  105<H>  3.9   |  25  |  0.60    Ca    9.2      01 May 2020 06:20  Phos  2.7     05-01  Mg     1.8     05-01    TPro  6.6  /  Alb  2.7<L>  /  TBili  0.2  /  DBili  x   /  AST  22  /  ALT  37  /  AlkPhos  53  05-01    RADIOLOGY & ADDITIONAL STUDIES:  Interval imaging and studies personally reviewed.

## 2020-05-02 LAB
CULTURE RESULTS: SIGNIFICANT CHANGE UP
CULTURE RESULTS: SIGNIFICANT CHANGE UP
SPECIMEN SOURCE: SIGNIFICANT CHANGE UP
SPECIMEN SOURCE: SIGNIFICANT CHANGE UP

## 2020-05-04 PROBLEM — Z00.00 ENCOUNTER FOR PREVENTIVE HEALTH EXAMINATION: Status: ACTIVE | Noted: 2020-05-04

## 2020-05-07 DIAGNOSIS — R94.31 ABNORMAL ELECTROCARDIOGRAM [ECG] [EKG]: ICD-10-CM

## 2020-05-07 DIAGNOSIS — N18.3 CHRONIC KIDNEY DISEASE, STAGE 3 (MODERATE): ICD-10-CM

## 2020-05-07 DIAGNOSIS — Z21 ASYMPTOMATIC HUMAN IMMUNODEFICIENCY VIRUS [HIV] INFECTION STATUS: ICD-10-CM

## 2020-05-07 DIAGNOSIS — B95.62 METHICILLIN RESISTANT STAPHYLOCOCCUS AUREUS INFECTION AS THE CAUSE OF DISEASES CLASSIFIED ELSEWHERE: ICD-10-CM

## 2020-05-07 DIAGNOSIS — U07.1 COVID-19: ICD-10-CM

## 2020-05-07 DIAGNOSIS — T85.638A LEAKAGE OF OTHER SPECIFIED INTERNAL PROSTHETIC DEVICES, IMPLANTS AND GRAFTS, INITIAL ENCOUNTER: ICD-10-CM

## 2020-05-07 DIAGNOSIS — Z83.3 FAMILY HISTORY OF DIABETES MELLITUS: ICD-10-CM

## 2020-05-07 DIAGNOSIS — G47.33 OBSTRUCTIVE SLEEP APNEA (ADULT) (PEDIATRIC): ICD-10-CM

## 2020-05-07 DIAGNOSIS — I12.9 HYPERTENSIVE CHRONIC KIDNEY DISEASE WITH STAGE 1 THROUGH STAGE 4 CHRONIC KIDNEY DISEASE, OR UNSPECIFIED CHRONIC KIDNEY DISEASE: ICD-10-CM

## 2020-05-07 DIAGNOSIS — R79.89 OTHER SPECIFIED ABNORMAL FINDINGS OF BLOOD CHEMISTRY: ICD-10-CM

## 2020-05-07 DIAGNOSIS — Z92.3 PERSONAL HISTORY OF IRRADIATION: ICD-10-CM

## 2020-05-07 DIAGNOSIS — J84.10 PULMONARY FIBROSIS, UNSPECIFIED: ICD-10-CM

## 2020-05-07 DIAGNOSIS — Z82.49 FAMILY HISTORY OF ISCHEMIC HEART DISEASE AND OTHER DISEASES OF THE CIRCULATORY SYSTEM: ICD-10-CM

## 2020-05-07 DIAGNOSIS — R74.0 NONSPECIFIC ELEVATION OF LEVELS OF TRANSAMINASE AND LACTIC ACID DEHYDROGENASE [LDH]: ICD-10-CM

## 2020-05-07 DIAGNOSIS — Z92.21 PERSONAL HISTORY OF ANTINEOPLASTIC CHEMOTHERAPY: ICD-10-CM

## 2020-05-07 DIAGNOSIS — J12.89 OTHER VIRAL PNEUMONIA: ICD-10-CM

## 2020-05-07 DIAGNOSIS — N17.9 ACUTE KIDNEY FAILURE, UNSPECIFIED: ICD-10-CM

## 2020-05-07 DIAGNOSIS — Z85.818 PERSONAL HISTORY OF MALIGNANT NEOPLASM OF OTHER SITES OF LIP, ORAL CAVITY, AND PHARYNX: ICD-10-CM

## 2020-05-07 DIAGNOSIS — N40.0 BENIGN PROSTATIC HYPERPLASIA WITHOUT LOWER URINARY TRACT SYMPTOMS: ICD-10-CM

## 2020-05-07 DIAGNOSIS — E78.5 HYPERLIPIDEMIA, UNSPECIFIED: ICD-10-CM

## 2020-05-07 DIAGNOSIS — E87.1 HYPO-OSMOLALITY AND HYPONATREMIA: ICD-10-CM

## 2020-05-07 DIAGNOSIS — R13.13 DYSPHAGIA, PHARYNGEAL PHASE: ICD-10-CM

## 2020-05-07 DIAGNOSIS — J96.01 ACUTE RESPIRATORY FAILURE WITH HYPOXIA: ICD-10-CM
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
## 2020-05-07 DIAGNOSIS — E03.9 HYPOTHYROIDISM, UNSPECIFIED: ICD-10-CM

## 2020-05-07 DIAGNOSIS — Z78.1 PHYSICAL RESTRAINT STATUS: ICD-10-CM

## 2020-05-07 DIAGNOSIS — R73.03 PREDIABETES: ICD-10-CM

## 2020-05-13 ENCOUNTER — APPOINTMENT (OUTPATIENT)
Dept: PULMONOLOGY | Facility: CLINIC | Age: 60
End: 2020-05-13
Payer: MEDICARE

## 2020-05-13 ENCOUNTER — APPOINTMENT (OUTPATIENT)
Dept: HEART AND VASCULAR | Facility: CLINIC | Age: 60
End: 2020-05-13
Payer: MEDICARE

## 2020-05-13 DIAGNOSIS — Z85.818 PERSONAL HISTORY OF MALIGNANT NEOPLASM OF OTHER SITES OF LIP, ORAL CAVITY, AND PHARYNX: ICD-10-CM

## 2020-05-13 DIAGNOSIS — B20 HUMAN IMMUNODEFICIENCY VIRUS [HIV] DISEASE: ICD-10-CM

## 2020-05-13 PROCEDURE — 99214 OFFICE O/P EST MOD 30 MIN: CPT | Mod: CS,95

## 2020-05-13 PROCEDURE — 99204 OFFICE O/P NEW MOD 45 MIN: CPT | Mod: CS,95

## 2020-05-13 RX ORDER — TAMSULOSIN HYDROCHLORIDE 0.4 MG/1
0.4 CAPSULE ORAL
Refills: 0 | Status: ACTIVE | COMMUNITY

## 2020-05-13 RX ORDER — ROSUVASTATIN CALCIUM 20 MG/1
20 TABLET, FILM COATED ORAL
Refills: 0 | Status: ACTIVE | COMMUNITY

## 2020-05-13 RX ORDER — DARUNAVIR 600 MG/1
600 TABLET, FILM COATED ORAL
Refills: 0 | Status: ACTIVE | COMMUNITY

## 2020-05-13 RX ORDER — LOSARTAN POTASSIUM 50 MG/1
50 TABLET, FILM COATED ORAL
Refills: 0 | Status: ACTIVE | COMMUNITY

## 2020-05-13 RX ORDER — TIZANIDINE 2 MG/1
2 TABLET ORAL
Refills: 0 | Status: ACTIVE | COMMUNITY

## 2020-05-13 RX ORDER — RALTEGRAVIR 400 MG/1
400 TABLET, FILM COATED ORAL
Refills: 0 | Status: ACTIVE | COMMUNITY

## 2020-05-13 RX ORDER — LEVOTHYROXINE SODIUM 0.17 MG/1
TABLET ORAL
Refills: 0 | Status: ACTIVE | COMMUNITY

## 2020-05-13 RX ORDER — RITONAVIR 100 MG 100 MG/1
100 TABLET ORAL
Refills: 0 | Status: ACTIVE | COMMUNITY

## 2020-05-13 RX ORDER — VALACYCLOVIR HYDROCHLORIDE 1 G/1
TABLET, FILM COATED ORAL
Refills: 0 | Status: ACTIVE | COMMUNITY

## 2020-05-13 NOTE — REASON FOR VISIT
[Follow-Up - From Hospitalization] : a follow-up visit after a recent hospitalization [TextBox_44] : COVID-19 [Follow-Up] : a follow-up visit

## 2020-05-13 NOTE — ASSESSMENT
[FreeTextEntry1] : Data reviewed:\par \par Last CT chest 4/26/20 [facility] personally reviewed: decreased ground glass w some traction bronchiectasis\par \par Impression:\par Acute respiratory failure due to\par Covid-19 pneumonia\par \par Plan:\par Improving.\par Continue current care.\par Patience as strength returns.\par Follow up 2 weeks w CXR if possible downtown, then office visit pref vs video if still can't manage stairs.\par Lovenox for a month ppx.

## 2020-05-13 NOTE — ASSESSMENT
[FreeTextEntry1] : 59 M \par \par COVID-19\par Extended VTE Prophylaxis due to COVID hospitalization, elevated ddimer ~3400\par HIV\par \par Plan:\par - no evidence of VTE on CTA or LE venous dopplers X 2 during hospitalization.  No suspicion at this time for new thrombotic event.  \par - cont lovenox 40mg SQ X 30days for VTE prophylaxis\par - followed by pulgianna Blood.  Still with O2 requirements\par - to come for office visit at one month post discharge

## 2020-05-13 NOTE — ASSESSMENT
[FreeTextEntry1] : Data reviewed:\par \par Last CT chest 4/26/20 [facility] personally reviewed: decreased ground glass w some traction bronchiectasis\par \par Impression:\par Acute respiratory failure due to\par Covid-19 pneumonia\par \par Plan:\par Improving.\par Continue current care.\par Patience as strength returns.\par Follow up 2 weeks w CXR if possible downtown, then office visit pref vs video if still can't manage stairs.\par Lovenox for a month ppx.

## 2020-05-13 NOTE — ASSESSMENT
[FreeTextEntry1] : Data reviewed:\par \par Last CT chest 4/26/20 [facility] personally reviewed: decreased ground glass w some traction bronchiectasis\par \par Impression:\par Acute respiratory failure due to\par Covid-19 pneumonia\par \par Plan:\par Improving.\par Continue current care.\par Patience as strength returns.\par Follow up 2 weeks w CXR if possible downtown, then office visit pref vs video if still can't manage stairs.\par Lovenox for a month ppx.

## 2020-05-13 NOTE — HISTORY OF PRESENT ILLNESS
[FreeTextEntry1] : Telehealth encounter 5/13/20: verbal consent given by Raul Rodriguez [birth year] at 430PM\par \par 59M hx HIV, tonsillar CA, HLD, BPH, sleep apnea, and recent long COVID-19 admission to [facility] 3/29-5/8/20\par Discharged on 4L NC,  lovenox 40mg injections X 30 days for extended VTE ppx.  No evidence of PE on CTA.  No DVT on multiple LE dopplers.  \par \par Walking around in home, feels a little SOB.  Weak from long hospital course.  Notes  O2 sats 93-95% at rest, mid 80's with exertion.  Sats go back up after 2-3 mins.  No CP.  No leg pain or swelling.  \par \par No hx VTE. \par Father hx AAA

## 2020-05-13 NOTE — PROCEDURE
[FreeTextEntry1] : \par EXAM: CT ANGIO CHEST PE PROTOCOL IC \par \par PROCEDURE DATE: 04/26/2020 \par \par \par \par INTERPRETATION: CTA (CT angiography) of the CHEST \par \par INDICATION: Covid positive. Double increasing d-dimer level today. Shortness \par of breath. Assess for pulmonary embolism. \par \par TECHNIQUE: CT angiography of the chest was performed during bolus injection \par of intravenous contrast. Post-processing including the production of axial, \par coronal and sagittal multiplanar reformatted images and axial and coronal \par maximum intensity projections (MIPs) was performed. \par \par PRIOR STUDY: Chest CT from 4/16/2020. Chest CT from 3/30/2020. \par \par FINDINGS: No acute pulmonary embolism is seen. The main pulmonary artery is \par again dilated, measuring 3.5 cm. \par \par There is again thoracic aortic aneurysm, with aortic root measuring 4.4 cm, \par ascending aorta 4.1 cm, aortic arch 3.2 cm, and descending aorta 3.2 cm. \par Small calcified plaque aorta. Moderate coronary artery calcification. The \par heart size is mildly enlarged. No pericardial effusion is seen. \par \par There are borderline size, 1.0 cm lymph nodes in stations 4R, 4L, and 7. \par \par No pleural effusions are seen. There is again severe, diffuse groundglass \par opacity with superimposed intralobular septal thickening ("crazy paving") \par throughout both lungs. Slight improvement degree of associated \par consolidation, such as in the left lower lobe. There is again abnormal \par bronchial dilatation bilaterally. \par \par Limited evaluation of the upper abdomen demonstrates a small hiatal hernia. \par \par Mild symmetric gynecomastia bilaterally. \par \par Evaluation of the osseous structures demonstrates degenerative changes of \par the spine. \par \par \par IMPRESSION: \par 1. Since 4/16/2020, there is no evidence of acute pulmonary embolism. \par \par 2. Main pulmonary artery again dilated, which may be due to pulmonary \par hypertension. \par \par 3. Severe diffuse groundglass opacity throughout both lungs. Pattern of \par groundglass opacity suggest infection including atypical pneumonia/viral \par infection from atypical agents including COVID 19. \par \par \par \par \par \par \par \par Thank you for the opportunity to participate in the care of this patient. \par \par \par \par MARCI QUINTERO M.D., ATTENDING RADIOLOGIST \par This document has been electronically signed. Apr 26 2020 2:14PM \par \par \par \par \par \par \par \par \par    \par  \par  \par \par \par Bookmarks

## 2020-05-14 NOTE — HISTORY OF PRESENT ILLNESS
[TextBox_4] : 5/13/20: Known to me from hospital. 60yo man w controlled HIV, tonsillar CA (s/p chemo & radiation), HLD, hypothyroidism, BPH and sleep apnea with recent hospitalization at [facility] from 3/30/20-5/1/20 with COVID-19 infection. Patient presented with increasing SOB, cough and diarrhea. Required NRB and was admitted to COVID-tele floor. Tx'd w HCQ, azithro, toci and steroids as well as vanc/zosyn for +MRSA swab as well as Bactrim for PCP ppx. Initiated on anticoagulation for DVT ppx though LE doppler negative and CTA negative for PE. Mid course he had worsening imaging and oxygenation and transferred to MICU and intubated for bronch which was nondiagnostic. Patient ultimately improved & weaned off oxygen and discharged with home supplemental oxygen 4L NC as well as 30 days of Lovenox injections. \par \par Today he was seen by video as he cannot easily navigate the stairs to his 3rd floor walkup. He relates that he feels markedly better. He has titrated his O2 down to 3L but still desaturates on exertion. Had gained 5 lbs, eating all the time. Seriously debilitated, navigates his ADLs w difficulty but has two friends assisting him. Taking Lovenox x 1 month with no difficulty and no bleeding. Finished Augmentin on discharge. Not on steroids. [FreeTextEntry1] : 59 year old male with PMHx of HIV, tonsillar CA (s/p chemo & radiation), HLD, hypothyroidism, BPH and sleep apnea with recent hospitalization at [facility] from 3/30/20-5/1/20 with COVID-19 infection. Patient presented with increasing SOB, cough and diarrhea. Required NRB and was admitted to COVID-tele floor. Patient completed course of Azithromycin, Plaquenil, Toci and steroids for COVID treatment. Also was given vanc/zosyn for +MRSA swab as well as Bactrim for PCP ppx. Initiated on anticoagulation for DVT ppx though LE doppler negative and CTA negative for PE.  \par \par Of note, during hospitalization CT chest notable for worsening of  bilateral upper lobe GGOs. Transferred to MICU for worsening CT findings and acute hypoxic respiratory failure requiring intubation. Patient ultimately improved & weaned off oxygen and discharged with home supplemental oxygen 4L NC as well as 30 days of Lovenox injections. \par \par Today, patient is feeling

## 2020-05-14 NOTE — HISTORY OF PRESENT ILLNESS
[TextBox_4] : 5/13/20: Known to me from hospital. 58yo man w controlled HIV, tonsillar CA (s/p chemo & radiation), HLD, hypothyroidism, BPH and sleep apnea with recent hospitalization at [facility] from 3/30/20-5/1/20 with COVID-19 infection. Patient presented with increasing SOB, cough and diarrhea. Required NRB and was admitted to COVID-tele floor. Tx'd w HCQ, azithro, toci and steroids as well as vanc/zosyn for +MRSA swab as well as Bactrim for PCP ppx. Initiated on anticoagulation for DVT ppx though LE doppler negative and CTA negative for PE. Mid course he had worsening imaging and oxygenation and transferred to MICU and intubated for bronch which was nondiagnostic. Patient ultimately improved & weaned off oxygen and discharged with home supplemental oxygen 4L NC as well as 30 days of Lovenox injections. \par \par Today he was seen by video as he cannot easily navigate the stairs to his 3rd floor walkup. He relates that he feels markedly better. He has titrated his O2 down to 3L but still desaturates on exertion. Had gained 5 lbs, eating all the time. Seriously debilitated, navigates his ADLs w difficulty but has two friends assisting him. Taking Lovenox x 1 month with no difficulty and no bleeding. Finished Augmentin on discharge. Not on steroids. [FreeTextEntry1] : 59 year old male with PMHx of HIV, tonsillar CA (s/p chemo & radiation), HLD, hypothyroidism, BPH and sleep apnea with recent hospitalization at [facility] from 3/30/20-5/1/20 with COVID-19 infection. Patient presented with increasing SOB, cough and diarrhea. Required NRB and was admitted to COVID-tele floor. Patient completed course of Azithromycin, Plaquenil, Toci and steroids for COVID treatment. Also was given vanc/zosyn for +MRSA swab as well as Bactrim for PCP ppx. Initiated on anticoagulation for DVT ppx though LE doppler negative and CTA negative for PE.  \par \par Of note, during hospitalization CT chest notable for worsening of  bilateral upper lobe GGOs. Transferred to MICU for worsening CT findings and acute hypoxic respiratory failure requiring intubation. Patient ultimately improved & weaned off oxygen and discharged with home supplemental oxygen 4L NC as well as 30 days of Lovenox injections. \par \par Today, patient is feeling

## 2020-05-14 NOTE — HISTORY OF PRESENT ILLNESS
[TextBox_4] : 5/13/20: Known to me from hospital. 60yo man w controlled HIV, tonsillar CA (s/p chemo & radiation), HLD, hypothyroidism, BPH and sleep apnea with recent hospitalization at [facility] from 3/30/20-5/1/20 with COVID-19 infection. Patient presented with increasing SOB, cough and diarrhea. Required NRB and was admitted to COVID-tele floor. Tx'd w HCQ, azithro, toci and steroids as well as vanc/zosyn for +MRSA swab as well as Bactrim for PCP ppx. Initiated on anticoagulation for DVT ppx though LE doppler negative and CTA negative for PE. Mid course he had worsening imaging and oxygenation and transferred to MICU and intubated for bronch which was nondiagnostic. Patient ultimately improved & weaned off oxygen and discharged with home supplemental oxygen 4L NC as well as 30 days of Lovenox injections. \par \par Today he was seen by video as he cannot easily navigate the stairs to his 3rd floor walkup. He relates that he feels markedly better. He has titrated his O2 down to 3L but still desaturates on exertion. Had gained 5 lbs, eating all the time. Seriously debilitated, navigates his ADLs w difficulty but has two friends assisting him. Taking Lovenox x 1 month with no difficulty and no bleeding. Finished Augmentin on discharge. Not on steroids. [FreeTextEntry1] : 59 year old male with PMHx of HIV, tonsillar CA (s/p chemo & radiation), HLD, hypothyroidism, BPH and sleep apnea with recent hospitalization at [facility] from 3/30/20-5/1/20 with COVID-19 infection. Patient presented with increasing SOB, cough and diarrhea. Required NRB and was admitted to COVID-tele floor. Patient completed course of Azithromycin, Plaquenil, Toci and steroids for COVID treatment. Also was given vanc/zosyn for +MRSA swab as well as Bactrim for PCP ppx. Initiated on anticoagulation for DVT ppx though LE doppler negative and CTA negative for PE.  \par \par Of note, during hospitalization CT chest notable for worsening of  bilateral upper lobe GGOs. Transferred to MICU for worsening CT findings and acute hypoxic respiratory failure requiring intubation. Patient ultimately improved & weaned off oxygen and discharged with home supplemental oxygen 4L NC as well as 30 days of Lovenox injections. \par \par Today, patient is feeling

## 2020-05-16 ENCOUNTER — TRANSCRIPTION ENCOUNTER (OUTPATIENT)
Age: 60
End: 2020-05-16

## 2020-06-23 ENCOUNTER — APPOINTMENT (OUTPATIENT)
Dept: RADIOLOGY | Facility: CLINIC | Age: 60
End: 2020-06-23

## 2020-06-23 ENCOUNTER — RESULT REVIEW (OUTPATIENT)
Age: 60
End: 2020-06-23

## 2020-06-23 ENCOUNTER — OUTPATIENT (OUTPATIENT)
Dept: OUTPATIENT SERVICES | Facility: HOSPITAL | Age: 60
LOS: 1 days | End: 2020-06-23
Payer: MEDICARE

## 2020-06-23 PROCEDURE — 71046 X-RAY EXAM CHEST 2 VIEWS: CPT | Mod: 26

## 2020-06-24 ENCOUNTER — APPOINTMENT (OUTPATIENT)
Dept: HEART AND VASCULAR | Facility: CLINIC | Age: 60
End: 2020-06-24

## 2020-06-30 ENCOUNTER — APPOINTMENT (OUTPATIENT)
Dept: HEART AND VASCULAR | Facility: CLINIC | Age: 60
End: 2020-06-30

## 2020-07-01 ENCOUNTER — APPOINTMENT (OUTPATIENT)
Dept: HEART AND VASCULAR | Facility: CLINIC | Age: 60
End: 2020-07-01

## 2020-07-10 ENCOUNTER — APPOINTMENT (OUTPATIENT)
Dept: HEART AND VASCULAR | Facility: CLINIC | Age: 60
End: 2020-07-10
Payer: MEDICARE

## 2020-07-10 VITALS
DIASTOLIC BLOOD PRESSURE: 66 MMHG | BODY MASS INDEX: 23.27 KG/M2 | WEIGHT: 150 LBS | OXYGEN SATURATION: 93 % | HEART RATE: 68 BPM | SYSTOLIC BLOOD PRESSURE: 114 MMHG | TEMPERATURE: 98.3 F | HEIGHT: 67.32 IN

## 2020-07-10 DIAGNOSIS — E78.49 OTHER HYPERLIPIDEMIA: ICD-10-CM

## 2020-07-10 DIAGNOSIS — R79.89 OTHER SPECIFIED ABNORMAL FINDINGS OF BLOOD CHEMISTRY: ICD-10-CM

## 2020-07-10 DIAGNOSIS — G47.33 OBSTRUCTIVE SLEEP APNEA (ADULT) (PEDIATRIC): ICD-10-CM

## 2020-07-10 DIAGNOSIS — I71.2 THORACIC AORTIC ANEURYSM, W/OUT RUPTURE: ICD-10-CM

## 2020-07-10 DIAGNOSIS — I28.8 OTHER DISEASES OF PULMONARY VESSELS: ICD-10-CM

## 2020-07-10 DIAGNOSIS — J96.00 ACUTE RESPIRATORY FAILURE, UNSPECIFIED WHETHER WITH HYPOXIA OR HYPERCAPNIA: ICD-10-CM

## 2020-07-10 PROCEDURE — 93970 EXTREMITY STUDY: CPT

## 2020-07-10 PROCEDURE — 93978 VASCULAR STUDY: CPT

## 2020-07-10 PROCEDURE — 93000 ELECTROCARDIOGRAM COMPLETE: CPT | Mod: CS

## 2020-07-10 PROCEDURE — 93306 TTE W/DOPPLER COMPLETE: CPT

## 2020-07-10 PROCEDURE — 99215 OFFICE O/P EST HI 40 MIN: CPT | Mod: CS

## 2020-07-10 RX ORDER — ENOXAPARIN SODIUM 40 MG/.4ML
40 INJECTION SUBCUTANEOUS
Refills: 0 | Status: DISCONTINUED | COMMUNITY
End: 2020-07-10

## 2020-07-10 NOTE — ASSESSMENT
[FreeTextEntry1] : 59 M \par \par COVID-19 PNA \par S/p Extended VTE Prophylaxis with lovenox due to COVID hospitalization, elevated ddimer ~3400\par CTA Chest April 2020 - neg PE, Aortic root 4.4cm, asc 4.1cm, dilated main PA, diffuse groundglass opacities \par LE venous dopplers  April 2020 X 2 - negative for DVT\par HIV\par EKG: NSR, RBBB, 1st AVB, APC\par Thoracic Aortic Aneurysm:   on CTA April 2020\par \par Plan:\par - ECHO rule out pulm htn, dilated PA on CTA.  \par - venous dopplers confirm no DVT\par - US abd aorta.  Hx of Father AAA s/p repair \par - yearly echo for monitoring aortic root aneurysm \par - cont crestor 20mg \par - Still with O2 requirements. Advised to set up appt with pulm Dr. Blood for O2 management. \par

## 2020-07-10 NOTE — PHYSICAL EXAM
[General Appearance - Well Developed] : well developed [Normal Appearance] : normal appearance [Well Groomed] : well groomed [General Appearance - Well Nourished] : well nourished [No Deformities] : no deformities [Normal Conjunctiva] : the conjunctiva exhibited no abnormalities [General Appearance - In No Acute Distress] : no acute distress [Eyelids - No Xanthelasma] : the eyelids demonstrated no xanthelasmas [No Oral Pallor] : no oral pallor [Normal Oral Mucosa] : normal oral mucosa [Normal Jugular Venous A Waves Present] : normal jugular venous A waves present [No Oral Cyanosis] : no oral cyanosis [Normal Jugular Venous V Waves Present] : normal jugular venous V waves present [No Jugular Venous Park A Waves] : no jugular venous park A waves [Respiration, Rhythm And Depth] : normal respiratory rhythm and effort [Auscultation Breath Sounds / Voice Sounds] : lungs were clear to auscultation bilaterally [Exaggerated Use Of Accessory Muscles For Inspiration] : no accessory muscle use [Heart Rate And Rhythm] : heart rate and rhythm were normal [Murmurs] : no murmurs present [Abdomen Soft] : soft [Abdomen Tenderness] : non-tender [Abdomen Mass (___ Cm)] : no abdominal mass palpated [Abnormal Walk] : normal gait [Gait - Sufficient For Exercise Testing] : the gait was sufficient for exercise testing [Nail Clubbing] : no clubbing of the fingernails [Petechial Hemorrhages (___cm)] : no petechial hemorrhages [Cyanosis, Localized] : no localized cyanosis [Skin Color & Pigmentation] : normal skin color and pigmentation [] : no rash [No Venous Stasis] : no venous stasis [Skin Lesions] : no skin lesions [No Skin Ulcers] : no skin ulcer [No Xanthoma] : no  xanthoma was observed [Oriented To Time, Place, And Person] : oriented to person, place, and time [Affect] : the affect was normal [Mood] : the mood was normal [No Anxiety] : not feeling anxious [Arterial Pulses Normal] : the arterial pulses were normal [Edema] : no peripheral edema present [FreeTextEntry1] : prominent S2

## 2020-07-10 NOTE — HISTORY OF PRESENT ILLNESS
[FreeTextEntry1] : Telehealth encounter 5/13/20: verbal consent given by Raul Rodriguez [birth year] at 430PM\par \par 59M hx HIV, tonsillar CA, HLD, BPH, sleep apnea, and recent long COVID-19 admission to [facility] 3/29-5/8/20\par Discharged on 4L NC,  lovenox 40mg injections X 30 days for extended VTE ppx.  No evidence of PE on CTA.  No DVT on multiple LE dopplers.   Discharged on home O2 5L NC\par \par 7/10/20:  at rest low 90s at rest, high 80's with exertion.  Still using O2, down to 3L NC.  No chest pain.  feels sob with minimal exertion still.  no leg swelling/pain\par \par No hx VTE. \par Father hx AAA

## 2020-07-13 LAB
25(OH)D3 SERPL-MCNC: 14 NG/ML
ALBUMIN SERPL ELPH-MCNC: 4.2 G/DL
ALP BLD-CCNC: 55 U/L
ALT SERPL-CCNC: 18 U/L
ANION GAP SERPL CALC-SCNC: 15 MMOL/L
APTT BLD: 31.8 SEC
AST SERPL-CCNC: 20 U/L
BASOPHILS # BLD AUTO: 0.02 K/UL
BASOPHILS NFR BLD AUTO: 0.4 %
BILIRUB SERPL-MCNC: 0.4 MG/DL
BUN SERPL-MCNC: 11 MG/DL
CALCIUM SERPL-MCNC: 10.1 MG/DL
CHLORIDE SERPL-SCNC: 101 MMOL/L
CHOLEST SERPL-MCNC: 185 MG/DL
CHOLEST/HDLC SERPL: 3.1 RATIO
CO2 SERPL-SCNC: 26 MMOL/L
CREAT SERPL-MCNC: 0.88 MG/DL
CRP SERPL-MCNC: 0.49 MG/DL
DEPRECATED D DIMER PPP IA-ACNC: 380 NG/ML DDU
EOSINOPHIL # BLD AUTO: 0.11 K/UL
EOSINOPHIL NFR BLD AUTO: 2 %
ESTIMATED AVERAGE GLUCOSE: 91 MG/DL
GLUCOSE SERPL-MCNC: 78 MG/DL
HBA1C MFR BLD HPLC: 4.8 %
HCT VFR BLD CALC: 37.8 %
HDLC SERPL-MCNC: 60 MG/DL
HGB BLD-MCNC: 11.9 G/DL
IMM GRANULOCYTES NFR BLD AUTO: 0.2 %
INR PPP: 0.98 RATIO
LDLC SERPL CALC-MCNC: 105 MG/DL
LYMPHOCYTES # BLD AUTO: 1.2 K/UL
LYMPHOCYTES NFR BLD AUTO: 22 %
MAGNESIUM SERPL-MCNC: 1.7 MG/DL
MAN DIFF?: NORMAL
MCHC RBC-ENTMCNC: 29.2 PG
MCHC RBC-ENTMCNC: 31.5 GM/DL
MCV RBC AUTO: 92.9 FL
MONOCYTES # BLD AUTO: 0.47 K/UL
MONOCYTES NFR BLD AUTO: 8.6 %
NEUTROPHILS # BLD AUTO: 3.65 K/UL
NEUTROPHILS NFR BLD AUTO: 66.8 %
NT-PROBNP SERPL-MCNC: 218 PG/ML
PLATELET # BLD AUTO: 160 K/UL
POTASSIUM SERPL-SCNC: 3.5 MMOL/L
PROT SERPL-MCNC: 6.3 G/DL
PT BLD: 11.6 SEC
RBC # BLD: 4.07 M/UL
RBC # FLD: 14.4 %
SARS-COV-2 IGG SERPL IA-ACNC: 28.1 INDEX
SARS-COV-2 IGG SERPL QL IA: POSITIVE
SODIUM SERPL-SCNC: 142 MMOL/L
TRIGL SERPL-MCNC: 102 MG/DL
TSH SERPL-ACNC: 9.47 UIU/ML
WBC # FLD AUTO: 5.46 K/UL

## 2020-07-26 NOTE — DISCHARGE NOTE NURSING/CASE MANAGEMENT/SOCIAL WORK - NSDCPELOVENOXFU_GEN_ALL_CORE
97 Go for blood tests as directed. Your doctor will do lab tests at regular visits to monitor the effects of this medicine. Please follow up with your doctor and keep your health care provider appointments

## 2020-07-29 ENCOUNTER — APPOINTMENT (OUTPATIENT)
Dept: PULMONOLOGY | Facility: CLINIC | Age: 60
End: 2020-07-29
Payer: MEDICARE

## 2020-07-29 VITALS
DIASTOLIC BLOOD PRESSURE: 80 MMHG | SYSTOLIC BLOOD PRESSURE: 132 MMHG | TEMPERATURE: 96.8 F | WEIGHT: 150 LBS | HEIGHT: 67.32 IN | HEART RATE: 84 BPM | BODY MASS INDEX: 23.27 KG/M2 | OXYGEN SATURATION: 94 %

## 2020-07-29 PROCEDURE — 99214 OFFICE O/P EST MOD 30 MIN: CPT | Mod: CS

## 2020-07-29 NOTE — REVIEW OF SYSTEMS
[Recent Wt Gain (___ Lbs)] : ~T recent [unfilled] lb weight gain [Negative] : Gastrointestinal [Dyspnea] : dyspnea

## 2020-08-07 ENCOUNTER — EMERGENCY (EMERGENCY)
Facility: HOSPITAL | Age: 60
LOS: 1 days | Discharge: HOME | End: 2020-08-07
Attending: EMERGENCY MEDICINE | Admitting: EMERGENCY MEDICINE
Payer: MEDICARE

## 2020-08-07 VITALS
SYSTOLIC BLOOD PRESSURE: 125 MMHG | OXYGEN SATURATION: 91 % | WEIGHT: 149.91 LBS | RESPIRATION RATE: 19 BRPM | HEART RATE: 78 BPM | HEIGHT: 70 IN | TEMPERATURE: 98 F | DIASTOLIC BLOOD PRESSURE: 87 MMHG

## 2020-08-07 VITALS
HEIGHT: 70 IN | OXYGEN SATURATION: 97 % | RESPIRATION RATE: 18 BRPM | SYSTOLIC BLOOD PRESSURE: 132 MMHG | DIASTOLIC BLOOD PRESSURE: 66 MMHG | HEART RATE: 71 BPM | TEMPERATURE: 99 F

## 2020-08-07 VITALS
SYSTOLIC BLOOD PRESSURE: 138 MMHG | HEART RATE: 73 BPM | OXYGEN SATURATION: 97 % | TEMPERATURE: 99 F | DIASTOLIC BLOOD PRESSURE: 72 MMHG | RESPIRATION RATE: 16 BRPM

## 2020-08-07 DIAGNOSIS — R53.83 OTHER FATIGUE: ICD-10-CM

## 2020-08-07 DIAGNOSIS — L02.11 CUTANEOUS ABSCESS OF NECK: ICD-10-CM

## 2020-08-07 DIAGNOSIS — Z20.828 CONTACT WITH AND (SUSPECTED) EXPOSURE TO OTHER VIRAL COMMUNICABLE DISEASES: ICD-10-CM

## 2020-08-07 DIAGNOSIS — L03.221 CELLULITIS OF NECK: ICD-10-CM

## 2020-08-07 LAB
ALBUMIN SERPL ELPH-MCNC: 2.7 G/DL — LOW (ref 3.4–5)
ALP SERPL-CCNC: 61 U/L — SIGNIFICANT CHANGE UP (ref 40–120)
ALT FLD-CCNC: 22 U/L — SIGNIFICANT CHANGE UP (ref 12–42)
ANION GAP SERPL CALC-SCNC: 8 MMOL/L — LOW (ref 9–16)
APTT BLD: 33.3 SEC — SIGNIFICANT CHANGE UP (ref 27.5–35.5)
AST SERPL-CCNC: 22 U/L — SIGNIFICANT CHANGE UP (ref 15–37)
BASOPHILS # BLD AUTO: 0.02 K/UL — SIGNIFICANT CHANGE UP (ref 0–0.2)
BASOPHILS NFR BLD AUTO: 0.2 % — SIGNIFICANT CHANGE UP (ref 0–2)
BILIRUB SERPL-MCNC: 0.4 MG/DL — SIGNIFICANT CHANGE UP (ref 0.2–1.2)
BUN SERPL-MCNC: 13 MG/DL — SIGNIFICANT CHANGE UP (ref 7–23)
CALCIUM SERPL-MCNC: 10.6 MG/DL — HIGH (ref 8.5–10.5)
CHLORIDE SERPL-SCNC: 99 MMOL/L — SIGNIFICANT CHANGE UP (ref 96–108)
CO2 SERPL-SCNC: 32 MMOL/L — HIGH (ref 22–31)
CREAT SERPL-MCNC: 0.86 MG/DL — SIGNIFICANT CHANGE UP (ref 0.5–1.3)
EOSINOPHIL # BLD AUTO: 0 K/UL — SIGNIFICANT CHANGE UP (ref 0–0.5)
EOSINOPHIL NFR BLD AUTO: 0 % — SIGNIFICANT CHANGE UP (ref 0–6)
GLUCOSE SERPL-MCNC: 110 MG/DL — HIGH (ref 70–99)
HCT VFR BLD CALC: 39.5 % — SIGNIFICANT CHANGE UP (ref 39–50)
HGB BLD-MCNC: 13.2 G/DL — SIGNIFICANT CHANGE UP (ref 13–17)
IMM GRANULOCYTES NFR BLD AUTO: 0.5 % — SIGNIFICANT CHANGE UP (ref 0–1.5)
INR BLD: 1.28 — HIGH (ref 0.88–1.16)
LYMPHOCYTES # BLD AUTO: 1.13 K/UL — SIGNIFICANT CHANGE UP (ref 1–3.3)
LYMPHOCYTES # BLD AUTO: 12.8 % — LOW (ref 13–44)
MCHC RBC-ENTMCNC: 28.8 PG — SIGNIFICANT CHANGE UP (ref 27–34)
MCHC RBC-ENTMCNC: 33.4 GM/DL — SIGNIFICANT CHANGE UP (ref 32–36)
MCV RBC AUTO: 86.1 FL — SIGNIFICANT CHANGE UP (ref 80–100)
MONOCYTES # BLD AUTO: 0.85 K/UL — SIGNIFICANT CHANGE UP (ref 0–0.9)
MONOCYTES NFR BLD AUTO: 9.6 % — SIGNIFICANT CHANGE UP (ref 2–14)
NEUTROPHILS # BLD AUTO: 6.78 K/UL — SIGNIFICANT CHANGE UP (ref 1.8–7.4)
NEUTROPHILS NFR BLD AUTO: 76.9 % — SIGNIFICANT CHANGE UP (ref 43–77)
NRBC # BLD: 0 /100 WBCS — SIGNIFICANT CHANGE UP (ref 0–0)
PLATELET # BLD AUTO: 206 K/UL — SIGNIFICANT CHANGE UP (ref 150–400)
POTASSIUM SERPL-MCNC: 3.2 MMOL/L — LOW (ref 3.5–5.3)
POTASSIUM SERPL-SCNC: 3.2 MMOL/L — LOW (ref 3.5–5.3)
PROT SERPL-MCNC: 7.6 G/DL — SIGNIFICANT CHANGE UP (ref 6.4–8.2)
PROTHROM AB SERPL-ACNC: 14.9 SEC — HIGH (ref 10.6–13.6)
RBC # BLD: 4.59 M/UL — SIGNIFICANT CHANGE UP (ref 4.2–5.8)
RBC # FLD: 13.2 % — SIGNIFICANT CHANGE UP (ref 10.3–14.5)
SODIUM SERPL-SCNC: 139 MMOL/L — SIGNIFICANT CHANGE UP (ref 132–145)
WBC # BLD: 8.82 K/UL — SIGNIFICANT CHANGE UP (ref 3.8–10.5)
WBC # FLD AUTO: 8.82 K/UL — SIGNIFICANT CHANGE UP (ref 3.8–10.5)

## 2020-08-07 PROCEDURE — 99285 EMERGENCY DEPT VISIT HI MDM: CPT | Mod: CS

## 2020-08-07 PROCEDURE — 71045 X-RAY EXAM CHEST 1 VIEW: CPT | Mod: 26

## 2020-08-07 PROCEDURE — 70496 CT ANGIOGRAPHY HEAD: CPT | Mod: 26

## 2020-08-07 PROCEDURE — 70498 CT ANGIOGRAPHY NECK: CPT | Mod: 26

## 2020-08-07 PROCEDURE — 93010 ELECTROCARDIOGRAM REPORT: CPT

## 2020-08-07 RX ORDER — VANCOMYCIN HCL 1 G
1350 VIAL (EA) INTRAVENOUS ONCE
Refills: 0 | Status: DISCONTINUED | OUTPATIENT
Start: 2020-08-07 | End: 2020-08-07

## 2020-08-07 RX ORDER — VANCOMYCIN HCL 1 G
1250 VIAL (EA) INTRAVENOUS ONCE
Refills: 0 | Status: COMPLETED | OUTPATIENT
Start: 2020-08-07 | End: 2020-08-07

## 2020-08-07 RX ORDER — CEFTRIAXONE 500 MG/1
1000 INJECTION, POWDER, FOR SOLUTION INTRAMUSCULAR; INTRAVENOUS ONCE
Refills: 0 | Status: COMPLETED | OUTPATIENT
Start: 2020-08-07 | End: 2020-08-07

## 2020-08-07 RX ORDER — KETOROLAC TROMETHAMINE 30 MG/ML
30 SYRINGE (ML) INJECTION ONCE
Refills: 0 | Status: DISCONTINUED | OUTPATIENT
Start: 2020-08-07 | End: 2020-08-07

## 2020-08-07 RX ADMIN — CEFTRIAXONE 100 MILLIGRAM(S): 500 INJECTION, POWDER, FOR SOLUTION INTRAMUSCULAR; INTRAVENOUS at 21:47

## 2020-08-07 RX ADMIN — CEFTRIAXONE 1000 MILLIGRAM(S): 500 INJECTION, POWDER, FOR SOLUTION INTRAMUSCULAR; INTRAVENOUS at 22:23

## 2020-08-07 RX ADMIN — Medication 30 MILLIGRAM(S): at 23:22

## 2020-08-07 RX ADMIN — Medication 166.67 MILLIGRAM(S): at 22:24

## 2020-08-07 NOTE — ED PROVIDER NOTE - OBJECTIVE STATEMENT
59M hx HIV (pt states VL undetectable), throat ca, c.o left sided neck pain and redness. states ongoing for past week.  no fevers. pain worse with movement.  no drooling, no voice change. no numbness/weakness.  pt was seen at [facility] - had CT showing possible cervical paraspinal abscess.  pt transferred for ENT evaluation.  given ceftriaxone, vanc.

## 2020-08-07 NOTE — ED ADULT NURSE NOTE - CHPI ED NUR SYMPTOMS NEG
no drainage/no bleeding at site/no purulent drainage/no chills/no blood in mucus/no vomiting/no fever/no rectal pain

## 2020-08-07 NOTE — ED PROVIDER NOTE - PROGRESS NOTE DETAILS
ENT at bedside - unable to obtain fluid on attempt at aspiration.  recommend admission to medicine for continued ABX. may require US guided or CT guided drainage.

## 2020-08-07 NOTE — ED PROVIDER NOTE - CLINICAL SUMMARY MEDICAL DECISION MAKING FREE TEXT BOX
left neck pain, no focal neuro deficits, abscess on CT at [facility]  -ENT consulted  -toradol left neck pain, no focal neuro deficits, abscess on CT at [facility],  no resp distress, no airway compromise, no drooling, afebrile. nontoxic.   -ENT consulted  -toradol

## 2020-08-07 NOTE — ED PROVIDER NOTE - ATTENDING CONTRIBUTION TO CARE
8 yo M with hx HIV on meds (CD4 ~300s, VL undetectable as per patient), hx throat CA, hospitalized at [facility] for covid-19 in April 2020 presents c/o left sided redness, pain and swelling x 1 week. No meningeal signs. On CT (+) paraspinal abscess cervical spine with myositis, IV abx started in ED, accepted for admission to ENT at [facility]. 10 yo M with hx HIV on meds (CD4 ~300s, VL undetectable as per patient), hx throat CA, hospitalized at [facility] for covid-19 in April 2020 presents c/o left sided redness, pain and swelling x 1 week. No meningeal signs. On CT (+) paraspinal abscess cervical spine with myositis, IV abx started in ED, ENT consulted and they req ER-ER transfer to facilitate evaluation and further workup. Accepted to [facility] ER.  See PA note for details.

## 2020-08-07 NOTE — ED PROVIDER NOTE - PHYSICAL EXAMINATION
CONSTITUTIONAL: Well-appearing; well-nourished; in no apparent distress.   	HEAD: Normocephalic; atraumatic.   	EYES:  conjunctiva and sclera clear  	ENT: airway patent  	NECK: Supple; +left sided paracervical erythema with swelling and ttp extending to left anterior neck with mild ear swelling. no fluctuance or induration. no midline tenderness. +severe pain upon range of motion of neck.   	CARDIOVASCULAR: Normal S1, S2; no murmurs, rubs, or gallops. Regular rate and rhythm.   	RESPIRATORY: Breathing easily; breath sounds clear and equal bilaterally; no wheezes, rhonchi, or rales.  	GI: Soft; non-distended; non-tender  	EXT: No cyanosis or edema; N/V intact  	NEURO: A & O x 3; face symmetric; grossly unremarkable.   PSYCHOLOGICAL: The patient’s mood and manner are appropriate.

## 2020-08-07 NOTE — ED PROVIDER NOTE - ENMT NEGATIVE STATEMENT, MLM
home/home w/ assist
Ears: no ear pain and no hearing problems.Nose: no nasal congestion and no nasal drainage.Mouth/Throat: no dysphagia, no hoarseness and no throat pain.Neck: no lumps, no pain, no stiffness and no swollen glands.

## 2020-08-07 NOTE — ED ADULT NURSE NOTE - OBJECTIVE STATEMENT
Pt w/ PMH of COVID and HIV presents to ED today c/o redness to left neck.  Pt airway is patent, and can handle PO fluids & secretions.  Pt has intermittent cough, but denies hemoptysis.  Pt denies fever/chills, N/V/D, CP or SOB.  Pt is pending labs and CT.  Will continue to monitor.

## 2020-08-07 NOTE — ED ADULT NURSE NOTE - OBJECTIVE STATEMENT
Patient aox3 and ambulatory with steady gait upon arrival. Patient transfer from [facility] due to L neck abscess. Patient states he started to have neck pain and tenderness x 4 days ago. Patient contacted his PCP, but PCP unable to see him in person at this time. Patient noticed L ear, posterior head, and neck swelling, pain, and redness. Patient transferred for ENT services. Patient states pain is deep, rates it 7/10. Patient denies CP, SOB, trouble swallowing, dizziness, weakness, fall/LOC/injury. Patient PMH: throat cancer 5 years ago, HIV+, COVID greater than 21 days ago  Patient has full ROM and bilateral equal strength. Patient has clear and equal bialteral lung sounds. Patient has positive, equal radial pulses. Patient presents with 20gIV to LAC.

## 2020-08-07 NOTE — ED PROVIDER NOTE - OBJECTIVE STATEMENT
58 yo M with hx HIV on meds (CD4 ~300s, VL undetectable as per patient), hx throat CA, hospitalized at [facility] for covid-19 in April 2020 presents c/o left sided redness, pain and swelling x 1 week. denies fever/chills. denies IVDA. has been taking cyclobenzaprine prescribed by pmd for pain. no drooling, tolerating po, has some dysphagia at baseline due to hx throat CA.

## 2020-08-07 NOTE — ED ADULT NURSE NOTE - CHPI ED NUR SYMPTOMS NEG
no scaly patches on skin/no vomiting/no chills/no fever/no inflammation/no itching/no petechia/no pain/no body aches/no decreased eating/drinking/no confusion

## 2020-08-07 NOTE — ED ADULT TRIAGE NOTE - CHIEF COMPLAINT QUOTE
one month of body aches and pains accompanied with cough/fatigue. states pain originated in neck and radiates down to chest. hx covid+

## 2020-08-07 NOTE — ED PROVIDER NOTE - CLINICAL SUMMARY MEDICAL DECISION MAKING FREE TEXT BOX
left sided neck cellulitis with findings concerning for paracervical abscess with myositis, afebrile, normal white count, IV antibiotics ordered, spoke with ENT fellow Dr. Hair/attending Dr. Combs and recommended transfer to [facility] ED for ENT eval, patient agrees with plan

## 2020-08-08 ENCOUNTER — INPATIENT (INPATIENT)
Facility: HOSPITAL | Age: 60
LOS: 3 days | Discharge: HOME | DRG: 558 | End: 2020-08-12
Attending: HOSPITALIST | Admitting: HOSPITALIST
Payer: MEDICARE

## 2020-08-08 DIAGNOSIS — I10 ESSENTIAL (PRIMARY) HYPERTENSION: ICD-10-CM

## 2020-08-08 DIAGNOSIS — L02.91 CUTANEOUS ABSCESS, UNSPECIFIED: ICD-10-CM

## 2020-08-08 DIAGNOSIS — B20 HUMAN IMMUNODEFICIENCY VIRUS [HIV] DISEASE: ICD-10-CM

## 2020-08-08 DIAGNOSIS — Z98.890 OTHER SPECIFIED POSTPROCEDURAL STATES: Chronic | ICD-10-CM

## 2020-08-08 DIAGNOSIS — N40.0 BENIGN PROSTATIC HYPERPLASIA WITHOUT LOWER URINARY TRACT SYMPTOMS: ICD-10-CM

## 2020-08-08 DIAGNOSIS — E83.52 HYPERCALCEMIA: ICD-10-CM

## 2020-08-08 DIAGNOSIS — I71.2 THORACIC AORTIC ANEURYSM, WITHOUT RUPTURE: ICD-10-CM

## 2020-08-08 DIAGNOSIS — E87.3 ALKALOSIS: ICD-10-CM

## 2020-08-08 DIAGNOSIS — M60.08 INFECTIVE MYOSITIS, OTHER SITE: ICD-10-CM

## 2020-08-08 DIAGNOSIS — E78.5 HYPERLIPIDEMIA, UNSPECIFIED: ICD-10-CM

## 2020-08-08 DIAGNOSIS — R63.8 OTHER SYMPTOMS AND SIGNS CONCERNING FOOD AND FLUID INTAKE: ICD-10-CM

## 2020-08-08 LAB
4/8 RATIO: 0.47 RATIO — LOW (ref 0.9–3.6)
ABS CD8: 664 /UL — SIGNIFICANT CHANGE UP (ref 142–740)
ALBUMIN SERPL ELPH-MCNC: 3.6 G/DL — SIGNIFICANT CHANGE UP (ref 3.3–5)
ALP SERPL-CCNC: 60 U/L — SIGNIFICANT CHANGE UP (ref 40–120)
ALT FLD-CCNC: 25 U/L — SIGNIFICANT CHANGE UP (ref 10–45)
ANION GAP SERPL CALC-SCNC: 13 MMOL/L — SIGNIFICANT CHANGE UP (ref 5–17)
AST SERPL-CCNC: 39 U/L — SIGNIFICANT CHANGE UP (ref 10–40)
BASOPHILS # BLD AUTO: 0.01 K/UL — SIGNIFICANT CHANGE UP (ref 0–0.2)
BASOPHILS NFR BLD AUTO: 0.1 % — SIGNIFICANT CHANGE UP (ref 0–2)
BILIRUB SERPL-MCNC: 0.3 MG/DL — SIGNIFICANT CHANGE UP (ref 0.2–1.2)
BUN SERPL-MCNC: 14 MG/DL — SIGNIFICANT CHANGE UP (ref 7–23)
CALCIUM SERPL-MCNC: 10 MG/DL — SIGNIFICANT CHANGE UP (ref 8.4–10.5)
CALCIUM SERPL-MCNC: 10.5 MG/DL — SIGNIFICANT CHANGE UP (ref 8.4–10.5)
CD16+CD56+ CELLS NFR BLD: 10 % — SIGNIFICANT CHANGE UP (ref 5–23)
CD16+CD56+ CELLS NFR SPEC: 132 /UL — SIGNIFICANT CHANGE UP (ref 71–410)
CD19 BLASTS SPEC-ACNC: 106 /UL — SIGNIFICANT CHANGE UP (ref 84–469)
CD19 BLASTS SPEC-ACNC: 8 % — SIGNIFICANT CHANGE UP (ref 6–24)
CD3 BLASTS SPEC-ACNC: 1015 /UL — SIGNIFICANT CHANGE UP (ref 672–1870)
CD3 BLASTS SPEC-ACNC: 79 % — SIGNIFICANT CHANGE UP (ref 59–83)
CD4 %: 25 % — LOW (ref 30–62)
CD8 %: 52 % — HIGH (ref 12–36)
CHLORIDE SERPL-SCNC: 96 MMOL/L — SIGNIFICANT CHANGE UP (ref 96–108)
CO2 SERPL-SCNC: 31 MMOL/L — SIGNIFICANT CHANGE UP (ref 22–31)
CREAT SERPL-MCNC: 0.84 MG/DL — SIGNIFICANT CHANGE UP (ref 0.5–1.3)
CRP SERPL-MCNC: 21.07 MG/DL — HIGH (ref 0–0.4)
EOSINOPHIL # BLD AUTO: 0.04 K/UL — SIGNIFICANT CHANGE UP (ref 0–0.5)
EOSINOPHIL NFR BLD AUTO: 0.5 % — SIGNIFICANT CHANGE UP (ref 0–6)
ERYTHROCYTE [SEDIMENTATION RATE] IN BLOOD: 100 MM/HR — HIGH
GLUCOSE SERPL-MCNC: 136 MG/DL — HIGH (ref 70–99)
HCT VFR BLD CALC: 40.7 % — SIGNIFICANT CHANGE UP (ref 39–50)
HGB BLD-MCNC: 13.2 G/DL — SIGNIFICANT CHANGE UP (ref 13–17)
IMM GRANULOCYTES NFR BLD AUTO: 0.5 % — SIGNIFICANT CHANGE UP (ref 0–1.5)
LYMPHOCYTES # BLD AUTO: 1.35 K/UL — SIGNIFICANT CHANGE UP (ref 1–3.3)
LYMPHOCYTES # BLD AUTO: 16.9 % — SIGNIFICANT CHANGE UP (ref 13–44)
MAGNESIUM SERPL-MCNC: 1.8 MG/DL — SIGNIFICANT CHANGE UP (ref 1.6–2.6)
MCHC RBC-ENTMCNC: 28.4 PG — SIGNIFICANT CHANGE UP (ref 27–34)
MCHC RBC-ENTMCNC: 32.4 GM/DL — SIGNIFICANT CHANGE UP (ref 32–36)
MCV RBC AUTO: 87.5 FL — SIGNIFICANT CHANGE UP (ref 80–100)
MONOCYTES # BLD AUTO: 0.83 K/UL — SIGNIFICANT CHANGE UP (ref 0–0.9)
MONOCYTES NFR BLD AUTO: 10.4 % — SIGNIFICANT CHANGE UP (ref 2–14)
NEUTROPHILS # BLD AUTO: 5.73 K/UL — SIGNIFICANT CHANGE UP (ref 1.8–7.4)
NEUTROPHILS NFR BLD AUTO: 71.6 % — SIGNIFICANT CHANGE UP (ref 43–77)
NRBC # BLD: 0 /100 WBCS — SIGNIFICANT CHANGE UP (ref 0–0)
PHOSPHATE SERPL-MCNC: 3.3 MG/DL — SIGNIFICANT CHANGE UP (ref 2.5–4.5)
PLATELET # BLD AUTO: 210 K/UL — SIGNIFICANT CHANGE UP (ref 150–400)
POTASSIUM SERPL-MCNC: 3 MMOL/L — LOW (ref 3.5–5.3)
POTASSIUM SERPL-SCNC: 3 MMOL/L — LOW (ref 3.5–5.3)
PROT SERPL-MCNC: 7.1 G/DL — SIGNIFICANT CHANGE UP (ref 6–8.3)
PTH-INTACT FLD-MCNC: 24 PG/ML — SIGNIFICANT CHANGE UP (ref 15–65)
RBC # BLD: 4.65 M/UL — SIGNIFICANT CHANGE UP (ref 4.2–5.8)
RBC # FLD: 13.2 % — SIGNIFICANT CHANGE UP (ref 10.3–14.5)
SARS-COV-2 RNA SPEC QL NAA+PROBE: SIGNIFICANT CHANGE UP
SARS-COV-2 RNA SPEC QL NAA+PROBE: SIGNIFICANT CHANGE UP
SODIUM SERPL-SCNC: 140 MMOL/L — SIGNIFICANT CHANGE UP (ref 135–145)
T-CELL CD4 SUBSET PNL BLD: 310 /UL — LOW (ref 489–1457)
WBC # BLD: 8 K/UL — SIGNIFICANT CHANGE UP (ref 3.8–10.5)
WBC # FLD AUTO: 8 K/UL — SIGNIFICANT CHANGE UP (ref 3.8–10.5)

## 2020-08-08 PROCEDURE — 99223 1ST HOSP IP/OBS HIGH 75: CPT | Mod: GC

## 2020-08-08 PROCEDURE — 93010 ELECTROCARDIOGRAM REPORT: CPT

## 2020-08-08 PROCEDURE — 99285 EMERGENCY DEPT VISIT HI MDM: CPT

## 2020-08-08 RX ORDER — LOSARTAN POTASSIUM 100 MG/1
50 TABLET, FILM COATED ORAL DAILY
Refills: 0 | Status: DISCONTINUED | OUTPATIENT
Start: 2020-08-08 | End: 2020-08-12

## 2020-08-08 RX ORDER — PIPERACILLIN AND TAZOBACTAM 4; .5 G/20ML; G/20ML
4.5 INJECTION, POWDER, LYOPHILIZED, FOR SOLUTION INTRAVENOUS EVERY 6 HOURS
Refills: 0 | Status: DISCONTINUED | OUTPATIENT
Start: 2020-08-08 | End: 2020-08-12

## 2020-08-08 RX ORDER — CYCLOBENZAPRINE HYDROCHLORIDE 10 MG/1
5 TABLET, FILM COATED ORAL ONCE
Refills: 0 | Status: COMPLETED | OUTPATIENT
Start: 2020-08-08 | End: 2020-08-08

## 2020-08-08 RX ORDER — ETRAVIRINE 200 MG/1
200 TABLET ORAL
Refills: 0 | Status: DISCONTINUED | OUTPATIENT
Start: 2020-08-08 | End: 2020-08-12

## 2020-08-08 RX ORDER — ATORVASTATIN CALCIUM 80 MG/1
20 TABLET, FILM COATED ORAL AT BEDTIME
Refills: 0 | Status: DISCONTINUED | OUTPATIENT
Start: 2020-08-08 | End: 2020-08-12

## 2020-08-08 RX ORDER — POTASSIUM CHLORIDE 20 MEQ
40 PACKET (EA) ORAL EVERY 4 HOURS
Refills: 0 | Status: COMPLETED | OUTPATIENT
Start: 2020-08-08 | End: 2020-08-08

## 2020-08-08 RX ORDER — LEVOTHYROXINE SODIUM 125 MCG
125 TABLET ORAL EVERY 24 HOURS
Refills: 0 | Status: DISCONTINUED | OUTPATIENT
Start: 2020-08-08 | End: 2020-08-12

## 2020-08-08 RX ORDER — RALTEGRAVIR 400 MG/1
400 TABLET, FILM COATED ORAL
Refills: 0 | Status: DISCONTINUED | OUTPATIENT
Start: 2020-08-08 | End: 2020-08-12

## 2020-08-08 RX ORDER — MAGNESIUM SULFATE 500 MG/ML
1 VIAL (ML) INJECTION ONCE
Refills: 0 | Status: COMPLETED | OUTPATIENT
Start: 2020-08-08 | End: 2020-08-08

## 2020-08-08 RX ORDER — ENOXAPARIN SODIUM 100 MG/ML
40 INJECTION SUBCUTANEOUS EVERY 24 HOURS
Refills: 0 | Status: DISCONTINUED | OUTPATIENT
Start: 2020-08-08 | End: 2020-08-12

## 2020-08-08 RX ORDER — RITONAVIR 100 MG/1
100 TABLET, FILM COATED ORAL
Refills: 0 | Status: DISCONTINUED | OUTPATIENT
Start: 2020-08-08 | End: 2020-08-12

## 2020-08-08 RX ORDER — POTASSIUM CHLORIDE 20 MEQ
10 PACKET (EA) ORAL
Refills: 0 | Status: COMPLETED | OUTPATIENT
Start: 2020-08-08 | End: 2020-08-08

## 2020-08-08 RX ORDER — TAMSULOSIN HYDROCHLORIDE 0.4 MG/1
0.4 CAPSULE ORAL AT BEDTIME
Refills: 0 | Status: DISCONTINUED | OUTPATIENT
Start: 2020-08-08 | End: 2020-08-12

## 2020-08-08 RX ORDER — RITONAVIR 100 MG/1
100 TABLET, FILM COATED ORAL EVERY 12 HOURS
Refills: 0 | Status: DISCONTINUED | OUTPATIENT
Start: 2020-08-08 | End: 2020-08-08

## 2020-08-08 RX ORDER — DARUNAVIR 75 MG/1
600 TABLET, FILM COATED ORAL
Refills: 0 | Status: DISCONTINUED | OUTPATIENT
Start: 2020-08-08 | End: 2020-08-12

## 2020-08-08 RX ORDER — ACETAMINOPHEN 500 MG
650 TABLET ORAL EVERY 6 HOURS
Refills: 0 | Status: DISCONTINUED | OUTPATIENT
Start: 2020-08-08 | End: 2020-08-12

## 2020-08-08 RX ORDER — DARUNAVIR 75 MG/1
600 TABLET, FILM COATED ORAL
Refills: 0 | Status: DISCONTINUED | OUTPATIENT
Start: 2020-08-08 | End: 2020-08-08

## 2020-08-08 RX ORDER — VANCOMYCIN HCL 1 G
1250 VIAL (EA) INTRAVENOUS EVERY 12 HOURS
Refills: 0 | Status: DISCONTINUED | OUTPATIENT
Start: 2020-08-08 | End: 2020-08-08

## 2020-08-08 RX ORDER — CYCLOBENZAPRINE HYDROCHLORIDE 10 MG/1
5 TABLET, FILM COATED ORAL THREE TIMES A DAY
Refills: 0 | Status: DISCONTINUED | OUTPATIENT
Start: 2020-08-08 | End: 2020-08-12

## 2020-08-08 RX ORDER — VANCOMYCIN HCL 1 G
1250 VIAL (EA) INTRAVENOUS EVERY 12 HOURS
Refills: 0 | Status: COMPLETED | OUTPATIENT
Start: 2020-08-08 | End: 2020-08-09

## 2020-08-08 RX ORDER — VALACYCLOVIR 500 MG/1
500 TABLET, FILM COATED ORAL
Refills: 0 | Status: DISCONTINUED | OUTPATIENT
Start: 2020-08-08 | End: 2020-08-12

## 2020-08-08 RX ORDER — RALTEGRAVIR 400 MG/1
400 TABLET, FILM COATED ORAL EVERY 12 HOURS
Refills: 0 | Status: DISCONTINUED | OUTPATIENT
Start: 2020-08-08 | End: 2020-08-08

## 2020-08-08 RX ORDER — EMTRICITABINE AND TENOFOVIR DISOPROXIL FUMARATE 200; 300 MG/1; MG/1
1 TABLET, FILM COATED ORAL DAILY
Refills: 0 | Status: DISCONTINUED | OUTPATIENT
Start: 2020-08-08 | End: 2020-08-12

## 2020-08-08 RX ORDER — VALACYCLOVIR 500 MG/1
1000 TABLET, FILM COATED ORAL EVERY 24 HOURS
Refills: 0 | Status: DISCONTINUED | OUTPATIENT
Start: 2020-08-08 | End: 2020-08-08

## 2020-08-08 RX ORDER — LOPERAMIDE HCL 2 MG
8 TABLET ORAL EVERY 12 HOURS
Refills: 0 | Status: DISCONTINUED | OUTPATIENT
Start: 2020-08-08 | End: 2020-08-12

## 2020-08-08 RX ADMIN — PIPERACILLIN AND TAZOBACTAM 200 GRAM(S): 4; .5 INJECTION, POWDER, LYOPHILIZED, FOR SOLUTION INTRAVENOUS at 09:17

## 2020-08-08 RX ADMIN — Medication 40 MILLIEQUIVALENT(S): at 14:39

## 2020-08-08 RX ADMIN — DARUNAVIR 600 MILLIGRAM(S): 75 TABLET, FILM COATED ORAL at 23:28

## 2020-08-08 RX ADMIN — Medication 166.67 MILLIGRAM(S): at 10:58

## 2020-08-08 RX ADMIN — Medication 650 MILLIGRAM(S): at 19:42

## 2020-08-08 RX ADMIN — Medication 8 MILLIGRAM(S): at 14:40

## 2020-08-08 RX ADMIN — PIPERACILLIN AND TAZOBACTAM 200 GRAM(S): 4; .5 INJECTION, POWDER, LYOPHILIZED, FOR SOLUTION INTRAVENOUS at 03:00

## 2020-08-08 RX ADMIN — RALTEGRAVIR 400 MILLIGRAM(S): 400 TABLET, FILM COATED ORAL at 14:39

## 2020-08-08 RX ADMIN — VALACYCLOVIR 500 MILLIGRAM(S): 500 TABLET, FILM COATED ORAL at 23:27

## 2020-08-08 RX ADMIN — Medication 166.67 MILLIGRAM(S): at 22:44

## 2020-08-08 RX ADMIN — Medication 8 MILLIGRAM(S): at 23:31

## 2020-08-08 RX ADMIN — TAMSULOSIN HYDROCHLORIDE 0.4 MILLIGRAM(S): 0.4 CAPSULE ORAL at 21:50

## 2020-08-08 RX ADMIN — Medication 100 MILLIEQUIVALENT(S): at 10:43

## 2020-08-08 RX ADMIN — ENOXAPARIN SODIUM 40 MILLIGRAM(S): 100 INJECTION SUBCUTANEOUS at 14:39

## 2020-08-08 RX ADMIN — RALTEGRAVIR 400 MILLIGRAM(S): 400 TABLET, FILM COATED ORAL at 23:28

## 2020-08-08 RX ADMIN — VALACYCLOVIR 500 MILLIGRAM(S): 500 TABLET, FILM COATED ORAL at 14:39

## 2020-08-08 RX ADMIN — EMTRICITABINE AND TENOFOVIR DISOPROXIL FUMARATE 1 TABLET(S): 200; 300 TABLET, FILM COATED ORAL at 14:39

## 2020-08-08 RX ADMIN — DARUNAVIR 600 MILLIGRAM(S): 75 TABLET, FILM COATED ORAL at 14:39

## 2020-08-08 RX ADMIN — Medication 1 TABLET(S): at 09:32

## 2020-08-08 RX ADMIN — Medication 40 MILLIEQUIVALENT(S): at 09:32

## 2020-08-08 RX ADMIN — Medication 100 GRAM(S): at 09:16

## 2020-08-08 RX ADMIN — RITONAVIR 100 MILLIGRAM(S): 100 TABLET, FILM COATED ORAL at 14:39

## 2020-08-08 RX ADMIN — Medication 650 MILLIGRAM(S): at 18:34

## 2020-08-08 RX ADMIN — Medication 30 MILLIGRAM(S): at 00:20

## 2020-08-08 RX ADMIN — CYCLOBENZAPRINE HYDROCHLORIDE 5 MILLIGRAM(S): 10 TABLET, FILM COATED ORAL at 18:33

## 2020-08-08 RX ADMIN — ETRAVIRINE 200 MILLIGRAM(S): 200 TABLET ORAL at 15:30

## 2020-08-08 RX ADMIN — RITONAVIR 100 MILLIGRAM(S): 100 TABLET, FILM COATED ORAL at 23:27

## 2020-08-08 RX ADMIN — LOSARTAN POTASSIUM 50 MILLIGRAM(S): 100 TABLET, FILM COATED ORAL at 09:15

## 2020-08-08 RX ADMIN — Medication 100 MILLIEQUIVALENT(S): at 09:16

## 2020-08-08 RX ADMIN — ETRAVIRINE 200 MILLIGRAM(S): 200 TABLET ORAL at 23:28

## 2020-08-08 RX ADMIN — ATORVASTATIN CALCIUM 20 MILLIGRAM(S): 80 TABLET, FILM COATED ORAL at 21:50

## 2020-08-08 RX ADMIN — PIPERACILLIN AND TAZOBACTAM 200 GRAM(S): 4; .5 INJECTION, POWDER, LYOPHILIZED, FOR SOLUTION INTRAVENOUS at 21:50

## 2020-08-08 RX ADMIN — PIPERACILLIN AND TAZOBACTAM 200 GRAM(S): 4; .5 INJECTION, POWDER, LYOPHILIZED, FOR SOLUTION INTRAVENOUS at 15:30

## 2020-08-08 RX ADMIN — Medication 125 MICROGRAM(S): at 09:15

## 2020-08-08 RX ADMIN — CYCLOBENZAPRINE HYDROCHLORIDE 5 MILLIGRAM(S): 10 TABLET, FILM COATED ORAL at 06:30

## 2020-08-08 NOTE — H&P ADULT - PROBLEM SELECTOR PLAN 9
no fluids  replete lytes as needed  lovenox 40 mg  no GI prophylaxis  peripheral IV  FULL CODE  Dispo [facility]

## 2020-08-08 NOTE — H&P ADULT - ASSESSMENT
60 yo male with hx HIV (pt states VL undetectable, compliant with medications), throat ca (2010, s/p radiation tx), BPH, COVID (in April), HTN, HLD, basal cell carcinoma (s/p resection 02/20) who presented to the ED CC of left sided neck pain for the past 7 days. CT neck showing 2x 2.1 cm abscess near C3 region on the left. ENT consulted but abscess not amenable to bedside drainage. Admitted to medicine for IV Abx (Vanc and zosyn) and potential IR guided drainage if unresponsive to therapy. 58 yo male with hx HIV (VL undetectable 3/20, compliant with medications), throat ca (2010, s/p radiation tx), BPH, COVID (in April), HTN, HLD, basal cell carcinoma (s/p resection 02/20) who presented to the ED CC of left sided neck pain for the past 7 days. CT neck showing 2x 2.1 cm abscess near C3 region on the left. ENT consulted but abscess not amenable to bedside drainage. Admitted to medicine for IV Abx (Vanc and zosyn) and potential IR guided drainage if unresponsive to therapy.

## 2020-08-08 NOTE — H&P ADULT - PROBLEM SELECTOR PROBLEM 6
Nutrition, metabolism, and development symptoms BPH (benign prostatic hyperplasia) Metabolic alkalosis

## 2020-08-08 NOTE — H&P ADULT - NSHPLABSRESULTS_GEN_ALL_CORE
.  LABS:                         13.2   8.82  )-----------( 206      ( 07 Aug 2020 18:41 )             39.5     08-07    139  |  99  |  13  ----------------------------<  110<H>  3.2<L>   |  32<H>  |  0.86    Ca    10.6<H>      07 Aug 2020 18:41    TPro  7.6  /  Alb  2.7<L>  /  TBili  0.4  /  DBili  x   /  AST  22  /  ALT  22  /  AlkPhos  61  08-07    PT/INR - ( 07 Aug 2020 21:36 )   PT: 14.9 sec;   INR: 1.28          PTT - ( 07 Aug 2020 21:36 )  PTT:33.3 sec    Cxray with no effusions or overt infiltrates  CT head-Dolichoectasia of the vertebrobasilar system. No large vessel occlusion.   CT neck aneurysmal dilatation of the ascending aorta with the diameter measuring approximately 4.5 cm. C3 level approximately 2.0 cm width x 2.1 cm with heterogeneously enhancing focus with central hypodensity suspicious for abscess with surrounding myositis.   Given vanc 1250 mg (10 pm), ceftriaxone 1 g (9 pm), 30 mg IVP ketoroloc            RADIOLOGY, EKG & ADDITIONAL TESTS: Reviewed.

## 2020-08-08 NOTE — H&P ADULT - PROBLEM SELECTOR PLAN 1
Patient presented with one week of left sided neck pain, which was worsened with any head movement. Accompanied by left facial swelling and muffled hearing in his left ear. On presentation, patient with no SIRs criteria and or leukocytosis. CT neck showed at C3 level approximately 2.0 cm width x 2.1 cm  heterogeneously enhancing focus with central hypodensity suspicious for abscess with surrounding myositis. ENT consulted in ED and attempted bedside drainage, but unable to access the pocket due to size and location. Given 1250 mg Vancomycin and 1 g ceftriaxone.  -given patient hx of HIV and location of abscess will cover with broad spectrum at this time with vancomycin 1250 mg Q12 and zosyn 4.5 mg Q6.   -f/u blood cx, and deescalate Abx accordingly  -appreciate ENT recs  -if pain persists or worsens would recommend IR guided drainage of abscess

## 2020-08-08 NOTE — PROGRESS NOTE ADULT - SUBJECTIVE AND OBJECTIVE BOX
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 Attempted aspiration at bedside unsuccessful due to small size of collection as well as depth.     Interval: Patient admitted to medicine and started on Vanc/Zosyn. , CRP 21. Minor electrolyte abnormalities. Pt with no further complaints this AM. Exam and pain stable.    CBC Full  -  ( 08 Aug 2020 05:38 )  WBC Count : 8.00 K/uL  RBC Count : 4.65 M/uL  Hemoglobin : 13.2 g/dL  Hematocrit : 40.7 %  Platelet Count - Automated : 210 K/uL  Mean Cell Volume : 87.5 fl  Mean Cell Hemoglobin : 28.4 pg  Mean Cell Hemoglobin Concentration : 32.4 gm/dL  Auto Neutrophil # : 5.73 K/uL  Auto Lymphocyte # : 1.35 K/uL  Auto Monocyte # : 0.83 K/uL  Auto Eosinophil # : 0.04 K/uL  Auto Basophil # : 0.01 K/uL  Auto Neutrophil % : 71.6 %  Auto Lymphocyte % : 16.9 %  Auto Monocyte % : 10.4 %  Auto Eosinophil % : 0.5 %  Auto Basophil % : 0.1 %      08-08    140  |  96  |  14  ----------------------------<  136<H>  3.0<L>   |  31  |  0.84    Ca    10.5      08 Aug 2020 05:38  Phos  3.3     08-08  Mg     1.8     08-08    TPro  7.1  /  Alb  3.6  /  TBili  0.3  /  DBili  x   /  AST  39  /  ALT  25  /  AlkPhos  60  08-08      PHYSICAL EXAM:  Gen: NAD  Skin: No rashes, bruises, or lesions  Head: Normocephalic, Atraumatic  Face: no edema, erythema, or fluctuance. Parotid glands soft without mass  Eyes: no scleral injection  Nose: Nares bilaterally patent, no discharge  Mouth: No Stridor / Drooling / Trismus.  Mucosa moist, tongue/uvula midline, L tonsillar fossa with scar, no masses visualized or palpated, BoT soft  Neck: L neck with mild erythema, ttp in posterior triangle from skull base to shoulder, no fluctuance or pinpoint areas of pain, larynx/trachea midline  Resp: breathing easily, no stridor  CV: no peripheral edema/cyanosis  GI: nondistended   Peripheral vascular: no JVD or edema  Neuro: facial nerve intact, no facial droop

## 2020-08-08 NOTE — H&P ADULT - PROBLEM SELECTOR PLAN 7
no fluids  replete lytes as needed  lovenox 40 mg  no GI prophylaxis  peripheral IV  FULL CODE  Dispo [facility]F -atorvastatin 20 (takes rosuvastatin 20 at home)

## 2020-08-08 NOTE — H&P ADULT - ATTENDING COMMENTS
patient seen and examined overnight  a/f neck abscess;  reports throat cancer is s/p RT, in remission since treatment in 2015   reviewed pertinent data, h&p  PE findings as above, pt w/ no photophobia b/l; +L posterior lateral neck tenderness on palpation w/ stiffness and decreased ROM    1. neck abscess: c/w IV vanc/zosyn, followup ENT recs, possible IR guided drainage if no resolution w/ IV abx.     rest of plan as above

## 2020-08-08 NOTE — H&P ADULT - PROBLEM SELECTOR PLAN 4
-c/w losartan 50 mg patient with corrected Ca ~11.5. Patient with no overt signs or symptoms. Evaluate for primary vs non PTH mediated hypercalcemia. may be drug induced as losartan has calcium component.  -f/u AM PTH, if normal or low normal consider other causes (PTH-rp, 1-25 or 25 vit D) patient with corrected Ca ~11.5. Patient with no overt signs or symptoms. Evaluate for primary vs non PTH mediated hypercalcemia. may be drug induced as losartan has calcium component.  -f/u AM PTH, if normal or low normal consider other causes (PTH-rp, 1-25 or 25 vit D)    ADDENDUM: resolved on repeat BMP

## 2020-08-08 NOTE — CONSULT NOTE ADULT - ASSESSMENT
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 Afebrile, WBC wnl, nontoxic. Attempted aspiration at bedside unsuccessful due to small size of collection as well as depth. Ddx also includes recurrence of tonsil CA although less likely in this location.  - Recommend admission to medicine given recent COVID infection, O2 dependence, HIV status, for IV Abx  - Defer to medicine/ID regarding best choice of Abx in this patient and location  - If no significant improvement on IV Abx, can consider ultrasound or CT-guided aspiration/biopsy with IR  - Pt should have routine f/u for his cancer, due for PET scan (last [facility] PET 6/2019)  - ENT will follow and see again in AM

## 2020-08-08 NOTE — CONSULT NOTE ADULT - SUBJECTIVE AND OBJECTIVE BOX
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 Pt was recently admitted to [facility] for COVID infection (see previous documentation), discharged at the beginning of June. Starting about 2 weeks ago, the patient noted left neck pain that became progressively worse. It radiated from his occiput to his left shoulder. He began having issues turning his head, so he presented to his PCP, who prescribed him muscle relaxants. He presented to  ED when his symptoms continued to worsen.     CTA neck revealed "Left cervical paraspinal musculature soft tissue swelling with approximately 2.0 x 2.1 cm abscess at the C3 level." Pt transferred to [facility] for further evaluation.     Pt has required oxygen intermittently and has also had fatigue since his COVID admission, but denies fevers, chills, nausea or other new symptoms.     PAST MEDICAL & SURGICAL HISTORY:  High cholesterol  History of BPH  HIV disease  Abscess  Throat cancer  No significant past surgical history    Allergies    No Known Allergies    Intolerances    ROS:   ENT: all negative except as noted in HPI   CV: denies palpitations  Pulm: denies cough, hemoptysis  GI: denies change in appetite, indigestion, n/v  : denies pertinent urinary symptoms, urgency  Neuro: denies numbness/tingling, loss of sensation  Psych: denies anxiety  Heme: denies easy bruising or bleeding  Endo: denies heat/cold intolerance, excessive sweating  Vascular: denies LE edema    Vital Signs Last 24 Hrs  T(C): 37.2 (07 Aug 2020 22:52), Max: 37.2 (07 Aug 2020 21:14)  T(F): 99 (07 Aug 2020 22:52), Max: 99 (07 Aug 2020 21:14)  HR: 71 (07 Aug 2020 22:52) (71 - 78)  BP: 132/66 (07 Aug 2020 22:52) (122/81 - 138/72)  BP(mean): --  RR: 18 (07 Aug 2020 22:52) (16 - 19)  SpO2: 97% (07 Aug 2020 22:52) (91% - 97%)                          13.2   8.82  )-----------( 206      ( 07 Aug 2020 18:41 )             39.5    08-07    139  |  99  |  13  ----------------------------<  110<H>  3.2<L>   |  32<H>  |  0.86    Ca    10.6<H>      07 Aug 2020 18:41    TPro  7.6  /  Alb  2.7<L>  /  TBili  0.4  /  DBili  x   /  AST  22  /  ALT  22  /  AlkPhos  61  08-07   PT/INR - ( 07 Aug 2020 21:36 )   PT: 14.9 sec;   INR: 1.28          PTT - ( 07 Aug 2020 21:36 )  PTT:33.3 sec    PHYSICAL EXAM:  Gen: NAD  Skin: No rashes, bruises, or lesions  Head: Normocephalic, Atraumatic  Face: no edema, erythema, or fluctuance. Parotid glands soft without mass  Eyes: no scleral injection  Nose: Nares bilaterally patent, no discharge  Mouth: No Stridor / Drooling / Trismus.  Mucosa moist, tongue/uvula midline, L tonsillar fossa with scar, no masses visualized or palpated, BoT soft  Neck: L neck with mild erythema, ttp in posterior triangle from skull base to shoulder, no fluctuance or pinpoint areas of pain, larynx/trachea midline  Resp: breathing easily, no stridor  CV: no peripheral edema/cyanosis  GI: nondistended   Peripheral vascular: no JVD or edema  Neuro: facial nerve intact, no facial droop    IMAGING/ADDITIONAL STUDIES: Normal CTA of the neck. Left cervical paraspinal musculature soft tissue swelling with approximately 2.0 x 2.1 cm abscess at the C3 level.     PROCEDURE:  Verbal consent obtained after discussion of r/b/a.  Area prepped with betadine.  3cc 1% lido with epi injected superficially into area at approximately level of C3 corresponding with CT  18G used to aspirate with return of scant blood, no gross purulence

## 2020-08-08 NOTE — H&P ADULT - NSICDXPASTMEDICALHX_GEN_ALL_CORE_FT
PAST MEDICAL HISTORY:  Abscess     High cholesterol     History of BPH     HIV disease     Throat cancer

## 2020-08-08 NOTE — H&P ADULT - PROBLEM SELECTOR PLAN 3
patient with corrected Ca ~11.5. Patient with no overt signs or symptoms. Evaluate for primary vs non PTH mediated hypercalcemia. may be drug induced as losartan has calcium component.  -f/u AM PTH, if normal or low normal consider other causes (PTH-rp, 1-25 or 25 vit D) CT neck aneurysmal dilatation of the ascending aorta with the diameter measuring approximately 4.5 cm.  Patient notes that his father also had aneursym. Given family hx would recommend close outpatient followup  -include in discharge summary

## 2020-08-08 NOTE — H&P ADULT - NSHPPHYSICALEXAM_GEN_ALL_CORE
.  VITAL SIGNS:  T(C): 36.8 (08-08-20 @ 00:50), Max: 37.2 (08-07-20 @ 21:14)  T(F): 98.3 (08-08-20 @ 00:50), Max: 99 (08-07-20 @ 21:14)  HR: 71 (08-08-20 @ 00:50) (71 - 78)  BP: 117/70 (08-08-20 @ 00:50) (117/70 - 138/72)  BP(mean): --  RR: 18 (08-08-20 @ 00:50) (16 - 19)  SpO2: 97% (08-08-20 @ 00:50) (91% - 97%)  Wt(kg): --    PHYSICAL EXAM:    Constitutional: WDWN resting comfortably in bed; NAD  Head: NC/AT  Eyes: PERRL, EOMI, anicteric sclera  ENT: no nasal discharge; uvula midline, no oropharyngeal erythema or exudates; MMM  Neck: left posterior neck tender to palpation with notable region of muscle tightness and warmth  Respiratory: CTA B/L; no W/R/R, no retractions  Cardiac: +S1/S2; RRR; no M/R/G; PMI non-displaced  Gastrointestinal: soft, NT/ND; no rebound or guarding; +BSx4  Back: spine midline, no bony tenderness or step-offs; no CVAT B/L  Extremities: WWP, no clubbing or cyanosis; no peripheral edema  Musculoskeletal: NROM x4; no joint swelling, tenderness or erythema  Vascular: 2+ radial, femoral, DP/PT pulses B/L  Dermatologic: skin warm, dry and intact; no rashes, wounds, or scars  Lymphatic: no submandibular or cervical LAD  Neurologic: AAOx3; CNII-XII grossly intact; no focal deficits  Psychiatric: affect and characteristics of appearance, verbalizations, behaviors are appropriate

## 2020-08-08 NOTE — H&P ADULT - PROBLEM SELECTOR PLAN 2
Patient states he is very compliant with his medications and his last viral load was undetectable. Follows with Dr. Justin Mckinnye ([phone])  -f/u HIV RNA and T cell subset  -c/w isentress  -c/w prezista  -c/w ritonavir 100 mg  -c/w descovy 1 tablet daily  -intelence 200 mg BID   -c/w valtrex 1 G (HSV prophylaxis)  -of note patient takes loperamide 8 mg BID as he gets GI side effects from HIV medications Patient states he is very compliant with his medications and his last viral load was undetectable, in March. Follows with Dr. Justin Mckinney ([phone])  -f/u HIV RNA and T cell subset  -c/w isentress  -c/w prezista  -c/w ritonavir 100 mg  -c/w descovy 1 tablet daily  -intelence 200 mg BID   -c/w valtrex 1 G (HSV prophylaxis)  -of note patient takes loperamide 8 mg BID as he gets GI side effects from HIV medications

## 2020-08-08 NOTE — H&P ADULT - PROBLEM SELECTOR PLAN 6
-c/w flomax Patient with elevated HC03 to 32. on previous admission patient HC03 was around 25. Unclear as to cause as patient with no obvious etiologies that would cause this. no diuretic use, no vomiting, no laxative abuse, no overt mineralocorticoid excess. Patient with no component of respiratory acidosis that would require metabolic compensation.  -trend BMP Patient with elevated HC03 to 32. on previous admission patient HC03 was around 25. Unclear as to cause as patient with no obvious etiologies that would cause this. no diuretic use, no vomiting, no laxative abuse, no overt mineralocorticoid excess. Patient with no component of respiratory acidosis that would require metabolic compensation.  -trend BMP    ADDENDUM: resolved

## 2020-08-08 NOTE — H&P ADULT - HISTORY OF PRESENT ILLNESS
58 yo male with hx HIV (pt states VL undetectable), throat ca (2010, s/p radiation tx), BPH, COVID (in April), HTN, HLD, basal cell carcinoma (s/p resection 02/20) who presented to the ED CC of left sided neck pain for the past 7 days. Patient states pain was sharp and stabbing he rated it 10/10. It radiates from his left ear all the way down his neck to his left clavicle. Patient states when the pain started he also noted left face swelling. He notes int he beginning that moving his neck in any direction worsened the pain. He called his PCP for a telehealth appointment and he prescribed him a muscle relaxer which helped a bit initially, deepak his ROM. Endorses that flexing his head a little while laying down also helps.  Patient notes that on 8/5 he started having issues hearing from his left ear (no tinnitus, just muffled). Patient denies fever, chills, SOB, nausea, vomiting, CP, blurred vision, HA, numbness, tingling, drooling, voice changes. He reports that his pain persisted and he called his PCP again, but he was out of the office till Monday so he was referred to the ED.    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 No large vessel occlusion. CT neck aneurysmal dilatation of the ascending aorta with the diameter measuring approximately 4.5 cm. C3 level approximately 2.0 cm width x 2.1 cm with heterogeneously enhancing focus with central hypodensity suspicious for abscess with surrounding myositis.   Given vanc 1250 mg (10 pm), ceftriaxone 1 g (9 pm), 30 mg IVP ketoroloc 60 yo male with hx HIV (pt states VL undetectable), throat ca (2010, s/p radiation tx), BPH, COVID (in April), HTN, HLD, basal cell carcinoma (s/p resection 02/20) who presented to the ED CC of left sided neck pain for the past 7 days. Patient states pain was sharp and stabbing he rated it 10/10. It radiates from his left ear all the way down his neck to his left clavicle. Patient states when the pain started he also noted left face swelling. Denied any trauma to the area. He noted in the beginning that moving his neck in any direction worsened the pain. He called his PCP for a telehealth appointment and he prescribed him a muscle relaxer which helped a bit initially, deepak his ROM. Endorses that flexing his head a little while laying down also helps.  Patient notes that on 8/5 he started having issues hearing from his left ear (no tinnitus, just muffled). Patient denies fever, chills, SOB, nausea, vomiting, CP, blurred vision, HA, numbness, tingling, drooling, voice changes. He reports that his pain persisted and he called his PCP again, but he was out of the office till Monday so he was referred to the ED.    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 No large vessel occlusion. CT neck aneurysmal dilatation of the ascending aorta with the diameter measuring approximately 4.5 cm. C3 level approximately 2.0 cm width x 2.1 cm with heterogeneously enhancing focus with central hypodensity suspicious for abscess with surrounding myositis.   Given vanc 1250 mg (10 pm), ceftriaxone 1 g (9 pm), 30 mg IVP ketoroloc 60 yo male with hx HIV (pt states VL undetectable), throat ca (2010, s/p radiation tx), BPH, COVID (in April), HTN, HLD, basal cell carcinoma (s/p resection 02/20) who presented to the ED CC of left sided neck pain for the past 7 days. Patient states pain was sharp and stabbing he rated it 10/10. It radiates from his left ear all the way down his neck to his left clavicle. Patient states when the pain started he also noted left face swelling. Denied any trauma to the area. He noted in the beginning that moving his neck in any direction worsened the pain. He called his PCP for a telehealth appointment and he prescribed him a muscle relaxer which helped a bit initially, deepak his ROM. Endorses that flexing his head a little while laying down also helps.  Patient notes that on 8/5 he started having issues hearing from his left ear (no tinnitus, just muffled). Patient denies fever, chills, SOB, nausea, vomiting, CP, blurred vision, HA, numbness, tingling, drooling, voice changes. He reports that his pain persisted and he called his PCP again, but he was out of the office till Monday so he was referred to the ED.    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 No large vessel occlusion. CT neck aneurysmal dilatation of the ascending aorta with the diameter measuring approximately 4.5 cm. C3 level approximately 2.0 cm width x 2.1 cm with heterogeneously enhancing focus with central hypodensity suspicious for abscess with surrounding myositis.   EKG with bifasicular block (1st degree AV block and RBBB)  Given vanc 1250 mg (10 pm), ceftriaxone 1 g (9 pm), 30 mg IVP ketoroloc 60 yo male with hx HIV (pt states VL undetectable), throat ca (2010, s/p radiation tx), BPH, COVID (in April), HTN, HLD, basal cell carcinoma (s/p resection 02/20) who presented to the ED CC of left sided neck pain for the past 7 days. Patient states pain was sharp and stabbing he rated it 10/10. It radiates from his left ear all the way down his neck to his left clavicle. Patient states when the pain started he also noted left face swelling. Denied any trauma to the area. He noted in the beginning that moving his neck in any direction worsened the pain. He called his PCP for a telehealth appointment and he prescribed him a muscle relaxer which helped a bit initially, deepak his ROM. Endorses that flexing his head a little while laying down also helps.  Patient notes that on 8/5 he started having issues hearing from his left ear (no tinnitus, just muffled). Patient denies fever, chills, SOB, nausea, vomiting, CP, blurred vision, HA, numbness, tingling, drooling, voice changes. He reports that his pain persisted and he called his PCP again, but he was out of the office till Monday so he was referred to the ED.    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 No large vessel occlusion. CT neck aneurysmal dilatation of the ascending aorta with the diameter measuring approximately 4.5 cm. C3 level approximately 2.0 cm width x 2.1 cm with heterogeneously enhancing focus with central hypodensity suspicious for abscess with surrounding myositis.   EKG with bifasicular block (1st degree AV block and RBBB)-unchanged from prior admission  Given vanc 1250 mg (10 pm), ceftriaxone 1 g (9 pm), 30 mg IVP ketoroloc 60 yo male with hx HIV (pt states VL undetectable), throat ca (2015, s/p radiation tx), BPH, COVID (in April), HTN, HLD, basal cell carcinoma (s/p resection 02/20) who presented to the ED CC of left sided neck pain for the past 7 days. Patient states pain was sharp and stabbing he rated it 10/10. It radiates from his left ear all the way down his neck to his left clavicle. Patient states when the pain started he also noted left face swelling. Denied any trauma to the area. He noted in the beginning that moving his neck in any direction worsened the pain. He called his PCP for a telehealth appointment and he prescribed him a muscle relaxer which helped a bit initially, deepak his ROM. Endorses that flexing his head a little while laying down also helps.  Patient notes that on 8/5 he started having issues hearing from his left ear (no tinnitus, just muffled). Patient denies fever, chills, SOB, nausea, vomiting, CP, blurred vision, HA, numbness, tingling, drooling, voice changes. He reports that his pain persisted and he called his PCP again, but he was out of the office till Monday so he was referred to the ED.    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 No large vessel occlusion. CT neck aneurysmal dilatation of the ascending aorta with the diameter measuring approximately 4.5 cm. C3 level approximately 2.0 cm width x 2.1 cm with heterogeneously enhancing focus with central hypodensity suspicious for abscess with surrounding myositis.   EKG with bifasicular block (1st degree AV block and RBBB)-unchanged from prior admission  Given vanc 1250 mg (10 pm), ceftriaxone 1 g (9 pm), 30 mg IVP ketoroloc

## 2020-08-09 LAB
ANION GAP SERPL CALC-SCNC: 13 MMOL/L — SIGNIFICANT CHANGE UP (ref 5–17)
BUN SERPL-MCNC: 10 MG/DL — SIGNIFICANT CHANGE UP (ref 7–23)
CALCIUM SERPL-MCNC: 10.2 MG/DL — SIGNIFICANT CHANGE UP (ref 8.4–10.5)
CHLORIDE SERPL-SCNC: 103 MMOL/L — SIGNIFICANT CHANGE UP (ref 96–108)
CO2 SERPL-SCNC: 29 MMOL/L — SIGNIFICANT CHANGE UP (ref 22–31)
CREAT SERPL-MCNC: 0.71 MG/DL — SIGNIFICANT CHANGE UP (ref 0.5–1.3)
CRP SERPL-MCNC: 13.31 MG/DL — HIGH (ref 0–0.4)
GLUCOSE SERPL-MCNC: 108 MG/DL — HIGH (ref 70–99)
HCT VFR BLD CALC: 40.3 % — SIGNIFICANT CHANGE UP (ref 39–50)
HGB BLD-MCNC: 12.9 G/DL — LOW (ref 13–17)
MAGNESIUM SERPL-MCNC: 2 MG/DL — SIGNIFICANT CHANGE UP (ref 1.6–2.6)
MCHC RBC-ENTMCNC: 28.5 PG — SIGNIFICANT CHANGE UP (ref 27–34)
MCHC RBC-ENTMCNC: 32 GM/DL — SIGNIFICANT CHANGE UP (ref 32–36)
MCV RBC AUTO: 89 FL — SIGNIFICANT CHANGE UP (ref 80–100)
NRBC # BLD: 0 /100 WBCS — SIGNIFICANT CHANGE UP (ref 0–0)
PLATELET # BLD AUTO: 268 K/UL — SIGNIFICANT CHANGE UP (ref 150–400)
POTASSIUM SERPL-MCNC: 3.4 MMOL/L — LOW (ref 3.5–5.3)
POTASSIUM SERPL-SCNC: 3.4 MMOL/L — LOW (ref 3.5–5.3)
RBC # BLD: 4.53 M/UL — SIGNIFICANT CHANGE UP (ref 4.2–5.8)
RBC # FLD: 13.4 % — SIGNIFICANT CHANGE UP (ref 10.3–14.5)
SODIUM SERPL-SCNC: 145 MMOL/L — SIGNIFICANT CHANGE UP (ref 135–145)
VANCOMYCIN TROUGH SERPL-MCNC: 13 UG/ML — SIGNIFICANT CHANGE UP (ref 10–20)
WBC # BLD: 4.2 K/UL — SIGNIFICANT CHANGE UP (ref 3.8–10.5)
WBC # FLD AUTO: 4.2 K/UL — SIGNIFICANT CHANGE UP (ref 3.8–10.5)

## 2020-08-09 PROCEDURE — 99233 SBSQ HOSP IP/OBS HIGH 50: CPT | Mod: GC

## 2020-08-09 RX ORDER — POTASSIUM CHLORIDE 20 MEQ
20 PACKET (EA) ORAL ONCE
Refills: 0 | Status: COMPLETED | OUTPATIENT
Start: 2020-08-09 | End: 2020-08-09

## 2020-08-09 RX ORDER — POTASSIUM CHLORIDE 20 MEQ
40 PACKET (EA) ORAL ONCE
Refills: 0 | Status: COMPLETED | OUTPATIENT
Start: 2020-08-09 | End: 2020-08-09

## 2020-08-09 RX ORDER — ONDANSETRON 8 MG/1
4 TABLET, FILM COATED ORAL ONCE
Refills: 0 | Status: COMPLETED | OUTPATIENT
Start: 2020-08-09 | End: 2020-08-09

## 2020-08-09 RX ADMIN — Medication 650 MILLIGRAM(S): at 06:51

## 2020-08-09 RX ADMIN — RITONAVIR 100 MILLIGRAM(S): 100 TABLET, FILM COATED ORAL at 13:07

## 2020-08-09 RX ADMIN — VALACYCLOVIR 500 MILLIGRAM(S): 500 TABLET, FILM COATED ORAL at 13:05

## 2020-08-09 RX ADMIN — PIPERACILLIN AND TAZOBACTAM 200 GRAM(S): 4; .5 INJECTION, POWDER, LYOPHILIZED, FOR SOLUTION INTRAVENOUS at 02:34

## 2020-08-09 RX ADMIN — Medication 166.67 MILLIGRAM(S): at 05:42

## 2020-08-09 RX ADMIN — Medication 8 MILLIGRAM(S): at 22:54

## 2020-08-09 RX ADMIN — ENOXAPARIN SODIUM 40 MILLIGRAM(S): 100 INJECTION SUBCUTANEOUS at 13:04

## 2020-08-09 RX ADMIN — DARUNAVIR 600 MILLIGRAM(S): 75 TABLET, FILM COATED ORAL at 05:42

## 2020-08-09 RX ADMIN — Medication 1 TABLET(S): at 08:43

## 2020-08-09 RX ADMIN — Medication 650 MILLIGRAM(S): at 20:20

## 2020-08-09 RX ADMIN — DARUNAVIR 600 MILLIGRAM(S): 75 TABLET, FILM COATED ORAL at 22:05

## 2020-08-09 RX ADMIN — Medication 125 MICROGRAM(S): at 05:43

## 2020-08-09 RX ADMIN — RITONAVIR 100 MILLIGRAM(S): 100 TABLET, FILM COATED ORAL at 22:48

## 2020-08-09 RX ADMIN — ETRAVIRINE 200 MILLIGRAM(S): 200 TABLET ORAL at 22:48

## 2020-08-09 RX ADMIN — RALTEGRAVIR 400 MILLIGRAM(S): 400 TABLET, FILM COATED ORAL at 13:06

## 2020-08-09 RX ADMIN — RALTEGRAVIR 400 MILLIGRAM(S): 400 TABLET, FILM COATED ORAL at 22:00

## 2020-08-09 RX ADMIN — ETRAVIRINE 200 MILLIGRAM(S): 200 TABLET ORAL at 13:07

## 2020-08-09 RX ADMIN — PIPERACILLIN AND TAZOBACTAM 200 GRAM(S): 4; .5 INJECTION, POWDER, LYOPHILIZED, FOR SOLUTION INTRAVENOUS at 08:42

## 2020-08-09 RX ADMIN — Medication 40 MILLIEQUIVALENT(S): at 20:55

## 2020-08-09 RX ADMIN — PIPERACILLIN AND TAZOBACTAM 200 GRAM(S): 4; .5 INJECTION, POWDER, LYOPHILIZED, FOR SOLUTION INTRAVENOUS at 14:47

## 2020-08-09 RX ADMIN — ATORVASTATIN CALCIUM 20 MILLIGRAM(S): 80 TABLET, FILM COATED ORAL at 22:09

## 2020-08-09 RX ADMIN — Medication 8 MILLIGRAM(S): at 12:25

## 2020-08-09 RX ADMIN — TAMSULOSIN HYDROCHLORIDE 0.4 MILLIGRAM(S): 0.4 CAPSULE ORAL at 22:00

## 2020-08-09 RX ADMIN — Medication 650 MILLIGRAM(S): at 21:20

## 2020-08-09 RX ADMIN — VALACYCLOVIR 500 MILLIGRAM(S): 500 TABLET, FILM COATED ORAL at 22:00

## 2020-08-09 RX ADMIN — EMTRICITABINE AND TENOFOVIR DISOPROXIL FUMARATE 1 TABLET(S): 200; 300 TABLET, FILM COATED ORAL at 13:07

## 2020-08-09 RX ADMIN — CYCLOBENZAPRINE HYDROCHLORIDE 5 MILLIGRAM(S): 10 TABLET, FILM COATED ORAL at 20:20

## 2020-08-09 RX ADMIN — PIPERACILLIN AND TAZOBACTAM 200 GRAM(S): 4; .5 INJECTION, POWDER, LYOPHILIZED, FOR SOLUTION INTRAVENOUS at 19:24

## 2020-08-09 RX ADMIN — LOSARTAN POTASSIUM 50 MILLIGRAM(S): 100 TABLET, FILM COATED ORAL at 05:43

## 2020-08-09 RX ADMIN — Medication 650 MILLIGRAM(S): at 05:51

## 2020-08-09 RX ADMIN — Medication 20 MILLIEQUIVALENT(S): at 22:00

## 2020-08-09 NOTE — PROGRESS NOTE ADULT - PROBLEM SELECTOR PLAN 6
Patient with elevated HC03 to 32. on previous admission patient HC03 was around 25. Unclear as to cause as patient with no obvious etiologies that would cause this. no diuretic use, no vomiting, no laxative abuse, no overt mineralocorticoid excess. Patient with no component of respiratory acidosis that would require metabolic compensation.  -trend BMP    ADDENDUM: resolved

## 2020-08-09 NOTE — PROGRESS NOTE ADULT - PROBLEM SELECTOR PLAN 2
Patient states he is very compliant with his medications and his last viral load was undetectable, in March. Follows with Dr. Justin Mckinney ([phone])  -f/u HIV RNA and T cell subset  -c/w isentress  -c/w prezista  -c/w ritonavir 100 mg  -c/w descovy 1 tablet daily  -intelence 200 mg BID   -c/w valtrex 1 G (HSV prophylaxis)  -of note patient takes loperamide 8 mg BID as he gets GI side effects from HIV medications

## 2020-08-09 NOTE — PROGRESS NOTE ADULT - ATTENDING COMMENTS
Continuing antibiotics  Planning for IR drainage of paraspinal abscess tomorrow  Night team notified re: ordering night-time vancomycin dose

## 2020-08-09 NOTE — PROGRESS NOTE ADULT - SUBJECTIVE AND OBJECTIVE BOX
OVERNIGHT EVENTS:no acute events     SUBJECTIVE / INTERVAL HPI: Patient seen and examined at bedside. C/o R sided neck pain 9/10 and nick stiffness. Also reports he is unable to hear out of L ear. Denies F/C, N/V, D/C, photophobia. All other ROS negative.    VITAL SIGNS:  Vital Signs Last 24 Hrs  T(C): 36.9 (09 Aug 2020 05:16), Max: 36.9 (09 Aug 2020 05:16)  T(F): 98.4 (09 Aug 2020 05:16), Max: 98.4 (09 Aug 2020 05:16)  HR: 65 (09 Aug 2020 05:16) (65 - 75)  BP: 119/76 (09 Aug 2020 05:16) (117/73 - 130/85)  BP(mean): --  RR: 19 (09 Aug 2020 05:16) (18 - 19)  SpO2: 98% (09 Aug 2020 05:16) (97% - 98%)      PHYSICAL EXAM:    General: WDWN  HEENT: NC/AT; PERRL, anicteric sclera; MMM  Neck: L neck with mild erythema, ttp in posterior triangle from skull base to shoulder, no fluctuance or pinpoint areas of pain, ROM limited to left side 2/2 to pain.  Cardiovascular: +S1/S2; RRR  Respiratory: CTA B/L; no W/R/R  Gastrointestinal: soft, NT/ND; +BSx4  Extremities: WWP; no edema, clubbing or cyanosis  Vascular: 2+ radial, DP/PT pulses B/L  Neurological: AAOx3; no focal deficits    MEDICATIONS:  MEDICATIONS  (STANDING):  atorvastatin 20 milliGRAM(s) Oral at bedtime  darunavir 600 milliGRAM(s) Oral two times a day  emtricitabine 200 mG/tenofovir alafenamide 25 mG (DESCOVY) Tablet 1 Tablet(s) Oral daily  enoxaparin Injectable 40 milliGRAM(s) SubCutaneous every 24 hours  etravirine 200 milliGRAM(s) Oral <User Schedule>  levothyroxine 125 MICROGram(s) Oral every 24 hours  loperamide Liquid 8 milliGRAM(s) Oral every 12 hours  losartan 50 milliGRAM(s) Oral daily  multivitamin 1 Tablet(s) Oral daily  piperacillin/tazobactam IVPB.. 4.5 Gram(s) IV Intermittent every 6 hours  raltegravir (12 Hour) 400 milliGRAM(s) Oral <User Schedule>  ritonavir Tablet 100 milliGRAM(s) Oral <User Schedule>  tamsulosin 0.4 milliGRAM(s) Oral at bedtime  valACYclovir 500 milliGRAM(s) Oral <User Schedule>    MEDICATIONS  (PRN):  acetaminophen   Tablet .. 650 milliGRAM(s) Oral every 6 hours PRN Mild Pain (1 - 3)  cyclobenzaprine 5 milliGRAM(s) Oral three times a day PRN Muscle Spasm      ALLERGIES:  Allergies    No Known Allergies    Intolerances        LABS:                        13.2   8.00  )-----------( 210      ( 08 Aug 2020 05:38 )             40.7     08-08    140  |  96  |  14  ----------------------------<  136<H>  3.0<L>   |  31  |  0.84    Ca    10.5      08 Aug 2020 05:38  Phos  3.3     08-08  Mg     1.8     08-08    TPro  7.1  /  Alb  3.6  /  TBili  0.3  /  DBili  x   /  AST  39  /  ALT  25  /  AlkPhos  60  08-08    PT/INR - ( 07 Aug 2020 21:36 )   PT: 14.9 sec;   INR: 1.28          PTT - ( 07 Aug 2020 21:36 )  PTT:33.3 sec    CAPILLARY BLOOD GLUCOSE            RADIOLOGY & ADDITIONAL TESTS: Reviewed.

## 2020-08-09 NOTE — PROGRESS NOTE ADULT - PROBLEM SELECTOR PLAN 4
patient with corrected Ca ~11.5. Patient with no overt signs or symptoms. Evaluate for primary vs non PTH mediated hypercalcemia. may be drug induced as losartan has calcium component.  -f/u AM PTH, if normal or low normal consider other causes (PTH-rp, 1-25 or 25 vit D)    ADDENDUM: resolved on repeat BMP

## 2020-08-09 NOTE — PROGRESS NOTE ADULT - PROBLEM SELECTOR PLAN 3
CT neck aneurysmal dilatation of the ascending aorta with the diameter measuring approximately 4.5 cm.  Patient notes that his father also had aneursym. Given family hx would recommend close outpatient followup  -include in discharge summary

## 2020-08-09 NOTE — PROGRESS NOTE ADULT - PROBLEM SELECTOR PLAN 1
Patient presented with one week of left sided neck pain, which was worsened with any head movement. Accompanied by left facial swelling and muffled hearing in his left ear. On presentation, patient with no SIRs criteria and or leukocytosis. CT neck showed at C3 level approximately 2.0 cm width x 2.1 cm  heterogeneously enhancing focus with central hypodensity suspicious for abscess with surrounding myositis. ENT consulted in ED and attempted bedside drainage, but unable to access the pocket due to size and location. Given 1250 mg Vancomycin and 1 g ceftriaxone.  -given patient hx of HIV and location of abscess will cover with broad spectrum at this time with vancomycin 1250 mg Q12 and zosyn 4.5 mg Q6.   -cw vancomycin 1250 mg Q12 and zosyn 4.5 mg Q6.   -IR guided drainage of abscess (8/10)  -f/u blood cx, and deescalate Abx accordingly  -appreciate ENT recs

## 2020-08-09 NOTE — PROGRESS NOTE ADULT - SUBJECTIVE AND OBJECTIVE BOX
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 Attempted aspiration at bedside unsuccessful due to small size of collection as well as depth.     08-08 Patient admitted to medicine and started on Vanc/Zosyn. , CRP 21. Minor electrolyte abnormalities. Pt with no further complaints this AM. Exam and pain stable.  08-09 Patient seen at bedside and discussed with attending. Per medicine not pt to continue on vanc/zosyn but currently only ordered for zosyn. CRP 13.3 this AM, AFVSS. Complaining of neck pain and increased paraspinal swelling.     ALLERGIES  No Known Allergies    MEDICATIONS  (STANDING):  atorvastatin 20 milliGRAM(s) Oral at bedtime  darunavir 600 milliGRAM(s) Oral two times a day  emtricitabine 200 mG/tenofovir alafenamide 25 mG (DESCOVY) Tablet 1 Tablet(s) Oral daily  enoxaparin Injectable 40 milliGRAM(s) SubCutaneous every 24 hours  etravirine 200 milliGRAM(s) Oral <User Schedule>  levothyroxine 125 MICROGram(s) Oral every 24 hours  loperamide Liquid 8 milliGRAM(s) Oral every 12 hours  losartan 50 milliGRAM(s) Oral daily  multivitamin 1 Tablet(s) Oral daily  piperacillin/tazobactam IVPB.. 4.5 Gram(s) IV Intermittent every 6 hours  raltegravir (12 Hour) 400 milliGRAM(s) Oral <User Schedule>  ritonavir Tablet 100 milliGRAM(s) Oral <User Schedule>  tamsulosin 0.4 milliGRAM(s) Oral at bedtime  valACYclovir 500 milliGRAM(s) Oral <User Schedule>    MEDICATIONS  (PRN):  acetaminophen   Tablet .. 650 milliGRAM(s) Oral every 6 hours PRN Mild Pain (1 - 3)  cyclobenzaprine 5 milliGRAM(s) Oral three times a day PRN Muscle Spasm      LABS                          13.2   8.00  )-----------( 210      ( 08 Aug 2020 05:38 )             40.7     08-08    140  |  96  |  14  ----------------------------<  136<H>  3.0<L>   |  31  |  0.84    Ca    10.5      08 Aug 2020 05:38  Phos  3.3     08-08  Mg     1.8     08-08    TPro  7.1  /  Alb  3.6  /  TBili  0.3  /  DBili  x   /  AST  39  /  ALT  25  /  AlkPhos  60  08-08    LIVER FUNCTIONS - ( 08 Aug 2020 05:38 )  Alb: 3.6 g/dL / Pro: 7.1 g/dL / ALK PHOS: 60 U/L / ALT: 25 U/L / AST: 39 U/L / GGT: x           PT/INR - ( 07 Aug 2020 21:36 )   PT: 14.9 sec;   INR: 1.28          PTT - ( 07 Aug 2020 21:36 )  PTT:33.3 sec    Full T Cell Subset (08.08.20 @ 15:04)    CD19 %: 8 %    CD3 %: 79: Reference ranges for pediatric population (0-18 years old) are from  Kavin Marsh et al. "Lymphocyte subsets in healthy children from  birth through 18 years of age: the Pediatric AIDS Clinical Trials Group   study. "Journal of Allergy and Clinical Immunology 112.5 (2003):  973-980.  Reference range for adult (18-65 years old) and geriatric (65 years old  and above) populations are developed at [place], [place]. %    CD4 %: 25 %    CD8 %: 52 %    [id number] %: 10 %    ABS CD8: 664 /uL    ABS CD19: 106 /uL    ABS [id number]: 132 /uL    ABS CD3: 1015 /uL    ABS CD4: 310 /uL        INs & OUTs      VITAL SIGNS:  ICU Vital Signs Last 24 Hrs  T(C): 36.4 (09 Aug 2020 12:27), Max: 36.9 (09 Aug 2020 05:16)  T(F): 97.6 (09 Aug 2020 12:27), Max: 98.4 (09 Aug 2020 05:16)  HR: 60 (09 Aug 2020 12:27) (60 - 75)  BP: 126/79 (09 Aug 2020 12:27) (117/73 - 130/85)  BP(mean): --  ABP: --  ABP(mean): --  RR: 17 (09 Aug 2020 12:27) (17 - 19)  SpO2: 97% (09 Aug 2020 12:27) (97% - 98%)      PHYSICAL EXAM:  Gen: NAD  Skin: No rashes, bruises, or lesions  Head: Normocephalic, Atraumatic  Face: no edema, erythema, or fluctuance. Parotid glands soft without mass  Eyes: no scleral injection  Mouth: No Stridor / Drooling / Trismus.  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 Ddx also includes recurrence of tonsil CA although less likely in this location.  - Appreciate care per medicine  - planned for IR drainage 8/10  - pain control  - Continue IV Abx, currently ordered for zosyn with vanc d/anthony  - Replete electrolytes  - trend CRP  - Pt should have routine f/u for his cancer, due for PET scan (last [facility] PET 6/2019)  - ENT will follow    Discussed with Dr. 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 Attempted aspiration at bedside unsuccessful due to small size of collection as well as depth.     08-08 Patient admitted to medicine and started on Vanc/Zosyn. , CRP 21. Minor electrolyte abnormalities. Pt with no further complaints this AM. Exam and pain stable.  08-09 Patient seen at bedside and discussed with attending. Per medicine not pt to continue on vanc/zosyn but currently only ordered for zosyn. CRP 13.3 this AM, AFVSS. Complaining of neck pain and increased paraspinal swelling.     ALLERGIES  No Known Allergies    MEDICATIONS  (STANDING):  atorvastatin 20 milliGRAM(s) Oral at bedtime  darunavir 600 milliGRAM(s) Oral two times a day  emtricitabine 200 mG/tenofovir alafenamide 25 mG (DESCOVY) Tablet 1 Tablet(s) Oral daily  enoxaparin Injectable 40 milliGRAM(s) SubCutaneous every 24 hours  etravirine 200 milliGRAM(s) Oral <User Schedule>  levothyroxine 125 MICROGram(s) Oral every 24 hours  loperamide Liquid 8 milliGRAM(s) Oral every 12 hours  losartan 50 milliGRAM(s) Oral daily  multivitamin 1 Tablet(s) Oral daily  piperacillin/tazobactam IVPB.. 4.5 Gram(s) IV Intermittent every 6 hours  raltegravir (12 Hour) 400 milliGRAM(s) Oral <User Schedule>  ritonavir Tablet 100 milliGRAM(s) Oral <User Schedule>  tamsulosin 0.4 milliGRAM(s) Oral at bedtime  valACYclovir 500 milliGRAM(s) Oral <User Schedule>    MEDICATIONS  (PRN):  acetaminophen   Tablet .. 650 milliGRAM(s) Oral every 6 hours PRN Mild Pain (1 - 3)  cyclobenzaprine 5 milliGRAM(s) Oral three times a day PRN Muscle Spasm      LABS                          13.2   8.00  )-----------( 210      ( 08 Aug 2020 05:38 )             40.7     08-08    140  |  96  |  14  ----------------------------<  136<H>  3.0<L>   |  31  |  0.84    Ca    10.5      08 Aug 2020 05:38  Phos  3.3     08-08  Mg     1.8     08-08    TPro  7.1  /  Alb  3.6  /  TBili  0.3  /  DBili  x   /  AST  39  /  ALT  25  /  AlkPhos  60  08-08    LIVER FUNCTIONS - ( 08 Aug 2020 05:38 )  Alb: 3.6 g/dL / Pro: 7.1 g/dL / ALK PHOS: 60 U/L / ALT: 25 U/L / AST: 39 U/L / GGT: x           PT/INR - ( 07 Aug 2020 21:36 )   PT: 14.9 sec;   INR: 1.28          PTT - ( 07 Aug 2020 21:36 )  PTT:33.3 sec    Full T Cell Subset (08.08.20 @ 15:04)    CD19 %: 8 %    CD3 %: 79: Reference ranges for pediatric population (0-18 years old) are from  Kavin Marsh et al. "Lymphocyte subsets in healthy children from  birth through 18 years of age: the Pediatric AIDS Clinical Trials Group   study. "Journal of Allergy and Clinical Immunology 112.5 (2003):  973-980.  Reference range for adult (18-65 years old) and geriatric (65 years old  and above) populations are developed at [place], [place]. %    CD4 %: 25 %    CD8 %: 52 %    [id number] %: 10 %    ABS CD8: 664 /uL    ABS CD19: 106 /uL    ABS [id number]: 132 /uL    ABS CD3: 1015 /uL    ABS CD4: 310 /uL        INs & OUTs      VITAL SIGNS:  ICU Vital Signs Last 24 Hrs  T(C): 36.4 (09 Aug 2020 12:27), Max: 36.9 (09 Aug 2020 05:16)  T(F): 97.6 (09 Aug 2020 12:27), Max: 98.4 (09 Aug 2020 05:16)  HR: 60 (09 Aug 2020 12:27) (60 - 75)  BP: 126/79 (09 Aug 2020 12:27) (117/73 - 130/85)  BP(mean): --  ABP: --  ABP(mean): --  RR: 17 (09 Aug 2020 12:27) (17 - 19)  SpO2: 97% (09 Aug 2020 12:27) (97% - 98%)      PHYSICAL EXAM:  Gen: NAD  Skin: No rashes, bruises, or lesions  Head: Normocephalic, Atraumatic  Face: no edema, erythema, or fluctuance. Parotid glands soft without mass  Eyes: no scleral injection  Mouth: No Stridor / Drooling / Trismus.  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 Ddx also includes recurrence of tonsil CA although less likely in this location.  - Appreciate care per medicine  - planned for IR drainage of paraspinal abscess 8/10  - pain control  - Continue IV Abx, currently ordered for zosyn with vanc d/anthony  - Replete electrolytes  - trend CRP  - Pt should have routine f/u for his cancer, due for PET scan (last [facility] PET 6/2019)  - ENT will follow    Discussed with Dr. Combs

## 2020-08-10 LAB
ANION GAP SERPL CALC-SCNC: 10 MMOL/L — SIGNIFICANT CHANGE UP (ref 5–17)
APTT BLD: 32.9 SEC — SIGNIFICANT CHANGE UP (ref 27.5–35.5)
BUN SERPL-MCNC: 9 MG/DL — SIGNIFICANT CHANGE UP (ref 7–23)
CALCIUM SERPL-MCNC: 10.4 MG/DL — SIGNIFICANT CHANGE UP (ref 8.4–10.5)
CHLORIDE SERPL-SCNC: 103 MMOL/L — SIGNIFICANT CHANGE UP (ref 96–108)
CO2 SERPL-SCNC: 30 MMOL/L — SIGNIFICANT CHANGE UP (ref 22–31)
CREAT SERPL-MCNC: 0.86 MG/DL — SIGNIFICANT CHANGE UP (ref 0.5–1.3)
GLUCOSE BLDC GLUCOMTR-MCNC: 137 MG/DL — HIGH (ref 70–99)
GLUCOSE SERPL-MCNC: 79 MG/DL — SIGNIFICANT CHANGE UP (ref 70–99)
GRAM STN FLD: SIGNIFICANT CHANGE UP
HCT VFR BLD CALC: 40.7 % — SIGNIFICANT CHANGE UP (ref 39–50)
HGB BLD-MCNC: 12.7 G/DL — LOW (ref 13–17)
INR BLD: 1.17 — HIGH (ref 0.88–1.16)
MAGNESIUM SERPL-MCNC: 1.9 MG/DL — SIGNIFICANT CHANGE UP (ref 1.6–2.6)
MCHC RBC-ENTMCNC: 28 PG — SIGNIFICANT CHANGE UP (ref 27–34)
MCHC RBC-ENTMCNC: 31.2 GM/DL — LOW (ref 32–36)
MCV RBC AUTO: 89.8 FL — SIGNIFICANT CHANGE UP (ref 80–100)
NRBC # BLD: 0 /100 WBCS — SIGNIFICANT CHANGE UP (ref 0–0)
PHOSPHATE SERPL-MCNC: 3.9 MG/DL — SIGNIFICANT CHANGE UP (ref 2.5–4.5)
PLATELET # BLD AUTO: 284 K/UL — SIGNIFICANT CHANGE UP (ref 150–400)
POTASSIUM SERPL-MCNC: 4 MMOL/L — SIGNIFICANT CHANGE UP (ref 3.5–5.3)
POTASSIUM SERPL-SCNC: 4 MMOL/L — SIGNIFICANT CHANGE UP (ref 3.5–5.3)
PROTHROM AB SERPL-ACNC: 13.9 SEC — HIGH (ref 10.6–13.6)
RBC # BLD: 4.53 M/UL — SIGNIFICANT CHANGE UP (ref 4.2–5.8)
RBC # FLD: 13.1 % — SIGNIFICANT CHANGE UP (ref 10.3–14.5)
SARS-COV-2 IGG SERPL QL IA: POSITIVE
SARS-COV-2 IGM SERPL IA-ACNC: 6.74 INDEX — HIGH
SODIUM SERPL-SCNC: 143 MMOL/L — SIGNIFICANT CHANGE UP (ref 135–145)
SPECIMEN SOURCE: SIGNIFICANT CHANGE UP
VANCOMYCIN TROUGH SERPL-MCNC: 12.6 UG/ML — SIGNIFICANT CHANGE UP (ref 10–20)
VANCOMYCIN TROUGH SERPL-MCNC: <4 UG/ML — LOW (ref 10–20)
WBC # BLD: 4.32 K/UL — SIGNIFICANT CHANGE UP (ref 3.8–10.5)
WBC # FLD AUTO: 4.32 K/UL — SIGNIFICANT CHANGE UP (ref 3.8–10.5)

## 2020-08-10 PROCEDURE — 99233 SBSQ HOSP IP/OBS HIGH 50: CPT | Mod: CS

## 2020-08-10 PROCEDURE — 76942 ECHO GUIDE FOR BIOPSY: CPT | Mod: 26

## 2020-08-10 PROCEDURE — 10160 PNXR ASPIR ABSC HMTMA BULLA: CPT

## 2020-08-10 PROCEDURE — 99233 SBSQ HOSP IP/OBS HIGH 50: CPT | Mod: GC

## 2020-08-10 RX ORDER — MAGNESIUM SULFATE 500 MG/ML
1 VIAL (ML) INJECTION ONCE
Refills: 0 | Status: COMPLETED | OUTPATIENT
Start: 2020-08-10 | End: 2020-08-10

## 2020-08-10 RX ORDER — VANCOMYCIN HCL 1 G
1500 VIAL (EA) INTRAVENOUS EVERY 12 HOURS
Refills: 0 | Status: COMPLETED | OUTPATIENT
Start: 2020-08-10 | End: 2020-08-11

## 2020-08-10 RX ORDER — OXYCODONE HYDROCHLORIDE 5 MG/1
5 TABLET ORAL EVERY 4 HOURS
Refills: 0 | Status: DISCONTINUED | OUTPATIENT
Start: 2020-08-10 | End: 2020-08-10

## 2020-08-10 RX ORDER — VANCOMYCIN HCL 1 G
1250 VIAL (EA) INTRAVENOUS EVERY 12 HOURS
Refills: 0 | Status: DISCONTINUED | OUTPATIENT
Start: 2020-08-10 | End: 2020-08-10

## 2020-08-10 RX ADMIN — Medication 166.67 MILLIGRAM(S): at 03:56

## 2020-08-10 RX ADMIN — ETRAVIRINE 200 MILLIGRAM(S): 200 TABLET ORAL at 15:41

## 2020-08-10 RX ADMIN — Medication 125 MICROGRAM(S): at 06:39

## 2020-08-10 RX ADMIN — Medication 1 TABLET(S): at 15:41

## 2020-08-10 RX ADMIN — LOSARTAN POTASSIUM 50 MILLIGRAM(S): 100 TABLET, FILM COATED ORAL at 06:39

## 2020-08-10 RX ADMIN — Medication 8 MILLIGRAM(S): at 21:56

## 2020-08-10 RX ADMIN — Medication 166.67 MILLIGRAM(S): at 10:47

## 2020-08-10 RX ADMIN — TAMSULOSIN HYDROCHLORIDE 0.4 MILLIGRAM(S): 0.4 CAPSULE ORAL at 21:55

## 2020-08-10 RX ADMIN — PIPERACILLIN AND TAZOBACTAM 200 GRAM(S): 4; .5 INJECTION, POWDER, LYOPHILIZED, FOR SOLUTION INTRAVENOUS at 21:54

## 2020-08-10 RX ADMIN — Medication 100 GRAM(S): at 09:11

## 2020-08-10 RX ADMIN — VALACYCLOVIR 500 MILLIGRAM(S): 500 TABLET, FILM COATED ORAL at 15:41

## 2020-08-10 RX ADMIN — DARUNAVIR 600 MILLIGRAM(S): 75 TABLET, FILM COATED ORAL at 15:41

## 2020-08-10 RX ADMIN — OXYCODONE HYDROCHLORIDE 5 MILLIGRAM(S): 5 TABLET ORAL at 18:02

## 2020-08-10 RX ADMIN — RITONAVIR 100 MILLIGRAM(S): 100 TABLET, FILM COATED ORAL at 15:41

## 2020-08-10 RX ADMIN — EMTRICITABINE AND TENOFOVIR DISOPROXIL FUMARATE 1 TABLET(S): 200; 300 TABLET, FILM COATED ORAL at 15:41

## 2020-08-10 RX ADMIN — Medication 8 MILLIGRAM(S): at 15:45

## 2020-08-10 RX ADMIN — PIPERACILLIN AND TAZOBACTAM 200 GRAM(S): 4; .5 INJECTION, POWDER, LYOPHILIZED, FOR SOLUTION INTRAVENOUS at 03:03

## 2020-08-10 RX ADMIN — PIPERACILLIN AND TAZOBACTAM 200 GRAM(S): 4; .5 INJECTION, POWDER, LYOPHILIZED, FOR SOLUTION INTRAVENOUS at 15:14

## 2020-08-10 RX ADMIN — RALTEGRAVIR 400 MILLIGRAM(S): 400 TABLET, FILM COATED ORAL at 15:41

## 2020-08-10 RX ADMIN — RITONAVIR 100 MILLIGRAM(S): 100 TABLET, FILM COATED ORAL at 21:54

## 2020-08-10 RX ADMIN — RALTEGRAVIR 400 MILLIGRAM(S): 400 TABLET, FILM COATED ORAL at 21:54

## 2020-08-10 RX ADMIN — ENOXAPARIN SODIUM 40 MILLIGRAM(S): 100 INJECTION SUBCUTANEOUS at 15:14

## 2020-08-10 RX ADMIN — ETRAVIRINE 200 MILLIGRAM(S): 200 TABLET ORAL at 21:54

## 2020-08-10 RX ADMIN — ATORVASTATIN CALCIUM 20 MILLIGRAM(S): 80 TABLET, FILM COATED ORAL at 21:54

## 2020-08-10 RX ADMIN — DARUNAVIR 600 MILLIGRAM(S): 75 TABLET, FILM COATED ORAL at 21:54

## 2020-08-10 RX ADMIN — OXYCODONE HYDROCHLORIDE 5 MILLIGRAM(S): 5 TABLET ORAL at 19:15

## 2020-08-10 RX ADMIN — PIPERACILLIN AND TAZOBACTAM 200 GRAM(S): 4; .5 INJECTION, POWDER, LYOPHILIZED, FOR SOLUTION INTRAVENOUS at 09:11

## 2020-08-10 RX ADMIN — VALACYCLOVIR 500 MILLIGRAM(S): 500 TABLET, FILM COATED ORAL at 21:54

## 2020-08-10 NOTE — PHYSICAL THERAPY INITIAL EVALUATION ADULT - ACTIVE RANGE OF MOTION EXAMINATION, REHAB EVAL
no Active ROM deficits were identified but cervical rotation AROM limited to ~25% normal rotation, due to neck pain (improved since IR procedure)/no Active ROM deficits were identified

## 2020-08-10 NOTE — PROGRESS NOTE ADULT - PROBLEM SELECTOR PLAN 4
patient with corrected Ca ~11.5. Patient with no overt signs or symptoms. Evaluate for primary vs non PTH mediated hypercalcemia. may be drug induced as losartan has calcium component.  -f/u AM PTH, if normal or low normal consider other causes (PTH-rp, 1-25 or 25 vit D)    ADDENDUM: resolved on repeat BMP ADDENDUM: resolved on repeat BMP

## 2020-08-10 NOTE — CONSULT NOTE ADULT - SUBJECTIVE AND OBJECTIVE BOX
HPI:  58 yo male with hx HIV (pt states VL undetectable), throat ca (2015, s/p radiation tx), BPH, COVID (in April), HTN, HLD, basal cell carcinoma (s/p resection 02/20) who presented to the ED CC of left sided neck pain for the past 7 days. Patient states pain was sharp and stabbing he rated it 10/10. It radiates from his left ear all the way down his neck to his left clavicle. Patient states when the pain started he also noted left face swelling. Denied any trauma to the area. He noted in the beginning that moving his neck in any direction worsened the pain. He called his PCP for a telehealth appointment and he prescribed him a muscle relaxer which helped a bit initially, deepak his ROM. Endorses that flexing his head a little while laying down also helps.  Patient notes that on 8/5 he started having issues hearing from his left ear (no tinnitus, just muffled). Patient denies fever, chills, SOB, nausea, vomiting, CP, blurred vision, HA, numbness, tingling, drooling, voice changes. He reports that his pain persisted and he called his PCP again, but he was out of the office till Monday so he was referred to the ED.    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 No large vessel occlusion. CT neck aneurysmal dilatation of the ascending aorta with the diameter measuring approximately 4.5 cm. C3 level approximately 2.0 cm width x 2.1 cm with heterogeneously enhancing focus with central hypodensity suspicious for abscess with surrounding myositis.   EKG with bifasicular block (1st degree AV block and RBBB)-unchanged from prior admission  Given vanc 1250 mg (10 pm), ceftriaxone 1 g (9 pm), 30 mg IVP ketoroloc (08 Aug 2020 00:28)      PAST MEDICAL & SURGICAL HISTORY:  High cholesterol  History of BPH  HIV disease  Abscess  Throat cancer  H/O basal cell carcinoma excision        REVIEW OF SYSTEMS:    General:	 no weakness; no fevers, no chills  Skin/Breast: no rash  Respiratory and Thorax: no SOB, no cough  Cardiovascular:	No chest pain  Gastrointestinal:	 no nausea, vomiting , diarrhea  Genitourinary:	no dysuria, no difficulty urinating, no hematuria  Musculoskeletal:	no weakness, no joint swelling/pain  Neurological:	no focal weakness/numbness  Endocrine:	no polyuria, no polydipsia      ANTIBIOTICS:  MEDICATIONS  (STANDING):  atorvastatin 20 milliGRAM(s) Oral at bedtime  darunavir 600 milliGRAM(s) Oral two times a day  emtricitabine 200 mG/tenofovir alafenamide 25 mG (DESCOVY) Tablet 1 Tablet(s) Oral daily  enoxaparin Injectable 40 milliGRAM(s) SubCutaneous every 24 hours  etravirine 200 milliGRAM(s) Oral <User Schedule>  levothyroxine 125 MICROGram(s) Oral every 24 hours  loperamide Liquid 8 milliGRAM(s) Oral every 12 hours  losartan 50 milliGRAM(s) Oral daily  multivitamin 1 Tablet(s) Oral daily  piperacillin/tazobactam IVPB.. 4.5 Gram(s) IV Intermittent every 6 hours  raltegravir (12 Hour) 400 milliGRAM(s) Oral <User Schedule>  ritonavir Tablet 100 milliGRAM(s) Oral <User Schedule>  tamsulosin 0.4 milliGRAM(s) Oral at bedtime  valACYclovir 500 milliGRAM(s) Oral <User Schedule>    MEDICATIONS  (PRN):  acetaminophen   Tablet .. 650 milliGRAM(s) Oral every 6 hours PRN Mild Pain (1 - 3)  cyclobenzaprine 5 milliGRAM(s) Oral three times a day PRN Muscle Spasm      Allergies    No Known Allergies    Intolerances        SOCIAL HISTORY:    FAMILY HISTORY:  Family history of diabetes mellitus (DM)  FH: heart disease: father, father still alive age 90      Vital Signs Last 24 Hrs  T(C): 36.7 (10 Aug 2020 06:21), Max: 36.8 (09 Aug 2020 20:41)  T(F): 98.1 (10 Aug 2020 06:21), Max: 98.2 (09 Aug 2020 20:41)  HR: 57 (10 Aug 2020 06:21) (57 - 61)  BP: 127/87 (10 Aug 2020 06:21) (127/87 - 137/79)  BP(mean): --  RR: 16 (10 Aug 2020 06:21) (16 - 18)  SpO2: 97% (10 Aug 2020 06:21) (96% - 97%)    PHYSICAL EXAM:  Constitutional:Well-developed, well nourished  Eyes:BING, EOMI  Ear/Nose/Throat: no oral lesion, no sinus tenderness on percussion	  Neck:no JVD, no lymphadenopathy, supple  Respiratory: CTA joseph  Cardiovascular: S1S2 RRR, no murmurs  Gastrointestinal:soft, (+) BS, no HSM  Extremities:no e/e/c  Vascular: DP Pulse:	right normal; left normal            LABS:                        12.7   4.32  )-----------( 284      ( 10 Aug 2020 07:30 )             40.7     08-10    143  |  103  |  9   ----------------------------<  79  4.0   |  30  |  0.86    Ca    10.4      10 Aug 2020 07:30  Phos  3.9     08-10  Mg     1.9     08-10      PT/INR - ( 10 Aug 2020 07:30 )   PT: 13.9 sec;   INR: 1.17          PTT - ( 10 Aug 2020 07:30 )  PTT:32.9 sec      MICROBIOLOGY: reviewed  RADIOLOGY & ADDITIONAL STUDIES: < from: CT Angio Neck w/ IV Cont (08.07.20 @ 19:44) >  Limited evaluation of the neck demonstrates enlargement of the left cervical paraspinal musculature including the splenius capitis, semispinalis capitis and semispinalis cervicis muscles as well as the levator scapula muscle. At approximately the C3 level, there is a approximately 2.0 cm width x 2.1 cm height heterogeneously enhancing focus with central areas of hypodensity (series 2 image 67 and series 603 image 34 and series 604 image 54). This is suspicious for abscess with surrounding myositis.    IMPRESSION: Normal CTA of the neck. Left cervical paraspinal musculature soft tissue swelling with approximately 2.0 x 2.1 cm abscess at the C3 level.    < end of copied text > HPI:  58 yo male with hx HIV (pt states VL undetectable), throat ca (2015, s/p radiation tx), BPH, COVID (in April), HTN, HLD, basal cell carcinoma (s/p resection 02/20) who presented to the ED CC of left sided neck pain for the past 7 days. Patient states pain was sharp and stabbing he rated it 10/10. It radiates from his left ear all the way down his neck to his left clavicle. Patient states when the pain started he also noted left face swelling. Denied any trauma to the area. He noted in the beginning that moving his neck in any direction worsened the pain. He called his PCP for a telehealth appointment and he prescribed him a muscle relaxer which helped a bit initially, deepak his ROM. Endorses that flexing his head a little while laying down also helps.  Patient notes that on 8/5 he started having issues hearing from his left ear (no tinnitus, just muffled). Patient denies fever, chills, SOB, nausea, vomiting, CP, blurred vision, HA, numbness, tingling, drooling, voice changes. He reports that his pain persisted and he called his PCP again, but he was out of the office till Monday so he was referred to the ED.    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 No large vessel occlusion. CT neck aneurysmal dilatation of the ascending aorta with the diameter measuring approximately 4.5 cm. C3 level approximately 2.0 cm width x 2.1 cm with heterogeneously enhancing focus with central hypodensity suspicious for abscess with surrounding myositis.   EKG with bifasicular block (1st degree AV block and RBBB)-unchanged from prior admission  Given vanc 1250 mg (10 pm), ceftriaxone 1 g (9 pm), 30 mg IVP ketoroloc (08 Aug 2020 00:28)      PAST MEDICAL & SURGICAL HISTORY:  High cholesterol  History of BPH  HIV disease  Abscess  Throat cancer  H/O basal cell carcinoma excision        REVIEW OF SYSTEMS:    General:	 no weakness; no fevers, no chills  Skin/Breast: no rash  Respiratory and Thorax: no SOB, no cough  Cardiovascular:	No chest pain  Gastrointestinal:	 no nausea, vomiting , diarrhea  Genitourinary:	no dysuria, no difficulty urinating, no hematuria  Musculoskeletal:	no weakness, no joint swelling/pain  Neurological:	no focal weakness/numbness  Endocrine:	no polyuria, no polydipsia      ANTIBIOTICS:  MEDICATIONS  (STANDING):  atorvastatin 20 milliGRAM(s) Oral at bedtime  darunavir 600 milliGRAM(s) Oral two times a day  emtricitabine 200 mG/tenofovir alafenamide 25 mG (DESCOVY) Tablet 1 Tablet(s) Oral daily  enoxaparin Injectable 40 milliGRAM(s) SubCutaneous every 24 hours  etravirine 200 milliGRAM(s) Oral <User Schedule>  levothyroxine 125 MICROGram(s) Oral every 24 hours  loperamide Liquid 8 milliGRAM(s) Oral every 12 hours  losartan 50 milliGRAM(s) Oral daily  multivitamin 1 Tablet(s) Oral daily  piperacillin/tazobactam IVPB.. 4.5 Gram(s) IV Intermittent every 6 hours  raltegravir (12 Hour) 400 milliGRAM(s) Oral <User Schedule>  ritonavir Tablet 100 milliGRAM(s) Oral <User Schedule>  tamsulosin 0.4 milliGRAM(s) Oral at bedtime  valACYclovir 500 milliGRAM(s) Oral <User Schedule>    MEDICATIONS  (PRN):  acetaminophen   Tablet .. 650 milliGRAM(s) Oral every 6 hours PRN Mild Pain (1 - 3)  cyclobenzaprine 5 milliGRAM(s) Oral three times a day PRN Muscle Spasm      Allergies    No Known Allergies    Intolerances        SOCIAL HISTORY:    FAMILY HISTORY:  Family history of diabetes mellitus (DM)  FH: heart disease: father, father still alive age 90      Vital Signs Last 24 Hrs  T(C): 36.7 (10 Aug 2020 06:21), Max: 36.8 (09 Aug 2020 20:41)  T(F): 98.1 (10 Aug 2020 06:21), Max: 98.2 (09 Aug 2020 20:41)  HR: 57 (10 Aug 2020 06:21) (57 - 61)  BP: 127/87 (10 Aug 2020 06:21) (127/87 - 137/79)  BP(mean): --  RR: 16 (10 Aug 2020 06:21) (16 - 18)  SpO2: 97% (10 Aug 2020 06:21) (96% - 97%)    PHYSICAL EXAM:  Constitutional:Well-developed, well nourished  Eyes:BING, EOMI  Ear/Nose/Throat: no oral lesion, no sinus tenderness on percussion	  Neck:no JVD, no lymphadenopathy, limited ROM; hairless neck at site of prior XRT  Respiratory: CTA joseph  Cardiovascular: S1S2 RRR, no murmurs  Gastrointestinal:soft, (+) BS, no HSM  Extremities:no e/e/c  Vascular: DP Pulse:	right normal; left normal            LABS:                        12.7   4.32  )-----------( 284      ( 10 Aug 2020 07:30 )             40.7     08-10    143  |  103  |  9   ----------------------------<  79  4.0   |  30  |  0.86    Ca    10.4      10 Aug 2020 07:30  Phos  3.9     08-10  Mg     1.9     08-10      PT/INR - ( 10 Aug 2020 07:30 )   PT: 13.9 sec;   INR: 1.17          PTT - ( 10 Aug 2020 07:30 )  PTT:32.9 sec      MICROBIOLOGY: reviewed  RADIOLOGY & ADDITIONAL STUDIES: < from: CT Angio Neck w/ IV Cont (08.07.20 @ 19:44) >  Limited evaluation of the neck demonstrates enlargement of the left cervical paraspinal musculature including the splenius capitis, semispinalis capitis and semispinalis cervicis muscles as well as the levator scapula muscle. At approximately the C3 level, there is a approximately 2.0 cm width x 2.1 cm height heterogeneously enhancing focus with central areas of hypodensity (series 2 image 67 and series 603 image 34 and series 604 image 54). This is suspicious for abscess with surrounding myositis.    IMPRESSION: Normal CTA of the neck. Left cervical paraspinal musculature soft tissue swelling with approximately 2.0 x 2.1 cm abscess at the C3 level.    < end of copied text >

## 2020-08-10 NOTE — PROGRESS NOTE ADULT - SUBJECTIVE AND OBJECTIVE BOX
Subjective/ONE: pt evaluated at bedside. Denies fevers or chills. States he has had some improvement in his neck ROM, however he continues to have left sided neck pain that is similar to when he was admitted to the hospital     MEDICATIONS  (STANDING):  atorvastatin 20 milliGRAM(s) Oral at bedtime  darunavir 600 milliGRAM(s) Oral two times a day  emtricitabine 200 mG/tenofovir alafenamide 25 mG (DESCOVY) Tablet 1 Tablet(s) Oral daily  enoxaparin Injectable 40 milliGRAM(s) SubCutaneous every 24 hours  etravirine 200 milliGRAM(s) Oral <User Schedule>  levothyroxine 125 MICROGram(s) Oral every 24 hours  loperamide Liquid 8 milliGRAM(s) Oral every 12 hours  losartan 50 milliGRAM(s) Oral daily  multivitamin 1 Tablet(s) Oral daily  piperacillin/tazobactam IVPB.. 4.5 Gram(s) IV Intermittent every 6 hours  raltegravir (12 Hour) 400 milliGRAM(s) Oral <User Schedule>  ritonavir Tablet 100 milliGRAM(s) Oral <User Schedule>  tamsulosin 0.4 milliGRAM(s) Oral at bedtime  valACYclovir 500 milliGRAM(s) Oral <User Schedule>  vancomycin  IVPB 1250 milliGRAM(s) IV Intermittent every 12 hours    MEDICATIONS  (PRN):  acetaminophen   Tablet .. 650 milliGRAM(s) Oral every 6 hours PRN Mild Pain (1 - 3)  cyclobenzaprine 5 milliGRAM(s) Oral three times a day PRN Muscle Spasm    Allergies    No Known Allergies    Intolerances    None    Vital Signs Last 24 Hrs  T(C): 36.7 (10 Aug 2020 06:21), Max: 36.8 (09 Aug 2020 20:41)  T(F): 98.1 (10 Aug 2020 06:21), Max: 98.2 (09 Aug 2020 20:41)  HR: 57 (10 Aug 2020 06:21) (57 - 61)  BP: 127/87 (10 Aug 2020 06:21) (126/79 - 137/79)  BP(mean): --  RR: 16 (10 Aug 2020 06:21) (16 - 18)  SpO2: 97% (10 Aug 2020 06:21) (96% - 97%)      PHYSICAL EXAM:  GENERAL: NAD, well-groomed, well-developed  HEAD:  Atraumatic, Normocephalic  EYES: EOMI, PERRLA, conjunctiva and sclera clear  ENMT: Moist mucous membranes  NECK: Tenderness to palpation of left side of neck and shoulder.   HEART: Regular rate and rhythm; No murmurs, rubs, or gallops  RESPIRATORY: CTA B/L, No W/R/R  ABDOMEN: Soft, Nontender, Nondistended; Bowel sounds present  NEUROLOGY: A&Ox3, nonfocal, moving all extremities  EXTREMITIES:  2+ Peripheral Pulses, No clubbing, cyanosis, or edema  SKIN: warm, dry, normal color, no rash or abnormal lesions      LABS:  cret                        12.7   4.32  )-----------( 284      ( 10 Aug 2020 07:30 )             40.7     08-10    143  |  103  |  9   ----------------------------<  79  4.0   |  30  |  0.86    Ca    10.4      10 Aug 2020 07:30  Mg     1.9     08-10      PT/INR - ( 10 Aug 2020 07:30 )   PT: 13.9 sec;   INR: 1.17          PTT - ( 10 Aug 2020 07:30 )  PTT:32.9 sec Subjective/ONE: pt evaluated at bedside. Denies fevers or chills. States he has had some improvement in his neck ROM, however he continues to have left sided neck pain that is similar to when he was first admitted to the hospital     MEDICATIONS  (STANDING):  atorvastatin 20 milliGRAM(s) Oral at bedtime  darunavir 600 milliGRAM(s) Oral two times a day  emtricitabine 200 mG/tenofovir alafenamide 25 mG (DESCOVY) Tablet 1 Tablet(s) Oral daily  enoxaparin Injectable 40 milliGRAM(s) SubCutaneous every 24 hours  etravirine 200 milliGRAM(s) Oral <User Schedule>  levothyroxine 125 MICROGram(s) Oral every 24 hours  loperamide Liquid 8 milliGRAM(s) Oral every 12 hours  losartan 50 milliGRAM(s) Oral daily  multivitamin 1 Tablet(s) Oral daily  piperacillin/tazobactam IVPB.. 4.5 Gram(s) IV Intermittent every 6 hours  raltegravir (12 Hour) 400 milliGRAM(s) Oral <User Schedule>  ritonavir Tablet 100 milliGRAM(s) Oral <User Schedule>  tamsulosin 0.4 milliGRAM(s) Oral at bedtime  valACYclovir 500 milliGRAM(s) Oral <User Schedule>  vancomycin  IVPB 1250 milliGRAM(s) IV Intermittent every 12 hours    MEDICATIONS  (PRN):  acetaminophen   Tablet .. 650 milliGRAM(s) Oral every 6 hours PRN Mild Pain (1 - 3)  cyclobenzaprine 5 milliGRAM(s) Oral three times a day PRN Muscle Spasm    Allergies    No Known Allergies    Intolerances    None    Vital Signs Last 24 Hrs  T(C): 36.7 (10 Aug 2020 06:21), Max: 36.8 (09 Aug 2020 20:41)  T(F): 98.1 (10 Aug 2020 06:21), Max: 98.2 (09 Aug 2020 20:41)  HR: 57 (10 Aug 2020 06:21) (57 - 61)  BP: 127/87 (10 Aug 2020 06:21) (126/79 - 137/79)  BP(mean): --  RR: 16 (10 Aug 2020 06:21) (16 - 18)  SpO2: 97% (10 Aug 2020 06:21) (96% - 97%)      PHYSICAL EXAM:  GENERAL: NAD, well-groomed, well-developed  HEAD:  Atraumatic, Normocephalic  EYES: EOMI, PERRLA, conjunctiva and sclera clear  ENMT: Moist mucous membranes  NECK: Tenderness to palpation of left side of neck and shoulder.   HEART: Regular rate and rhythm; No murmurs, rubs, or gallops  RESPIRATORY: CTA B/L, No W/R/R  ABDOMEN: Soft, Nontender, Nondistended; Bowel sounds present  NEUROLOGY: A&Ox3, nonfocal, moving all extremities  EXTREMITIES:  2+ Peripheral Pulses, No clubbing, cyanosis, or edema  SKIN: warm, dry, normal color, no rash or abnormal lesions      LABS:  cret                        12.7   4.32  )-----------( 284      ( 10 Aug 2020 07:30 )             40.7     08-10    143  |  103  |  9   ----------------------------<  79  4.0   |  30  |  0.86    Ca    10.4      10 Aug 2020 07:30  Mg     1.9     08-10      PT/INR - ( 10 Aug 2020 07:30 )   PT: 13.9 sec;   INR: 1.17          PTT - ( 10 Aug 2020 07:30 )  PTT:32.9 sec

## 2020-08-10 NOTE — PROGRESS NOTE ADULT - PROBLEM SELECTOR PLAN 6
Patient with elevated HC03 to 32. on previous admission patient HC03 was around 25. Unclear as to cause as patient with no obvious etiologies that would cause this. no diuretic use, no vomiting, no laxative abuse, no overt mineralocorticoid excess. Patient with no component of respiratory acidosis that would require metabolic compensation.  -trend BMP    ADDENDUM: resolved ADDENDUM: resolved

## 2020-08-10 NOTE — PHYSICAL THERAPY INITIAL EVALUATION ADULT - PERTINENT HX OF CURRENT PROBLEM, REHAB EVAL
58 yo male with hx HIV (VL undetectable 3/20, compliant with medications), throat ca (2010, s/p radiation tx), BPH, COVID (in April), HTN, HLD, basal cell carcinoma (s/p resection 02/20) who presented to the ED CC of left sided neck pain for the past 7 days. CT neck showing 2x 2.1 cm abscess near C3 region on the left. ENT consulted but abscess not amenable to bedside drainage. Admitted to medicine for IV Abx (Vanc and zosyn) and potential IR guided drainage if unresponsive to therapy.

## 2020-08-10 NOTE — PROGRESS NOTE ADULT - PROBLEM SELECTOR PLAN 1
Patient presented with one week of left sided neck pain, which was worsened with any head movement. Accompanied by left facial swelling and muffled hearing in his left ear. On presentation, patient with no SIRs criteria and or leukocytosis. CT neck showed at C3 level approximately 2.0 cm width x 2.1 cm  heterogeneously enhancing focus with central hypodensity suspicious for abscess with surrounding myositis. ENT consulted in ED and attempted bedside drainage, but unable to access the pocket due to size and location. Given 1250 mg Vancomycin and 1 g ceftriaxone.  -given patient hx of HIV and location of abscess will cover with broad spectrum at this time with vancomycin 1250 mg Q12 and zosyn 4.5 mg Q6.   -cw vancomycin 1250 mg Q12 and zosyn 4.5 mg Q6.   -IR guided drainage of abscess (8/10)  -f/u blood cx, and deescalate Abx accordingly  -appreciate ENT recs Patient presented with one week of left sided neck pain, which was worsened with any head movement. Accompanied by left facial swelling and muffled hearing in his left ear. On presentation, patient with no SIRs criteria and or leukocytosis. CT neck showed at C3 level approximately 2.0 cm width x 2.1 cm  heterogeneously enhancing focus with central hypodensity suspicious for abscess with surrounding myositis. ENT consulted in ED and attempted bedside drainage, but unable to access the pocket due to size and location. Given 1250 mg Vancomycin and 1 g ceftriaxone.   -cw vancomycin 1250 mg Q12 and zosyn 4.5 mg Q6.   -IR guided drainage of abscess today  -f/u blood cx negative to date.   -Consult ID  -appreciate ENT recs

## 2020-08-10 NOTE — PHYSICAL THERAPY INITIAL EVALUATION ADULT - ADDITIONAL COMMENTS
Pt lives in 4th floor walk up apt. Pt has family/friends assist throughout day as needed. Pt has not been leaving his apt much since COVID diagnosis and recovery.

## 2020-08-10 NOTE — PROGRESS NOTE ADULT - PROBLEM SELECTOR PLAN 2
Patient states he is very compliant with his medications and his last viral load was undetectable, in March. Follows with Dr. Justin Mckinney ([phone])  -f/u HIV RNA and T cell subset  -c/w isentress  -c/w prezista  -c/w ritonavir 100 mg  -c/w descovy 1 tablet daily  -intelence 200 mg BID   -c/w valtrex 1 G (HSV prophylaxis)  -of note patient takes loperamide 8 mg BID as he gets GI side effects from HIV medications Patient states he is very compliant with his medications and his last viral load was undetectable, in March. Follows with Dr. Justin Mckinney ([phone])  -f/u HIV RNA and T cell subset  -c/w isentress  -c/w prezista  -c/w ritonavir 100 mg  -c/w descovy 1 tablet daily  -intelence 200 mg BID   -c/w valtrex 1 G (HSV prophylaxis)  -of note patient takes loperamide 8 mg BID as he gets GI side effects from HIV medications. Okay to continue for now.

## 2020-08-10 NOTE — CONSULT NOTE ADULT - ASSESSMENT
58 yo male with hx COVID-19, well-controlled HIV, hx L tonsillar SCC s/p XRT 2015, BCC resection 2/2020, p/w one week of L neck pain, found to have L cervical paraspinous myositis and associated 2cm abscess, s/p IR drainage 8/10.  - f/u GS and cx of L neck abscess 8/10  - continue Zosyn 4.5g IV q6h + vancomycin 1.25g IV q12h (check trough prior to next dose--due around 10pm)  - continue home ARV    ID Team 2

## 2020-08-10 NOTE — PHYSICAL THERAPY INITIAL EVALUATION ADULT - STANDING BALANCE: STATIC
Patient was seen today for a hearing aide fitting and was told that she will need the wax removed by her Provider's nurse before they will proceed with fitting her for her hearing aides. Please advise as to when she may be seen. good balance

## 2020-08-10 NOTE — PROGRESS NOTE ADULT - PROBLEM SELECTOR PLAN 3
CT neck aneurysmal dilatation of the ascending aorta with the diameter measuring approximately 4.5 cm.  Patient notes that his father also had aneursym. Given family hx would recommend close outpatient followup  -include in discharge summary CT neck aneurysmal dilatation of the ascending aorta with the diameter measuring approximately 4.5 cm.  Patient notes that his father also had aneurysm. Given family hx would recommend close outpatient follow/imaging.   -include in discharge summary recommend f/u CT in 6 months-year.

## 2020-08-10 NOTE — PROGRESS NOTE ADULT - PROBLEM SELECTOR PLAN 9
no fluids  replete lytes as needed  lovenox 40 mg  no GI prophylaxis  peripheral IV  FULL CODE  Dispo [facility]
no fluids  replete lytes as needed  lovenox 40 mg  no GI prophylaxis  peripheral IV  FULL CODE  Dispo [facility]

## 2020-08-11 LAB
ANION GAP SERPL CALC-SCNC: 9 MMOL/L — SIGNIFICANT CHANGE UP (ref 5–17)
BUN SERPL-MCNC: 13 MG/DL — SIGNIFICANT CHANGE UP (ref 7–23)
CALCIUM SERPL-MCNC: 10 MG/DL — SIGNIFICANT CHANGE UP (ref 8.4–10.5)
CHLORIDE SERPL-SCNC: 102 MMOL/L — SIGNIFICANT CHANGE UP (ref 96–108)
CO2 SERPL-SCNC: 29 MMOL/L — SIGNIFICANT CHANGE UP (ref 22–31)
CREAT SERPL-MCNC: 0.99 MG/DL — SIGNIFICANT CHANGE UP (ref 0.5–1.3)
CRP SERPL-MCNC: 5.15 MG/DL — HIGH (ref 0–0.4)
GLUCOSE SERPL-MCNC: 99 MG/DL — SIGNIFICANT CHANGE UP (ref 70–99)
HCT VFR BLD CALC: 39.1 % — SIGNIFICANT CHANGE UP (ref 39–50)
HGB BLD-MCNC: 12.5 G/DL — LOW (ref 13–17)
HIV-1 VIRAL LOAD RESULT: ABNORMAL
HIV1 RNA # SERPL NAA+PROBE: SIGNIFICANT CHANGE UP
HIV1 RNA SER-IMP: SIGNIFICANT CHANGE UP
HIV1 RNA SERPL NAA+PROBE-ACNC: ABNORMAL
HIV1 RNA SERPL NAA+PROBE-LOG#: ABNORMAL LG COP/ML
MAGNESIUM SERPL-MCNC: 2 MG/DL — SIGNIFICANT CHANGE UP (ref 1.6–2.6)
MCHC RBC-ENTMCNC: 28.3 PG — SIGNIFICANT CHANGE UP (ref 27–34)
MCHC RBC-ENTMCNC: 32 GM/DL — SIGNIFICANT CHANGE UP (ref 32–36)
MCV RBC AUTO: 88.7 FL — SIGNIFICANT CHANGE UP (ref 80–100)
NIGHT BLUE STAIN TISS: SIGNIFICANT CHANGE UP
NRBC # BLD: 0 /100 WBCS — SIGNIFICANT CHANGE UP (ref 0–0)
PHOSPHATE SERPL-MCNC: 4.1 MG/DL — SIGNIFICANT CHANGE UP (ref 2.5–4.5)
PLATELET # BLD AUTO: 269 K/UL — SIGNIFICANT CHANGE UP (ref 150–400)
POTASSIUM SERPL-MCNC: 4.2 MMOL/L — SIGNIFICANT CHANGE UP (ref 3.5–5.3)
POTASSIUM SERPL-SCNC: 4.2 MMOL/L — SIGNIFICANT CHANGE UP (ref 3.5–5.3)
RBC # BLD: 4.41 M/UL — SIGNIFICANT CHANGE UP (ref 4.2–5.8)
RBC # FLD: 13.1 % — SIGNIFICANT CHANGE UP (ref 10.3–14.5)
SODIUM SERPL-SCNC: 140 MMOL/L — SIGNIFICANT CHANGE UP (ref 135–145)
SPECIMEN SOURCE: SIGNIFICANT CHANGE UP
WBC # BLD: 6.04 K/UL — SIGNIFICANT CHANGE UP (ref 3.8–10.5)
WBC # FLD AUTO: 6.04 K/UL — SIGNIFICANT CHANGE UP (ref 3.8–10.5)

## 2020-08-11 PROCEDURE — 99232 SBSQ HOSP IP/OBS MODERATE 35: CPT | Mod: CS

## 2020-08-11 PROCEDURE — 99233 SBSQ HOSP IP/OBS HIGH 50: CPT | Mod: GC

## 2020-08-11 RX ORDER — IBUPROFEN 200 MG
400 TABLET ORAL EVERY 6 HOURS
Refills: 0 | Status: DISCONTINUED | OUTPATIENT
Start: 2020-08-11 | End: 2020-08-12

## 2020-08-11 RX ADMIN — Medication 8 MILLIGRAM(S): at 11:21

## 2020-08-11 RX ADMIN — PIPERACILLIN AND TAZOBACTAM 200 GRAM(S): 4; .5 INJECTION, POWDER, LYOPHILIZED, FOR SOLUTION INTRAVENOUS at 05:02

## 2020-08-11 RX ADMIN — DARUNAVIR 600 MILLIGRAM(S): 75 TABLET, FILM COATED ORAL at 18:53

## 2020-08-11 RX ADMIN — Medication 300 MILLIGRAM(S): at 00:01

## 2020-08-11 RX ADMIN — Medication 1 TABLET(S): at 13:41

## 2020-08-11 RX ADMIN — ETRAVIRINE 200 MILLIGRAM(S): 200 TABLET ORAL at 18:53

## 2020-08-11 RX ADMIN — EMTRICITABINE AND TENOFOVIR DISOPROXIL FUMARATE 1 TABLET(S): 200; 300 TABLET, FILM COATED ORAL at 13:39

## 2020-08-11 RX ADMIN — ATORVASTATIN CALCIUM 20 MILLIGRAM(S): 80 TABLET, FILM COATED ORAL at 21:45

## 2020-08-11 RX ADMIN — VALACYCLOVIR 500 MILLIGRAM(S): 500 TABLET, FILM COATED ORAL at 18:54

## 2020-08-11 RX ADMIN — LOSARTAN POTASSIUM 50 MILLIGRAM(S): 100 TABLET, FILM COATED ORAL at 05:29

## 2020-08-11 RX ADMIN — ENOXAPARIN SODIUM 40 MILLIGRAM(S): 100 INJECTION SUBCUTANEOUS at 13:39

## 2020-08-11 RX ADMIN — Medication 300 MILLIGRAM(S): at 23:22

## 2020-08-11 RX ADMIN — DARUNAVIR 600 MILLIGRAM(S): 75 TABLET, FILM COATED ORAL at 05:29

## 2020-08-11 RX ADMIN — TAMSULOSIN HYDROCHLORIDE 0.4 MILLIGRAM(S): 0.4 CAPSULE ORAL at 21:45

## 2020-08-11 RX ADMIN — Medication 8 MILLIGRAM(S): at 23:22

## 2020-08-11 RX ADMIN — RALTEGRAVIR 400 MILLIGRAM(S): 400 TABLET, FILM COATED ORAL at 13:41

## 2020-08-11 RX ADMIN — RITONAVIR 100 MILLIGRAM(S): 100 TABLET, FILM COATED ORAL at 13:42

## 2020-08-11 RX ADMIN — ETRAVIRINE 200 MILLIGRAM(S): 200 TABLET ORAL at 13:51

## 2020-08-11 RX ADMIN — RALTEGRAVIR 400 MILLIGRAM(S): 400 TABLET, FILM COATED ORAL at 18:54

## 2020-08-11 RX ADMIN — Medication 300 MILLIGRAM(S): at 10:49

## 2020-08-11 RX ADMIN — VALACYCLOVIR 500 MILLIGRAM(S): 500 TABLET, FILM COATED ORAL at 13:42

## 2020-08-11 RX ADMIN — Medication 125 MICROGRAM(S): at 05:29

## 2020-08-11 RX ADMIN — RITONAVIR 100 MILLIGRAM(S): 100 TABLET, FILM COATED ORAL at 18:54

## 2020-08-11 RX ADMIN — PIPERACILLIN AND TAZOBACTAM 200 GRAM(S): 4; .5 INJECTION, POWDER, LYOPHILIZED, FOR SOLUTION INTRAVENOUS at 17:59

## 2020-08-11 RX ADMIN — PIPERACILLIN AND TAZOBACTAM 200 GRAM(S): 4; .5 INJECTION, POWDER, LYOPHILIZED, FOR SOLUTION INTRAVENOUS at 12:08

## 2020-08-11 NOTE — DIETITIAN INITIAL EVALUATION ADULT. - PERTINENT MEDS FT
Oxycodone,Vanco,Flexeril,Descovy,Intelence,Isentress,Zosyn,Lipitor,,Norvir,Valtrex,Synthroid,Immodium,MVI,Flomax.Prezista

## 2020-08-11 NOTE — PROGRESS NOTE ADULT - SUBJECTIVE AND OBJECTIVE BOX
INTERVAL HPI/OVERNIGHT EVENTS: Feels much improved today. Improved neck ROM.     CONSTITUTIONAL:  Negative fever or chills, feels well, good appetite  EYES:  Negative  blurry vision or double vision  CARDIOVASCULAR:  Negative for chest pain or palpitations  RESPIRATORY:  Negative for cough, wheezing, or SOB   GASTROINTESTINAL:  Negative for nausea, vomiting, diarrhea, constipation, or abdominal pain  GENITOURINARY:  Negative frequency, urgency or dysuria  NEUROLOGIC:  No headache, confusion, dizziness, lightheadedness      ANTIBIOTICS/RELEVANT:    MEDICATIONS  (STANDING):  atorvastatin 20 milliGRAM(s) Oral at bedtime  darunavir 600 milliGRAM(s) Oral two times a day  emtricitabine 200 mG/tenofovir alafenamide 25 mG (DESCOVY) Tablet 1 Tablet(s) Oral daily  enoxaparin Injectable 40 milliGRAM(s) SubCutaneous every 24 hours  etravirine 200 milliGRAM(s) Oral <User Schedule>  levothyroxine 125 MICROGram(s) Oral every 24 hours  loperamide Liquid 8 milliGRAM(s) Oral every 12 hours  losartan 50 milliGRAM(s) Oral daily  multivitamin 1 Tablet(s) Oral daily  piperacillin/tazobactam IVPB.. 4.5 Gram(s) IV Intermittent every 6 hours  raltegravir (12 Hour) 400 milliGRAM(s) Oral <User Schedule>  ritonavir Tablet 100 milliGRAM(s) Oral <User Schedule>  tamsulosin 0.4 milliGRAM(s) Oral at bedtime  valACYclovir 500 milliGRAM(s) Oral <User Schedule>  vancomycin  IVPB 1500 milliGRAM(s) IV Intermittent every 12 hours    MEDICATIONS  (PRN):  acetaminophen   Tablet .. 650 milliGRAM(s) Oral every 6 hours PRN Mild Pain (1 - 3)  cyclobenzaprine 5 milliGRAM(s) Oral three times a day PRN Muscle Spasm  ibuprofen  Tablet. 400 milliGRAM(s) Oral every 6 hours PRN Severe Pain (7 - 10)  oxyCODONE    IR 5 milliGRAM(s) Oral every 4 hours PRN Severe Pain (7 - 10)        Vital Signs Last 24 Hrs  T(C): 36.8 (11 Aug 2020 05:28), Max: 36.9 (10 Aug 2020 20:22)  T(F): 98.3 (11 Aug 2020 05:28), Max: 98.5 (10 Aug 2020 20:22)  HR: 68 (11 Aug 2020 05:28) (57 - 81)  BP: 130/82 (11 Aug 2020 05:28) (130/82 - 147/87)  BP(mean): --  RR: 18 (11 Aug 2020 05:28) (17 - 18)  SpO2: 96% (11 Aug 2020 05:28) (95% - 98%)    PHYSICAL EXAM:  Constitutional:Well-developed, well nourished  Eyes:BING, EOMI  Ear/Nose/Throat: no oral lesion, no sinus tenderness on percussion	  Neck:no JVD, no lymphadenopathy, supple  Respiratory: CTA joseph  Cardiovascular: S1S2 RRR, no murmurs  Gastrointestinal:soft, (+) BS, no HSM  Extremities:no e/e/c  Vascular: DP Pulse:	right normal; left normal      LABS:                        12.5   6.04  )-----------( 269      ( 11 Aug 2020 06:48 )             39.1     08-11    140  |  102  |  13  ----------------------------<  99  4.2   |  29  |  0.99    Ca    10.0      11 Aug 2020 06:48  Phos  4.1     08-11  Mg     2.0     08-11      PT/INR - ( 10 Aug 2020 07:30 )   PT: 13.9 sec;   INR: 1.17          PTT - ( 10 Aug 2020 07:30 )  PTT:32.9 sec      MICROBIOLOGY: Culture - Abscess with Gram Stain (08.10.20 @ 18:43)    Gram Stain:   No organisms seen  Rare WBC's    Specimen Source: .Abscess left neck abcess    Culture Results:   No growth to date        RADIOLOGY & ADDITIONAL STUDIES: reviewed

## 2020-08-11 NOTE — PROGRESS NOTE ADULT - PROBLEM SELECTOR PLAN 7
-atorvastatin 20 (takes rosuvastatin 20 at home)
no fluids  replete lytes as needed  lovenox 40 mg  no GI prophylaxis  peripheral IV  FULL CODE  Dispo [facility]
-atorvastatin 20 (takes rosuvastatin 20 at home)

## 2020-08-11 NOTE — DIETITIAN INITIAL EVALUATION ADULT. - PHYSICAL APPEARANCE
debilitated/Per physical assessment: Muscle Wasting- Temporal [  x ]  Clavicle/Pectoral [   ]  Shoulder/Deltoid [ x  ]  Scapula [   ]  Interosseous [   ]  Quadriceps [ x  ]  Gastrocnemius [   ]; Fat Wasting- Orbital [   ]  Buccal [   ]  Triceps [ x  ]  Rib [  x ]--> Suspect moderate [PCM] 2/2 to physical assessment, [poor intake], and [wt loss]; please see malnutrition chart note.

## 2020-08-11 NOTE — PROGRESS NOTE ADULT - PROBLEM SELECTOR PLAN 1
Patient presented with one week of left sided neck pain, which was worsened with any head movement. Accompanied by left facial swelling and muffled hearing in his left ear. On presentation, patient with no SIRs criteria and or leukocytosis. CT neck showed at C3 level approximately 2.0 cm width x 2.1 cm abscess with surrounding myositis. ENT consulted in ED and attempted bedside drainage, but unable to access the pocket due to size and location. IR drainage on 8/10. Negative gram stain. Pain improving. C-rp downtrending. Possibly long term antibiotics will be needed. ? PICC.   -cw vancomycin 1500 mg Q12 and zosyn 4.5 mg Q6.   -blood cx negative to date.   -f/u culture of aspirate.   -ID following appreciate recs

## 2020-08-11 NOTE — PROGRESS NOTE ADULT - PROBLEM SELECTOR PLAN 3
CT neck aneurysmal dilatation of the ascending aorta with the diameter measuring approximately 4.5 cm.  Patient notes that his father also had aneurysm. Given family hx would recommend close outpatient follow/imaging.   -include in discharge summary recommend f/u CT in 6 months-year.

## 2020-08-11 NOTE — PROGRESS NOTE ADULT - SUBJECTIVE AND OBJECTIVE BOX
Subjective/ONE: Pt evaluated at bedside. Pt states he is feeling significantly better since his IR drainage procedure rating his pain at a 4/10. Denies any fevers or chills. Tolerating PO intake without difficulty.      MEDICATIONS  (STANDING):  atorvastatin 20 milliGRAM(s) Oral at bedtime  darunavir 600 milliGRAM(s) Oral two times a day  emtricitabine 200 mG/tenofovir alafenamide 25 mG (DESCOVY) Tablet 1 Tablet(s) Oral daily  enoxaparin Injectable 40 milliGRAM(s) SubCutaneous every 24 hours  etravirine 200 milliGRAM(s) Oral <User Schedule>  levothyroxine 125 MICROGram(s) Oral every 24 hours  loperamide Liquid 8 milliGRAM(s) Oral every 12 hours  losartan 50 milliGRAM(s) Oral daily  multivitamin 1 Tablet(s) Oral daily  piperacillin/tazobactam IVPB.. 4.5 Gram(s) IV Intermittent every 6 hours  raltegravir (12 Hour) 400 milliGRAM(s) Oral <User Schedule>  ritonavir Tablet 100 milliGRAM(s) Oral <User Schedule>  tamsulosin 0.4 milliGRAM(s) Oral at bedtime  valACYclovir 500 milliGRAM(s) Oral <User Schedule>  vancomycin  IVPB 1500 milliGRAM(s) IV Intermittent every 12 hours    MEDICATIONS  (PRN):  acetaminophen   Tablet .. 650 milliGRAM(s) Oral every 6 hours PRN Mild Pain (1 - 3)  cyclobenzaprine 5 milliGRAM(s) Oral three times a day PRN Muscle Spasm  oxyCODONE    IR 5 milliGRAM(s) Oral every 4 hours PRN Severe Pain (7 - 10)    Allergies    No Known Allergies    Intolerances    Vital Signs Last 24 Hrs  T(C): 36.8 (11 Aug 2020 05:28), Max: 36.9 (10 Aug 2020 20:22)  T(F): 98.3 (11 Aug 2020 05:28), Max: 98.5 (10 Aug 2020 20:22)  HR: 68 (11 Aug 2020 05:28) (57 - 81)  BP: 130/82 (11 Aug 2020 05:28) (130/82 - 147/87)  BP(mean): --  RR: 18 (11 Aug 2020 05:28) (17 - 18)  SpO2: 96% (11 Aug 2020 05:28) (95% - 98%)      PHYSICAL EXAM:  GENERAL: NAD, well-groomed, well-developed  HEAD:  Atraumatic, Normocephalic  EYES: EOMI, PERRLA, conjunctiva and sclera clear  ENMT: No tonsillar erythema, exudates, or enlargement; Moist mucous membranes  NECK: Supple, No JVD, Normal thyroid  HEART: Regular rate and rhythm; No murmurs, rubs, or gallops  RESPIRATORY: CTA B/L, No W/R/R  ABDOMEN: Soft, Nontender, Nondistended; Bowel sounds present  NEUROLOGY: A&Ox3, nonfocal, moving all extremities  EXTREMITIES:  2+ Peripheral Pulses, No clubbing, cyanosis, or edema  SKIN: warm, dry, normal color, no rash or abnormal lesions        LABS:  cret                        12.5   6.04  )-----------( 269      ( 11 Aug 2020 06:48 )             39.1     08-11    140  |  102  |  13  ----------------------------<  99  4.2   |  29  |  0.99    Ca    10.0      11 Aug 2020 06:48  Phos  4.1     08-11  Mg     2.0     08-11      PT/INR - ( 10 Aug 2020 07:30 )   PT: 13.9 sec;   INR: 1.17          PTT - ( 10 Aug 2020 07:30 )  PTT:32.9 sec        C-Reactive Protein, Serum (08.11.20 @ 06:48)    C-Reactive Protein, Serum: 5.15 mg/dL Subjective/ONE: Pt evaluated at bedside. Pt states he is feeling significantly better since his IR drainage procedure rating his pain at a 4/10. Denies any fevers or chills. Tolerating PO intake without difficulty.      MEDICATIONS  (STANDING):  atorvastatin 20 milliGRAM(s) Oral at bedtime  darunavir 600 milliGRAM(s) Oral two times a day  emtricitabine 200 mG/tenofovir alafenamide 25 mG (DESCOVY) Tablet 1 Tablet(s) Oral daily  enoxaparin Injectable 40 milliGRAM(s) SubCutaneous every 24 hours  etravirine 200 milliGRAM(s) Oral <User Schedule>  levothyroxine 125 MICROGram(s) Oral every 24 hours  loperamide Liquid 8 milliGRAM(s) Oral every 12 hours  losartan 50 milliGRAM(s) Oral daily  multivitamin 1 Tablet(s) Oral daily  piperacillin/tazobactam IVPB.. 4.5 Gram(s) IV Intermittent every 6 hours  raltegravir (12 Hour) 400 milliGRAM(s) Oral <User Schedule>  ritonavir Tablet 100 milliGRAM(s) Oral <User Schedule>  tamsulosin 0.4 milliGRAM(s) Oral at bedtime  valACYclovir 500 milliGRAM(s) Oral <User Schedule>  vancomycin  IVPB 1500 milliGRAM(s) IV Intermittent every 12 hours    MEDICATIONS  (PRN):  acetaminophen   Tablet .. 650 milliGRAM(s) Oral every 6 hours PRN Mild Pain (1 - 3)  cyclobenzaprine 5 milliGRAM(s) Oral three times a day PRN Muscle Spasm  oxyCODONE    IR 5 milliGRAM(s) Oral every 4 hours PRN Severe Pain (7 - 10)    Allergies    No Known Allergies    Intolerances    Vital Signs Last 24 Hrs  T(C): 36.8 (11 Aug 2020 05:28), Max: 36.9 (10 Aug 2020 20:22)  T(F): 98.3 (11 Aug 2020 05:28), Max: 98.5 (10 Aug 2020 20:22)  HR: 68 (11 Aug 2020 05:28) (57 - 81)  BP: 130/82 (11 Aug 2020 05:28) (130/82 - 147/87)  BP(mean): --  RR: 18 (11 Aug 2020 05:28) (17 - 18)  SpO2: 96% (11 Aug 2020 05:28) (95% - 98%)    PHYSICAL EXAM:  GENERAL: NAD, well-groomed, well-developed  HEAD:  Atraumatic, Normocephalic  EYES: EOMI, PERRLA, conjunctiva and sclera clear  ENMT: Moist mucous membranes  NECK: Tenderness to palpation of left side of neck and shoulder.   HEART: Regular rate and rhythm; No murmurs, rubs, or gallops  RESPIRATORY: CTA B/L, No W/R/R  ABDOMEN: Soft, Nontender, Nondistended; Bowel sounds present  NEUROLOGY: A&Ox3, nonfocal, moving all extremities  EXTREMITIES:  2+ Peripheral Pulses, No clubbing, cyanosis, or edema  SKIN: warm, dry, normal color, no rash or abnormal lesions. No erythema or drainage from IR drainage site on  left side of neck. Mild tenderness to palpation.      LABS:  cret                        12.5   6.04  )-----------( 269      ( 11 Aug 2020 06:48 )             39.1     08-11    140  |  102  |  13  ----------------------------<  99  4.2   |  29  |  0.99    Ca    10.0      11 Aug 2020 06:48  Phos  4.1     08-11  Mg     2.0     08-11      PT/INR - ( 10 Aug 2020 07:30 )   PT: 13.9 sec;   INR: 1.17          PTT - ( 10 Aug 2020 07:30 )  PTT:32.9 sec        C-Reactive Protein, Serum (08.11.20 @ 06:48)    C-Reactive Protein, Serum: 5.15 mg/dL

## 2020-08-11 NOTE — DIETITIAN INITIAL EVALUATION ADULT. - OTHER INFO
58y/o HIV+ male with history of throat CA(2015),BPH,COVID+(April),HTN,Basal Cell CA s/p resection,Weight loss admitted for neck abscess s/p drainage. Patient sitting up in bed during visit with no complaints. Patient denied N./V/D/C or pain. Skin with surgical incision /no PU .Eating 100% of trays. Patient reported he usually eats all types of foods and Ensure Enlive.Was getting food delivery from [place] [id number] which he stated provided meals every other day. He resides alone, but has a lot of friends who help him.He stated that he had been in the hospital for 6weeks with COVID and lost a lot of weight.Reportedly had gone kvng n to "125lbs"Patient reported UBW:175lbs.Present weight is 150lbs.Patient with noted lipoatrophy of legs and arms and some noted temporal wasting.Presently 90% of IBW.Suspected moderate malnutrition due to 25lb weight loss over 5 months and NFPE of wasting and loss of fat in arms and legs.RD encouraged continued diet and to include Ensure Enlive TID(1050kcal and 60gmprotein) in between meals.

## 2020-08-11 NOTE — CHART NOTE - NSCHARTNOTEFT_GEN_A_CORE
Upon Nutritional Assessment by the Registered Dietitian your patient was determined to meet criteria / has evidence of the following diagnosis/diagnoses:          [ ]  Mild Protein Calorie Malnutrition        [x ]  Moderate Protein Calorie Malnutrition        [ ] Severe Protein Calorie Malnutrition        [ ] Unspecified Protein Calorie Malnutrition        [ ] Underweight / BMI <19        [ ] Morbid Obesity / BMI > 40      Findings as based on:  •  Comprehensive nutrition assessment and consultation  •  Calorie counts (nutrient intake analysis)  •  Food acceptance and intake status from observations by staff  •  Follow up  •  Patient education  •  Intervention secondary to interdisciplinary rounds  •   concerns      Treatment:    The following diet has been recommended:Please add 3 Ensure Enlive      PROVIDER Section:     By signing this assessment you are acknowledging and agree with the diagnosis/diagnoses assigned by the Registered Dietitian    Comments:

## 2020-08-11 NOTE — PROGRESS NOTE ADULT - PROBLEM SELECTOR PLAN 2
Patient states he is very compliant with his medications and his last viral load was undetectable, in March. Follows with Dr. Justin Mckinney ([phone])  -f/u HIV RNA and T cell subset  -c/w isentress  -c/w prezista  -c/w ritonavir 100 mg  -c/w descovy 1 tablet daily  -intelence 200 mg BID   -c/w valtrex 1 G (HSV prophylaxis)  -of note patient takes loperamide 8 mg BID as he gets GI side effects from HIV medications. Okay to continue for now.

## 2020-08-11 NOTE — DIETITIAN INITIAL EVALUATION ADULT. - PROBLEM SELECTOR PLAN 2
Patient states he is very compliant with his medications and his last viral load was undetectable, in March. Follows with Dr. Justin Mckinney ([phone])  -f/u HIV RNA and T cell subset  -c/w isentress  -c/w prezista  -c/w ritonavir 100 mg  -c/w descovy 1 tablet daily  -intelence 200 mg BID   -c/w valtrex 1 G (HSV prophylaxis)  -of note patient takes loperamide 8 mg BID as he gets GI side effects from HIV medications

## 2020-08-12 ENCOUNTER — TRANSCRIPTION ENCOUNTER (OUTPATIENT)
Age: 60
End: 2020-08-12

## 2020-08-12 VITALS
DIASTOLIC BLOOD PRESSURE: 76 MMHG | HEART RATE: 70 BPM | SYSTOLIC BLOOD PRESSURE: 118 MMHG | OXYGEN SATURATION: 98 % | RESPIRATION RATE: 17 BRPM | TEMPERATURE: 98 F

## 2020-08-12 LAB
ANION GAP SERPL CALC-SCNC: 11 MMOL/L — SIGNIFICANT CHANGE UP (ref 5–17)
BUN SERPL-MCNC: 10 MG/DL — SIGNIFICANT CHANGE UP (ref 7–23)
CALCIUM SERPL-MCNC: 10.4 MG/DL — SIGNIFICANT CHANGE UP (ref 8.4–10.5)
CHLORIDE SERPL-SCNC: 101 MMOL/L — SIGNIFICANT CHANGE UP (ref 96–108)
CO2 SERPL-SCNC: 27 MMOL/L — SIGNIFICANT CHANGE UP (ref 22–31)
CREAT SERPL-MCNC: 0.78 MG/DL — SIGNIFICANT CHANGE UP (ref 0.5–1.3)
GLUCOSE SERPL-MCNC: 95 MG/DL — SIGNIFICANT CHANGE UP (ref 70–99)
HCT VFR BLD CALC: 41.7 % — SIGNIFICANT CHANGE UP (ref 39–50)
HGB BLD-MCNC: 13.4 G/DL — SIGNIFICANT CHANGE UP (ref 13–17)
MAGNESIUM SERPL-MCNC: 2 MG/DL — SIGNIFICANT CHANGE UP (ref 1.6–2.6)
MCHC RBC-ENTMCNC: 28.2 PG — SIGNIFICANT CHANGE UP (ref 27–34)
MCHC RBC-ENTMCNC: 32.1 GM/DL — SIGNIFICANT CHANGE UP (ref 32–36)
MCV RBC AUTO: 87.6 FL — SIGNIFICANT CHANGE UP (ref 80–100)
NRBC # BLD: 0 /100 WBCS — SIGNIFICANT CHANGE UP (ref 0–0)
PHOSPHATE SERPL-MCNC: 3.8 MG/DL — SIGNIFICANT CHANGE UP (ref 2.5–4.5)
PLATELET # BLD AUTO: 275 K/UL — SIGNIFICANT CHANGE UP (ref 150–400)
POTASSIUM SERPL-MCNC: 4 MMOL/L — SIGNIFICANT CHANGE UP (ref 3.5–5.3)
POTASSIUM SERPL-SCNC: 4 MMOL/L — SIGNIFICANT CHANGE UP (ref 3.5–5.3)
RBC # BLD: 4.76 M/UL — SIGNIFICANT CHANGE UP (ref 4.2–5.8)
RBC # FLD: 13 % — SIGNIFICANT CHANGE UP (ref 10.3–14.5)
SODIUM SERPL-SCNC: 139 MMOL/L — SIGNIFICANT CHANGE UP (ref 135–145)
VANCOMYCIN TROUGH SERPL-MCNC: 26.3 UG/ML — CRITICAL HIGH (ref 10–20)
WBC # BLD: 6.27 K/UL — SIGNIFICANT CHANGE UP (ref 3.8–10.5)
WBC # FLD AUTO: 6.27 K/UL — SIGNIFICANT CHANGE UP (ref 3.8–10.5)

## 2020-08-12 PROCEDURE — 87040 BLOOD CULTURE FOR BACTERIA: CPT

## 2020-08-12 PROCEDURE — 96365 THER/PROPH/DIAG IV INF INIT: CPT | Mod: XU

## 2020-08-12 PROCEDURE — 99232 SBSQ HOSP IP/OBS MODERATE 35: CPT | Mod: CS

## 2020-08-12 PROCEDURE — 87536 HIV-1 QUANT&REVRSE TRNSCRPJ: CPT

## 2020-08-12 PROCEDURE — 87635 SARS-COV-2 COVID-19 AMP PRB: CPT

## 2020-08-12 PROCEDURE — 76942 ECHO GUIDE FOR BIOPSY: CPT

## 2020-08-12 PROCEDURE — 86357 NK CELLS TOTAL COUNT: CPT

## 2020-08-12 PROCEDURE — 36415 COLL VENOUS BLD VENIPUNCTURE: CPT

## 2020-08-12 PROCEDURE — 83735 ASSAY OF MAGNESIUM: CPT

## 2020-08-12 PROCEDURE — 70496 CT ANGIOGRAPHY HEAD: CPT

## 2020-08-12 PROCEDURE — 82962 GLUCOSE BLOOD TEST: CPT

## 2020-08-12 PROCEDURE — 85027 COMPLETE CBC AUTOMATED: CPT

## 2020-08-12 PROCEDURE — 87206 SMEAR FLUORESCENT/ACID STAI: CPT

## 2020-08-12 PROCEDURE — 85025 COMPLETE CBC W/AUTO DIFF WBC: CPT

## 2020-08-12 PROCEDURE — 96375 TX/PRO/DX INJ NEW DRUG ADDON: CPT | Mod: XU

## 2020-08-12 PROCEDURE — 86901 BLOOD TYPING SEROLOGIC RH(D): CPT

## 2020-08-12 PROCEDURE — 80202 ASSAY OF VANCOMYCIN: CPT

## 2020-08-12 PROCEDURE — 87075 CULTR BACTERIA EXCEPT BLOOD: CPT

## 2020-08-12 PROCEDURE — 85652 RBC SED RATE AUTOMATED: CPT

## 2020-08-12 PROCEDURE — 82310 ASSAY OF CALCIUM: CPT

## 2020-08-12 PROCEDURE — 83970 ASSAY OF PARATHORMONE: CPT

## 2020-08-12 PROCEDURE — 87205 SMEAR GRAM STAIN: CPT

## 2020-08-12 PROCEDURE — 86850 RBC ANTIBODY SCREEN: CPT

## 2020-08-12 PROCEDURE — 80048 BASIC METABOLIC PNL TOTAL CA: CPT

## 2020-08-12 PROCEDURE — 10160 PNXR ASPIR ABSC HMTMA BULLA: CPT

## 2020-08-12 PROCEDURE — 87116 MYCOBACTERIA CULTURE: CPT

## 2020-08-12 PROCEDURE — 99285 EMERGENCY DEPT VISIT HI MDM: CPT | Mod: 25

## 2020-08-12 PROCEDURE — 97161 PT EVAL LOW COMPLEX 20 MIN: CPT

## 2020-08-12 PROCEDURE — 70498 CT ANGIOGRAPHY NECK: CPT

## 2020-08-12 PROCEDURE — 96374 THER/PROPH/DIAG INJ IV PUSH: CPT

## 2020-08-12 PROCEDURE — 87102 FUNGUS ISOLATION CULTURE: CPT

## 2020-08-12 PROCEDURE — 93005 ELECTROCARDIOGRAM TRACING: CPT

## 2020-08-12 PROCEDURE — C1769: CPT

## 2020-08-12 PROCEDURE — 86769 SARS-COV-2 COVID-19 ANTIBODY: CPT

## 2020-08-12 PROCEDURE — 87070 CULTURE OTHR SPECIMN AEROBIC: CPT

## 2020-08-12 PROCEDURE — 85730 THROMBOPLASTIN TIME PARTIAL: CPT

## 2020-08-12 PROCEDURE — 86140 C-REACTIVE PROTEIN: CPT

## 2020-08-12 PROCEDURE — 71045 X-RAY EXAM CHEST 1 VIEW: CPT

## 2020-08-12 PROCEDURE — 84100 ASSAY OF PHOSPHORUS: CPT

## 2020-08-12 PROCEDURE — 80053 COMPREHEN METABOLIC PANEL: CPT

## 2020-08-12 PROCEDURE — U0003: CPT

## 2020-08-12 PROCEDURE — 85610 PROTHROMBIN TIME: CPT

## 2020-08-12 RX ORDER — TIZANIDINE 4 MG/1
0 TABLET ORAL
Qty: 90 | Refills: 0 | DISCHARGE

## 2020-08-12 RX ORDER — AZTREONAM 2 G
1 VIAL (EA) INJECTION
Qty: 20 | Refills: 0
Start: 2020-08-12 | End: 2020-08-21

## 2020-08-12 RX ORDER — EMTRICITABINE AND TENOFOVIR DISOPROXIL FUMARATE 200; 300 MG/1; MG/1
1 TABLET, FILM COATED ORAL
Qty: 0 | Refills: 0 | DISCHARGE
Start: 2020-08-12

## 2020-08-12 RX ORDER — CEFPODOXIME PROXETIL 100 MG
2 TABLET ORAL
Qty: 40 | Refills: 0
Start: 2020-08-12 | End: 2020-08-21

## 2020-08-12 RX ORDER — ETRAVIRINE 200 MG/1
1 TABLET ORAL
Qty: 0 | Refills: 0 | DISCHARGE
Start: 2020-08-12

## 2020-08-12 RX ADMIN — LOSARTAN POTASSIUM 50 MILLIGRAM(S): 100 TABLET, FILM COATED ORAL at 05:57

## 2020-08-12 RX ADMIN — PIPERACILLIN AND TAZOBACTAM 200 GRAM(S): 4; .5 INJECTION, POWDER, LYOPHILIZED, FOR SOLUTION INTRAVENOUS at 05:58

## 2020-08-12 RX ADMIN — ETRAVIRINE 200 MILLIGRAM(S): 200 TABLET ORAL at 13:31

## 2020-08-12 RX ADMIN — DARUNAVIR 600 MILLIGRAM(S): 75 TABLET, FILM COATED ORAL at 05:57

## 2020-08-12 RX ADMIN — RALTEGRAVIR 400 MILLIGRAM(S): 400 TABLET, FILM COATED ORAL at 13:32

## 2020-08-12 RX ADMIN — EMTRICITABINE AND TENOFOVIR DISOPROXIL FUMARATE 1 TABLET(S): 200; 300 TABLET, FILM COATED ORAL at 13:31

## 2020-08-12 RX ADMIN — RITONAVIR 100 MILLIGRAM(S): 100 TABLET, FILM COATED ORAL at 13:32

## 2020-08-12 RX ADMIN — VALACYCLOVIR 500 MILLIGRAM(S): 500 TABLET, FILM COATED ORAL at 13:32

## 2020-08-12 RX ADMIN — PIPERACILLIN AND TAZOBACTAM 200 GRAM(S): 4; .5 INJECTION, POWDER, LYOPHILIZED, FOR SOLUTION INTRAVENOUS at 00:06

## 2020-08-12 RX ADMIN — Medication 8 MILLIGRAM(S): at 12:04

## 2020-08-12 RX ADMIN — Medication 125 MICROGRAM(S): at 05:57

## 2020-08-12 RX ADMIN — Medication 1 TABLET(S): at 13:32

## 2020-08-12 RX ADMIN — ENOXAPARIN SODIUM 40 MILLIGRAM(S): 100 INJECTION SUBCUTANEOUS at 13:31

## 2020-08-12 RX ADMIN — PIPERACILLIN AND TAZOBACTAM 200 GRAM(S): 4; .5 INJECTION, POWDER, LYOPHILIZED, FOR SOLUTION INTRAVENOUS at 11:30

## 2020-08-12 NOTE — DISCHARGE NOTE PROVIDER - NSDCMRMEDTOKEN_GEN_ALL_CORE_FT
ALBUTEROL SULFATE HFA  108 MCG/ACT AERS:   ISENTRESS  400 MG TABS:   LEVOTHYROXINE SODIUM  125 MCG TABS:   loperamide: 10 milligram(s) orally 2 times a day  LOSARTAN POTASSIUM  50 MG TABS:   nystatin 100,000 units/mL oral suspension: 4 milliliter(s) orally every 6 hours   PREZISTA  600 MG TABS:   RITONAVIR  100 MG TABS:   ROSUVASTATIN CALCIUM  20 MG TABS:   TAMSULOSIN HYDROCHLORIDE  .4 MG CAPS:   TIZANIDINE HCL  2 MG TABS:   Valtrex: ALBUTEROL SULFATE HFA  108 MCG/ACT AERS:   Bactrim  mg-160 mg oral tablet: 1 tab(s) orally 2 times a day, start evening 8/12  cefpodoxime 200 mg oral tablet: 2 tab(s) orally 2 times a day, start evening 8/12  emtricitabine-tenofovir alafenamide 200 mg-25 mg oral tablet: 1 tab(s) orally once a day  etravirine 200 mg oral tablet: 1 tab(s) orally   ISENTRESS  400 MG TABS:   LEVOTHYROXINE SODIUM  125 MCG TABS:   loperamide: 10 milligram(s) orally 2 times a day  LOSARTAN POTASSIUM  50 MG TABS:   PREZISTA  600 MG TABS:   RITONAVIR  100 MG TABS:   ROSUVASTATIN CALCIUM  20 MG TABS:   TAMSULOSIN HYDROCHLORIDE  .4 MG CAPS:   Valtrex:

## 2020-08-12 NOTE — DISCHARGE NOTE PROVIDER - PROVIDER TOKENS
PROVIDER:[TOKEN:[[id number]],FOLLOWUP:[2 weeks]],FREE:[LAST:[Primary Doctor],PHONE:[(   )    -],FAX:[(   )    -],ADDRESS:[Follow up with your [facility] PCP in the next two weeks for a recheck.],FOLLOWUP:[2 weeks]]

## 2020-08-12 NOTE — PROGRESS NOTE ADULT - PROVIDER SPECIALTY LIST ADULT
ENT
ENT
Infectious Disease
Internal Medicine
Infectious Disease

## 2020-08-12 NOTE — PROGRESS NOTE ADULT - SUBJECTIVE AND OBJECTIVE BOX
Patient seen and examined. Agree with resident's findings, assessment and plan. Agree with discharge diagnoses, as well as with plan of care and goals.  Time spent: 35 minutes for evaluation, plan of care, patient education, medication reconciliation, coordination of care, follow ups and transition to outpatient.     Exam on discharge: Aox3, full range of motion of neck without sig pain, no sig bleeding at IR biopsy site, lungs clear, CV RRR, abd soft, nt, ext wwp, no sig le edema. Labs/imaging reviewed- IR cx remains NGTD     58 yo male with hx controlled HIV, throat ca (2010, s/p radiation tx), BPH, COVID (in April), HTN, HLD, basal cell carcinoma (s/p resection 02/20) who presented with neck pain found to have 2cm abscess near C3 region on the left. No drainage by ENT, s/p drainage by IR. Sig improved on broad spectrum IV abx, plan for likely dc on empiric oral regimen as per ID for 2 weeks as IR cx remain NGTD    Zachary Varela MD [phone] Patient seen and examined. Agree with resident's findings, assessment and plan. Agree with discharge diagnoses, as well as with plan of care and goals.  Time spent: 35 minutes for evaluation, plan of care, patient education, medication reconciliation, coordination of care, follow ups and transition to outpatient.     Exam on discharge: Aox3, full range of motion of neck without sig pain, no sig bleeding at IR biopsy site, lungs clear, CV RRR, abd soft, nt, ext wwp, no sig le edema. Labs/imaging reviewed- IR cx remains NGTD     58 yo male with hx controlled HIV, throat ca (2010, s/p radiation tx), BPH, COVID (in April), HTN, HLD, basal cell carcinoma (s/p resection 02/20) who presented with neck pain found to have 2cm abscess near C3 region on the left. No drainage by ENT, s/p drainage by IR. Sig improved on broad spectrum IV abx  -Plan for likely dc on empiric oral regimen as per ID for 2 weeks as IR cx remain NGTD  -ENT f/u also outpt re mild L hearing loss, no acute inpatient workup per team.     Zachary Varela MD [phone]

## 2020-08-12 NOTE — PROGRESS NOTE ADULT - SUBJECTIVE AND OBJECTIVE BOX
INTERVAL HPI/OVERNIGHT EVENTS: Feels well. Continued improvement in neck ROM. Slight hearing loss and popping sounds AS.    CONSTITUTIONAL:  Negative fever or chills, feels well, good appetite  EYES:  Negative  blurry vision or double vision  CARDIOVASCULAR:  Negative for chest pain or palpitations  RESPIRATORY:  Negative for cough, wheezing, or SOB   GASTROINTESTINAL:  Negative for nausea, vomiting, diarrhea, constipation, or abdominal pain  GENITOURINARY:  Negative frequency, urgency or dysuria  NEUROLOGIC:  No headache, confusion, dizziness, lightheadedness      ANTIBIOTICS/RELEVANT:    MEDICATIONS  (STANDING):  atorvastatin 20 milliGRAM(s) Oral at bedtime  darunavir 600 milliGRAM(s) Oral two times a day  emtricitabine 200 mG/tenofovir alafenamide 25 mG (DESCOVY) Tablet 1 Tablet(s) Oral daily  enoxaparin Injectable 40 milliGRAM(s) SubCutaneous every 24 hours  etravirine 200 milliGRAM(s) Oral <User Schedule>  levothyroxine 125 MICROGram(s) Oral every 24 hours  loperamide Liquid 8 milliGRAM(s) Oral every 12 hours  losartan 50 milliGRAM(s) Oral daily  multivitamin 1 Tablet(s) Oral daily  piperacillin/tazobactam IVPB.. 4.5 Gram(s) IV Intermittent every 6 hours  raltegravir (12 Hour) 400 milliGRAM(s) Oral <User Schedule>  ritonavir Tablet 100 milliGRAM(s) Oral <User Schedule>  tamsulosin 0.4 milliGRAM(s) Oral at bedtime  valACYclovir 500 milliGRAM(s) Oral <User Schedule>    MEDICATIONS  (PRN):  acetaminophen   Tablet .. 650 milliGRAM(s) Oral every 6 hours PRN Mild Pain (1 - 3)  cyclobenzaprine 5 milliGRAM(s) Oral three times a day PRN Muscle Spasm  ibuprofen  Tablet. 400 milliGRAM(s) Oral every 6 hours PRN Severe Pain (7 - 10)        Vital Signs Last 24 Hrs  T(C): 36.6 (12 Aug 2020 05:55), Max: 36.7 (11 Aug 2020 13:45)  T(F): 97.8 (12 Aug 2020 05:55), Max: 98.1 (11 Aug 2020 13:45)  HR: 67 (12 Aug 2020 05:55) (60 - 67)  BP: 138/83 (12 Aug 2020 05:55) (133/81 - 139/85)  BP(mean): --  RR: 18 (12 Aug 2020 05:55) (17 - 18)  SpO2: 97% (12 Aug 2020 05:55) (96% - 97%)    PHYSICAL EXAM:  Constitutional:Well-developed, well nourished  Eyes:BING, EOMI  Ear/Nose/Throat: no oral lesion, no sinus tenderness on percussion	  Neck:no JVD, no lymphadenopathy, supple  Respiratory: CTA joseph  Cardiovascular: S1S2 RRR, no murmurs  Gastrointestinal:soft, (+) BS, no HSM  Extremities:no e/e/c  Vascular: DP Pulse:	right normal; left normal      LABS:                        13.4   6.27  )-----------( 275      ( 12 Aug 2020 06:22 )             41.7     08-12    139  |  101  |  10  ----------------------------<  95  4.0   |  27  |  0.78    Ca    10.4      12 Aug 2020 06:22  Phos  3.8     08-12  Mg     2.0     08-12            MICROBIOLOGY: Culture - Abscess with Gram Stain (08.10.20 @ 18:43)    Gram Stain:   No organisms seen  Rare WBC's    Specimen Source: .Abscess left neck abcess    Culture Results:   No growth to date        RADIOLOGY & ADDITIONAL STUDIES: reviewed

## 2020-08-12 NOTE — DISCHARGE NOTE NURSING/CASE MANAGEMENT/SOCIAL WORK - PATIENT PORTAL LINK FT
You can access the FollowMyHealth Patient Portal offered by [facility] by registering at the following website: [email or web address]. By joining [facility]’s FollowMyHealth portal, you will also be able to view your health information using other applications (apps) compatible with our system.

## 2020-08-12 NOTE — PROGRESS NOTE ADULT - REASON FOR ADMISSION
Neck Abscess

## 2020-08-12 NOTE — PROGRESS NOTE ADULT - ASSESSMENT
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 Ddx also includes recurrence of tonsil CA although less likely in this location.  - Appreciate care per medicine  - Continue IV Abx   - Replete electrolytes  - If no significant improvement on IV Abx, recommend ultrasound or CT-guided aspiration/biopsy with IR  - Pt should have routine f/u for his cancer, due for PET scan (last TAPAN PET 6/2019)  - ENT will follow    Discussed with Dr. Cobms
60 yo male with hx COVID-19, well-controlled HIV, hx L tonsillar SCC s/p XRT 2015, BCC resection 2/2020, p/w one week of L neck pain, found to have L cervical paraspinous myositis and associated 2cm abscess, s/p IR drainage 8/10; cx remains ngtd.  - patient can be discharged on TMP/SMX 1 DS q12h plus cefpodoxime 400mg PO q12h to complete 2 week antibiotic course through 8/21  - continue home ARV    Patient should f/u with ENT and his HIV physician after discharge    Please reconsult with ?    ID Team 2
60 yo male with hx HIV (VL undetectable 3/20, compliant with medications), throat ca (2010, s/p radiation tx), BPH, COVID (in April), HTN, HLD, basal cell carcinoma (s/p resection 02/20) who presented to the ED CC of left sided neck pain for the past 7 days. CT neck showing 2x 2.1 cm abscess near C3 region on the left. ENT consulted but abscess not amenable to bedside drainage. Admitted to medicine for IV Abx (Vanc and zosyn) and potential IR guided drainage if unresponsive to therapy.
60 yo male with hx HIV (VL undetectable 3/20, compliant with medications), throat ca (2010, s/p radiation tx), BPH, COVID (in April), HTN, HLD, basal cell carcinoma (s/p resection 02/20) who presented to the ED CC of left sided neck pain for the past 7 days. CT neck showing 2x 2.1 cm abscess near C3 region on the left. ENT consulted but abscess not amenable to bedside drainage. Admitted to medicine for IV Abx (Vanc and zosyn) and potential IR guided drainage if unresponsive to therapy.
58 yo male with hx HIV (VL undetectable 3/20, compliant with medications), throat ca (2010, s/p radiation tx), BPH, COVID (in April), HTN, HLD, basal cell carcinoma (s/p resection 02/20) who presented to the ED CC of left sided neck pain for the past 7 days. CT neck showing 2x 2.1 cm abscess near C3 region on the left. ENT consulted but abscess not amenable to bedside drainage. Admitted to medicine for IV Abx (Vanc and zosyn) and potential IR guided drainage if unresponsive to therapy.
58 yo male with hx COVID-19, well-controlled HIV, hx L tonsillar SCC s/p XRT 2015, BCC resection 2/2020, p/w one week of L neck pain, found to have L cervical paraspinous myositis and associated 2cm abscess, s/p IR drainage 8/10.  - f/u cx of L neck abscess 8/10; GS negative; cx ngtd  - continue empiric Zosyn 4.5g IV q6h + vancomycin 1.5g IV q12h; if no growth on cx by 8/12, anticipate transition to PO TMP/SMX + cefpodoxime to complete total two week antibiotic course (i.e. through 8/21)  - continue home ARV    ID Team 2

## 2020-08-12 NOTE — DISCHARGE NOTE PROVIDER - HOSPITAL COURSE
59yom with pmhx of HIV (well controlled, VL undetectable), throat CA s/p chemo radiation in 2015, BCC of forehead resection in 2/2020, and COVID s/p hospitalization in 4/2020 who presented with 7 days of left sided neck pain and stiffness. Found to have a left paraspinal abscess with local myositis and admitted for further treatment.         Problem List/Main Diagnoses (system-based):          Left paraspinal neck abscess: Treated initially with IV Vanco and Zosyn. Pt continued to experience pain. IR drainage of abscess on 8/10 with negative cultures to date. ID consulted for further recs with recommendations to transition to oral antibiotics given negative culture results. TMP- SMX and cefpodoxime will be the oral antibiotic regimen to continue to 8/21. Pt stable at time od discharge with improvement in pain and continues to have normal vital signs and laboratory studies.         Ear fullness: Pt reporting left sided sensation of ear fullness since the onset of his left neck pain. Reports decrease in ability to hear out of left ear. ENT evaluated while inpt-likely 2/2 local inflammation from abscess or cerumen impaction. Recommending follow up with ENT as an outpt.         HIV: stable on home meds. found to have undetectable viral load while inpt. CD4 count 310. Continue home medication regimen.         New medications:         TMP-SMX: 1 DS tab oral BID until 8/21        Cefpodoxime: 400mg tab oral BID until 8/21        Labs to be followed outpatient: None        Exam to be followed outpatient: Follow up with ENT and PCP as an outpt. 59 M PMH HIV (well controlled, VL undetectable), throat CA s/p chemo radiation in 2015, BCC of forehead resection in 2/2020, COVID s/p hospitalization in 4/2020 who presented with 7 days of left sided neck pain and stiffness. Found to have a left paraspinal abscess with local myositis and admitted for further treatment.         Problem List/Main Diagnoses (system-based):          Left paraspinal neck abscess: Treated initially with IV Vanco and Zosyn. Pt continued to experience pain. IR drainage of abscess on 8/10 with negative cultures to date. ID consulted for further recs with recommendations to transition to oral antibiotics given negative culture results. TMP- SMX and cefpodoxime will be the oral antibiotic regimen to continue to 8/21. Pt stable at time od discharge with improvement in pain and continues to have normal vital signs and laboratory studies.         Ear fullness: Pt reporting left sided sensation of ear fullness since the onset of his left neck pain. Reports decrease in ability to hear out of left ear. ENT evaluated while inpt-likely 2/2 local inflammation from abscess or cerumen impaction. Recommending follow up with ENT as an outpt.         HIV: stable on home meds. found to have undetectable viral load while inpt. CD4 count 310. Continue home medication regimen.         New medications:         TMP-SMX: 1 DS tab oral BID until 8/21        Cefpodoxime: 400mg tab oral BID until 8/21        Labs to be followed outpatient: None        Exam to be followed outpatient: Follow up with ENT and PCP as an outpt.

## 2020-08-12 NOTE — DISCHARGE NOTE PROVIDER - NSDCCPCAREPLAN_GEN_ALL_CORE_FT
PRINCIPAL DISCHARGE DIAGNOSIS  Diagnosis: Cellulitis and abscess of neck  Assessment and Plan of Treatment: You were found to have an infection with abscess of your left side of your neck. You were treated with IV antibiotics and drainage of the abscess. You will continue to take oral antibiotics until 8/21. it is very inportant you take these medicaitons until they are complete. Even if you are feeling better. Follow up with your PCP in the next two weeks for a recheck. Contact your doctor or come to the ER right away if you have worsening neck pain, fevers or chills, weakness or decreased sensation to any of your extremitites as this may be a sign of worsening infeciton.      SECONDARY DISCHARGE DIAGNOSES  Diagnosis: HIV disease  Assessment and Plan of Treatment: You have longstanding HIV. Your viral load during admission is undetectable. your CD4 count was 310. Continue your home medications and follow up with your PCP as needed.    Diagnosis: Ear fullness  Assessment and Plan of Treatment: You have some left sided ear fullness since the onset of your left neck pain. You were evaluated by ENT in the hospital. They recommend following up with an ENT in the next two weeks for recheck. Your symptoms are likely related to local inflammation form your infection, but could also be impacted ear wax.

## 2020-08-12 NOTE — DISCHARGE NOTE PROVIDER - NSDCFUSCHEDAPPT_GEN_ALL_CORE_FT
GILBERT PRIETO ; 09/30/2020 ; [facility] [address] GILBERT PRIETO ; 09/30/2020 ; [facility] [address] GILBERT PRIETO ; 09/30/2020 ; [facility] [address]

## 2020-08-16 LAB
CULTURE RESULTS: NO GROWTH — SIGNIFICANT CHANGE UP
SPECIMEN SOURCE: SIGNIFICANT CHANGE UP

## 2020-08-17 DIAGNOSIS — E78.5 HYPERLIPIDEMIA, UNSPECIFIED: ICD-10-CM

## 2020-08-17 DIAGNOSIS — C14.0 MALIGNANT NEOPLASM OF PHARYNX, UNSPECIFIED: ICD-10-CM

## 2020-08-17 DIAGNOSIS — M60.88 OTHER MYOSITIS, OTHER SITE: ICD-10-CM

## 2020-08-17 DIAGNOSIS — E83.52 HYPERCALCEMIA: ICD-10-CM

## 2020-08-17 DIAGNOSIS — Z21 ASYMPTOMATIC HUMAN IMMUNODEFICIENCY VIRUS [HIV] INFECTION STATUS: ICD-10-CM

## 2020-08-17 DIAGNOSIS — H61.22 IMPACTED CERUMEN, LEFT EAR: ICD-10-CM

## 2020-08-17 DIAGNOSIS — L02.818 CUTANEOUS ABSCESS OF OTHER SITES: ICD-10-CM

## 2020-08-17 DIAGNOSIS — Z86.19 PERSONAL HISTORY OF OTHER INFECTIOUS AND PARASITIC DISEASES: ICD-10-CM

## 2020-08-17 DIAGNOSIS — N40.0 BENIGN PROSTATIC HYPERPLASIA WITHOUT LOWER URINARY TRACT SYMPTOMS: ICD-10-CM

## 2020-08-17 DIAGNOSIS — L03.221 CELLULITIS OF NECK: ICD-10-CM

## 2020-08-17 DIAGNOSIS — I71.2 THORACIC AORTIC ANEURYSM, WITHOUT RUPTURE: ICD-10-CM

## 2020-08-17 DIAGNOSIS — I44.0 ATRIOVENTRICULAR BLOCK, FIRST DEGREE: ICD-10-CM

## 2020-08-17 DIAGNOSIS — E87.3 ALKALOSIS: ICD-10-CM

## 2020-08-17 DIAGNOSIS — I10 ESSENTIAL (PRIMARY) HYPERTENSION: ICD-10-CM

## 2020-08-17 DIAGNOSIS — I45.10 UNSPECIFIED RIGHT BUNDLE-BRANCH BLOCK: ICD-10-CM

## 2020-09-01 ENCOUNTER — APPOINTMENT (OUTPATIENT)
Dept: CT IMAGING | Facility: CLINIC | Age: 60
End: 2020-09-01
Payer: MEDICARE

## 2020-09-01 ENCOUNTER — OUTPATIENT (OUTPATIENT)
Dept: OUTPATIENT SERVICES | Facility: HOSPITAL | Age: 60
LOS: 1 days | End: 2020-09-01

## 2020-09-01 DIAGNOSIS — Z98.890 OTHER SPECIFIED POSTPROCEDURAL STATES: Chronic | ICD-10-CM

## 2020-09-01 PROBLEM — E78.00 PURE HYPERCHOLESTEROLEMIA, UNSPECIFIED: Chronic | Status: ACTIVE | Noted: 2020-08-07

## 2020-09-01 PROCEDURE — 71250 CT THORAX DX C-: CPT | Mod: 26,CS

## 2020-09-04 ENCOUNTER — RESULT REVIEW (OUTPATIENT)
Age: 60
End: 2020-09-04

## 2020-09-04 ENCOUNTER — APPOINTMENT (OUTPATIENT)
Dept: CT IMAGING | Facility: CLINIC | Age: 60
End: 2020-09-04
Payer: MEDICARE

## 2020-09-04 ENCOUNTER — OUTPATIENT (OUTPATIENT)
Dept: OUTPATIENT SERVICES | Facility: HOSPITAL | Age: 60
LOS: 1 days | End: 2020-09-04

## 2020-09-04 ENCOUNTER — APPOINTMENT (OUTPATIENT)
Dept: OTOLARYNGOLOGY | Facility: CLINIC | Age: 60
End: 2020-09-04
Payer: MEDICARE

## 2020-09-04 VITALS — HEIGHT: 67 IN | TEMPERATURE: 97.5 F | BODY MASS INDEX: 23.54 KG/M2 | WEIGHT: 150 LBS

## 2020-09-04 DIAGNOSIS — Z87.39 PERSONAL HISTORY OF OTHER DISEASES OF THE MUSCULOSKELETAL SYSTEM AND CONNECTIVE TISSUE: ICD-10-CM

## 2020-09-04 DIAGNOSIS — Z98.890 OTHER SPECIFIED POSTPROCEDURAL STATES: Chronic | ICD-10-CM

## 2020-09-04 DIAGNOSIS — Z82.49 FAMILY HISTORY OF ISCHEMIC HEART DISEASE AND OTHER DISEASES OF THE CIRCULATORY SYSTEM: ICD-10-CM

## 2020-09-04 DIAGNOSIS — Z86.69 PERSONAL HISTORY OF OTHER DISEASES OF THE NERVOUS SYSTEM AND SENSE ORGANS: ICD-10-CM

## 2020-09-04 DIAGNOSIS — Z85.819 PERSONAL HISTORY OF MALIGNANT NEOPLASM OF UNSPECIFIED SITE OF LIP, ORAL CAVITY, AND PHARYNX: ICD-10-CM

## 2020-09-04 DIAGNOSIS — Z87.09 PERSONAL HISTORY OF OTHER DISEASES OF THE RESPIRATORY SYSTEM: ICD-10-CM

## 2020-09-04 DIAGNOSIS — Z87.898 PERSONAL HISTORY OF OTHER SPECIFIED CONDITIONS: ICD-10-CM

## 2020-09-04 DIAGNOSIS — Z21 ASYMPTOMATIC HUMAN IMMUNODEFICIENCY VIRUS [HIV] INFECTION STATUS: ICD-10-CM

## 2020-09-04 DIAGNOSIS — M54.2 CERVICALGIA: ICD-10-CM

## 2020-09-04 DIAGNOSIS — Z78.9 OTHER SPECIFIED HEALTH STATUS: ICD-10-CM

## 2020-09-04 PROCEDURE — 70491 CT SOFT TISSUE NECK W/DYE: CPT | Mod: 26

## 2020-09-04 PROCEDURE — 99203 OFFICE O/P NEW LOW 30 MIN: CPT

## 2020-09-08 NOTE — ASSESSMENT
[FreeTextEntry1] : Data reviewed:\par \par Last CT chest 4/26/20 [facility] personally reviewed: decreased ground glass w some traction bronchiectasis\par CXR [facility] 6/2020 personally reviewed : improvement in joseph infiltrates compared to 4/2020\par \par Impression:\par Acute-->chronic respiratory failure due to\par Covid-19 pneumonia\par \par Plan:\par Certainly better than in hospital but now w chronic respiratory failure.\par Will get a new CT now and evaluate for fibrosis.\par Will see him back in ~2 mos.\par --\par CT chest [facility] 9/1/20 personally reviewed :\par 1. Since April 26, 2020, decreased mild residual groundglass opacities with persistent bilateral traction bronchiectasis with new right apical lung scarring and bilateral subpleural reticular and bandlike opacities with upper lung predominance suspicious for early fibrosis probably sequela of known infection. \par 2. Slightly decreased mediastinal nodes. \par 3. Unchanged fusiform dilatation mid ascending aorta and dilated aortic root. \par 4. Dilated main pulmonary trunk, possibly pulmonary hypertension. \par

## 2020-09-08 NOTE — HISTORY OF PRESENT ILLNESS
[de-identified] : This gentleman was recently hospitalized with a left paraspinal neck abscess. He underwent image guided drainage.\par He's mostly improved but does have some low-grade neck pain.\par He's not sure if this is related to his underlying history of cervical disc disease.\par He does not have a fever.

## 2020-09-08 NOTE — HISTORY OF PRESENT ILLNESS
[TextBox_4] : 5/13/20: Known to me from hospital. 60yo man w controlled HIV, tonsillar CA (s/p chemo & radiation), HLD, hypothyroidism, BPH and sleep apnea with recent hospitalization at [facility] from 3/30/20-5/1/20 with COVID-19 infection. Required NRB and was admitted to COVID-tele floor. Tx'd w HCQ, azithro, toci and steroids as well as vanc/zosyn for +MRSA swab as well as Bactrim for PCP ppx. Initiated on anticoagulation for DVT ppx though LE doppler negative and CTA negative for PE. Mid course he had worsening imaging and oxygenation and transferred to MICU and intubated for bronch which was nondiagnostic. Patient ultimately improved & weaned off oxygen and discharged with home supplemental oxygen 4L NC as well as 30 days of Lovenox injections. \par \par Today he was seen by video as he cannot easily navigate the stairs to his 3rd floor walkup. He relates that he feels markedly better. He has titrated his O2 down to 3L but still desaturates on exertion. Had gained 5 lbs, eating all the time. Seriously debilitated, navigates his ADLs w difficulty but has two friends assisting him. Taking Lovenox x 1 month with no difficulty and no bleeding. Finished Augmentin on discharge. Not on steroids.\par \par 7/29/20: Has been very slowly improving. Still difficult to navigate stairs to his walkup. But otherwise in his apartment much more ambulatory, doing more for himself, cooking etc. Has been able to walk his dog a bit. Able to go without oxygen sometimes. Eating, has gained 20-25 lbs[place] On disability from his throat cancer. Off Lovenox. Seeing Dr Cervantes for HIV care every 2 weeks. Got labs at home 2 weeks ago, VL still undetectable. Using O2 at 2L, up to 3-4 w exertion.

## 2020-09-08 NOTE — ASSESSMENT
[FreeTextEntry1] : Clinically he has a normal ear nose and throat head and neck exam. There is no clinical evidence by exam or history of an ongoing infection.\par However because of the ongoing pain I will send her for a CT scan of his neck to rule out an indolent pathology.\par Pending those results further workup including musculoskeletal workup may be considered.

## 2020-09-08 NOTE — CONSULT LETTER
[Dear  ___] : Dear  [unfilled], [Courtesy Letter:] : I had the pleasure of seeing your patient, [unfilled], in my office today. [Please see my note below.] : Please see my note below. [Consult Closing:] : Thank you very much for allowing me to participate in the care of this patient.  If you have any questions, please do not hesitate to contact me. [Sincerely,] : Sincerely, [FreeTextEntry2] : Justin Cervantes MD\par [address]\[place] [address] [FreeTextEntry3] : Felecia Blood MD, FCCP\par

## 2020-09-08 NOTE — PHYSICAL EXAM
[No Acute Distress] : no acute distress [Normal Rate/Rhythm] : normal rate/rhythm [Normal S1, S2] : normal s1, s2 [No Murmurs] : no murmurs [TextBox_68] : basilar rales

## 2020-09-09 LAB
CULTURE RESULTS: SIGNIFICANT CHANGE UP
SPECIMEN SOURCE: SIGNIFICANT CHANGE UP

## 2020-09-18 ENCOUNTER — MED ADMIN CHARGE (OUTPATIENT)
Age: 60
End: 2020-09-18

## 2020-09-18 ENCOUNTER — APPOINTMENT (OUTPATIENT)
Dept: PULMONOLOGY | Facility: CLINIC | Age: 60
End: 2020-09-18
Payer: MEDICARE

## 2020-09-18 VITALS
DIASTOLIC BLOOD PRESSURE: 76 MMHG | WEIGHT: 150 LBS | SYSTOLIC BLOOD PRESSURE: 113 MMHG | HEART RATE: 65 BPM | OXYGEN SATURATION: 96 % | HEIGHT: 67 IN | TEMPERATURE: 97.2 F | BODY MASS INDEX: 23.54 KG/M2

## 2020-09-18 DIAGNOSIS — Z23 ENCOUNTER FOR IMMUNIZATION: ICD-10-CM

## 2020-09-18 PROCEDURE — 90686 IIV4 VACC NO PRSV 0.5 ML IM: CPT

## 2020-09-18 PROCEDURE — 99213 OFFICE O/P EST LOW 20 MIN: CPT | Mod: CS,25

## 2020-09-18 PROCEDURE — G0008: CPT

## 2020-09-30 LAB
CULTURE RESULTS: SIGNIFICANT CHANGE UP
SPECIMEN SOURCE: SIGNIFICANT CHANGE UP

## 2020-11-14 NOTE — PROGRESS NOTE ADULT - NSHPATTENDINGPLANDISCUSS_GEN_ALL_CORE
primary team
increase functional independence. Pt will perform all aspects of toileting with CGA to increase functional independence. Pt will participate in therex/therax c emphasis on strength, activity tolerance,  safety, KELSEY tasks. Plan:  Plan  Times per week: 2x      Treatment today:      Therapeutic Activity Training:   Therapeutic activity training was instructed today. Cues were given for safety, sequence, UE/LE placement, visual cues, and balance. Activities performed today included bed mobility training, sup-sit, sit-stand, walk c walker 6'  Education: Role of OT, OT POC, d/c needs, home safety    Safety: Left in chair with all needs in reach. Chair alarm in place Gait belt used for transfer and mobility. Time in:  1300  Time out:  1325  Timed treatment minutes:  12  Total treatment time:  25    Electronically signed by: Ina Calderon [place], [place]   [id number]   11:44 AM, 11/15/2020  Discussed with Katy Reveles, who asked about OT recommendations. Reviewed note content, reviewed OT navigator, found that filed values needed to be refreshed into note. OT recommended IP rehab. D/w Marja Gilford. addended OT note.     Rubens Anaya [id number]  11:44 AM 11/15/2020
Dr. Varela
Housestaff

## 2021-03-01 NOTE — ED PROVIDER NOTE - MDM ORDERS SUBMITTED SELECTION
Rob Sweeney called requesting a refill of the below medication which has been pended for you:     Requested Prescriptions     Pending Prescriptions Disp Refills    amLODIPine (NORVASC) 5 MG tablet [Pharmacy Med Name: amLODIPine Besylate 5 MG Oral Tablet] 60 tablet 3     Sig: Take 1 tablet by mouth twice daily       Last Appointment Date: 2/25/2021  Next Appointment Date: 5/27/2021    Allergies   Allergen Reactions    Atorvastatin      Other reaction(s): Muscle Pain    Codeine     Diltiazem Hcl Hives    Levofloxacin      Other reaction(s):  Intolerance-unknown    Pregabalin      lyrica    Simvastatin      Other reaction(s): Muscle Pain
Imaging Studies

## 2021-03-10 ENCOUNTER — OUTPATIENT (OUTPATIENT)
Dept: OUTPATIENT SERVICES | Facility: HOSPITAL | Age: 61
LOS: 1 days | End: 2021-03-10

## 2021-03-10 ENCOUNTER — APPOINTMENT (OUTPATIENT)
Dept: CT IMAGING | Facility: CLINIC | Age: 61
End: 2021-03-10
Payer: MEDICARE

## 2021-03-10 ENCOUNTER — RESULT REVIEW (OUTPATIENT)
Age: 61
End: 2021-03-10

## 2021-03-10 DIAGNOSIS — Z98.890 OTHER SPECIFIED POSTPROCEDURAL STATES: Chronic | ICD-10-CM

## 2021-03-10 PROCEDURE — 71250 CT THORAX DX C-: CPT | Mod: 26,ME

## 2021-03-10 PROCEDURE — G1004: CPT

## 2021-03-17 ENCOUNTER — APPOINTMENT (OUTPATIENT)
Dept: PULMONOLOGY | Facility: CLINIC | Age: 61
End: 2021-03-17

## 2021-03-19 NOTE — ASSESSMENT
[FreeTextEntry1] : Data reviewed:\par \par CT chest [facility] 9/1/20 personally reviewed :\par 1. Since April 26, 2020, decreased mild residual groundglass opacities with persistent bilateral traction bronchiectasis with new right apical lung scarring and bilateral subpleural reticular and bandlike opacities with upper lung predominance suspicious for early fibrosis probably sequela of known infection. \par 2. Slightly decreased mediastinal nodes. \par 3. Unchanged fusiform dilatation mid ascending aorta and dilated aortic root. \par 4. Dilated main pulmonary trunk, possibly pulmonary hypertension. \par \par Impression:\par Acute-->chronic respiratory failure due to\par Covid-19 pneumonia\par \par Plan:\par Continues to improve.\par He can discontinue oxygen.\par We will get another scan in 6 mos and see where he is.\par Flu vaccine given.\par --\par CT chest [facility] 3/10/21 personally reviewed :\par Mild pulmonary fibrosis, likely sequela of known prior COVID-19.\par Unchanged aortic aneurysm aortic root and ascending aorta.\par Unchanged dilated pulmonary trunk, which can be seen in pulmonary hypertension.\par

## 2021-03-19 NOTE — CONSULT LETTER
[Dear  ___] : Dear  [unfilled], [Courtesy Letter:] : I had the pleasure of seeing your patient, [unfilled], in my office today. [Please see my note below.] : Please see my note below. [Consult Closing:] : Thank you very much for allowing me to participate in the care of this patient.  If you have any questions, please do not hesitate to contact me. [Sincerely,] : Sincerely, [FreeTextEntry2] : Justin Cervantes MD\par [address]\[place] [address] [FreeTextEntry3] : Felecia Blood MD, FCCP\par

## 2021-03-19 NOTE — HISTORY OF PRESENT ILLNESS
[TextBox_4] : 5/13/20: Known to me from hospital. 58yo man w controlled HIV, tonsillar CA (s/p chemo & radiation), HLD, hypothyroidism, BPH and sleep apnea with recent hospitalization at [facility] from 3/30/20-5/1/20 with COVID-19 infection. Required NRB and was admitted to COVID-tele floor. Tx'd w HCQ, azithro, toci and steroids as well as vanc/zosyn for +MRSA swab as well as Bactrim for PCP ppx. Initiated on anticoagulation for DVT ppx though LE doppler negative and CTA negative for PE. Mid course he had worsening imaging and oxygenation and transferred to MICU and intubated for bronch which was nondiagnostic. Patient ultimately improved & weaned off oxygen and discharged with home supplemental oxygen 4L NC as well as 30 days of Lovenox injections. \par \par Today he was seen by video as he cannot easily navigate the stairs to his 3rd floor walkup. He relates that he feels markedly better. He has titrated his O2 down to 3L but still desaturates on exertion. Had gained 5 lbs, eating all the time. Seriously debilitated, navigates his ADLs w difficulty but has two friends assisting him. Taking Lovenox x 1 month with no difficulty and no bleeding. Finished Augmentin on discharge. Not on steroids.\par \par 7/29/20: Has been very slowly improving. Still difficult to navigate stairs to his walkup. But otherwise in his apartment much more ambulatory, doing more for himself, cooking etc. Has been able to walk his dog a bit. Able to go without oxygen sometimes. Eating, has gained 20-25 lbs. [place] On [place] from his throat cancer. Off Lovenox. Seeing Dr Cervantes for HIV care every 2 weeks. Got labs at home 2 weeks ago, VL still undetectable. Using O2 at 2L, up to 3-4 w exertion.\par \par 9/18/20: Took himself off oxygen 3-4 weeks ago, sats mid-90s on RA at home. Will go to 90-91% on ascending to his 5th floor walkup. May move to a ground floor apartment. In the interim had a painful neck abscess, [facility] sent him to [facility] where admitted x 5d, drained by IR, seen by ID and ENT. Cultures negative. Still w a lot of L head/neck pain - had a CT.

## 2021-04-06 ENCOUNTER — APPOINTMENT (OUTPATIENT)
Dept: PULMONOLOGY | Facility: CLINIC | Age: 61
End: 2021-04-06
Payer: MEDICARE

## 2021-04-06 DIAGNOSIS — J12.82 COVID-19: ICD-10-CM

## 2021-04-06 DIAGNOSIS — U07.1 COVID-19: ICD-10-CM

## 2021-04-06 PROCEDURE — 99213 OFFICE O/P EST LOW 20 MIN: CPT | Mod: CS,95

## 2021-04-06 NOTE — HISTORY OF PRESENT ILLNESS
[TextBox_4] : 5/13/20: Known to me from hospital. 58yo man w controlled HIV, tonsillar CA (s/p chemo & radiation), HLD, hypothyroidism, BPH and sleep apnea with recent hospitalization at [facility] from 3/30/20-5/1/20 with COVID-19 infection. Required NRB and was admitted to COVID-tele floor. Tx'd w HCQ, azithro, toci and steroids as well as vanc/zosyn for +MRSA swab as well as Bactrim for PCP ppx. Initiated on anticoagulation for DVT ppx though LE doppler negative and CTA negative for PE. Mid course he had worsening imaging and oxygenation and transferred to MICU and intubated for bronch which was nondiagnostic. Patient ultimately improved & weaned off oxygen and discharged with home supplemental oxygen 4L NC as well as 30 days of Lovenox injections. \par \par Today he was seen by video as he cannot easily navigate the stairs to his 3rd floor walkup. He relates that he feels markedly better. He has titrated his O2 down to 3L but still desaturates on exertion. Had gained 5 lbs, eating all the time. Seriously debilitated, navigates his ADLs w difficulty but has two friends assisting him. Taking Lovenox x 1 month with no difficulty and no bleeding. Finished Augmentin on discharge. Not on steroids.\par \par 7/29/20: Has been very slowly improving. Still difficult to navigate stairs to his walkup. But otherwise in his apartment much more ambulatory, doing more for himself, cooking etc. Has been able to walk his dog a bit. Able to go without oxygen sometimes. Eating, has gained 20-25 lbs. [place] On [place] from his throat cancer. Off Lovenox. Seeing Dr Cervantes for HIV care every 2 weeks. Got labs at home 2 weeks ago, VL still undetectable. Using O2 at 2L, up to 3-4 w exertion.\par \par 9/18/20: Took himself off oxygen 3-4 weeks ago, sats mid-90s on RA at home. Will go to 90-91% on ascending to his 5th floor walkup. May move to a ground floor apartment. In the interim had a painful neck abscess, [facility] sent him to [facility] where admitted x 5d, drained by IR, seen by ID and ENT. Cultures negative. Still w a lot of L head/neck pain - had a CT.\par \par 4/6/21: Appt by telehealth at his request due to Covid pandemic; both in [place]. He feels really, really well. He is grateful to have improved so much. He's had no significant interval health problems. He still experiences some dyspnea/fatigue, but is not limited in performing any activities. He got both Covid shots w severe fatigue but no other side effects. He has gained weight back to his premorbid weight. He would like to begin working out again. Having some PND.

## 2021-04-06 NOTE — ASSESSMENT
[FreeTextEntry1] : Data reviewed:\par \par CT chest [facility] 3/10/21 personally reviewed :\par Mild pulmonary fibrosis, likely sequela of known prior COVID-19.\par Unchanged aortic aneurysm aortic root and ascending aorta.\par Unchanged dilated pulmonary trunk, which can be seen in pulmonary hypertension.\par \par Impression:\par Acute-->chronic respiratory failure due to\par Covid-19 pneumonia\par \par Plan:\par He's markedly improved. He may or may not have further lung improvement but either way he should have further gains in stamina. Discussed exercise, start slow and work up. Has been vaccinated for Covid. Call for any concerns.

## 2021-05-06 NOTE — ED PROVIDER NOTE - CROS ED [facility] ALL NEG
Scheduled procedure with Patient at      Telephone Information:   Mobile [phone]      Scheduled Via: Case Request Order for  [facility]   Procedure date: 06-24-21   Procedure time: tbd ; Did patient request this specific time? n/a    -If non-ambulatory location, select reason: Not applicable  -Did patient request a specific facility other than MD's preferred facility? n/a; If so, which facility? n/a  -If a MAC pt is scheduled at [facility] OR [facility], pt was notified a family member/friend is need to stay with the patient over night after their procedure: n/a                Notified patient about receiving an animated Lara program? Yes    Was patient informed of COVID testing Yes;    The following have been confirmed:  Insurance name confirmed as [facility], will be the same at time of procedure?:yes Ins Accepted at Facility? Yes  Latex Allergy No  Diabetic No  Sleep Apnea No  Diuretic/Water pill No  Defibrillator/Pacemaker No  MRSA hx No  Blood thinners: Coumadin (Warfarin) or Plavix No      Aspirin No      Phentermine (diet pill) No  Constipation No;    Pre-Op testing required yes Patient informed Yes  Prep required? Yes, Pharmacy is [id number] [place], [place] - [address] AT [facility] [address] [facility]  P: [phone]  F: [phone]   (include location and/or phone number); Briefly reviewed? Yes; Prep cost range discussed? n/a  If procedure is scheduled 7 days or less, patient was told to  prep letter?: n/a     negative...

## 2021-05-20 NOTE — DISCHARGE NOTE NURSING/CASE MANAGEMENT/SOCIAL WORK - NSDCDMENAME_GEN_ALL_CORE_FT
Hospital Discharge Summary    Patient's PCP: Vannessa Kramer  Admit Date: 5/14/2021   Discharge Date: 5/21/2021    Admitting Physician: Dr. Lisette Luevano MD  Discharge Physician: Dr. Lieutenant Vazquez MD     Consults:   IP CONSULT TO PHARMACY  IP CONSULT TO NEPHROLOGY  IP CONSULT TO NEUROLOGY  IP CONSULT TO CASE MANAGEMENT    Brief HPI: Patient is a 45-year-old female with past medical history of schizophrenia, diabetes mellitus, DVT status post IVC filter, dementia who presented to hospital due to altered mental status, generalized weakness. Patient is unable to provide reliable history due to multiple comorbid conditions, reportedly patient presented to the emergency department today as patient was noted to have change in mental status, generalized weakness. According to the patient's home care nurse patient was noted to have right eye droop and generalized weakness, patient is usually more alert and awake however patient was found to be weak and not communicating much. Patient has history of previous CVA in the past.  Patient was recently treated for urinary tract infection on last admission. No reported fevers chills chest pain shortness of breath.       Brief hospital course:   1. Acute metabolic encephalopathy , improving , likely due to CVA  -MRI brain  - shows acute cva, echo without  PFO,   -Consult PT/OT/speech therapy/neurology  -On aspirin and started on plavix as she had stroke on aspirin  Neurology consulted     2. Generalized weakness  PTOT  recommending snf     3. Diabetes mellitus, type 2  Resume home aspirin, statin  Insulin sliding scale     4. Schizophrenia  Continue home meds     5. DVT status post IVC filter, patient was on Coumadin and Xarelto at one-point which was stopped due to GI bleed in 2018, per chart review       Invasive procedures:  None     Discharge Diagnoses: Active Problems:    AMS (altered mental status)  Resolved Problems:    * No resolved hospital problems. *      Physical Exam: BP (!) 158/69   Pulse 71   Temp 97.7 °F (36.5 °C) (Oral)   Resp 18   Ht 5' 6\" (1.676 m)   Wt 257 lb (116.6 kg)   SpO2 99%   BMI 41.48 kg/m²       General appearance: NAD, some what lethargic slow to respond, sitting n chair  HEENT:  Conjunctivae/corneas clear. Neck: Supple, with full range of motion. Respiratory:  Normal respiratory effort. Clear to auscultation, bilaterally without Rales/Wheezes/Rhonchi. Cardiovascular: Regular rate and rhythm with normal S1/S2 without murmurs or rubs  Abdomen: Soft, non-tender, non-distended, normal bowel sounds. Musculoskeletal: No cyanosis or edema bilaterally  Neurologic:  Seems to have R facial droop,  Psychiatric: Alert and oriented x 1, Normal mood  Peripheral Pulses: +2 palpable, equal bilaterally           Significant diagnostic studies that may require follow up:  Echo Complete    Result Date: 5/17/2021  Transthoracic Echocardiography Report (TTE)  Demographics   Patient Name       Rebeca ARANA   Date of Study      05/17/2021         Gender              Female   Patient Number     [id number]         Date of Birth       1942   Visit Number       [id number]          Age                 66 year(s)   Accession Number   [id number]         Room Number         5509   Corporate ID       [id number]           Sonographer         Godfrey Stone T   Ordering Physician Balaji Viera,   Interpreting        [facility] Frida LAMAR                 Physician           Francesco Lobato MD  Procedure Type of Study   TTE procedure:ECHOCARDIOGRAM COMPLETE 2D W DOPPLER W COLOR. Procedure Date Date: 05/17/2021 Start: 11:14 AM Study Location: [address] Echo Lab Technical Quality: Limited visualization due to body habitus. Indications:CVA. Patient Status: Routine Contrast Medium: Definity.  Amount - 2 ml Height: 66 inches Weight: 257.01 pounds BSA: 2.23 m2 BMI: 41.48 kg/m2 BP: 142/63 mmHg  Conclusions   Summary  N  Normal left ventricle size. There isborderline concentric left ventricular  hypertrophy. Overall left ventricular systolic function appears normal with  an ejection fraction of 55%. No regional wall motion abnormalities are  noted. Diastolic filling parameters suggests normal diastolic function. Aortic valve leaflets appear calcific/thickened but open adequately. Mild tricuspid regurgitation. Estimated pulmonary artery systolic pressure is at 32-36 mmHg assuming a  right atrial pressure of 8 mmHg. A bubble study was performed and fails to show evidence of right to left  shunting. IVC not well visualized. Signature   ------------------------------------------------------------------  Electronically signed by Yelitza Natarajan MD (Interpreting  physician) on 05/17/2021 at 12:51 PM  ------------------------------------------------------------------   Findings   Left Ventricle  Normal left ventricle size. There isborderline concentric left ventricular  hypertrophy. Overall left ventricular systolic function appears normal with  an ejection fraction of 55%. No regional wall motion abnormalities are  noted. Diastolic filling parameters suggests normal diastolic function. Mitral Valve  The mitral valve normal in structure and function. No evidence of mitral regurgitation or stenosis. Left Atrium  The left atrium is normal in size. A bubble study was performed and fails to show evidence of right to left  shunting. Aortic Valve  The aortic valve appears tricuspid. Aortic valve leaflets appear calcific/thickened but open adequately. Trace aortic valve regurgitation. No evidence of aortic valve stenosis. Aorta  The aortic root is normal in size. Right Ventricle  The right ventricle is normal in size and function.   TAPSE 2.03 cm  RV S velocity 9.14 cm/s   Tricuspid Valve  The tricuspid valve is normal in structure and function. Mild tricuspid regurgitation. Estimated pulmonary artery systolic pressure is at 70-24 mmHg assuming a  right atrial pressure of 8 mmHg. No evidence of tricuspid stenosis. Right Atrium  The right atrial size is normal.   Pulmonic Valve  The pulmonic valve is not well visualized. Trace pulmonary regurgitation. Pericardial Effusion  No pericardial effusion noted. Pleural Effusion  No pleural effusion. Miscellaneous  IVC not well visualized. M-Mode/2D Measurements (cm)   LV Diastolic Dimension: 3.78 cm LV Systolic Dimension: 6.41 cm  LV Septum Diastolic: 1.1 cm     LV Septum Systolic: 6.70 cm  LV PW Diastolic: 8.29 cm        LV PW Systolic: 7.05 cm  RV Diastolic Dimension: 9.18 cm AO Root Dimension: 2.7 cm                                  LA Dimension: 3.7 cm                                  LA Area: 17.1 cm2                                  LA volume/Index: 44.3 ml /20 ml/m2  Doppler Measurements                                  MV Peak E-Wave: 81.8 cm/s                                 MV Peak A-Wave: 111 cm/s                                 MV E/A Ratio: 0.74  TR Velocity:296 cm/s  TR Gradient:35.05 mmHg  Estimated RAP:8 mmHg  Estimated RVSP: 43 mmHg  E' Septal Velocity: 6.96 cm/s  MV Deceleration Time: 246 msec  E' Lateral Velocity: 9.68 cm/s  E/E' ratio: 8.5  PV Peak Velocity: 117 cm/s  PV Peak Gradient: 5.48 mmHg  Aorta   Aortic Root: 2.7 cm      CT HEAD WO CONTRAST    Result Date: 5/16/2021  CT HEAD WITHOUT CONTRAST Comparison study 5/14/2021 and MRI brain 5/14/2021 HISTORY: Evaluate for progression of CVA FINDINGS: Thin section axial images obtained through the head from skull base to the vertex. Multiplanar reconstruction images received for interpretation. Low radiation dose CT technique utilized. Diffuse cerebral atrophy with prominence of the sulci and ventricles.   Patchy periventricular deep white matter low attenuation bilaterally similar prior exam. Stable remote encephalomalacia in the superior right frontal lobe. There are no masses or midline shift. No abnormal extra-axial fluid collection seen. Brainstem and posterior fossa appear within normal limits. Visualized orbits and brain sinuses are clear. 1.  Age-related atrophy with patchy periventricular white matter changes bilaterally consistent with chronic small vessel ischemia. 3.  Stable right frontal lobe encephalomalacia consistent with remote infarct. Overall no significant interval change in appearance of the head from prior exam.    CT HEAD WO CONTRAST    Result Date: 5/14/2021  CT head without contrast HISTORY: Acute mental status change COMPARISON: 4/15/2021 CONTRAST:  none  TECHNIQUE: Individualized dose optimization technique was used in order to meet ALARA standards for radiation dose reduction. In addition to vendor specific dose reduction algorithms, the dose reduction techniques vary based on the specific scanner utilized but frequently include automated exposure control, adjustment of the mA and/or kV according to patient size, and use of iterative reconstruction technique. COMMENTS: There is moderate periventricular microangiopathy. Small area of encephalomalacia is again seen in the superior right frontal lobe. No evidence of acute territorial infarction, hemorrhage, or hydrocephalus. Sinuses and mastoids are clear. No osseous abnormality. Age-related changes and old infarct without acute intracranial abnormality. MRA HEAD WO CONTRAST    Result Date: 5/14/2021  PROCEDURE: 1.  Magnetic resonance imaging (MRI) of the brain without contrast 2. Magnetic resonance angiography (MRA) of the head without contrast 3. MRA of the neck without and with contrast INDICATION: weakness, AMS COMPARISON: 2/26/2020 TECHNIQUE: MRI of the brain was performed without contrast according to standard protocol. MRA of the head was performed without contrast utilizing time of flight technique.  MRA of the neck was performed utilizing fluoroscopy triggered contrast enhanced technique after the uneventful administration of 8 mL ProHance intravenous gadolinium contrast. FINDINGS: Brain: No evidence of acute or chronic hemorrhage is identified. There is a small 7 mm infarct in the left centrum semiovale at the frontoparietal junction. There is mild cerebral volume loss with associated ventricular dilatation. No mass effect or midline shift is seen. Moderate periventricular and subcortical white matter FLAIR hyperintensities likely represent sequelae of chronic small vessel ischemic disease. There is a chronic right frontal lobe infarct. The corpus callosum and sella appear normal. The posterior fossa, brainstem, and craniocervical junction appear normal. Visualized portions of the orbits, paranasal sinuses, and mastoids appear normal. Normal flow voids are demonstrated in the carotid arteries and basilar artery. The calvarium and visualized cervical spine appear normal. MRA Neck: The visualized aortic arch appears normal. The configuration of the brachiocephalic vessels is typical. The innominate artery and both subclavian arteries appear normal. The common carotid arteries appear normal. The carotid bifurcations appear normal. The cervical internal carotid arteries appear normal. The cervical vertebral arteries appear normal. MRA Head: The distal internal carotid arteries appear normal. The anterior and middle cerebral arteries appear normal. The distal vertebral arteries appear normal. The basilar artery and posterior cerebral arteries appear normal. No aneurysms, vascular occlusions,  or intracranial stenoses are identified. 1.  Small acute bland infarct at the left frontoparietal junction. 2.  Chronic right frontal lobe infarct. 3.  Moderate chronic small vessel ischemic disease. 4.  No aneurysms, vascular occlusions, or intracranial stenoses identified.  5.  No significant stenosis in the extracranial vertebral or carotid arteries. XR CHEST PORTABLE    Result Date: 5/14/2021  Chest portable 12 4 HISTORY: Wheezing     Elevated right hemidiaphragm. Clear lungs. Stable top normal heart size. NM PARATHYORID W SPECT    Result Date: 5/17/2021  NM PARATHYROID Indication: Hyperparathyroidism. Comparison: None. Radiopharmaceutical: 29.8 mCi of Tc-99m Sestamibi, IV. Technique: 20 minute and 2 hour delayed anterior planar images were acquired from the head to the mediastinum. SPECT imaging could not be performed due to patient's body habitus. Findings: Normal washout of thyroid activity on 2 hour images. No definite retained focus of uptake to identify or localize a parathyroid adenoma. Physiologic tracer activity is noted in the salivary glands, myocardium and dome of the liver. There are no scintigraphic findings to identify or localize a parathyroid adenoma. VL Extremity Venous Bilateral    Result Date: 5/18/2021  Lower Extremities DVT Study  Demographics   Patient Name        Sharifa ARANA   Date of Study       05/17/2021     Gender               Female   Patient Number      [id number]     Date of Birth        1942   Visit Number        [id number]      Age                  66 year(s)   Accession Number    [id number]     Room Number          1677   Corporate ID        [id number]       Sonographer          STACIA Trotter,                                                          RVT                                                          [facility], T   Ordering Physician  Jason Melissa [address] Vascular                                     Physician            Readers                                                          Saul Perdomo MD  Procedure Type of Study:   Veins:Lower Extremities DVT Study, VASC EXTREMITY VENOUS DUPLEX BILATERAL. Vascular Sonographer Report  Indications for Study:Swelling. Additional Indications:Patient is a poor historian.  She was admitted with altered mental status and has bilateral lower extremity swelling for an unknown amount of time. She has a history of DVT. Venous Duplex Scan: B-mode imaging of the deep and superficial veins, with compression maneuvers, including color and Doppler spectral waveform analysis. Impressions Right Impression Technically difficult study due to body habitus and also that the patient could not tolerate compression maneuvers. There is chronic partially occluding deep venous thrombosis involving the common femoral vein and the femoral vein. There is no evidence of deep or superficial venous thrombosis involving the right lower extremity within the limitations of this study. Left Impression Patient was moving constantly during the left leg scan. There is chronic partially occluding deep venous thrombosis involving the common femoral vein. The scan was discontinued after scanning the popliteal vein, due to patient intolerance. Conclusions   Summary   Evidence of a chronic phlebitic process is noted in the right common femoral  vein and the femoral vein and also in the left common femoral vein. The left calf was not scanned due to patient movement. Signature   ------------------------------------------------------------------  Electronically signed by Claudia Lopez MD (Interpreting  physician) on 05/18/2021 at 01:23 PM  ------------------------------------------------------------------  Patient Status:Routine. Study [address] [place] [address] - Vascular Lab. Technical Quality:Limited visualization due to body habitus. Risk Factors History +---------+----+-------------------------------------------------------------+ ! Diagnosis! Date! Comments                                                     ! +---------+----+-------------------------------------------------------------+ ! Other    ! ! History of DVT left leg two yrs. ago, History of P.E. 5-6    ! !         !    !years ago. !None      ! +------------------------+----------+---------------+----------+ ! PTV                     ! Yes       ! Yes            ! None      ! +------------------------+----------+---------------+----------+ ! Peroneal                !Partial   !Yes            ! None      ! +------------------------+----------+---------------+----------+ ! GSV Calf                ! Yes       ! Yes            ! None      ! +------------------------+----------+---------------+----------+ ! SSV                     ! Yes       ! Yes            ! None      ! +------------------------+----------+---------------+----------+ Right Doppler Measurements +------------------------+------+------+------------+ ! Location                ! Signal!Reflux! Reflux (sec)! +------------------------+------+------+------------+ ! Sapheno Femoral Junction! Phasic! No    !            ! +------------------------+------+------+------------+ ! Common Femoral          !Phasic! No    !            ! +------------------------+------+------+------------+ ! Femoral                 !Phasic! No    !            ! +------------------------+------+------+------------+ ! Deep Femoral            !Phasic! No    !            ! +------------------------+------+------+------------+ ! Popliteal               !Phasic! No    !            ! +------------------------+------+------+------------+ Left Lower Extremities DVT Study Measurements Left 2D Measurements +------------------------+----------+---------------+----------+ ! Location                ! Visualized! Compressibility! Thrombosis! +------------------------+----------+---------------+----------+ ! Sapheno Femoral Junction! Yes       ! Yes            ! None      ! +------------------------+----------+---------------+----------+ ! GSV Thigh               ! Yes       ! Yes            ! None      ! +------------------------+----------+---------------+----------+ ! Common Femoral          !Yes       ! Yes            ! Chronic   ! +------------------------+----------+---------------+----------+ ! Femoral                 !Yes       ! Yes            ! None      ! +------------------------+----------+---------------+----------+ ! Prox Femoral            !Yes       ! Yes            ! None      ! +------------------------+----------+---------------+----------+ ! Mid Femoral             !Partial   !No             !None      ! +------------------------+----------+---------------+----------+ ! Dist Femoral            !Partial   !No             !None      ! +------------------------+----------+---------------+----------+ ! Deep Femoral            !Partial   !No             !None      ! +------------------------+----------+---------------+----------+ ! Popliteal               !Yes       ! Yes            ! None      ! +------------------------+----------+---------------+----------+ ! GSV Below Knee          ! Yes       ! Yes            ! None      ! +------------------------+----------+---------------+----------+ ! GSV Calf                ! Yes       ! Yes            ! None      ! +------------------------+----------+---------------+----------+ Left Doppler Measurements +------------------------+------+------+------------+ ! Location                ! Signal!Reflux! Reflux (sec)! +------------------------+------+------+------------+ ! Sapheno Femoral Junction! Phasic! No    !            ! +------------------------+------+------+------------+ ! Common Femoral          !Phasic! No    !            ! +------------------------+------+------+------------+ ! Femoral                 !Phasic! No    !            ! +------------------------+------+------+------------+ ! Deep Femoral            !Phasic! No    !            ! +------------------------+------+------+------------+ ! Popliteal               !Phasic! No    !            ! +------------------------+------+------+------------+    MRA NECK W WO CONTRAST    Result Date: 5/14/2021  PROCEDURE: 1.  Magnetic resonance imaging (MRI) of the brain [facility] Surgical aneurysms, vascular occlusions,  or intracranial stenoses are identified. 1.  Small acute bland infarct at the left frontoparietal junction. 2.  Chronic right frontal lobe infarct. 3.  Moderate chronic small vessel ischemic disease. 4.  No aneurysms, vascular occlusions, or intracranial stenoses identified. 5.  No significant stenosis in the extracranial vertebral or carotid arteries. MRI BRAIN WO CONTRAST    Result Date: 5/14/2021  PROCEDURE: 1.  Magnetic resonance imaging (MRI) of the brain without contrast 2. Magnetic resonance angiography (MRA) of the head without contrast 3. MRA of the neck without and with contrast INDICATION: weakness, AMS COMPARISON: 2/26/2020 TECHNIQUE: MRI of the brain was performed without contrast according to standard protocol. MRA of the head was performed without contrast utilizing time of flight technique. MRA of the neck was performed utilizing fluoroscopy triggered contrast enhanced technique after the uneventful administration of 8 mL ProHance intravenous gadolinium contrast. FINDINGS: Brain: No evidence of acute or chronic hemorrhage is identified. There is a small 7 mm infarct in the left centrum semiovale at the frontoparietal junction. There is mild cerebral volume loss with associated ventricular dilatation. No mass effect or midline shift is seen. Moderate periventricular and subcortical white matter FLAIR hyperintensities likely represent sequelae of chronic small vessel ischemic disease. There is a chronic right frontal lobe infarct. The corpus callosum and sella appear normal. The posterior fossa, brainstem, and craniocervical junction appear normal. Visualized portions of the orbits, paranasal sinuses, and mastoids appear normal. Normal flow voids are demonstrated in the carotid arteries and basilar artery.  The calvarium and visualized cervical spine appear normal. MRA Neck: The visualized aortic arch appears normal. The configuration of the brachiocephalic SITagliptin 100 MG tablet  Commonly known as: Bryonuvia  Take 1 tablet by mouth daily           Where to Get Your Medications      These medications were sent to [facility], [address]  [address] [place] [id number]    Phone: [phone]   · clopidogrel 75 MG tablet  · insulin glargine 100 UNIT/ML injection vial  · losartan 50 MG tablet  · torsemide 20 MG tablet         Activity: activity as tolerated  Diet: DIET DENTAL SOFT; Carb Control: 3 carb choices (45 gms)/meal; Low Sodium (2 GM); Dental Soft      Disposition: SNF  Discharged Condition: Stable  Follow Up:   Charlei Christensen [address]  [phone]              Code status:  Full Code         Total time spent on discharge, finalizing medications, referrals and arranging outpatient follow up was more than 45 minutes      Thank you Dr. Louie Guadalupe for the opportunity to be involved in this patients care.

## 2021-09-14 NOTE — CONSULT NOTE ADULT - SUBJECTIVE AND OBJECTIVE BOX
HPI:  58YO M with PMH of HIV (VL undetectable, CD4 232 per pt), tonsillar Ca (sp chemo and radtx), HLD, hypothyroidism, BPH and sleep apnea who presented with cough, subjective fevers, pain with cough, decreased PO intake for 2 weeks with 1d of increasing SOB and MENDEZ. Of note presented mid-March (3/15) to [facility] with similar sx and was prescribed Azithromycin. On ROS endorsed cough as intermittently productive - dark brown sputum. Endorsed mild diarrhea (possibly chronic per med review), denied n/v. Denied abd pain/chest pain. Denied chills. Denied any known sick contacts or contacts w COVID dx.    Patient reports he was diagnosed with HIV 24 years ago.  Has been on multiple regimens in the past.  Reports his current regimen is:  Descovy + Intelence + Isentress + Prezista/Norvir.  He reports 100 % compliance with his medications.  Has been on this regimen for years. He follows with a Dr. Marco Cervantes at [facility].  Reports he last saw the doctor in 10/2019.  He believes his viral load is always undetectable and CD4 count is above 200.        Date of onset of fever: unclear date   Date of onset of dyspnea: 1d  Recent Travel: N  Sick Contacts: N  COVID Exposure: N  Close Contacts: N    ROS:  Fevers: Y  Malaise: Y  Myalgias: N  SOB: Y         At rest: Y        With exertion: Y         At baseline:  N  Cough: Y         Productive: Y  Nausea: N  Vomiting: N  Diarrhea: Y    PMHx:   HTN: N  DM: N  Lung Disease: N however->       Specify: prior CTs in [facility] concerning for pulmonary nodule  Cardiovascular disease: N  Malignancy: Y hx tonsilar Ca  Immunosuppression: N  HIV: Y  CKD/ESRD: N  Chronic Liver Disease: N    SoHx:  Tobacco use: N  Vape use: N  Health care worker: N (31 Mar 2020 02:00)      PAST MEDICAL & SURGICAL HISTORY:  History of BPH  HIV disease  Abscess  Throat cancer  No significant past surgical history        REVIEW OF SYSTEMS:    General:	 no weakness; no fevers, no chills  Skin/Breast: no rash  Respiratory and Thorax: no SOB, no cough  Cardiovascular:	No chest pain  Gastrointestinal:	 no nausea, vomiting , diarrhea  Genitourinary:	no dysuria, no difficulty urinating, no hematuria  Musculoskeletal:	no weakness, no joint swelling/pain  Neurological:	no focal weakness/numbness  Endocrine:	no polyuria, no polydipsia      ANTIBIOTICS:  MEDICATIONS  (STANDING):  acetaminophen   Tablet .. 975 milliGRAM(s) Oral every 6 hours  darunavir 600 milliGRAM(s) Oral two times a day  enoxaparin Injectable 40 milliGRAM(s) SubCutaneous every 24 hours  famotidine    Tablet 20 milliGRAM(s) Oral daily  influenza   Vaccine 0.5 milliLiter(s) IntraMuscular once  levothyroxine 125 MICROGram(s) Oral daily  raltegravir Tablet 400 milliGRAM(s) Oral two times a day  ritonavir Tablet 100 milliGRAM(s) Oral two times a day  sodium chloride 0.9%. 1000 milliLiter(s) (50 mL/Hr) IV Continuous <Continuous>  tamsulosin 0.4 milliGRAM(s) Oral at bedtime  valACYclovir 500 milliGRAM(s) Oral two times a day    MEDICATIONS  (PRN):  benzonatate 100 milliGRAM(s) Oral three times a day PRN Cough      Allergies    No Known Allergies    Intolerances        SOCIAL HISTORY:    FAMILY HISTORY:  Family history of diabetes mellitus (DM)  FH: heart disease: father, father still alive age 90      Vital Signs Last 24 Hrs  T(C): 37.9 (31 Mar 2020 17:00), Max: 38.6 (31 Mar 2020 11:59)  T(F): 100.3 (31 Mar 2020 17:00), Max: 101.4 (31 Mar 2020 11:59)  HR: 72 (31 Mar 2020 11:59) (56 - 110)  BP: 112/64 (31 Mar 2020 11:59) (77/52 - 143/67)  BP(mean): --  RR: 18 (31 Mar 2020 11:59) (17 - 20)  SpO2: 97% (31 Mar 2020 11:59) (91% - 98%)    PHYSICAL EXAM:  Constitutional:  non-toxic, no distress  Eyes:  no icterus   Ear/Nose/Throat: no sinus tenderness on percussion	  Neck:  supple  Respiratory: CTA joseph  Cardiovascular: S1S2 RRR, no murmurs  Gastrointestinal:soft, (+) BS, no HSM  Extremities:no edema   Vascular: DP Pulse:	right normal; left normal            LABS:                        16.8   8.91  )-----------( 163      ( 31 Mar 2020 09:09 )             51.4     03-31    134<L>  |  100  |  49<H>  ----------------------------<  96  3.9   |  18<L>  |  2.10<H>    Ca    8.8      31 Mar 2020 09:09  Phos  3.7     03-31  Mg     2.1     03-31    TPro  7.4  /  Alb  2.8<L>  /  TBili  0.3  /  DBili  x   /  AST  69<H>  /  ALT  46<H>  /  AlkPhos  41  03-31      COVID-19:  PCR + x 2     MICROBIOLOGY:      RADIOLOGY & ADDITIONAL STUDIES:    < from: CT Chest No Cont (03.30.20 @ 19:31) >   Multiple perihilar and peripheral ground glass opacities are seen within the upper and lower lobes bilaterally, as well as within the right middle lobe with a bronchocentric and peripheral pattern. These findings are suspicious for atypical/viral pneumonia such as COVID-19   2.  Aneurysmal dilatation of the aortic root measuring up to 4.6 cm, ascending aorta measuring 4.4 cm, aortic arch 3.5 cm and descending thoracic aorta to the level of the aortic hiatus measuring 3 cm. Periodic interval surveillance may be of value.  3.  Mild dilatation of the main pulmonary artery, measuring up to 3.3 cm, suggestive of pulmonary hypertension.  4.  Nonobstructive left nephrolithiasis.
2

## 2021-10-06 PROBLEM — U07.1 PNEUMONIA DUE TO COVID-19 VIRUS: Status: ACTIVE | Noted: 2020-05-13

## 2022-01-20 NOTE — ED ADULT TRIAGE NOTE - NSWEIGHTCALCTOOLDRUG_GEN_A_CORE
Cardiology Progress Note    Subjective:  Patient back in a-fib since yesterday. HR controlled at 96 this morning on PO diltiazem. She reports palpitations with associated shortness of breath, fatigue, anxiety, and general discomfort. SpO2 maintained at 95% on 3L O2. She states that she has difficulty breathing when lying in bed and has been sitting in the recliner. However, she was able to sleep well overnight in bed. Alprazolam increased back to 0.25 mg BID. Mucinex and Claritin also ordered by pulmonology.    Current Medications:  • guaiFENesin  1,200 mg Oral 2 times per day   • loratadine  10 mg Oral QAM AC   • ALPRAZolam  0.25 mg Oral BID   • predniSONE  20 mg Oral Daily with breakfast    Followed by   • [START ON 1/22/2022] predniSONE  15 mg Oral Daily with breakfast    Followed by   • [START ON 1/25/2022] predniSONE  10 mg Oral Daily with breakfast   • budesonide  0.5 mg Nebulization BID Resp   • dilTIAZem  60 mg Oral 4 times per day   • sodium chloride (PF)  2 mL Intracatheter 2 times per day   • enoxaparin  1 mg/kg Subcutaneous Q12H   • ipratropium-albuterol  3 mL Nebulization Q4H WA Resp          Objective:  Vital signs in last 24 hours:  Temp:  [97.3 °F (36.3 °C)-98.6 °F (37 °C)] 98.3 °F (36.8 °C)  Heart Rate:  [] 96  Resp:  [16-18] 16  BP: (110-150)/(59-67) 110/62    Weight:  Weight    01/17/22 1524 01/17/22 1840   Weight: 59.9 kg (132 lb) 63 kg (138 lb 14.2 oz)       Vital Most Recent Value First Value   Weight 63 kg (138 lb 14.2 oz) Weight: 59.9 kg (132 lb)   Height 5' 4\" (162.6 cm) Height: 5' 4\" (162.6 cm)       Intake/output:    Intake/Output Summary (Last 24 hours) at 1/20/2022 1042  Last data filed at 1/20/2022 0947  Gross per 24 hour   Intake 240 ml   Output --   Net 240 ml                Physical Exam:     General:  Alert and oriented.  Heart:  Irregularly irregular rhythm.  Lungs:  Decreased breath sounds bilaterally.  Abdomen:  Soft and non-tender.  Extremities:  No edema or  tenderness.  Peripheral Pulses:  Normal dorsalis pedis and posterior tibial pulses bilaterally.    Recent Labs   Lab 01/20/22  0608 01/19/22  0334 01/17/22  1528   WBC 5.6 7.9 9.7   RBC 4.57 4.28 4.82   HGB 13.2 12.0 13.8   HCT 44.5 41.3 45.6   MCV 97.4 96.5 94.6    143 219     No results for input(s): INR in the last 72 hours.  Recent Labs   Lab 10/01/21  0427   Cholesterol 222*   HDL 78   Triglycerides 94   CALCLDL 125   Non-HDL Cholesterol 144     Recent Labs   Lab 01/20/22  0608 01/19/22  0334 01/17/22  1528   SODIUM 144 142 140   POTASSIUM 4.1 4.1 3.7   CHLORIDE 100 104 99   CO2 39* 39* 41*   CREATININE 0.47* 0.41* 0.56   BUN 12 12 16   GLUCOSE 79 118* 129*   ANIONGAP 9* 3* 4*   MG 2.4 2.4 2.5*     Recent Labs   Lab 01/17/22  1528 09/30/21  1122 08/12/21  1938 07/12/21  1609 06/01/21  0955   Troponin I  --   --   --   --  <0.10   Troponin I, Ultra Sensitive  --  <0.02 <0.02 <0.02  --    Troponin I, High Sensitivity 190*  --   --   --   --    NT-proBNP 1,073* 562*  --   --   --        Assessment:  Atrial fibrillation with RVR: New onset - mediated by COPD exacerbation/hypoxia. Back in a-fib with controlled HR. On diltiazem 60 mg PO every 6 hours as of this morning; Lovenox 60 mg inj BID.    Recommendations /Plan:    Continue current medications except increase diltiazem to 90 mg PO q6h to optimize heart rate control.    Rohit Alejandro MS-3  10:42 AM    I attest that I interviewed and examined the patient, reviewed the data and the care plan with Rohit Alejandro MS-3, and agree with the documentation as outlined above.  I reviewed and edited the above note as needed. CV: Normal heart sounds; irregularly regular rhythm. Lungs:  Decreased breath sounds on auscultation.       Jimy Sheehan MD            used

## 2022-06-16 NOTE — ED ADULT NURSE NOTE - BREATH SOUNDS, MLM
Tim Maxwell is 9 year old 1 month old, here for a preventive care visit.    Assessment & Plan     (Z00.129) Encounter for routine child health examination w/o abnormal findings  (primary encounter diagnosis)  Comment:   Plan: BEHAVIORAL/EMOTIONAL ASSESSMENT (34149),         SCREENING TEST, PURE TONE, AIR ONLY, SCREENING,        VISUAL ACUITY, QUANTITATIVE, BILAT              Growth        Normal height and weight    No weight concerns.    Immunizations     Appropriate vaccinations were ordered.      Anticipatory Guidance    Reviewed age appropriate anticipatory guidance.   The following topics were discussed:  SOCIAL/ FAMILY:    Chores/ expectations  NUTRITION:    Balanced diet  HEALTH/ SAFETY:    Physical activity    Regular dental care    Booster seat/ Seat belts    Helmet        Referrals/Ongoing Specialty Care  No    Follow Up      Return in 1 year (on 6/16/2023) for Preventive Care visit.    Subjective     Additional Questions 6/16/2022   Do you have any questions today that you would like to discuss? No   Has your child had a surgery, major illness or injury since the last physical exam? No             Social 6/13/2022   Who does your child live with? Parent(s), Sibling(s)   Has your child experienced any stressful family events recently? (!) OTHER   Please specify: Mom was in an ATV accident which lead to her being in the [facility] hospitals for 4 weeks.   In the past 12 months, has lack of transportation kept you from medical appointments or from getting medications? No   In the last 12 months, was there a time when you were not able to pay the mortgage or rent on time? No   In the last 12 months, was there a time when you did not have a steady place to sleep or slept in a shelter (including now)? No       Health Risks/Safety 6/13/2022   What type of car seat does your child use? (!) NONE   Where does your child sit in the car?  Back seat   Do you have a swimming pool? No   Is your child ever home alone?  No   Do  you have guns/firearms in the home? (!) YES   Are the guns/firearms secured in a safe or with a trigger lock? Yes   Is ammunition stored separately from guns? Yes       TB Screening 6/13/2022   Was your child born outside of the [place]? No     TB Screening 6/13/2022   Since your last Well Child visit, have any of your child's family members or close contacts had tuberculosis or a positive tuberculosis test? No   Since your last Well Child Visit, has your child or any of their family members or close contacts traveled or lived outside of the [place]? No   Since your last Well Child visit, has your child lived in a high-risk group setting like a correctional facility, health care facility, homeless shelter, or refugee camp? No        Dyslipidemia Screening 6/13/2022   Have any of the child's parents or grandparents had a stroke or heart attack before age 55 for males or before age 65 for females?  No   Do either of the child's parents have high cholesterol or are currently taking medications to treat cholesterol? No    Risk Factors: None      Dental Screening 6/13/2022   Has your child seen a dentist? (!) NO   Has your child had cavities in the last 3 years? (!) YES, 1-2 CAVITIES IN THE LAST 3 YEARS- MODERATE RISK   Has your child s parent(s), caregiver, or sibling(s) had any cavities in the last 2 years?  No     Dental Fluoride Varnish:   No, parent/guardian declines fluoride varnish.  Reason for decline: Patient/Parental preference  Diet 6/13/2022   Do you have questions about feeding your child? No   What does your child regularly drink? Water, Cow's milk, (!) JUICE   What type of milk? 1%   What type of water? (!) BOTTLED, (!) FILTERED   How often does your family eat meals together? Every day   How many snacks does your child eat per day 3-6 snacks   Are there types of foods your child won't eat? (!) YES   Please specify: rice, some meat   Does your child get at least 3 servings of food or beverages  that have calcium each day (dairy, green leafy vegetables, etc)? Yes   Within the past 12 months, you worried that your food would run out before you got money to buy more. (!) SOMETIMES TRUE   Within the past 12 months, the food you bought just didn't last and you didn't have money to get more. (!) DECLINE     Elimination 6/13/2022   Do you have any concerns about your child's bladder or bowels? No concerns         Activity 6/13/2022   On average, how many days per week does your child engage in moderate to strenuous exercise (like walking fast, running, jogging, dancing, swimming, biking, or other activities that cause a light or heavy sweat)? (!) 5 DAYS   On average, how many minutes does your child engage in exercise at this level? (!) 20 MINUTES   What does your child do for exercise?  ride bike, play in trampoline, play upstairs, walk to playground   What activities is your child involved with?  Girls on the run, cleaning, playing, cooking with mom     Media Use 6/13/2022   How many hours per day is your child viewing a screen for entertainment?    5-8 hours   Does your child use a screen in their bedroom? No     Sleep 6/13/2022   Do you have any concerns about your child's sleep?  No concerns, sleeps well through the night       Vision/Hearing 6/13/2022   Do you have any concerns about your child's hearing or vision?  No concerns     Vision Screen  Vision Screen Details  Does the patient have corrective lenses (glasses/contacts)?: No  No Corrective Lenses, PLUS LENS REQUIRED: Pass  Vision Acuity Screen  Vision Acuity Tool: Ramiro  RIGHT EYE: 10/12.5 (20/25)  LEFT EYE: 10/12.5 (20/25)  Is there a two line difference?: No  Vision Screen Results: Pass    Hearing Screen  RIGHT EAR  1000 Hz on Level 40 dB (Conditioning sound): Pass  1000 Hz on Level 20 dB: Pass  2000 Hz on Level 20 dB: Pass  4000 Hz on Level 20 dB: Pass  LEFT EAR  4000 Hz on Level 20 dB: Pass  2000 Hz on Level 20 dB: Pass  1000 Hz on Level 20 dB:  "Pass  500 Hz on Level 25 dB: Pass  RIGHT EAR  500 Hz on Level 25 dB: Pass  Results  Hearing Screen Results: Pass      School 6/13/2022   Do you have any concerns about your child's learning in school? No concerns   What grade is your child in school? 3rd Grade   What school does your child attend? [employer]   Does your child typically miss more than 2 days of school per month? No   Do you have concerns about your child's friendships or peer relationships?  No     Development / Social-Emotional Screen 6/13/2022   Does your child receive any special educational services? (!) INDIVIDUAL EDUCATIONAL PROGRAM (IEP), (!) SPEECH THERAPY     Mental Health - PSC-17 required for C&TC  Screening:    Electronic PSC   PSC SCORES 6/13/2022   Inattentive / Hyperactive Symptoms Subtotal 0   Externalizing Symptoms Subtotal 1   Internalizing Symptoms Subtotal 5 (At Risk)   PSC - 17 Total Score 6       Follow up:  no follow up necessary     No concerns        Review of Systems       Objective     Exam  BP 94/61 (BP Location: Left arm, Patient Position: Sitting, Cuff Size: Child)   Pulse 71   Temp 97.4  F (36.3  C) (Tympanic)   Ht 1.242 m (4' 0.9\")   Wt 22.9 kg (50 lb 6.4 oz)   SpO2 100%   BMI 14.82 kg/m    6 %ile (Z= -1.53) based on CDC (Girls, 2-20 Years) Stature-for-age data based on Stature recorded on 6/16/2022.  6 %ile (Z= -1.53) based on CDC (Girls, 2-20 Years) weight-for-age data using vitals from 6/16/2022.  20 %ile (Z= -0.86) based on CDC (Girls, 2-20 Years) BMI-for-age based on BMI available as of 6/16/2022.  Blood pressure percentiles are 51 % systolic and 66 % diastolic based on the 2017 AAP Clinical Practice Guideline. This reading is in the normal blood pressure range.  Physical Exam  GENERAL: Active, alert, in no acute distress.  SKIN: Clear. No significant rash, abnormal pigmentation or lesions  HEAD: Normocephalic  EYES: Pupils equal, round, reactive, Extraocular muscles intact. Normal " conjunctivae.  EARS: Normal canals. Tympanic membranes are normal; gray and translucent.  NOSE: Normal without discharge.  MOUTH/THROAT: Clear. No oral lesions. Teeth without obvious abnormalities.  NECK: Supple, no masses.  No thyromegaly.  LYMPH NODES: No adenopathy  LUNGS: Clear. No rales, rhonchi, wheezing or retractions  HEART: Regular rhythm. Normal S1/S2. No murmurs. Normal pulses.  ABDOMEN: Soft, non-tender, not distended, no masses or hepatosplenomegaly. Bowel sounds normal.   NEUROLOGIC: No focal findings. Cranial nerves grossly intact: DTR's normal. Normal gait, strength and tone  BACK: Spine is straight, no scoliosis.  EXTREMITIES: Full range of motion, no deformities  : Normal female external genitalia, Raghu stage 1.   BREASTS:  Raghu stage 1.  No abnormalities.            Lisa Welch MD  [facility]   Clear

## 2022-08-26 NOTE — PATIENT PROFILE ADULT - BRADEN NUTRITION
Otc Regimen: CLN shampoo Plan: If there’s no improvement, send in minocycline Initiate Treatment: clindamycin phosphate 1 % topical solution \\nQuantity: 60.0 ml  Days Supply: 30\\nSig: Apply to affected areas on scalp once daily for flares as needed Detail Level: Zone (3) adequate

## 2022-10-06 NOTE — ED PROCEDURE NOTE - SUBCUTANEOUS SUTURE
Surgeon: Mary Ann    Requesting Physician: Vianney    HISTORY OF PRESENT ILLNESS (Need 4):  33 YO Male with hx of DVT/PE was on Xarelto but stopped in the last 6 months who presents with chest pain and noted to have Thrombus in IVC, SVC, and Rt Atrium. Patient was transferred from [facility] with above noted findings and on heparin gtt. On interview today patient denies any right leg pain, swelling, weakness, chest pain, SOB, N,V, headache or blurred vision. Patient reports that he was detained by  [place]. During his detainment his Xarelto was stopped. He admits to 1ppd smoking and alcoholism. His last drink was 5 days ago. Further work showed no evidence of thrombus but found a solid pulmonary nodule right posterior basal segment with pleural contact 1.7 x 1.4 x 1.3 cm in size.  Thoracic surgery consulted for evaluation.      PAST MEDICAL & SURGICAL HISTORY:  Pulmonary embolism      DVT, lower extremity          MEDICATIONS  (STANDING):  heparin  Infusion. 1400 Unit(s)/Hr (14 mL/Hr) IV Continuous <Continuous>  influenza   Vaccine 0.5 milliLiter(s) IntraMuscular once  nicotine -   7 mG/24Hr(s) Patch 1 Patch Transdermal daily  sodium chloride 0.9%. 1000 milliLiter(s) (60 mL/Hr) IV Continuous <Continuous>    MEDICATIONS  (PRN):      Allergies    No Known Allergies    Intolerances        SOCIAL HISTORY:  Smoker:  YES 1ppd  ETOH use:  YES Last drink 5 yrs ago  Ilicit Drug use:  No    FAMILY HISTORY:      Review of Systems (Need 10):  CONSTITUTIONAL: Denies fevers / chills, sweats, fatigue, weight loss, weight gain                                       NEURO:  Denies parathesias, seizures, syncope, confusion                                                                                  EYES:  Denies blurry vision, discharge, pain, loss of vision                                                                                    ENMT:  Denies difficulty hearing, vertigo, dysphagia, epistaxis, recent dental work                                       CV:  Denies chest pain, palpitations, LAND, orthopnea                                                                                           RESPIRATORY:  Denies wWheezing, SOB, cough / sputum, hemoptysis                                                               GI:  Denies nausea, vomiting, diarrhea, constipation, melena                                                                          : Denies hematuria, dysuria, urgency, incontinence                                                                                          MUSKULOSKELETAL:  Denies arthritis, joint swelling, muscle weakness                                                             SKIN/BREAST:  Denies rash, itching, hair loss, masses                                                                                              PSYCH:  Denies depression, anxiety, suicidal ideation                                                                                                HEME/LYMPH:  Denies bruises easily, enlarged lymph nodes, tender lymph nodes                                          ENDOCRINE:  Denies cold intolerance, heat intolerance, polydipsia                                                                      Vital Signs Last 24 Hrs  T(C): 36.2 (06 Oct 2022 14:25), Max: 36.8 (06 Oct 2022 04:26)  T(F): 97.1 (06 Oct 2022 14:25), Max: 98.3 (06 Oct 2022 04:26)  HR: 45 (06 Oct 2022 08:30) (45 - 70)  BP: 120/61 (06 Oct 2022 08:30) (112/73 - 131/83)  BP(mean): 84 (06 Oct 2022 08:30) (84 - 84)  RR: 19 (06 Oct 2022 08:30) (13 - 19)  SpO2: 96% (06 Oct 2022 08:30) (96% - 99%)    Parameters below as of 06 Oct 2022 08:30  Patient On (Oxygen Delivery Method): room air        Physical Exam (Need 8)  GEN: NAD, looks comfortable  Psych: Mood appropriate  Neuro: A&Ox3.  No focal deficits.  Moving all extremities.   HEENT: No obvious abnormalities  CV: S1S2, regular, no murmurs appreciated.  No carotid bruits.  No JVD  Lungs: Clear B/L.  No wheezing, rales or rhonchi  ABD: Soft, non-tender, non-distended.  +Bowel sounds  EXT: Warm and well perfused.  No peripheral edema noted  Musculoskeletal: Moving all extremities with normal ROM, no joint swelling  PV: Pedal pulses palpable                                                          LABS:                        15.2   7.72  )-----------( 241      ( 06 Oct 2022 12:00 )             45.7     10-06    141  |  109<H>  |  7   ----------------------------<  90  4.0   |  26  |  0.80    Ca    8.6      06 Oct 2022 08:30  Phos  3.6     10-06  Mg     2.0     10-06    TPro  7.4  /  Alb  3.6  /  TBili  0.4  /  DBili  x   /  AST  17  /  ALT  14  /  AlkPhos  84  10-05    PT/INR - ( 05 Oct 2022 23:13 )   PT: 13.2 sec;   INR: 1.14          PTT - ( 06 Oct 2022 12:20 )  PTT:67.7 sec            RADIOLOGY & ADDITIONAL STUDIES:    CT Scan:    < from: CT Angio Abdomen and Pelvis w/ IV Cont (10.06.22 @ 13:18) >  1.  No venous thrombosis. Questionable finding reported on recent CTA   chest October 5, 2022 is related to mixing artifact.  2.  Solid right lowerlobe pulmonary nodules and pleural-based mass,   suspicious for neoplasia. Recommend FDG PET/CT correlation and/or tissue   biopsy.    < end of copied text >       interrupted

## 2023-08-08 NOTE — ED PROVIDER NOTE - CPE EDP ENMT NORM
Pt given discharge instructions, patient education, 0 prescriptions, and follow up information. Pt verbalizes understanding. All questions answered. Pt discharged to home in private vehicle, ambulatory. Pt A&Ox4, RA, pain controlled.     Work note given     Cristofer Browning RN  08/07/23 2210       Cristofer Browning RN  08/07/23 0832 normal...

## 2023-08-15 NOTE — PATIENT PROFILE ADULT - NSPROGENSOURCEINFO_GEN_A_NUR
Topical Sulfur Applications Counseling: Topical Sulfur Counseling: Patient counseled that this medication may cause skin irritation or allergic reactions.  In the event of skin irritation, the patient was advised to reduce the amount of the drug applied or use it less frequently.   The patient verbalized understanding of the proper use and possible adverse effects of topical sulfur application.  All of the patient's questions and concerns were addressed. patient

## 2023-09-05 NOTE — ED ADULT TRIAGE NOTE - BP NONINVASIVE DIASTOLIC (MM HG)
Diya Massey  : 1961  Primary: [employer]  Secondary: Evette Roberts [facility] @ [address] [place], [place] [address]ice [facility] [address]  Phone: [phone]  Fax: [phone] Plan Frequency: 1-2 times/week    Certification Period Expiration Date: 23      SLP VISIT INFO  Effective Dates:    2023 TO 2023   Frequency/Duration  1-2 time(s) a week for 90 days  SLP Visit Info:  No Show: 0  Canceled Appointment: 1 ( reason unknown)  Total # of Visits to Date: 3      OUTPATIENT SPEECH PATHOLOGY NOTE:Daily Note 2023  Appt Desk   Episode      Treatment Diagnosis: R47.1 Dysarthria and Anarthria  Medical/Referring Diagnosis:  Weakness [R53.1]  Referring Physician:  VASU Manzanares MD Orders:  Eval and Treat   Allergies:  Patient has no allergy information on record. Medications Last Reviewed:  2023  Subjective Comments:  Patient states his provider has stopped his cholesterol medication. Pain:  Patient does not c/o pain. Interventions Planned: (Treatment may consist of any combination of the following)  Oral motor exercises, Motor speech based treatment, Training in compensatory strategies, and Education  GOALS: (Goals have been discussed and agreed upon with patient.)  Short-Term Functional Goals: Time Frame: 8 weeks  Patient will perform oral motor exercises x5 each with >90% accuracy to improve strength and coordination of oral articulators. Patient will use pacing and pausing techniques at phrase/sentence/paragraph level with min cues 80% of the time. Patient will articulate consonants at phrase/sentence/paragraph level with 90% accuracy with min cues. Patient will independently use compensatory strategies at conversational level to improve speech intelligibility to >90% accuracy. Patient will perform home exercise program based on incorporating compensatory strategies into communication with 90% efficiency. 82

## 2023-09-12 NOTE — ED PROCEDURE NOTE - PROCEDURE NAME, MLM
Laceration Repair Detail Level: Simple Plan: He cannot take antihistamines due to glaucoma.  He can use cool packs and moisturizing daily.  He notes that it is episodic and brief

## 2023-09-14 NOTE — PROGRESS NOTE ADULT - PROVIDER SPECIALTY LIST ADULT
DATE OF SERVICE: 9/14/23    ATTENDING SURGEON:  Ruiz Scott MD      PREOPERATIVE DIAGNOSIS:    1.) Left nasal defect s/p stage 1 reconstruction with a melolabial flap and intranasal skin graft.  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      POSTOPERATIVE DIAGNOSES:    1.) Left nasal defect s/p stage 1 reconstruction with a melolabial flap and intranasal skin graft.  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      PROCEDURE:  Stage II left nasal reconstruction consisting of:   1.  Left nasal skin and scar excision 25x3mm and underlying deep tissue.  2.  Left lateral nasal wall reconstruction.  3.  Left conchal cartilage harvest and transfer to the nose as a left alar batten graft.     4. Adjacent tissue transfer, left nasal skin using a superiorly based nasal alar and sidewall advancement flap 40x30 mm to reconstruct 25x3 mm cutaneous nasal defect.     OPERATIVE FINDINGS:  Left nasal defect with viable skin and intranasal skin graft. Absence of cartilage framework and lateral nasal wall support.  At the end of the procedure, the nasal sidewall support was restored with cartilage grafting, all tissue was 100% viable with adequate color and capillary refill, and the nasal valve was widely patient.      INDICATIONS:  Mr. 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  Tumor-free margins were obtained.  He has undergone stage 1 reconstruction with a local flap and intranasal skin grafting and presents today for stage 2 surgery to reconstruct the lateral nasal sidewall support with cartilage grafting. An extensive preoperative discussion was held.  The patient stated he understood the risks, benefits, alternatives and limitations of the procedure.  He also stated he had his questions answered to his satisfaction.  He wished to proceed with surgery.      DESCRIPTION OF PROCEDURE:   Internal Medicine After informed consent was obtained and placed in chart, the patient was brought to the operating room, placed in supine position.  After successful induction of general anesthesia, the patient was prepped and draped in the standard sterile fashion, which included injection of 1% lidocaine with 1:100,000 epinephrine at the proposed operative sites.       A time-out was performed.  The correct patient and procedure verified.  It was also confirmed that he was wearing functional pneumo boots, antibiotics given, his pressure points were adequately protected and his eyes were protected with tegaderms. We discussed the antibiotic dosing for Mr. Hernandez's creatine clearance. A fire safety plan was discussed with the anesthesia service.      Procedure began with excision of left nasal scars.  A 15-blade scalpel was used to incise around the periphery of the widened and depressed scars beveling outwards to promote eversion at closure. The deep aspect was excised in the subdermal plane. This tissue was sent for routine pathology.      Attention then turned towards the superiorly based nasal sidewall and alar advancement flap. A #15 blade scalpel used to incise around the remaining scars from the previous flap transfer to create a superiorly-based flap.  The flap was raised in the subcutaneous plane with a #15 blade scalpel.     Attention then turned to lateral nasal wall reconstruction. There was obstructing deep tissue from the previous flap transfer creating static nasal valve collapse due to the weight of the tissue. This tissue was excised maintaining adequate deep tissue thickness to preserve the blood supply to the previously transferred intranasal skin graft. Complete hemostasis was obtained with a bipolar cautery. The obstructing tissue was appropriately excised.     Attention then turned towards conchal cartilage harvest. A left postauricular incision was made with a #15 blade scalpel. Dissection continued down to  the cartilage. A #15 blade was then used to excise a segment of conchal cartilage preserving the antihelical rim, helical root, and remaining outside the external auditory meatus. Complete hemostasis was obtained with a bipolar cautery. The donor site was then closed in multiple layers using multiple interrupted 5-0 vicryl sutures in the deep layer, and running 5-0 fast absorbing sutures in the superficial layer. Through and through basting sutures were then placed to coapt the dead space between the tissue planes. A xeroform bolser was placed in the conchal bowl and secured with 3-0 Nylon suture.     The cartilage was then trimmed to the appropriate contour for an alar batten graft, inserted under the adjacent skin of the nose, and inset to the nose using multiple interrupted 5-0 chromic sutures. The intranasal tissue was 100% viable and intact. The nasal valve was widely patent. At this point, a sterilized custom made nasal stent made by our radiology department from [employer] biocompatatible photopolymer resin was inserted into the left nasal cavity to ensure adequate volume to counteract the contractile forces of the skin graft and scar. This inserted easily with an accurate fit to the surrounding tissue. It was secured to the septum with a silicone sheet bolster on the opposite side of the septum with 3-0 prolene suture, secured laterally with a 4-0 prolene suture through the nasofacial sulcus. There was no evidence of excess pressure on any of the tissue.     The flap was then advanced into the cutaneous nasal defect. The periphery of the flap was inset using multiple interrupted 5-0 Vicryl sutures in the deep layer and running 5-0 prolene subcuticular sutures in the superficial layer.     Ointment was applied to the incisions. At the end of the procedure, all tissue was 100% viable with adequate color and capillary refill.  Complete hemostasis was obtained.  No evidence of hematoma.  There is no  significant change in the nostril margin position.  The patient was returned to the care of the anesthesia service in stable condition.      Postoperative written and verbal instructions were given in detail.  He  will follow up next week for suture removal.  He has my direct number to call if he has questions or problems arise.      CAIN WYATT MD

## 2024-05-17 NOTE — PROGRESS NOTE ADULT - PROBLEM SELECTOR PROBLEM 3
A (CATHETER 5FR PGTL CRV 65CM 8 SH RADOPQ BRAID CALIBRATE SUP) catheter was used to selectively engage and inject the right common iliac artery by hand injection. Multiple views were taken. HIV (human immunodeficiency virus infection)

## 2024-11-08 NOTE — PROGRESS NOTE ADULT - PROBLEM/PLAN-5
Imaging Studies
DISPLAY PLAN FREE TEXT

## 2025-01-23 NOTE — ED ADULT TRIAGE NOTE - NS ED NOTE AC HIGH RISK COUNTRIES
[facility]   Topic Date Due    Pneumococcal 0-64 years Vaccine (1 of 2 - PCV) Never done    Varicella vaccine (1 of 2 - 13+ 2-dose series) Never done    Hepatitis B vaccine (1 of 3 - 19+ 3-dose series) Never done    Diabetes screen  09/19/2021    Lipids  Never done    Breast cancer screen  06/08/2023    Flu vaccine (1) 08/01/2024    COVID-19 Vaccine (1 - 2023-24 season) Never done    Depression Monitoring  01/30/2025    Cervical cancer screen  11/15/2025    DTaP/Tdap/Td vaccine (2 - Td or Tdap) 09/20/2034    Hepatitis C screen  Completed    HIV screen  Completed    Hepatitis A vaccine  Aged Out    Hib vaccine  Aged Out    HPV vaccine  Aged Out    Polio vaccine  Aged Out    Meningococcal (ACWY) vaccine  Aged Out             (applicable per patient's age: Cancer Screenings, Depression Screening, Fall Risk Screening, Immunizations)    Hemoglobin A1C (%)   Date Value   09/19/2018 5.3     AST (U/L)   Date Value   01/30/2024 23     ALT (U/L)   Date Value   01/30/2024 23     BUN (mg/dL)   Date Value   01/30/2024 11      (goal A1C is < 7)   (goal LDL is <100) need 30-50% reduction from baseline     BP Readings from Last 3 Encounters:   11/11/24 124/80   08/12/24 120/80   08/08/24 130/75    (goal /80)      All Future Testing planned in [facility]:  Lab Frequency Next Occurrence   RADIOFREQUENCY L/S ADDITIONAL Once 06/06/2024   FACET JOINT L/S SINGLE Once 06/06/2024   SARAH ALONZO DIGITAL SCREEN BILATERAL Once 08/12/2024       Next Visit Date:  Future Appointments   Date Time Provider Department Center   1/30/2025 11:45 AM Gerber [address] DO [facility]   2/4/2025  1:40 PM Jennifer Camacho DO [facility] DE[facility]   2/14/2025 12:30 PM [facility]            Patient Active Problem List:     Chronic neck pain     Onychomycosis     Chronic low back pain without sciatica     Chronic constipation     Alcohol abuse, in remission     Hidradenitis  No

## 2025-06-30 NOTE — CONSULT NOTE ADULT - CONSULT REASON
Established patient  Chart prep for upcoming appointment.    Any pending/incomplete orders from last visit? No, all orders completed.  Was patient called and reminded to complete pending orders? N/A orders complete  Were any records requested?  No    Referral up to date? Yes renewal ordered, sent to provider to co-sign, AND new referral attached to upcoming appointment.    Referral attached to appointment (renewals and New patients only)? Yes renewal ordered and sent to provider to co-sign   Virtual appointment? No    [facility] Morales [facility]'t  [facility]  Ph. [phone]  Fx. [phone]      
HIV management in setting of COVID infection